# Patient Record
Sex: MALE | Race: WHITE | Employment: OTHER | URBAN - METROPOLITAN AREA
[De-identification: names, ages, dates, MRNs, and addresses within clinical notes are randomized per-mention and may not be internally consistent; named-entity substitution may affect disease eponyms.]

---

## 2017-11-17 PROBLEM — L03.116 CELLULITIS OF LEFT FOOT: Status: ACTIVE | Noted: 2017-11-17

## 2018-03-16 ENCOUNTER — HOSPITAL ENCOUNTER (OUTPATIENT)
Dept: WOUND CARE | Age: 73
Discharge: HOME | End: 2018-03-16
Payer: MEDICARE

## 2018-03-16 VITALS
RESPIRATION RATE: 18 BRPM | HEART RATE: 56 BPM | TEMPERATURE: 97.6 F | WEIGHT: 315 LBS | HEIGHT: 75 IN | BODY MASS INDEX: 39.17 KG/M2 | SYSTOLIC BLOOD PRESSURE: 150 MMHG | DIASTOLIC BLOOD PRESSURE: 84 MMHG

## 2018-03-16 DIAGNOSIS — M86.672 CHRONIC OSTEOMYELITIS OF LEFT FOOT (HCC): ICD-10-CM

## 2018-03-16 PROCEDURE — 11042 DBRDMT SUBQ TIS 1ST 20SQCM/<: CPT

## 2018-03-16 PROCEDURE — 11045 DBRDMT SUBQ TISS EACH ADDL: CPT

## 2018-03-16 NOTE — PLAN OF CARE
Problem: Pain:  Goal: Pain level will decrease  Pain level will decrease   Outcome: Ongoing    Goal: Control of acute pain  Control of acute pain   Outcome: Ongoing    Goal: Control of chronic pain  Control of chronic pain   Outcome: Ongoing      Problem: Falls - Risk of  Goal: Absence of falls  Outcome: Ongoing      Problem: Wound:  Goal: Will show signs of wound healing; wound closure and no evidence of infection  Will show signs of wound healing; wound closure and no evidence of infection   Outcome: Ongoing      Problem: Blood Glucose:  Goal: Ability to maintain appropriate glucose levels will improve  Ability to maintain appropriate glucose levels will improve   Outcome: Ongoing      Problem: Venous:  Goal: Signs of wound healing will improve  Signs of wound healing will improve   Outcome: Ongoing      Problem: Compression therapy:  Goal: Will be free from complications associated with compression therapy  Will be free from complications associated with compression therapy   Outcome: Ongoing

## 2018-03-16 NOTE — PROGRESS NOTES
64       Diabetic Ulcer 03/23/16 Foot Plantar;Left #1 lt plantar foot  onset 3/23/15   diabetic  wag 2 (Active)   Belén-wound Assessment Pink 3/16/2018 10:19 AM   Belén-Wound Moisture Macerated 2/16/2018  9:24 AM   Belén-Wound Color Red 11/17/2017 10:18 AM   Diabetic Wound - Ladell Owen 2 7/21/2017 10:33 AM   Wound Assessment Red;Pink 3/16/2018 10:19 AM   Wound Length (cm) 2.5 cm 3/16/2018 11:13 AM   Wound Width (cm) 2.1 cm 3/16/2018 11:13 AM   Wound Depth (cm)  0 3/16/2018 11:13 AM   Calculated Wound Size (cm^2) (l*w) 5.25 cm^2 3/16/2018 11:13 AM   Change in Wound Size % (l*w) -1400 3/16/2018 11:13 AM   Culture Taken Yes 11/10/2017 10:51 AM   Dressing Status Clean;Dry; Intact 3/9/2018 11:00 AM   Dressing Changed Changed/New 3/9/2018 11:00 AM   Dressing/Treatment Dry dressing 3/9/2018 11:00 AM   Wound Cleansed Rinsed/Irrigated with saline 3/9/2018 11:00 AM   Dressing Change Due 02/05/18 2/2/2018 11:48 AM   Granulation Quality Red 2/23/2018  9:33 AM   Drainage Amount Moderate 3/16/2018 10:19 AM   Drainage Description Yellow 3/16/2018 10:19 AM   Odor None 3/16/2018 10:19 AM   Undermining Starts ___ O'Clock 11 1/19/2018 11:09 AM   Undermining Ends___ O'Clock 2 1/19/2018 11:09 AM   Undermining Maxium Distance (cm) .5 1/19/2018 11:09 AM   [place]%Wound Bed 70 2/3/2017 11:21 AM   Red%Wound Bed 80 2/23/2018  9:33 AM   Yellow%Wound Bed 20 2/23/2018  9:33 AM   Exposed structure Muscle 7/14/2017 10:38 AM   Debridement per physician Subcutaneous 2/23/2018 10:09 AM   Time out Yes 3/16/2018 11:13 AM   Procedural Pain 0 3/16/2018 11:13 AM   Post procedural Pain 1 9/29/2017 11:12 AM   Op First Treatment Date 10/07/16 10/7/2016 11:11 AM   Number of days: 723       Diabetic Ulcer 07/14/17 Toe (Comment which one) Left;Plantar #11 left second toe see I DFU acq: 7-7-17 (Active)   Belén-wound Assessment Pink 3/16/2018 10:19 AM   Belén-Wound Moisture Intact 2/23/2018  9:33 AM   Diabetic Wound - Ladell Owen 1 10/13/2017 10:41 AM   Wound

## 2018-03-23 ENCOUNTER — HOSPITAL ENCOUNTER (OUTPATIENT)
Dept: WOUND CARE | Age: 73
Discharge: HOME | End: 2018-03-23
Payer: MEDICARE

## 2018-03-23 VITALS
DIASTOLIC BLOOD PRESSURE: 80 MMHG | TEMPERATURE: 97.7 F | RESPIRATION RATE: 18 BRPM | SYSTOLIC BLOOD PRESSURE: 130 MMHG | HEART RATE: 56 BPM

## 2018-03-23 DIAGNOSIS — M86.672 CHRONIC OSTEOMYELITIS OF LEFT FOOT (HCC): ICD-10-CM

## 2018-03-23 PROCEDURE — 11042 DBRDMT SUBQ TIS 1ST 20SQCM/<: CPT

## 2018-03-23 ASSESSMENT — PAIN DESCRIPTION - FREQUENCY: FREQUENCY: INTERMITTENT

## 2018-03-23 NOTE — PROGRESS NOTES
(ADVIL;MOTRIN) 600 MG tablet Take 1 tablet by mouth every 8 hours as needed 120 tablet 3    diltiazem (CARDIZEM) 120 MG tablet Take 300 mg by mouth daily       carbidopa-levodopa (SINEMET)  MG per tablet Take 1 tablet by mouth 2 times daily Patient takes both in evening      pioglitazone (ACTOS) 45 MG tablet Take 1 tablet by mouth daily. 30 tablet 3    doxazosin (CARDURA) 8 MG tablet Take 1 tablet by mouth daily. 30 tablet 3    hydrALAZINE (APRESOLINE) 25 MG tablet Take 1 tablet by mouth 3 times daily. 90 tablet 3    atorvastatin (LIPITOR) 10 MG tablet Take 20 mg by mouth daily       potassium chloride SA (K-DUR;KLOR-CON M) 20 MEQ tablet Take 1 tablet by mouth daily (with breakfast) (Patient taking differently: Take 20 mEq by mouth daily (with breakfast) Patient \"takes it with lasix when I feel bloated. \") 60 tablet 3    furosemide (LASIX) 40 MG tablet Take 1 tablet by mouth daily. (Patient taking differently: Take 40 mg by mouth daily as needed ) 60 tablet 3     No current facility-administered medications on file prior to encounter.         REVIEW OF SYSTEMS See HPI    Objective:    /80   Pulse 56   Temp 97.7 °F (36.5 °C) (Oral)   Resp 18   Wt Readings from Last 3 Encounters:   03/16/18 (!) 350 lb (158.8 kg)   03/09/18 (!) 350 lb (158.8 kg)   02/16/18 (!) 350 lb (158.8 kg)     PHYSICAL EXAM  CONSTITUTIONAL:   Awake, alert, cooperative   EYES:  lids and lashes normal   ENT: external ears and nose without lesions   NECK:  supple, symmetrical, trachea midline   SKIN:  Open wound Present    Assessment:     Patient Active Problem List   Diagnosis Code    DM (diabetes mellitus) ([address] E11.9    HTN (hypertension) I10    Hyperlipidemia E78.5    Non-pressure chronic ulcer of left lower leg with fat layer exposed ([address] L97.922    Non-pressure chronic ulcer of left lower leg with fat layer exposed ([address] L97.922    Non-pressure chronic ulcer of other part of left foot with unspecified severity ([address] Wound Size (cm^2) (l*w) 2 cm^2 3/23/2018 11:14 AM   Change in Wound Size % (l*w) 44.44 3/23/2018 11:14 AM   Dressing Status Changed 3/16/2018 12:15 PM   Dressing Changed Changed/New 3/16/2018 12:15 PM   Dressing/Treatment Dry dressing 3/16/2018 12:15 PM   Wound Cleansed Rinsed/Irrigated with saline 3/16/2018 12:15 PM   Dressing Change Due 02/05/18 2/2/2018 11:48 AM   Granulation Quality Pink 9/15/2017 10:57 AM   Drainage Amount Scant 3/23/2018 10:40 AM   Drainage Description Yellow 3/23/2018 10:40 AM   Odor None 3/23/2018 10:40 AM   Undermining Starts ___ O'Clock 6 12/29/2017 10:31 AM   Undermining Ends___ O'Clock 7 12/29/2017 10:31 AM   Undermining Maxium Distance (cm) 0.6 12/29/2017 10:31 AM   [place]%Wound Bed 70 11/17/2017 10:18 AM   Red%Wound Bed 50 8/25/2017 10:36 AM   Yellow%Wound Bed 30 11/17/2017 10:18 AM   Debridement per physician Subcutaneous 2/23/2018 10:09 AM   Time out Yes 3/23/2018 11:14 AM   Procedural Pain 0 3/23/2018 11:14 AM   Post procedural Pain 0 11/22/2017 12:12 PM   Number of days: 252     Percent of Wound/Ulcer Debrided: 100%    Total Surface Area Debrided:  27 sq cm     Estimated Blood Loss:  Minimal  Hemostasis Achieved:  by pressure    Procedural Pain:  2  / 10   Post Procedural Pain:  2 / 10     Response to treatment:  Well tolerated by patient. Plan:   Treatment Note please see attached Discharge Instructions    Written patient dismissal instructions given to patient and signed by patient or POA. Discharge Instructions       Visit Discharge/Physician Orders      Discharge condition: Stable      Assessment of pain at discharge; no      Anesthetic used: 2%LIDOCAINE GEL      Discharge to:home      Left via:Private automobile      Accompanied by: Danica Mantilla  ECF/[facility]: [address] .          Dressing Orders:  CLEANSE LEFT LEG AND FOOT INCLUDING ULCERS WITH CLORPACTIN SOLUTION,   apply vianey, TO ALL ULCERS,LEFT PLANTAR FOOT, DORSAL FOOT, LEFT LATERAL LEG AND LEFT TOES, MAY WEAVE BETWEEN TOES TO PREVENT MACERATION DRY DRESSING AND SECURE, KERLEX , COBAN WRAP . APPLY BETADINE TO DRY AREAS ON TOES ON LEFT FOOT, THEN NYSTATIN POWDER TO LEFT FOOT, AND LEG INCLUDING ULCERS. SECURE ALL DRESSINGS AND CHANGE daily  MAY APPLY DESITIN CREAM TO AREA AROUND ULCER ON LEFT LEG      Treatment Orders:FOLLOW NUTRITIOUS DIET. CHOOSE FOODS HIGH IN PROTEIN -CHICKEN- FISH-AND EGGS, CHOOSE FOODS HIGH IN VITAMIN C. MULTIVITAMIN DAILY.        NON WEIGHT BEARING ON LEFT FOOT--OK TO BEAR WEIGHT ON HEEL        PLEASE CALL [facility] WITH ANY PROBLEMS [phone]/  [facility]  OR [phone]  [address] followup visit ____1 WEEK_____DR RUSSO____FRIDAY______________  Jacek Scriver  (Please note your next appointment above and if you are unable to keep, kindly give a 24 hour notice.  Thank you.)      Physician signature:__________________________  Zeinab Mares  If you experience any of the following, please call the [address] during business hours:      * Increase in Pain  * Temperature over 101  * Increase in drainage from your wound  * Drainage with a foul odor  * Bleeding  * Increase in swelling  * Need for compression bandage changes due to slippage, breakthrough drainage.      If you need medical attention outside of the business hours of the [address] please contact your PCP or go to the nearest emergency room        Electronically signed by Edgardo Ortega DPM on 3/23/2018 at 11:20 AM

## 2018-04-06 ENCOUNTER — HOSPITAL ENCOUNTER (OUTPATIENT)
Dept: WOUND CARE | Age: 73
Discharge: HOME | End: 2018-04-06
Payer: MEDICARE

## 2018-04-06 VITALS
BODY MASS INDEX: 39.17 KG/M2 | HEIGHT: 75 IN | DIASTOLIC BLOOD PRESSURE: 72 MMHG | HEART RATE: 64 BPM | WEIGHT: 315 LBS | TEMPERATURE: 97.7 F | SYSTOLIC BLOOD PRESSURE: 136 MMHG | RESPIRATION RATE: 20 BRPM

## 2018-04-06 DIAGNOSIS — M86.672 CHRONIC OSTEOMYELITIS OF LEFT FOOT (HCC): ICD-10-CM

## 2018-04-06 PROCEDURE — 11042 DBRDMT SUBQ TIS 1ST 20SQCM/<: CPT

## 2018-04-13 ENCOUNTER — HOSPITAL ENCOUNTER (OUTPATIENT)
Dept: WOUND CARE | Age: 73
Discharge: HOME | End: 2018-04-13
Payer: MEDICARE

## 2018-04-13 VITALS
DIASTOLIC BLOOD PRESSURE: 70 MMHG | TEMPERATURE: 97.6 F | RESPIRATION RATE: 18 BRPM | HEART RATE: 60 BPM | BODY MASS INDEX: 39.17 KG/M2 | WEIGHT: 315 LBS | SYSTOLIC BLOOD PRESSURE: 148 MMHG | HEIGHT: 75 IN

## 2018-04-13 DIAGNOSIS — M86.672 CHRONIC OSTEOMYELITIS OF LEFT FOOT (HCC): ICD-10-CM

## 2018-04-13 PROCEDURE — 11042 DBRDMT SUBQ TIS 1ST 20SQCM/<: CPT

## 2018-04-20 ENCOUNTER — HOSPITAL ENCOUNTER (OUTPATIENT)
Dept: WOUND CARE | Age: 73
Discharge: HOME | End: 2018-04-20
Payer: MEDICARE

## 2018-04-20 VITALS
RESPIRATION RATE: 18 BRPM | SYSTOLIC BLOOD PRESSURE: 142 MMHG | DIASTOLIC BLOOD PRESSURE: 70 MMHG | HEART RATE: 72 BPM | TEMPERATURE: 97.6 F

## 2018-04-20 DIAGNOSIS — M86.672 CHRONIC OSTEOMYELITIS OF LEFT FOOT (HCC): ICD-10-CM

## 2018-04-20 PROCEDURE — 11042 DBRDMT SUBQ TIS 1ST 20SQCM/<: CPT

## 2018-04-27 ENCOUNTER — HOSPITAL ENCOUNTER (OUTPATIENT)
Dept: WOUND CARE | Age: 73
Discharge: HOME | End: 2018-04-27
Payer: MEDICARE

## 2018-04-27 VITALS
DIASTOLIC BLOOD PRESSURE: 80 MMHG | RESPIRATION RATE: 18 BRPM | SYSTOLIC BLOOD PRESSURE: 150 MMHG | HEART RATE: 68 BPM | TEMPERATURE: 97.9 F

## 2018-04-27 DIAGNOSIS — M86.672 CHRONIC OSTEOMYELITIS OF LEFT FOOT (HCC): ICD-10-CM

## 2018-04-27 PROCEDURE — 11042 DBRDMT SUBQ TIS 1ST 20SQCM/<: CPT

## 2018-05-04 ENCOUNTER — HOSPITAL ENCOUNTER (OUTPATIENT)
Dept: WOUND CARE | Age: 73
Discharge: HOME | End: 2018-05-04
Payer: MEDICARE

## 2018-05-04 VITALS
BODY MASS INDEX: 39.17 KG/M2 | SYSTOLIC BLOOD PRESSURE: 145 MMHG | RESPIRATION RATE: 18 BRPM | HEIGHT: 75 IN | HEART RATE: 86 BPM | TEMPERATURE: 97.8 F | WEIGHT: 315 LBS | DIASTOLIC BLOOD PRESSURE: 70 MMHG

## 2018-05-04 DIAGNOSIS — M86.672 CHRONIC OSTEOMYELITIS OF LEFT FOOT (HCC): ICD-10-CM

## 2018-05-04 PROCEDURE — 11042 DBRDMT SUBQ TIS 1ST 20SQCM/<: CPT

## 2018-05-04 ASSESSMENT — PAIN DESCRIPTION - PAIN TYPE: TYPE: CHRONIC PAIN

## 2018-05-04 ASSESSMENT — PAIN DESCRIPTION - PROGRESSION: CLINICAL_PROGRESSION: NOT CHANGED

## 2018-05-04 ASSESSMENT — PAIN SCALES - GENERAL: PAINLEVEL_OUTOF10: 0

## 2018-05-11 ENCOUNTER — HOSPITAL ENCOUNTER (OUTPATIENT)
Dept: WOUND CARE | Age: 73
Discharge: HOME | End: 2018-05-11
Payer: MEDICARE

## 2018-05-11 VITALS
RESPIRATION RATE: 16 BRPM | HEIGHT: 75 IN | DIASTOLIC BLOOD PRESSURE: 70 MMHG | TEMPERATURE: 97.9 F | HEART RATE: 68 BPM | WEIGHT: 315 LBS | SYSTOLIC BLOOD PRESSURE: 118 MMHG | BODY MASS INDEX: 39.17 KG/M2

## 2018-05-11 DIAGNOSIS — M86.672 CHRONIC OSTEOMYELITIS OF LEFT FOOT (HCC): ICD-10-CM

## 2018-05-11 PROCEDURE — 11042 DBRDMT SUBQ TIS 1ST 20SQCM/<: CPT

## 2018-05-18 ENCOUNTER — HOSPITAL ENCOUNTER (OUTPATIENT)
Dept: WOUND CARE | Age: 73
Discharge: HOME | End: 2018-05-18
Payer: MEDICARE

## 2018-05-18 VITALS
RESPIRATION RATE: 18 BRPM | HEART RATE: 72 BPM | DIASTOLIC BLOOD PRESSURE: 80 MMHG | SYSTOLIC BLOOD PRESSURE: 142 MMHG | TEMPERATURE: 98 F

## 2018-05-18 DIAGNOSIS — E11.621 DIABETIC ULCER OF LEFT MIDFOOT ASSOCIATED WITH TYPE 2 DIABETES MELLITUS, WITH FAT LAYER EXPOSED (HCC): ICD-10-CM

## 2018-05-18 DIAGNOSIS — M86.672 CHRONIC OSTEOMYELITIS OF LEFT FOOT (HCC): ICD-10-CM

## 2018-05-18 DIAGNOSIS — L97.422 DIABETIC ULCER OF LEFT MIDFOOT ASSOCIATED WITH TYPE 2 DIABETES MELLITUS, WITH FAT LAYER EXPOSED (HCC): ICD-10-CM

## 2018-05-18 PROCEDURE — 11042 DBRDMT SUBQ TIS 1ST 20SQCM/<: CPT

## 2018-06-08 ENCOUNTER — HOSPITAL ENCOUNTER (OUTPATIENT)
Dept: WOUND CARE | Age: 73
Discharge: HOME | End: 2018-06-08

## 2018-06-22 ENCOUNTER — HOSPITAL ENCOUNTER (OUTPATIENT)
Dept: WOUND CARE | Age: 73
Discharge: HOME | End: 2018-06-22
Payer: MEDICARE

## 2018-06-22 VITALS
BODY MASS INDEX: 39.17 KG/M2 | TEMPERATURE: 97.4 F | DIASTOLIC BLOOD PRESSURE: 66 MMHG | WEIGHT: 315 LBS | HEIGHT: 75 IN | RESPIRATION RATE: 18 BRPM | HEART RATE: 55 BPM | SYSTOLIC BLOOD PRESSURE: 130 MMHG

## 2018-06-22 DIAGNOSIS — L97.422 DIABETIC ULCER OF LEFT MIDFOOT ASSOCIATED WITH TYPE 2 DIABETES MELLITUS, WITH FAT LAYER EXPOSED (HCC): ICD-10-CM

## 2018-06-22 DIAGNOSIS — M86.672 CHRONIC OSTEOMYELITIS OF LEFT FOOT (HCC): ICD-10-CM

## 2018-06-22 DIAGNOSIS — E11.621 DIABETIC ULCER OF LEFT MIDFOOT ASSOCIATED WITH TYPE 2 DIABETES MELLITUS, WITH FAT LAYER EXPOSED (HCC): ICD-10-CM

## 2018-06-22 PROCEDURE — 11042 DBRDMT SUBQ TIS 1ST 20SQCM/<: CPT

## 2018-06-22 ASSESSMENT — PAIN DESCRIPTION - PROGRESSION: CLINICAL_PROGRESSION: NOT CHANGED

## 2018-06-22 ASSESSMENT — PAIN SCALES - GENERAL: PAINLEVEL_OUTOF10: 0

## 2018-06-29 ENCOUNTER — HOSPITAL ENCOUNTER (OUTPATIENT)
Dept: WOUND CARE | Age: 73
Discharge: HOME | End: 2018-06-29
Payer: MEDICARE

## 2018-06-29 VITALS
WEIGHT: 315 LBS | RESPIRATION RATE: 18 BRPM | DIASTOLIC BLOOD PRESSURE: 82 MMHG | SYSTOLIC BLOOD PRESSURE: 160 MMHG | BODY MASS INDEX: 39.17 KG/M2 | HEART RATE: 72 BPM | HEIGHT: 75 IN | TEMPERATURE: 98 F

## 2018-06-29 DIAGNOSIS — E11.621 DIABETIC ULCER OF LEFT MIDFOOT ASSOCIATED WITH TYPE 2 DIABETES MELLITUS, WITH FAT LAYER EXPOSED (HCC): ICD-10-CM

## 2018-06-29 DIAGNOSIS — M86.672 CHRONIC OSTEOMYELITIS OF LEFT FOOT (HCC): ICD-10-CM

## 2018-06-29 DIAGNOSIS — L97.422 DIABETIC ULCER OF LEFT MIDFOOT ASSOCIATED WITH TYPE 2 DIABETES MELLITUS, WITH FAT LAYER EXPOSED (HCC): ICD-10-CM

## 2018-06-29 PROCEDURE — 11042 DBRDMT SUBQ TIS 1ST 20SQCM/<: CPT

## 2018-07-06 ENCOUNTER — HOSPITAL ENCOUNTER (OUTPATIENT)
Dept: WOUND CARE | Age: 73
Discharge: HOME | End: 2018-07-06
Payer: MEDICARE

## 2018-07-06 VITALS
BODY MASS INDEX: 39.17 KG/M2 | RESPIRATION RATE: 18 BRPM | TEMPERATURE: 97.9 F | HEIGHT: 75 IN | SYSTOLIC BLOOD PRESSURE: 110 MMHG | DIASTOLIC BLOOD PRESSURE: 58 MMHG | WEIGHT: 315 LBS | HEART RATE: 68 BPM

## 2018-07-06 DIAGNOSIS — L97.422 DIABETIC ULCER OF LEFT MIDFOOT ASSOCIATED WITH TYPE 2 DIABETES MELLITUS, WITH FAT LAYER EXPOSED (HCC): ICD-10-CM

## 2018-07-06 DIAGNOSIS — M86.672 CHRONIC OSTEOMYELITIS OF LEFT FOOT (HCC): ICD-10-CM

## 2018-07-06 DIAGNOSIS — E11.621 DIABETIC ULCER OF LEFT MIDFOOT ASSOCIATED WITH TYPE 2 DIABETES MELLITUS, WITH FAT LAYER EXPOSED (HCC): ICD-10-CM

## 2018-07-06 PROCEDURE — 11042 DBRDMT SUBQ TIS 1ST 20SQCM/<: CPT

## 2018-07-06 PROCEDURE — 11045 DBRDMT SUBQ TISS EACH ADDL: CPT

## 2018-07-06 NOTE — PROGRESS NOTES
(ADVIL;MOTRIN) 600 MG tablet Take 1 tablet by mouth every 8 hours as needed 120 tablet 3    potassium chloride SA (K-DUR;KLOR-CON M) 20 MEQ tablet Take 1 tablet by mouth daily (with breakfast) (Patient taking differently: Take 20 mEq by mouth daily (with breakfast) Patient \"takes it with lasix when I feel bloated. \") 60 tablet 3    diltiazem (CARDIZEM) 120 MG tablet Take 300 mg by mouth daily       carbidopa-levodopa (SINEMET)  MG per tablet Take 1 tablet by mouth 2 times daily Patient takes both in evening      pioglitazone (ACTOS) 45 MG tablet Take 1 tablet by mouth daily. 30 tablet 3    doxazosin (CARDURA) 8 MG tablet Take 1 tablet by mouth daily. 30 tablet 3    hydrALAZINE (APRESOLINE) 25 MG tablet Take 1 tablet by mouth 3 times daily. 90 tablet 3    furosemide (LASIX) 40 MG tablet Take 1 tablet by mouth daily. (Patient taking differently: Take 40 mg by mouth daily as needed ) 60 tablet 3    atorvastatin (LIPITOR) 10 MG tablet Take 20 mg by mouth daily        No current facility-administered medications on file prior to encounter.         REVIEW OF SYSTEMS See HPI    Objective:    BP (!) 110/58   Pulse 68   Temp 97.9 °F (36.6 °C) (Oral)   Resp 18   Ht 6' 3\" (1.905 m)   Wt (!) 350 lb (158.8 kg)   BMI 43.75 kg/m²   Wt Readings from Last 3 Encounters:   07/06/18 (!) 350 lb (158.8 kg)   06/29/18 (!) 350 lb (158.8 kg)   06/22/18 (!) 350 lb (158.8 kg)     PHYSICAL EXAM  CONSTITUTIONAL:   Awake, alert, cooperative   EYES:  lids and lashes normal   ENT: external ears and nose without lesions   NECK:  supple, symmetrical, trachea midline   SKIN:  Open wound Present    Assessment:     Problem List Items Addressed This Visit     Diabetic foot ulcer associated with type 2 diabetes mellitus, with fat layer exposed ([address]    Chronic osteomyelitis of left foot ([address]        Procedure Note  Indications:  Based on my examination of this patient's wound(s)/ulcer(s) today, debridement is required to promote healing and evaluate the wound base. Performed by: Merline Byers DPM    Consent obtained:  Yes    Time out taken:  Yes    Pain Control: Anesthetic  Anesthetic: 2% Lidocaine Gel Topical     Debridement:Excisional Debridement    Using curette the wound(s)/ulcer(s) was/were sharply debrided down through and including the removal of subcutaneous tissue. Devitalized Tissue Debrided:  fibrin, biofilm, slough and necrotic/eschar to stimulate bleeding to promote healing, post debridement good bleeding base and wound edges noted    Pre Debridement Measurements:  Are located in the Wound/Ulcer Documentation Flow Sheet    Wound/Ulcer #: 1, 2 and 3    Post Debridement Measurements:  Wound/Ulcer Descriptions are Pre Debridement except measurements:    Wound 03/23/16 Venous ulcer Leg Left;Posterior #3 lt lateral calf  onset 2/1/16  venous;  (Active)   Wound Image   6/29/2018  8:54 AM   Wound Venous 10/13/2017 10:41 AM   Dressing Status Clean;Dry; Intact 6/29/2018  9:28 AM   Dressing Changed Changed/New 6/29/2018  9:28 AM   Dressing/Treatment Dry dressing 6/29/2018  9:28 AM   Wound Cleansed Wound cleanser 6/29/2018  9:28 AM   Dressing Change Due 02/05/18 2/2/2018 11:48 AM   Wound Length (cm) 3.5 cm 7/6/2018  8:23 AM   Wound Width (cm) 6 cm 7/6/2018  8:23 AM   Wound Depth (cm)  0.2 7/6/2018  8:23 AM   Calculated Wound Size (cm^2) (l*w) 21 cm^2 7/6/2018  8:23 AM   Change in Wound Size % (l*w) 90 7/6/2018  8:23 AM   Wound Assessment Pink;Yellow; White;Gray 7/6/2018  8:23 AM   Drainage Amount Large 7/6/2018  8:23 AM   Drainage Description [place];Yellow 7/6/2018  8:23 AM   Odor Mild 7/6/2018  8:23 AM   Belén-wound Assessment Maceration;Gray 7/6/2018  8:23 AM   [place]%Wound Bed 60 1/19/2018 11:09 AM   Red%Wound Bed 70 2/23/2018  9:33 AM   Yellow%Wound Bed 30 2/23/2018  9:33 AM   Culture Taken Yes 3/9/2018 10:36 AM   Debridement per physician Subcutaneous 2/23/2018 10:09 AM   Time out Yes 6/29/2018  9:18 AM   Procedural Pain 0 5/18/2018  9:22 AM   Post procedural Pain 1 12/22/2017 11:19 AM   Number of days: 509       Wound 06/29/18 Other (Comment) Toe (Comment  which one) Left;Plantar;Other (Comment) #19 Tilley II second toe acq: 6-25-18 (Active)   Wound Image   6/29/2018  9:05 AM   Dressing Status Clean;Dry; Intact 6/29/2018  9:28 AM   Dressing Changed Changed/New 6/29/2018  9:28 AM   Dressing/Treatment Dry dressing 6/29/2018  9:28 AM   Wound Cleansed Wound cleanser 6/29/2018  9:28 AM   Wound Length (cm) 1.8 cm 7/6/2018  8:23 AM   Wound Width (cm) 2 cm 7/6/2018  8:23 AM   Wound Depth (cm)  0.1 7/6/2018  8:23 AM   Calculated Wound Size (cm^2) (l*w) 3.6 cm^2 7/6/2018  8:23 AM   Change in Wound Size % (l*w) -20 7/6/2018  8:23 AM   Wound Assessment Yola Favorite 7/6/2018  8:23 AM   Drainage Amount Large 7/6/2018  8:23 AM   Drainage Description Yellow;Green 7/6/2018  8:23 AM   Odor Mild 7/6/2018  8:23 AM   Belén-wound Assessment Maceration;Gray 7/6/2018  8:23 AM   Time out Yes 6/29/2018  9:18 AM   Number of days: 6       Diabetic Ulcer 03/23/16 Foot Plantar;Left #1 lt plantar foot  onset 3/23/15   diabetic  wag 2 (Active)   Belén-wound Assessment Maceration 7/6/2018  8:23 AM   Belén-Wound Moisture Denuded 6/22/2018  8:20 AM   Belén-Wound Color White 6/22/2018  8:20 AM   Diabetic Wound - Starlene Desanctis 2 7/21/2017 10:33 AM   Wound Assessment White;Yellow;Gray 7/6/2018  8:23 AM   Wound Length (cm) 1.8 cm 7/6/2018  8:23 AM   Wound Width (cm) 2.2 cm 7/6/2018  8:23 AM   Wound Depth (cm)  0.3 7/6/2018  8:23 AM   Calculated Wound Size (cm^2) (l*w) 3.96 cm^2 7/6/2018  8:23 AM   Change in Wound Size % (l*w) -1031.43 7/6/2018  8:23 AM   Culture Taken Yes 11/10/2017 10:51 AM   Dressing Status Clean;Dry; Intact 6/29/2018  9:28 AM   Dressing Changed Changed/New 6/29/2018  9:28 AM   Dressing/Treatment Dry dressing 6/29/2018  9:28 AM   Wound Cleansed Wound cleanser 6/29/2018  9:28 AM   Dressing Change Due 02/05/18 2/2/2018 11:48 AM   Granulation Quality Red 6/22/2018  8:20

## 2018-07-13 ENCOUNTER — HOSPITAL ENCOUNTER (OUTPATIENT)
Dept: WOUND CARE | Age: 73
Discharge: HOME | End: 2018-07-13
Payer: MEDICARE

## 2018-07-13 VITALS
TEMPERATURE: 97.6 F | HEIGHT: 75 IN | DIASTOLIC BLOOD PRESSURE: 68 MMHG | HEART RATE: 64 BPM | SYSTOLIC BLOOD PRESSURE: 138 MMHG | BODY MASS INDEX: 39.17 KG/M2 | RESPIRATION RATE: 18 BRPM | WEIGHT: 315 LBS

## 2018-07-13 DIAGNOSIS — M86.672 CHRONIC OSTEOMYELITIS OF LEFT FOOT (HCC): ICD-10-CM

## 2018-07-13 PROCEDURE — 11042 DBRDMT SUBQ TIS 1ST 20SQCM/<: CPT

## 2018-07-13 PROCEDURE — 11045 DBRDMT SUBQ TISS EACH ADDL: CPT

## 2018-07-13 NOTE — FLOWSHEET NOTE
Pt had maggots on left lateral leg when dressing was removed, maggots cleansed off of wound and vashe applied to area.

## 2018-07-13 NOTE — PROGRESS NOTES
[facility] Followup Visit Note    Referring Physician : Lucille Castleman, MD  Kenn Chahal  MEDICAL RECORD NUMBER:  [id number]  AGE: 72 y.o. GENDER: male  : 1945  EPISODE DATE:  2018    Subjective:     Chief Complaint   Patient presents with    Wound Check     left plantar, left lateral leg      HISTORY of PRESENT ILLNESS WAYNE Chahal is a 72 y.o. male who presents today for wound/ulcer evaluation.    History of Wound Context:  3 years     Wound/Ulcer Pain Timing/Severity: intermittent  Quality of pain: aching  Severity:  2 / 10   Modifying Factors: Pain worsens with walking  Associated Signs/Symptoms: edema, erythema, drainage and magots    Ulcer Identification:  Ulcer Type: venous and diabetic  Contributing Factors: edema, venous stasis, lymphedema, diabetes and poor glucose control    Diabetic/Pressure/Non Pressure Ulcers only:  Ulcer: Diabetic ulcer, fat layer exposed    Wound: N/A        PAST MEDICAL HISTORY      Diagnosis Date    Diabetes mellitus ([address]     Diabetic foot ulcer associated with type 2 diabetes mellitus, with fat layer exposed ([address] 2016    Hyperlipidemia     Hypertension      Past Surgical History:   Procedure Laterality Date    FOOT AMPUTATION      FOOT SURGERY Left 16    I&D    OTHER SURGICAL HISTORY  2014    left foot debridement, I&D exostectomy, bone biopsy; I&D right lower leg(BK stump site)    [facility]      has been shut off for 5 years     Family History   Problem Relation Age of Onset    Diabetes Mother      Social History   Substance Use Topics    Smoking status: Former Smoker    Smokeless tobacco: Current User     Types: Snuff    Alcohol use No     Allergies   Allergen Reactions    Zosyn [Piperacillin Sod-Tazobactam So] Itching    Morphine Rash    Vancomycin Rash     Current Outpatient Prescriptions on File Prior to Encounter   Medication Sig Dispense Refill    oxychlorosene (CLORPACTIN) powder Apply Jose Estevez DPM    Consent obtained:  Yes    Time out taken:  Yes    Pain Control: Anesthetic  Anesthetic: 2% Lidocaine Gel Topical     Debridement:Excisional Debridement    Using #15 blade scalpel the wound(s)/ulcer(s) was/were sharply debrided down through and including the removal of subcutaneous tissue. Devitalized Tissue Debrided:  fibrin, biofilm, slough and necrotic/eschar to stimulate bleeding to promote healing, post debridement good bleeding base and wound edges noted    Pre Debridement Measurements:  Are located in the Wound/Ulcer Documentation Flow Sheet    Wound/Ulcer #: 1, 2 and 3    Post Debridement Measurements:  Wound/Ulcer Descriptions are Pre Debridement except measurements:    Wound 03/23/16 Venous ulcer Leg Left;Posterior #3 lt lateral calf  onset 2/1/16  venous;  (Active)   Wound Image   6/29/2018  8:54 AM   Wound Venous 10/13/2017 10:41 AM   Dressing Status Clean;Dry; Intact 7/6/2018  9:03 AM   Dressing Changed Changed/New 7/6/2018  9:03 AM   Dressing/Treatment Dry dressing 7/6/2018  9:03 AM   Wound Cleansed Wound cleanser 7/6/2018  9:03 AM   Dressing Change Due 02/05/18 2/2/2018 11:48 AM   Wound Length (cm) 9.5 cm 7/13/2018  9:28 AM   Wound Width (cm) 6.9 cm 7/13/2018  9:28 AM   Wound Depth (cm)  0.2 7/13/2018  9:28 AM   Calculated Wound Size (cm^2) (l*w) 65.55 cm^2 7/13/2018  9:28 AM   Change in Wound Size % (l*w) 68.79 7/13/2018  9:28 AM   Wound Assessment Pink;Brown;Gray 7/13/2018  8:53 AM   Drainage Amount Large 7/13/2018  8:53 AM   Drainage Description Yellow;Green 7/13/2018  8:53 AM   Odor None 7/13/2018  8:53 AM   Belén-wound Assessment Maceration 7/13/2018  8:53 AM   [place]%Wound Bed 60 1/19/2018 11:09 AM   Red%Wound Bed 70 2/23/2018  9:33 AM   Yellow%Wound Bed 30 2/23/2018  9:33 AM   Culture Taken Yes 3/9/2018 10:36 AM   Debridement per physician Subcutaneous 2/23/2018 10:09 AM   Time out Yes 7/13/2018  9:28 AM   Procedural Pain 0 5/18/2018  9:22 AM   Post procedural Pain 1 12/22/2017 11:19 AM   Number of days: 841       Wound 06/29/18 Other (Comment) Toe (Comment  which one) Left;Plantar;Other (Comment) #19 Tilley II second toe acq: 6-25-18 (Active)   Wound Image   6/29/2018  9:05 AM   Dressing Status Clean;Dry; Intact 7/6/2018  9:03 AM   Dressing Changed Changed/New 7/6/2018  9:03 AM   Dressing/Treatment Dry dressing 7/6/2018  9:03 AM   Wound Cleansed Wound cleanser 7/6/2018  9:03 AM   Wound Length (cm) 2 cm 7/13/2018  9:28 AM   Wound Width (cm) 2.5 cm 7/13/2018  9:28 AM   Wound Depth (cm)  0.3 7/13/2018  9:28 AM   Calculated Wound Size (cm^2) (l*w) 5 cm^2 7/13/2018  9:28 AM   Change in Wound Size % (l*w) -66.67 7/13/2018  9:28 AM   Wound Assessment Pink;Yellow 7/13/2018  8:53 AM   Drainage Amount Large 7/13/2018  8:53 AM   Drainage Description Green 7/13/2018  8:53 AM   Odor None 7/13/2018  8:53 AM   Belén-wound Assessment Maceration;Pink 7/13/2018  8:53 AM   Time out Yes 7/13/2018  9:28 AM   Number of days: 14       Diabetic Ulcer 03/23/16 Foot Plantar;Left #1 lt plantar foot  onset 3/23/15   diabetic  wag 2 (Active)   Bleén-wound Assessment [place] 7/13/2018  8:53 AM   Belén-Wound Moisture Denuded 6/22/2018  8:20 AM   Bel[place]-Wound Color White 6/22/2018  8:20 AM   Diabetic Wound - Shelvia Lips 2 7/21/2017 10:33 AM   Wound Assessment Pink;Red;Yellow 7/13/2018  8:53 AM   Wound Length (cm) 1.2 cm 7/13/2018  9:28 AM   Wound Width (cm) 2 cm 7/13/2018  9:28 AM   Wound Depth (cm)  0.3 7/13/2018  9:28 AM   Calculated Wound Size (cm^2) (l*w) 2.4 cm^2 7/13/2018  9:28 AM   Change in Wound Size % (l*w) -585.71 7/13/2018  9:28 AM   Culture Taken Yes 11/10/2017 10:51 AM   Dressing Status Clean;Dry; Intact 7/6/2018  9:03 AM   Dressing Changed Changed/New 7/6/2018  9:03 AM   Dressing/Treatment Dry dressing 7/6/2018  9:03 AM   Wound Cleansed Wound cleanser 7/6/2018  9:03 AM   Dressing Change Due 02/05/18 2/2/2018 11:48 AM   Granulation Quality Red 6/22/2018  8:20 AM   Drainage Amount Moderate 7/13/2018  8:53 AM EGGS, CHOOSE FOODS HIGH IN VITAMIN C. MULTIVITAMIN DAILY.        NON WEIGHT BEARING ON LEFT FOOT--OK TO BEAR WEIGHT ON HEEL        PLEASE CALL [facility] WITH ANY PROBLEMS [phone]/  [facility]  OR [phone]  [facility]      WILL ORDER SUPPLIES FROM [address] followup visit ____one week____FRIDAY_  Andrea Dubose  (Please note your next appointment above and if you are unable to keep, kindly give a 24 hour notice.  Thank you.)      Physician signature:__________________________  Dior Turner  If you experience any of the following, please call the [address] during business hours:      * Increase in Pain  * Temperature over 101  * Increase in drainage from your wound  * Drainage with a foul odor  * Bleeding  * Increase in swelling  * Need for compression bandage changes due to slippage, breakthrough drainage.      If you need medical attention outside of the business hours of the [address] please contact your PCP or go to the nearest emergency room        Electronically signed by Gaby Hodges DPM on 7/13/2018 at 9:33 AM

## 2018-07-20 ENCOUNTER — HOSPITAL ENCOUNTER (OUTPATIENT)
Dept: WOUND CARE | Age: 73
Discharge: HOME | End: 2018-07-20
Payer: MEDICARE

## 2018-07-20 ENCOUNTER — HOSPITAL ENCOUNTER (OUTPATIENT)
Age: 73
Discharge: HOME | End: 2018-07-22
Payer: MEDICARE

## 2018-07-20 VITALS
RESPIRATION RATE: 18 BRPM | DIASTOLIC BLOOD PRESSURE: 58 MMHG | WEIGHT: 315 LBS | HEIGHT: 75 IN | SYSTOLIC BLOOD PRESSURE: 152 MMHG | TEMPERATURE: 98.2 F | HEART RATE: 72 BPM | BODY MASS INDEX: 39.17 KG/M2

## 2018-07-20 DIAGNOSIS — M86.672 CHRONIC OSTEOMYELITIS OF LEFT FOOT (HCC): ICD-10-CM

## 2018-07-20 PROCEDURE — 87070 CULTURE OTHR SPECIMN AEROBIC: CPT

## 2018-07-20 PROCEDURE — 11045 DBRDMT SUBQ TISS EACH ADDL: CPT

## 2018-07-20 PROCEDURE — 11042 DBRDMT SUBQ TIS 1ST 20SQCM/<: CPT

## 2018-07-20 PROCEDURE — 87205 SMEAR GRAM STAIN: CPT

## 2018-07-20 PROCEDURE — 87075 CULTR BACTERIA EXCEPT BLOOD: CPT

## 2018-07-20 NOTE — PLAN OF CARE
Problem: Falls - Risk of  Goal: Absence of falls  Outcome: Ongoing      Problem: Wound:  Goal: Will show signs of wound healing; wound closure and no evidence of infection  Will show signs of wound healing; wound closure and no evidence of infection   Outcome: Ongoing      Problem: Blood Glucose:  Goal: Ability to maintain appropriate glucose levels will improve  Ability to maintain appropriate glucose levels will improve   Outcome: Ongoing      Problem: Venous:  Goal: Signs of wound healing will improve  Signs of wound healing will improve   Outcome: Ongoing      Problem: Compression therapy:  Goal: Will be free from complications associated with compression therapy  Will be free from complications associated with compression therapy   Outcome: Ongoing

## 2018-07-20 NOTE — PROGRESS NOTES
tablet Take 1 tablet by mouth every 8 hours as needed 120 tablet 3    potassium chloride SA (K-DUR;KLOR-CON M) 20 MEQ tablet Take 1 tablet by mouth daily (with breakfast) (Patient taking differently: Take 20 mEq by mouth daily (with breakfast) Patient \"takes it with lasix when I feel bloated. \") 60 tablet 3    diltiazem (CARDIZEM) 120 MG tablet Take 300 mg by mouth daily       carbidopa-levodopa (SINEMET)  MG per tablet Take 1 tablet by mouth 2 times daily Patient takes both in evening      pioglitazone (ACTOS) 45 MG tablet Take 1 tablet by mouth daily. 30 tablet 3    doxazosin (CARDURA) 8 MG tablet Take 1 tablet by mouth daily. 30 tablet 3    hydrALAZINE (APRESOLINE) 25 MG tablet Take 1 tablet by mouth 3 times daily. 90 tablet 3    furosemide (LASIX) 40 MG tablet Take 1 tablet by mouth daily. (Patient taking differently: Take 40 mg by mouth daily as needed ) 60 tablet 3    atorvastatin (LIPITOR) 10 MG tablet Take 20 mg by mouth daily        No current facility-administered medications on file prior to encounter. REVIEW OF SYSTEMS See HPI    Objective:    BP (!) 152/58   Pulse 72   Temp 98.2 °F (36.8 °C) (Oral)   Resp 18   Ht 6' 3\" (1.905 m)   Wt (!) 350 lb (158.8 kg)   BMI 43.75 kg/m²   Wt Readings from Last 3 Encounters:   07/20/18 (!) 350 lb (158.8 kg)   07/13/18 (!) 350 lb (158.8 kg)   07/06/18 (!) 350 lb (158.8 kg)     PHYSICAL EXAM  CONSTITUTIONAL:   Awake, alert, cooperative   EYES:  lids and lashes normal   ENT: external ears and nose without lesions   NECK:  supple, symmetrical, trachea midline   SKIN:  Open wound Present    Assessment:     Problem List Items Addressed This Visit     Chronic osteomyelitis of left foot ([address]        Procedure Note  Indications:  Based on my examination of this patient's wound(s)/ulcer(s) today, debridement is required to promote healing and evaluate the wound base.     Performed by: Hank Saul DPM    Consent obtained:  Yes    Time out taken: Yes    Pain Control: Anesthetic  Anesthetic: 2% Lidocaine Gel Topical     Debridement:Excisional Debridement    Using #15 blade scalpel the wound(s)/ulcer(s) was/were sharply debrided down through and including the removal of subcutaneous tissue. Devitalized Tissue Debrided:  fibrin, biofilm, slough and necrotic/eschar to stimulate bleeding to promote healing, post debridement good bleeding base and wound edges noted    Pre Debridement Measurements:  Are located in the Wound/Ulcer Documentation Flow Sheet    Wound/Ulcer #: 1, 2 and 3    Post Debridement Measurements:  Wound/Ulcer Descriptions are Pre Debridement except measurements:    Wound 03/23/16 Venous ulcer Leg Left;Posterior #3 lt lateral calf  onset 2/1/16  venous;  (Active)   Wound Image   6/29/2018  8:54 AM   Wound Venous 10/13/2017 10:41 AM   Dressing Status Clean;Dry; Intact 7/13/2018  9:35 AM   Dressing Changed Changed/New 7/13/2018  9:35 AM   Dressing/Treatment Dry dressing 7/13/2018  9:35 AM   Wound Cleansed Wound cleanser 7/13/2018  9:35 AM   Dressing Change Due 02/05/18 2/2/2018 11:48 AM   Wound Length (cm) 11.8 cm 7/20/2018 10:00 AM   Wound Width (cm) 7.9 cm 7/20/2018 10:00 AM   Wound Depth (cm)  0.2 7/20/2018 10:00 AM   Calculated Wound Size (cm^2) (l*w) 93.22 cm^2 7/20/2018 10:00 AM   Change in Wound Size % (l*w) 55.61 7/20/2018 10:00 AM   Wound Assessment Pale;Pink;Red 7/20/2018  8:52 AM   Drainage Amount Copious 7/20/2018  8:52 AM   Drainage Description Green;Yellow 7/20/2018  8:52 AM   Odor None 7/20/2018  8:52 AM   Belén-wound Assessment Maceration 7/20/2018  8:52 AM   [place]%Wound Bed 60 1/19/2018 11:09 AM   Red%Wound Bed 70 2/23/2018  9:33 AM   Yellow%Wound Bed 30 2/23/2018  9:33 AM   Culture Taken Yes 3/9/2018 10:36 AM   Debridement per physician Subcutaneous 2/23/2018 10:09 AM   Time out Yes 7/20/2018 10:00 AM   Procedural Pain 0 5/18/2018  9:22 AM   Post procedural Pain 1 12/22/2017 11:19 AM   Number of days: 291       Wound

## 2018-07-24 LAB
ANAEROBIC CULTURE: ABNORMAL
ANAEROBIC CULTURE: ABNORMAL
ORGANISM: ABNORMAL

## 2018-07-27 ENCOUNTER — APPOINTMENT (OUTPATIENT)
Dept: ULTRASOUND IMAGING | Age: 73
DRG: 623 | End: 2018-07-27
Payer: MEDICARE

## 2018-07-27 ENCOUNTER — APPOINTMENT (OUTPATIENT)
Dept: GENERAL RADIOLOGY | Age: 73
DRG: 623 | End: 2018-07-27
Payer: MEDICARE

## 2018-07-27 ENCOUNTER — APPOINTMENT (OUTPATIENT)
Dept: CT IMAGING | Age: 73
DRG: 623 | End: 2018-07-27
Payer: MEDICARE

## 2018-07-27 ENCOUNTER — HOSPITAL ENCOUNTER (INPATIENT)
Age: 73
LOS: 5 days | Discharge: NURSING, REHAB OR LONG-TERM CARE | DRG: 623 | End: 2018-08-01
Attending: EMERGENCY MEDICINE | Admitting: HOSPITALIST
Payer: MEDICARE

## 2018-07-27 ENCOUNTER — HOSPITAL ENCOUNTER (OUTPATIENT)
Dept: WOUND CARE | Age: 73
Discharge: HOME | DRG: 623 | End: 2018-07-27
Payer: MEDICARE

## 2018-07-27 VITALS
HEART RATE: 72 BPM | DIASTOLIC BLOOD PRESSURE: 80 MMHG | RESPIRATION RATE: 16 BRPM | WEIGHT: 315 LBS | HEIGHT: 75 IN | TEMPERATURE: 98.2 F | SYSTOLIC BLOOD PRESSURE: 146 MMHG | BODY MASS INDEX: 39.17 KG/M2

## 2018-07-27 DIAGNOSIS — L02.416 CELLULITIS AND ABSCESS OF LEFT LEG: Primary | ICD-10-CM

## 2018-07-27 DIAGNOSIS — L03.116 CELLULITIS AND ABSCESS OF LEFT LEG: Primary | ICD-10-CM

## 2018-07-27 DIAGNOSIS — Z78.9 FAILURE OF OUTPATIENT TREATMENT: ICD-10-CM

## 2018-07-27 DIAGNOSIS — M86.672 CHRONIC OSTEOMYELITIS OF LEFT FOOT (HCC): ICD-10-CM

## 2018-07-27 LAB
ALBUMIN SERPL-MCNC: 3.8 G/DL (ref 3.5–5.2)
ALP BLD-CCNC: 145 U/L (ref 40–129)
ALT SERPL-CCNC: 11 U/L (ref 0–40)
ANION GAP SERPL CALCULATED.3IONS-SCNC: 14 MMOL/L (ref 7–16)
ANTISTREPTOLYSIN-O: 540 IU/ML (ref 0–200)
AST SERPL-CCNC: 17 U/L (ref 0–39)
BACTERIA: NORMAL /HPF
BASOPHILS ABSOLUTE: 0 E9/L (ref 0–0.2)
BASOPHILS RELATIVE PERCENT: 0 % (ref 0–2)
BILIRUB SERPL-MCNC: 1.1 MG/DL (ref 0–1.2)
BILIRUBIN URINE: NEGATIVE
BLOOD, URINE: ABNORMAL
BUN BLDV-MCNC: 20 MG/DL (ref 8–23)
C-REACTIVE PROTEIN: 18.3 MG/DL (ref 0–0.4)
CALCIUM SERPL-MCNC: 8.8 MG/DL (ref 8.6–10.2)
CASTS: NORMAL /LPF
CHLORIDE BLD-SCNC: 98 MMOL/L (ref 98–107)
CLARITY: CLEAR
CO2: 25 MMOL/L (ref 22–29)
COLOR: YELLOW
CREAT SERPL-MCNC: 1.1 MG/DL (ref 0.7–1.2)
EOSINOPHILS ABSOLUTE: 0.09 E9/L (ref 0.05–0.5)
EOSINOPHILS RELATIVE PERCENT: 1 % (ref 0–6)
EPITHELIAL CELLS, UA: NORMAL /HPF
GFR AFRICAN AMERICAN: >60
GFR NON-AFRICAN AMERICAN: >60 ML/MIN/1.73
GLUCOSE BLD-MCNC: 124 MG/DL (ref 74–109)
GLUCOSE URINE: NEGATIVE MG/DL
HCT VFR BLD CALC: 33.6 % (ref 37–54)
HEMOGLOBIN: 11 G/DL (ref 12.5–16.5)
KETONES, URINE: NEGATIVE MG/DL
LACTIC ACID, SEPSIS: 1.1 MMOL/L (ref 0.5–1.9)
LACTIC ACID, SEPSIS: 1.1 MMOL/L (ref 0.5–1.9)
LEUKOCYTE ESTERASE, URINE: NEGATIVE
LIPASE: 37 U/L (ref 13–60)
LYMPHOCYTES ABSOLUTE: 0.18 E9/L (ref 1.5–4)
LYMPHOCYTES RELATIVE PERCENT: 2 % (ref 20–42)
MAGNESIUM: 2 MG/DL (ref 1.6–2.6)
MCH RBC QN AUTO: 28.6 PG (ref 26–35)
MCHC RBC AUTO-ENTMCNC: 32.7 % (ref 32–34.5)
MCV RBC AUTO: 87.5 FL (ref 80–99.9)
METER GLUCOSE: 215 MG/DL (ref 70–110)
MONOCYTES ABSOLUTE: 0.62 E9/L (ref 0.1–0.95)
MONOCYTES RELATIVE PERCENT: 7 % (ref 2–12)
NEUTROPHILS ABSOLUTE: 8.01 E9/L (ref 1.8–7.3)
NEUTROPHILS RELATIVE PERCENT: 90 % (ref 43–80)
NITRITE, URINE: NEGATIVE
PDW BLD-RTO: 15.5 FL (ref 11.5–15)
PH UA: 5.5 (ref 5–9)
PLATELET # BLD: 193 E9/L (ref 130–450)
PMV BLD AUTO: 9.9 FL (ref 7–12)
POTASSIUM REFLEX MAGNESIUM: 3.5 MMOL/L (ref 3.5–5)
PROTEIN UA: 100 MG/DL
RBC # BLD: 3.84 E12/L (ref 3.8–5.8)
RBC # BLD: NORMAL 10*6/UL
RBC UA: NORMAL /HPF (ref 0–2)
SEDIMENTATION RATE, ERYTHROCYTE: 83 MM/HR (ref 0–15)
SODIUM BLD-SCNC: 137 MMOL/L (ref 132–146)
SPECIFIC GRAVITY UA: >=1.03 (ref 1–1.03)
TOTAL PROTEIN: 8.8 G/DL (ref 6.4–8.3)
UROBILINOGEN, URINE: 1 E.U./DL
WBC # BLD: 8.9 E9/L (ref 4.5–11.5)
WBC UA: NORMAL /HPF (ref 0–5)

## 2018-07-27 PROCEDURE — 85651 RBC SED RATE NONAUTOMATED: CPT

## 2018-07-27 PROCEDURE — 0JBP0ZZ EXCISION OF LEFT LOWER LEG SUBCUTANEOUS TISSUE AND FASCIA, OPEN APPROACH: ICD-10-PCS | Performed by: PODIATRIST

## 2018-07-27 PROCEDURE — 83605 ASSAY OF LACTIC ACID: CPT

## 2018-07-27 PROCEDURE — 80053 COMPREHEN METABOLIC PANEL: CPT

## 2018-07-27 PROCEDURE — 0JBR0ZZ EXCISION OF LEFT FOOT SUBCUTANEOUS TISSUE AND FASCIA, OPEN APPROACH: ICD-10-PCS | Performed by: PODIATRIST

## 2018-07-27 PROCEDURE — 1200000000 HC SEMI PRIVATE

## 2018-07-27 PROCEDURE — 83735 ASSAY OF MAGNESIUM: CPT

## 2018-07-27 PROCEDURE — 87070 CULTURE OTHR SPECIMN AEROBIC: CPT

## 2018-07-27 PROCEDURE — 99285 EMERGENCY DEPT VISIT HI MDM: CPT

## 2018-07-27 PROCEDURE — 87088 URINE BACTERIA CULTURE: CPT

## 2018-07-27 PROCEDURE — 86060 ANTISTREPTOLYSIN O TITER: CPT

## 2018-07-27 PROCEDURE — 11045 DBRDMT SUBQ TISS EACH ADDL: CPT

## 2018-07-27 PROCEDURE — 86140 C-REACTIVE PROTEIN: CPT

## 2018-07-27 PROCEDURE — 6370000000 HC RX 637 (ALT 250 FOR IP): Performed by: INTERNAL MEDICINE

## 2018-07-27 PROCEDURE — 2580000003 HC RX 258: Performed by: EMERGENCY MEDICINE

## 2018-07-27 PROCEDURE — 81001 URINALYSIS AUTO W/SCOPE: CPT

## 2018-07-27 PROCEDURE — 85025 COMPLETE CBC W/AUTO DIFF WBC: CPT

## 2018-07-27 PROCEDURE — 11042 DBRDMT SUBQ TIS 1ST 20SQCM/<: CPT

## 2018-07-27 PROCEDURE — 6360000002 HC RX W HCPCS: Performed by: SPECIALIST

## 2018-07-27 PROCEDURE — 93971 EXTREMITY STUDY: CPT

## 2018-07-27 PROCEDURE — 83690 ASSAY OF LIPASE: CPT

## 2018-07-27 PROCEDURE — 73700 CT LOWER EXTREMITY W/O DYE: CPT

## 2018-07-27 PROCEDURE — 36415 COLL VENOUS BLD VENIPUNCTURE: CPT

## 2018-07-27 PROCEDURE — 2580000003 HC RX 258: Performed by: SPECIALIST

## 2018-07-27 PROCEDURE — 87040 BLOOD CULTURE FOR BACTERIA: CPT

## 2018-07-27 PROCEDURE — 71045 X-RAY EXAM CHEST 1 VIEW: CPT

## 2018-07-27 PROCEDURE — 82962 GLUCOSE BLOOD TEST: CPT

## 2018-07-27 PROCEDURE — 99223 1ST HOSP IP/OBS HIGH 75: CPT | Performed by: INTERNAL MEDICINE

## 2018-07-27 PROCEDURE — 2580000003 HC RX 258: Performed by: INTERNAL MEDICINE

## 2018-07-27 RX ORDER — SODIUM CHLORIDE 0.9 % (FLUSH) 0.9 %
10 SYRINGE (ML) INJECTION PRN
Status: DISCONTINUED | OUTPATIENT
Start: 2018-07-27 | End: 2018-08-01 | Stop reason: HOSPADM

## 2018-07-27 RX ORDER — ONDANSETRON 2 MG/ML
4 INJECTION INTRAMUSCULAR; INTRAVENOUS EVERY 6 HOURS PRN
Status: DISCONTINUED | OUTPATIENT
Start: 2018-07-27 | End: 2018-08-01 | Stop reason: HOSPADM

## 2018-07-27 RX ORDER — IBUPROFEN 600 MG/1
600 TABLET ORAL EVERY 8 HOURS PRN
Status: DISCONTINUED | OUTPATIENT
Start: 2018-07-27 | End: 2018-08-01 | Stop reason: HOSPADM

## 2018-07-27 RX ORDER — DEXTROSE MONOHYDRATE 50 MG/ML
100 INJECTION, SOLUTION INTRAVENOUS PRN
Status: DISCONTINUED | OUTPATIENT
Start: 2018-07-27 | End: 2018-08-01 | Stop reason: HOSPADM

## 2018-07-27 RX ORDER — 0.9 % SODIUM CHLORIDE 0.9 %
1000 INTRAVENOUS SOLUTION INTRAVENOUS ONCE
Status: COMPLETED | OUTPATIENT
Start: 2018-07-27 | End: 2018-07-27

## 2018-07-27 RX ORDER — FUROSEMIDE 40 MG/1
40 TABLET ORAL DAILY
Status: DISCONTINUED | OUTPATIENT
Start: 2018-07-27 | End: 2018-08-01 | Stop reason: HOSPADM

## 2018-07-27 RX ORDER — DOXAZOSIN MESYLATE 4 MG/1
8 TABLET ORAL NIGHTLY
Status: DISCONTINUED | OUTPATIENT
Start: 2018-07-27 | End: 2018-08-01 | Stop reason: HOSPADM

## 2018-07-27 RX ORDER — SODIUM CHLORIDE 0.9 % (FLUSH) 0.9 %
10 SYRINGE (ML) INJECTION EVERY 12 HOURS SCHEDULED
Status: DISCONTINUED | OUTPATIENT
Start: 2018-07-27 | End: 2018-08-01 | Stop reason: HOSPADM

## 2018-07-27 RX ORDER — DEXTROSE MONOHYDRATE 25 G/50ML
12.5 INJECTION, SOLUTION INTRAVENOUS PRN
Status: DISCONTINUED | OUTPATIENT
Start: 2018-07-27 | End: 2018-08-01 | Stop reason: HOSPADM

## 2018-07-27 RX ORDER — ATORVASTATIN CALCIUM 20 MG/1
20 TABLET, FILM COATED ORAL NIGHTLY
Status: DISCONTINUED | OUTPATIENT
Start: 2018-07-27 | End: 2018-08-01 | Stop reason: HOSPADM

## 2018-07-27 RX ORDER — HYDRALAZINE HYDROCHLORIDE 25 MG/1
25 TABLET, FILM COATED ORAL 3 TIMES DAILY
Status: DISCONTINUED | OUTPATIENT
Start: 2018-07-27 | End: 2018-08-01 | Stop reason: HOSPADM

## 2018-07-27 RX ORDER — DILTIAZEM HYDROCHLORIDE 240 MG/1
240 CAPSULE, COATED, EXTENDED RELEASE ORAL NIGHTLY
Status: DISCONTINUED | OUTPATIENT
Start: 2018-07-27 | End: 2018-08-01 | Stop reason: HOSPADM

## 2018-07-27 RX ORDER — NICOTINE POLACRILEX 4 MG
15 LOZENGE BUCCAL PRN
Status: DISCONTINUED | OUTPATIENT
Start: 2018-07-27 | End: 2018-08-01 | Stop reason: HOSPADM

## 2018-07-27 RX ADMIN — Medication 10 ML: at 22:46

## 2018-07-27 RX ADMIN — INSULIN LISPRO 1 UNITS: 100 INJECTION, SOLUTION INTRAVENOUS; SUBCUTANEOUS at 22:48

## 2018-07-27 RX ADMIN — SODIUM CHLORIDE 1000 ML: 9 INJECTION, SOLUTION INTRAVENOUS at 16:51

## 2018-07-27 RX ADMIN — SODIUM CHLORIDE 1000 MG: 900 INJECTION INTRAVENOUS at 18:33

## 2018-07-27 RX ADMIN — DOXAZOSIN MESYLATE 8 MG: 4 TABLET ORAL at 22:46

## 2018-07-27 RX ADMIN — IBUPROFEN 600 MG: 600 TABLET ORAL at 22:47

## 2018-07-27 RX ADMIN — CARBIDOPA AND LEVODOPA 2 TABLET: 25; 100 TABLET ORAL at 22:47

## 2018-07-27 RX ADMIN — HYDRALAZINE HYDROCHLORIDE 25 MG: 25 TABLET ORAL at 22:46

## 2018-07-27 RX ADMIN — ATORVASTATIN CALCIUM 20 MG: 20 TABLET, FILM COATED ORAL at 22:47

## 2018-07-27 RX ADMIN — DILTIAZEM HYDROCHLORIDE 240 MG: 240 CAPSULE, EXTENDED RELEASE ORAL at 22:47

## 2018-07-27 ASSESSMENT — PAIN DESCRIPTION - LOCATION
LOCATION: LEG
LOCATION: LEG

## 2018-07-27 ASSESSMENT — PAIN DESCRIPTION - ORIENTATION
ORIENTATION: LEFT
ORIENTATION: LEFT

## 2018-07-27 ASSESSMENT — PAIN SCALES - GENERAL
PAINLEVEL_OUTOF10: 5
PAINLEVEL_OUTOF10: 6

## 2018-07-27 ASSESSMENT — PAIN DESCRIPTION - PAIN TYPE
TYPE: ACUTE PAIN
TYPE: ACUTE PAIN

## 2018-07-27 NOTE — PROGRESS NOTES
Database complete. Medications reconciled. Care plans and education initiated. Pacemaker to chest that is inactive. Right lower leg amputation and NWB to LLE.

## 2018-07-27 NOTE — ED NOTES
Report faxed to [phone], notified Naomy Montano RN at ext [phone], patient chimed for transfer to room [facility]6 [phone] via brina Jacobo RN  07/27/18 4427

## 2018-07-27 NOTE — ED PROVIDER NOTES
HPI:  7/27/18, Time: 4:15 PM        Alcira Gonzalez is a 72 y.o. male presenting to the ED for worsening wound conditions, beginning 2 weeks ago ago. The complaint has been persistent, severe in severity, and worsened by nothing. Patient's does have history of osteomyelitis chronically dating back to 2 years ago;  he is being followed by wound care but was actually sent here from wound care because of worsening condition of his left leg. Patient is also a history of severe peripheral vascular disease status post right below the knee amputation. Patient denies any fevers chills sweating nausea vomiting diarrhea or any change in bowel or bladder habit patterns and he denies any chest heaviness/pressure tightness or pain. Patient has had worsening drainage from the affected left leg and increased redness extending to the mid thigh area. No relieving factors are reported symptoms worsen by any palpation or pressure and ambulation. Review of Systems:   Pertinent positives and negatives are stated within HPI, all other systems reviewed and are negative.      --------------------------------------------- PAST HISTORY ---------------------------------------------  Past Medical History:  has a past medical history of Diabetes mellitus ([facility] Diabetic foot ulcer associated with type 2 diabetes mellitus, with fat layer exposed [facility] Hyperlipidemia; and Hypertension. Past Surgical History:  has a past surgical history that includes Foot Amputation; other surgical history (9/20/2014); Foot surgery (Left, 5/29/16); and pacemaker placement. Social History:  reports that he quit smoking about 34 years ago. His smoking use included Cigarettes. He started smoking about 55 years ago. He smoked 3.00 packs per day. He has quit using smokeless tobacco. His smokeless tobacco use included Snuff. He reports that he does not drink alcohol or use drugs.     Family History: family history includes Cancer in his brother; Diabetes in ED encounter and vital signs as below have been reviewed. BP (!) 175/67   Pulse 86   Temp 99.1 °F (37.3 °C) (Oral)   Resp 20   Ht 6' 4\" (1.93 m)   Wt (!) 350 lb (158.8 kg)   SpO2 98%   BMI 42.60 kg/m²   Oxygen Saturation Interpretation: Normal      ---------------------------------------------------PHYSICAL EXAM--------------------------------------      Constitutional/General: Alert and oriented x3, well appearing, non toxic in NAD  Head: Normocephalic and atraumatic  Eyes: PERRL, EOMI  Mouth: Oropharynx clear, handling secretions, no trismus  Neck: Supple, full ROM, Trachea midline no JVD no bruits  Pulmonary: Lungs clear to auscultation bilaterally, no wheezes, rales, or rhonchi. Not in respiratory distress  Cardiovascular:  Regular rate and rhythm, no murmurs, gallops, or rubs. 2+ distal pulses  Abdomen: Soft, non tender, obesely distended, no apparent tenderness no rebound tenderness no guarding no rigidity normal bowel sounds  Extremities: Right below-the-knee\" with prosthesis in place. Patient has macerated tissue of the left foot with wound drainage which appears to be foul-smelling and is markedly erythematous. Patient had red streaking to the mid thigh which frisbee consistent with cellulitis  Skin: See above; no petechia and no purpura no target lesions no bullae   Neurologic: GCS 15, CN's 2-12 grossly intact, no focal deficits. Psych: Normal Affect; no signs of depression nor psychosis.    ------------------------------ ED COURSE/MEDICAL DECISION MAKING----------------------  Medications   ertapenem (INVANZ) 1 g IVPB minibag (1,000 mg Intravenous New Bag 7/27/18 1833)   0.9 % sodium chloride bolus (0 mLs Intravenous Stopped 7/27/18 1825)     ED COURSE:     Medical Decision Making:   Differential Diagnoses:  Cellulitis, Wound Abscess, Osteomyelitis, Bacteremia, Metabolic/Electrolyte disorder, Lymphedema, to name a few. Counseling:   The emergency provider has spoken with the patient and spouse/SO and discussed todays results, in addition to providing specific details for the plan of care and counseling regarding the diagnosis and prognosis. Questions are answered at this time and they are agreeable with the plan. SIRS CRITERIA:    Temp >38 C (100.4 F) or <36 C (96.8 F)   NO     Heart Rate >90   NO     Resp. Rate >20 or PaCO2 < 32 mmHg   NO     Altered Mental Status   NO     WBC <4K or >12K  or >10% Bands   NO     Total:   0   * Two or more above criteria met? No*  * Infection Source determined? Yes*    SEPTIC SHOCK CRITERIA:  SBP <90 or 40 reduction from baseline      despite adequate fluid resuscitation. No    MODS CRITERIA:  Altered organ function in an acutely ill  person that requires intervention. No  Systems affected:        LACTIC ACID    Initial                ordered  Follow - up if initial abnormal             not indicated    FLUIDS  Saline 30 ml/kg given              No  (Septic SHOCK or lactic > 4)                         No    CENTRAL LINE INSERTED? No       3-Hour Sepsis Re-examination  7/27/18   5:23 PM          Vital Signs:   Vitals:    07/27/18 1355 07/27/18 1414 07/27/18 1649 07/27/18 1842   BP: (!) 222/98 (!) 180/66 (!) 163/68 (!) 175/67   Pulse: 82 86 84 86   Resp: 20 20 18 20   Temp: 98.1 °F (36.7 °C) 98.2 °F (36.8 °C) 98.8 °F (37.1 °C) 99.1 °F (37.3 °C)   TempSrc: Oral Oral Oral Oral   SpO2: 95% 96% 97% 98%   Weight: (!) 350 lb (158.8 kg)      Height: 6' 4\" (1.93 m)        Card/Pulm:  Rhythm: normal rate. Heart Sounds: no murmurs, gallops, or rubs. clear to auscultation, no wheezes or rales and unlabored breathing. Capillary Refill: normal.  Radial Pulse:  present 2+.   Skin:  Upper extremities dry, warm; still w/ wound maceration and drainage LLE, unchanged from initial exam..    Consults:  Spoke w/ Dr. Chencho Maynard (Infectious Disease Specialist) @ 16:16 hours and he stated that he would advise administering Cefepime 2g IV iONLY f the patient had an elevated WBC and to perform routine cultures as were previously ordered. Dr. Jose Alejandro Walters came down to the ED and saw the patient independently @ 17:15 and at this time, based on his opinion no anabiotic orders were given. Later find out that Dr. Jose Alejandro Walters put in anabiotic orders himself however and I did relay this to the admitting physician and the patient will be admitted to the medicine floor under the hospitalist service.     --------------------------------- IMPRESSION AND DISPOSITION ---------------------------------    IMPRESSION  1. Cellulitis and abscess of left leg    2. Failure of outpatient treatment        DISPOSITION  Disposition: Admit to med/surg floor  Patient condition is stable      NOTE: This report was transcribed using voice recognition software.  Every effort was made to ensure accuracy; however, inadvertent computerized transcription errors may be present        Keyshawn Bucio MD  07/27/18 9704

## 2018-07-27 NOTE — CONSULTS
[address] [place] [address] [facility]  Consultation Note     Admit Date: 7/27/2018  1:52 PM    Reason for Consult:   Cellulitis    Attending Physician:  Boni Yeh MD    HISTORY OF PRESENT ILLNESS:             The history is obtained from extensive review of available past medical records. The patient is a 72 y.o. male who is known to the ID service. The patient has a history of diabetes, right below-the-knee amputation and a chronic lymphedema over the left foot and distal leg. He was last treated in December 2017 for cellulitis and wound infection. Patient was doing relatively well. He continues to follow with Podiatry at the wound clinic. He says that he took a road trip down to the [facility] around May 2018. He has noticed that he is left leg has become somewhat reddened slightly tender. He has not had any fevers. A culture was done a week ago showing anaerobic gram-negative rods and mixed syed. He was not treated with any antibiotics. He went to see his podiatrist today. He decided to send him to the ER for further treatment. Past Medical History:        Diagnosis Date    Diabetes mellitus ([address]     Diabetic foot ulcer associated with type 2 diabetes mellitus, with fat layer exposed ([facility] 9/16/2016    Hyperlipidemia     Hypertension      November 2017. Admitted briefly to [facility]. Had a few wound infection with cellulitis. Cultures grew multiple organisms, including Proteus mirabilis, Pseudomonas aeruginosa, Klebsiella oxytoca, Enterococcus faecalis and group B streptococcus. Patient was treated with Meropenem and Daptomycin. Meropenem was too expensive so this was switched over to Cefepime. Patient finished IV antibiotics at home. Followed up in the office and did well. May 2016. Admitted to the hospital and seen by Dr. Ray Alexander. Cultures of the foot grew pseudomonas aeruginosa and coagulase-negative Staphylococcus.  He was treated with Vancomycin and Cefepime.     April 2016. Admitted to [facility]. Seen by ID for cellulitis of the left lower extremity. The port of entry. To be a chronic ulcer on the plantar aspect of the left lateral foot. Cultures grew mixed syed. He was treated with Cefazolin and Clindamycin, followed by oral Cephalexin and clindamycin.     September 2014. Admitted for a limb threatening diabetic foot infection secondary to group B streptococcus. He had positive blood cultures with group B streptococcus. He was treated with Vancomycin and Ertapenem and narrowed to Aztreonam because of a suspected allergy to Zosyn. Past Surgical History:        Procedure Laterality Date    FOOT AMPUTATION      FOOT SURGERY Left 5/29/16    I&D    OTHER SURGICAL HISTORY  9/20/2014    left foot debridement, I&D exostectomy, bone biopsy; I&D right lower leg(BK stump site)    PACEMAKER PLACEMENT      has been shut off for 5 years     Current Medications:   Scheduled Meds:    Continuous Infusions:  PRN Meds:    Allergies:  Zosyn [piperacillin sod-tazobactam so]; Morphine; and Vancomycin    Social History:   Social History     Social History    Marital status:      Spouse name: N/A    Number of children: N/A    Years of education: N/A     Occupational History    RETIRED      Social History Main Topics    Smoking status: Former Smoker    Smokeless tobacco: Current User     Types: Snuff    Alcohol use No    Drug use: No    Sexual activity: No     Other Topics Concern    None     Social History Narrative    None      Pets: Dogs and cats  Travel: Patient took a road trip with his wife to the [facility] in May 2018    Family History:   Family History   Problem Relation Age of Onset    Diabetes Mother    . Otherwise non-pertinent to the chief complaint.     REVIEW OF SYSTEMS:    Constitutional: Negative for fevers, chills, diaphoresis  Neurologic: Negative   Psychiatric: Negative  Rheumatologic: Negative   Endocrine: Diabetes, under Date    ALT 11 07/27/2018    AST 17 07/27/2018    ALKPHOS 145 (H) 07/27/2018    BILITOT 1.1 07/27/2018     Lab Results   Component Value Date     07/27/2018    K 3.5 07/27/2018    CL 98 07/27/2018    CO2 25 07/27/2018    BUN 20 07/27/2018    CREATININE 1.1 07/27/2018    GFRAA >60 07/27/2018    LABGLOM >60 07/27/2018    GLUCOSE 124 07/27/2018    PROT 8.8 07/27/2018    LABALBU 3.8 07/27/2018    CALCIUM 8.8 07/27/2018    BILITOT 1.1 07/27/2018    ALKPHOS 145 07/27/2018    AST 17 07/27/2018    ALT 11 07/27/2018       Lab Results   Component Value Date    PROTIME 13.1 05/27/2016    INR 1.2 05/27/2016       No results found for: TSH    Lab Results   Component Value Date    COLORU Yellow 07/27/2018    PHUR 5.5 07/27/2018    LABCAST RARE 07/27/2018    WBCUA 0-1 07/27/2018    RBCUA 2-5 07/27/2018    BACTERIA NONE 07/27/2018    CLARITYU Clear 07/27/2018    SPECGRAV >=1.030 07/27/2018    LEUKOCYTESUR Negative 07/27/2018    UROBILINOGEN 1.0 07/27/2018    BILIRUBINUR Negative 07/27/2018    BLOODU TRACE-INTACT 07/27/2018    GLUCOSEU Negative 07/27/2018       Radiology:  Noted    Microbiology:  Pending  No results for input(s): BC in the last 72 hours. No results for input(s): ORG in the last 72 hours. No results for input(s): Emily Zheng in the last 72 hours. No results for input(s): STREPNEUMAGU in the last 72 hours. No results for input(s): LP1UAG in the last 72 hours. No results for input(s): ASO in the last 72 hours. No results for input(s): CULTRESP in the last 72 hours. Assessment:  · Chronic left diabetic foot ulcer plantar aspect. It is clean  · Chronic wounds associated to venous stasis and lymphedema left leg  · Wound infection left leg with cellulitis  · History of intolerance to Zosyn and Vancomycin    Plan:    · Start Ertapenem  · Check cultures, baseline ESR, CRP, ASO titer  · Wound care  · Will follow with you    Thank you for having us see this patient in consultation.  The case was discussed with the ED provider.     Jed Gillette  5:32 PM  7/27/2018

## 2018-07-28 LAB
ALBUMIN SERPL-MCNC: 3.3 G/DL (ref 3.5–5.2)
ALP BLD-CCNC: 137 U/L (ref 40–129)
ALT SERPL-CCNC: <5 U/L (ref 0–40)
ANION GAP SERPL CALCULATED.3IONS-SCNC: 13 MMOL/L (ref 7–16)
AST SERPL-CCNC: 18 U/L (ref 0–39)
BASOPHILS ABSOLUTE: 0.02 E9/L (ref 0–0.2)
BASOPHILS RELATIVE PERCENT: 0.3 % (ref 0–2)
BILIRUB SERPL-MCNC: 0.8 MG/DL (ref 0–1.2)
BUN BLDV-MCNC: 18 MG/DL (ref 8–23)
CALCIUM SERPL-MCNC: 8.4 MG/DL (ref 8.6–10.2)
CHLORIDE BLD-SCNC: 100 MMOL/L (ref 98–107)
CO2: 23 MMOL/L (ref 22–29)
CREAT SERPL-MCNC: 1 MG/DL (ref 0.7–1.2)
EOSINOPHILS ABSOLUTE: 0.05 E9/L (ref 0.05–0.5)
EOSINOPHILS RELATIVE PERCENT: 0.7 % (ref 0–6)
GFR AFRICAN AMERICAN: >60
GFR NON-AFRICAN AMERICAN: >60 ML/MIN/1.73
GLUCOSE BLD-MCNC: 115 MG/DL (ref 74–109)
HCT VFR BLD CALC: 30.9 % (ref 37–54)
HEMOGLOBIN: 10 G/DL (ref 12.5–16.5)
IMMATURE GRANULOCYTES #: 0.07 E9/L
IMMATURE GRANULOCYTES %: 0.9 % (ref 0–5)
LACTIC ACID: 1 MMOL/L (ref 0.5–2.2)
LYMPHOCYTES ABSOLUTE: 0.49 E9/L (ref 1.5–4)
LYMPHOCYTES RELATIVE PERCENT: 6.6 % (ref 20–42)
MAGNESIUM: 2 MG/DL (ref 1.6–2.6)
MCH RBC QN AUTO: 28.3 PG (ref 26–35)
MCHC RBC AUTO-ENTMCNC: 32.4 % (ref 32–34.5)
MCV RBC AUTO: 87.5 FL (ref 80–99.9)
METER GLUCOSE: 120 MG/DL (ref 70–110)
METER GLUCOSE: 146 MG/DL (ref 70–110)
METER GLUCOSE: 147 MG/DL (ref 70–110)
METER GLUCOSE: 158 MG/DL (ref 70–110)
MONOCYTES ABSOLUTE: 0.88 E9/L (ref 0.1–0.95)
MONOCYTES RELATIVE PERCENT: 11.8 % (ref 2–12)
NEUTROPHILS ABSOLUTE: 5.95 E9/L (ref 1.8–7.3)
NEUTROPHILS RELATIVE PERCENT: 79.7 % (ref 43–80)
PDW BLD-RTO: 15.3 FL (ref 11.5–15)
PLATELET # BLD: 182 E9/L (ref 130–450)
PMV BLD AUTO: 9.7 FL (ref 7–12)
POTASSIUM REFLEX MAGNESIUM: 3.3 MMOL/L (ref 3.5–5)
RBC # BLD: 3.53 E12/L (ref 3.8–5.8)
RBC # BLD: NORMAL 10*6/UL
SODIUM BLD-SCNC: 136 MMOL/L (ref 132–146)
TOTAL PROTEIN: 7.9 G/DL (ref 6.4–8.3)
WBC # BLD: 7.5 E9/L (ref 4.5–11.5)

## 2018-07-28 PROCEDURE — 6370000000 HC RX 637 (ALT 250 FOR IP): Performed by: INTERNAL MEDICINE

## 2018-07-28 PROCEDURE — 99232 SBSQ HOSP IP/OBS MODERATE 35: CPT | Performed by: INTERNAL MEDICINE

## 2018-07-28 PROCEDURE — 80053 COMPREHEN METABOLIC PANEL: CPT

## 2018-07-28 PROCEDURE — 2580000003 HC RX 258: Performed by: INTERNAL MEDICINE

## 2018-07-28 PROCEDURE — 82962 GLUCOSE BLOOD TEST: CPT

## 2018-07-28 PROCEDURE — 85025 COMPLETE CBC W/AUTO DIFF WBC: CPT

## 2018-07-28 PROCEDURE — 6360000002 HC RX W HCPCS: Performed by: SPECIALIST

## 2018-07-28 PROCEDURE — 83605 ASSAY OF LACTIC ACID: CPT

## 2018-07-28 PROCEDURE — 2580000003 HC RX 258: Performed by: SPECIALIST

## 2018-07-28 PROCEDURE — 6360000002 HC RX W HCPCS: Performed by: INTERNAL MEDICINE

## 2018-07-28 PROCEDURE — 83735 ASSAY OF MAGNESIUM: CPT

## 2018-07-28 PROCEDURE — 36415 COLL VENOUS BLD VENIPUNCTURE: CPT

## 2018-07-28 PROCEDURE — 1200000000 HC SEMI PRIVATE

## 2018-07-28 RX ORDER — POTASSIUM CHLORIDE 20 MEQ/1
40 TABLET, EXTENDED RELEASE ORAL ONCE
Status: COMPLETED | OUTPATIENT
Start: 2018-07-28 | End: 2018-07-28

## 2018-07-28 RX ADMIN — IBUPROFEN 600 MG: 600 TABLET ORAL at 07:28

## 2018-07-28 RX ADMIN — Medication 10 ML: at 08:30

## 2018-07-28 RX ADMIN — HYDRALAZINE HYDROCHLORIDE 25 MG: 25 TABLET ORAL at 13:33

## 2018-07-28 RX ADMIN — DILTIAZEM HYDROCHLORIDE 240 MG: 240 CAPSULE, EXTENDED RELEASE ORAL at 20:31

## 2018-07-28 RX ADMIN — HYDRALAZINE HYDROCHLORIDE 25 MG: 25 TABLET ORAL at 08:30

## 2018-07-28 RX ADMIN — POTASSIUM CHLORIDE 40 MEQ: 20 TABLET, EXTENDED RELEASE ORAL at 11:48

## 2018-07-28 RX ADMIN — HYDRALAZINE HYDROCHLORIDE 25 MG: 25 TABLET ORAL at 20:31

## 2018-07-28 RX ADMIN — INSULIN LISPRO 1 UNITS: 100 INJECTION, SOLUTION INTRAVENOUS; SUBCUTANEOUS at 12:32

## 2018-07-28 RX ADMIN — ENOXAPARIN SODIUM 40 MG: 40 INJECTION, SOLUTION INTRAVENOUS; SUBCUTANEOUS at 08:30

## 2018-07-28 RX ADMIN — ATORVASTATIN CALCIUM 20 MG: 20 TABLET, FILM COATED ORAL at 20:30

## 2018-07-28 RX ADMIN — SODIUM CHLORIDE, PRESERVATIVE FREE 10 ML: 5 INJECTION INTRAVENOUS at 11:45

## 2018-07-28 RX ADMIN — INSULIN LISPRO 1 UNITS: 100 INJECTION, SOLUTION INTRAVENOUS; SUBCUTANEOUS at 17:04

## 2018-07-28 RX ADMIN — SODIUM CHLORIDE 1000 MG: 900 INJECTION INTRAVENOUS at 17:04

## 2018-07-28 RX ADMIN — IBUPROFEN 600 MG: 600 TABLET ORAL at 20:31

## 2018-07-28 RX ADMIN — FUROSEMIDE 40 MG: 40 TABLET ORAL at 08:30

## 2018-07-28 RX ADMIN — CARBIDOPA AND LEVODOPA 2 TABLET: 25; 100 TABLET ORAL at 22:12

## 2018-07-28 RX ADMIN — Medication 10 ML: at 20:32

## 2018-07-28 RX ADMIN — DOXAZOSIN MESYLATE 8 MG: 4 TABLET ORAL at 20:31

## 2018-07-28 RX ADMIN — INSULIN LISPRO 1 UNITS: 100 INJECTION, SOLUTION INTRAVENOUS; SUBCUTANEOUS at 20:36

## 2018-07-28 ASSESSMENT — PAIN SCALES - GENERAL
PAINLEVEL_OUTOF10: 0
PAINLEVEL_OUTOF10: 6
PAINLEVEL_OUTOF10: 6

## 2018-07-28 ASSESSMENT — PAIN DESCRIPTION - ORIENTATION
ORIENTATION: LEFT
ORIENTATION: LEFT

## 2018-07-28 ASSESSMENT — PAIN DESCRIPTION - LOCATION
LOCATION: LEG
LOCATION: LEG

## 2018-07-28 ASSESSMENT — PAIN DESCRIPTION - FREQUENCY: FREQUENCY: INTERMITTENT

## 2018-07-28 ASSESSMENT — PAIN DESCRIPTION - DESCRIPTORS
DESCRIPTORS: ACHING;DISCOMFORT
DESCRIPTORS: ACHING;DISCOMFORT

## 2018-07-28 ASSESSMENT — PAIN DESCRIPTION - PAIN TYPE
TYPE: ACUTE PAIN
TYPE: ACUTE PAIN

## 2018-07-28 ASSESSMENT — PAIN DESCRIPTION - ONSET: ONSET: ON-GOING

## 2018-07-28 NOTE — PLAN OF CARE
Problem: Nutrition  Goal: Optimal nutrition therapy  Outcome: Ongoing  Nutrition Problem: Increased nutrient needs  Intervention: Food and/or Nutrient Delivery: Continue current diet, Start ONS (Ivan BID, Ensure HP BID)  Nutritional Goals: Consume >75% meals/ONS

## 2018-07-28 NOTE — PROGRESS NOTES
[address] [place] [address] [facility]  Progress Note    SUBJECTIVE:  Chief Complaint   Patient presents with    Wound Check     left leg, was told he had to come to er to be evaluated by ID     Patient is tolerating medications. No reported adverse drug reactions. No nausea, vomiting, diarrhea. No new c/o, states that he feels a little better. Review of systems as above, otherwise negative. Medications:  Scheduled Meds:   potassium chloride  40 mEq Oral Once    ertapenem (INVANZ) IVPB  1 g Intravenous Q24H    atorvastatin  20 mg Oral Nightly    doxazosin  8 mg Oral Nightly    hydrALAZINE  25 mg Oral TID    furosemide  40 mg Oral Daily    carbidopa-levodopa  2 tablet Oral Nightly    clorpactin 2 G irrigation  2 g Irrigation Daily    sodium chloride flush  10 mL Intravenous 2 times per day    enoxaparin  40 mg Subcutaneous Daily    insulin lispro  0-6 Units Subcutaneous TID WC    insulin lispro  0-3 Units Subcutaneous Nightly    diltiazem  240 mg Oral Nightly     Continuous Infusions:   dextrose       PRN Meds:ibuprofen, sodium chloride flush, magnesium hydroxide, ondansetron, glucose, dextrose, glucagon (rDNA), dextrose    OBJECTIVE:  BP (!) 146/68   Pulse 69   Temp 97.8 °F (36.6 °C) (Oral)   Resp 18   Ht 6' 4\" (1.93 m)   Wt (!) 377 lb 14.4 oz (171.4 kg)   SpO2 97%   BMI 46.00 kg/m²   Temp  Av.6 °F (37 °C)  Min: 97.8 °F (36.6 °C)  Max: 99.7 °F (37.6 °C)  Constitutional: The patient is awake, alert, and oriented. Skin: Warm and dry. No rashes were noted. HEENT: Eyes show round, and reactive pupils. No jaundice. Moist mucous membranes, no ulcerations, no thrush. Neck: Supple to movements. No lymphadenopathy. Chest: No use of accessory muscles to breathe. Symmetrical expansion. Auscultation reveals no wheezing, crackles, or rhonchi. Cardiovascular: S1 and S2 are rhythmic and regular. No murmurs appreciated. Abdomen: Positive bowel sounds to auscultation. thrombosis in  the left lower extremity    7/27/18 CT left foot:   1. 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 2. Ulceration and at plantar aspect of the foot with dense attenuating  tissue extending to the plantar aspect of the tarsal bones with  irregular periosteal thickening which could suggest chronic  osteomyelitis at this site. No definite evidence to suggest acute  osteomyelitis. Three-phase bone scan or MRI with and without contrast  may be helpful for further evaluation. 3. Generalized soft tissue edema with severe generalized skin  Thickening.    7/27/18 CXR: Stable pulmonary vascular congestion. No obvious pneumonia or pleural effusion. Microbiology:   No new cultures  7/27/18 BC pending  7/27/18 Urine pending  7/27/18 wound pending    Recent Labs      07/27/18   1829   ASO  540*       ASSESSMENT:  · Chronic left diabetic foot ulcer plantar aspect. It is clean  · Chronic wounds associated to venous stasis and lymphedema left leg  · Wound infection left leg with cellulitis  · History of intolerance to Zosyn and Vancomycin    PLAN:  · Continue Ertapenem  · Continue wound care  · Follow up cultures  · Monitor labs    PAPA Marie  11:01 AM  7/28/2018     Patient seen and examined. I had a face to face encounter with the patient. Agree with exam, assessment and plan as outlined above. Addition and corrections were done as deemed appropriate. My exam and plan include: Patient is tolerating current antibiotic regimen. There is some evidence of clinical improvement. Awaiting for results of cultures. Elevated ASO titer is suggestive of a beta hemolytic Streptococcal infection.     Jose Whipple  7/28/2018

## 2018-07-28 NOTE — PROGRESS NOTES
Unable to perform dressing change to LLE at this time d/t not enough [place] [employer] available. Awaiting supply from [facility] this afternoon.

## 2018-07-28 NOTE — H&P
questions appropriately and cooperative with exam  HEENT:  Mucous membranes moist. No erythema, rhinorrhea, or post-nasal drip noted. Neck:  No carotid bruits. Heart:  Rhythm regular at rate of 92  Lungs:  CTA. No wheeze, rales, or rhonchi  Abdomen:  Positive bowel sounds positive. Soft. Non-tender. No guarding, rebound or rigidity. Breast/Rectal/Genitourinary: not pertinent. Extremities:  positive for 1-2+ left lower extremity edema  Skin:  Warm and dry, erythema extending from foot to above knee. lle very warm to touch  Vascular: 2/4 Dorsalis Pedis pulses bilaterally. Neuro:  Cranial nerves 2-12 grossly intact, no focal weakness or change in sensation noted. Extraocular muscles intact. Pupils equal, round, reactive to light. LABS:  Recent Labs      07/27/18   1601   NA  137   K  3.5   CL  98   CO2  25   BUN  20   CREATININE  1.1   GLUCOSE  124*   CALCIUM  8.8       Recent Labs      07/27/18   1601   WBC  8.9   RBC  3.84   HGB  11.0*   HCT  33.6*   MCV  87.5   MCH  28.6   MCHC  32.7   RDW  15.5*   PLT  193   MPV  9.9       No results for input(s): POCGLU in the last 72 hours.     CBC with Differential:    Lab Results   Component Value Date    WBC 8.9 07/27/2018    RBC 3.84 07/27/2018    HGB 11.0 07/27/2018    HCT 33.6 07/27/2018     07/27/2018    MCV 87.5 07/27/2018    MCH 28.6 07/27/2018    MCHC 32.7 07/27/2018    RDW 15.5 07/27/2018    NRBC 0.0 11/23/2016    BANDSPCT 2 09/19/2014    LYMPHOPCT 2.0 07/27/2018    MONOPCT 7.0 07/27/2018    BASOPCT 0.0 07/27/2018    MONOSABS 0.62 07/27/2018    LYMPHSABS 0.18 07/27/2018    EOSABS 0.09 07/27/2018    BASOSABS 0.00 07/27/2018     CMP:    Lab Results   Component Value Date     07/27/2018    K 3.5 07/27/2018    CL 98 07/27/2018    CO2 25 07/27/2018    BUN 20 07/27/2018    CREATININE 1.1 07/27/2018    GFRAA >60 07/27/2018    LABGLOM >60 07/27/2018    GLUCOSE 124 07/27/2018    PROT 8.8 07/27/2018    LABALBU 3.8 07/27/2018    CALCIUM 8.8 2018    BILITOT 1.1 2018    ALKPHOS 145 2018    AST 17 2018    ALT 11 2018     Magnesium:    Lab Results   Component Value Date    MG 2.0 2018     U/A:    Lab Results   Component Value Date    COLORU Yellow 2018    PROTEINU 100 2018    PHUR 5.5 2018    LABCAST RARE 2018    WBCUA 0-1 2018    RBCUA 2-5 2018    BACTERIA NONE 2018    CLARITYU Clear 2018    SPECGRAV >=1.030 2018    LEUKOCYTESUR Negative 2018    UROBILINOGEN 1.0 2018    BILIRUBINUR Negative 2018    BLOODU TRACE-INTACT 2018    GLUCOSEU Negative 2018     LIPASE:    Lab Results   Component Value Date    LIPASE 37 2018     Lactic Acid, Sepsis 1.1          Radiology: Xr Chest 1 Vw    Result Date: 2018  Patient MRN:  [id number] : 1945 Age: 72 years Gender: Male Order Date:  2018 3:30 PM EXAM: XR CHEST 1 VIEW COMPARISON: 2017 INDICATION:  sepsis workup sepsis workup FINDINGS: Left sided pacemaker present. There is prominence of pulmonary vasculature appearing similar when compared to prior. Stable mild cardiomegaly. No pneumothorax. No focal airspace opacity or pleural effusion. Stable pulmonary vascular congestion. No obvious pneumonia or pleural effusion. Ct Foot Left Wo Contrast    Result Date: 2018  Patient MRN:  [id number] : 1945 Age: 72 years Gender: Male Order Date:  2018 4:45 PM EXAM: CT FOOT LEFT WO CONTRAST COMPARISON: None INDICATION:  chronic nonhealing wound w/ history of osteomyelitis  TECHNIQUE:  Low-dose CT acquisition technique included one of the following options; 1. Automated exposure control, 2. Adjustment of mA and/or kV according to the patient's size or 3. Use of iterative reconstruction. Unenhanced CT performed, this data then utilized to create 3-dimensional images. FINDINGS: There is deformity of the foot and notable loss of plantar calcaneal arch.  There is also subluxation of bases of the metatarsal bones and there is abnormal cortical thickening at level of first metatarsal bone suggestive of remote/old healed fracture. There is severe generalized soft tissue edema and severe skin thickening. Note made of an ulceration at the plantar aspect of the foot with dense attenuating tissue extending deep to the level of the tarsal bones. There is significant cortical thickening and thickened, irregular periosteum at this site deep to the ulceration suggestive of chronic osteomyelitis. No obvious erosive changes to suggest acute osteomyelitis. No evidence of acute fracture. Calcaneal tendon is intact. Plantar fascia appears lax and may be related to disruption. 1. 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 2. Ulceration and at plantar aspect of the foot with dense attenuating tissue extending to the plantar aspect of the tarsal bones with irregular periosteal thickening which could suggest chronic osteomyelitis at this site. No definite evidence to suggest acute osteomyelitis. Three-phase bone scan or MRI with and without contrast may be helpful for further evaluation. 3. Generalized soft tissue edema with severe generalized skin thickening. Us Dup Lower Extremity Left Marco    Result Date: 2018  Patient MRN:  [id number] : 1945 Age: 72 years Gender: Male Order Date:  2018 5:30 PM EXAM: US DUP LOWER EXTREMITY LEFT MARCO NUMBER OF IMAGES:  23 INDICATION: Leg swelling COMPARISON: No prior study is available for comparison. TECHNIQUE:  Gray-scale, color Doppler, and spectral Doppler ultrasonography of the deep veins was performed by the sonographic technologist. Selected images were submitted for radiologic interpretation. FINDINGS: The image quality is significantly limited by the patient's body habitus and edematous soft tissues.  The submitted images demonstrate at least partial compressibility in the common femoral vein, femoral vein, and popliteal vein, with flow and augmentation demonstrated on Doppler imaging. Limited study without evidence of occlusive deep venous thrombosis in the left lower extremity. ASSESSMENT:      Active Problems:    Cellulitis and abscess of left leg  Resolved Problems:    * No resolved hospital problems. *      PLAN:    1. Cellulitis of LLE with assocication of wound infection abx per ID  2. Chronic left diabetic foot plantar aspect wound will consult wound care and podiatry  3. Venous stasis/lymphedema of left leg. U/s noted. Will continue with his diuretic and will check orthostatics. 4. Dm type 2 controlled monitor bs and tx with ssi  5. htn continue meds  6.  Hyperlipidemia continue med          Electronically signed by Manju Sal DO on 7/27/2018 at 9:20 PM

## 2018-07-28 NOTE — PROGRESS NOTES
[facility]   Progress Note    Admitting Date and Time: 7/27/2018  1:52 PM  Admit Dx: Cellulitis and abscess of left leg [L03.116, L02.416]  Cellulitis and abscess of left leg [L03.116, L02.416]    Subjective:    Pt feels left lower extremity is doing a little better. He is tolerating antibiotics without adverse event. Reports he feels better than he did on presentation. ROS: denies fever, chills, cp, sob, n/v, HA unless stated above.      ertapenem (INVANZ) IVPB  1 g Intravenous Q24H    atorvastatin  20 mg Oral Nightly    doxazosin  8 mg Oral Nightly    hydrALAZINE  25 mg Oral TID    furosemide  40 mg Oral Daily    carbidopa-levodopa  2 tablet Oral Nightly    clorpactin 2 G irrigation  2 g Irrigation Daily    sodium chloride flush  10 mL Intravenous 2 times per day    enoxaparin  40 mg Subcutaneous Daily    insulin lispro  0-6 Units Subcutaneous TID WC    insulin lispro  0-3 Units Subcutaneous Nightly    diltiazem  240 mg Oral Nightly       ibuprofen 600 mg Q8H PRN   sodium chloride flush 10 mL PRN   magnesium hydroxide 30 mL Daily PRN   ondansetron 4 mg Q6H PRN   glucose 15 g PRN   dextrose 12.5 g PRN   glucagon (rDNA) 1 mg PRN   dextrose 100 mL/hr PRN        Objective:    /64   Pulse 69   Temp 97.8 °F (36.6 °C) (Oral)   Resp 18   Ht 6' 4\" (1.93 m)   Wt (!) 377 lb 14.4 oz (171.4 kg)   SpO2 97%   BMI 46.00 kg/m²   General Appearance: alert and oriented to person, place and time, well developed and well- nourished, in no acute distress  Skin: warm and dry, no rash or erythema  Head: normocephalic and atraumatic  Eyes: pupils equal, round, and reactive to light, extraocular eye movements intact, conjunctivae normal  Neck: supple and non-tender without mass, no thyromegaly or thyroid nodules, no cervical lymphadenopathy  Pulmonary/Chest: clear to auscultation bilaterally- no wheezes, rales or rhonchi, normal air movement, no respiratory distress  Cardiovascular: normal rate, regular rhythm, normal S1 and S2, no murmurs, rubs, clicks, or gallops, distal pulses intact, no carotid bruits  Abdomen: Obese soft, non-tender, non-distended, normal bowel sounds, no masses or organomegaly  Extremities: Left lower extremity with extensive chronic lipedema wound changes and foul-smelling with erythema up to the mid thigh; right BKA; left foot ulcer  Neurologic: Awake and alert, moving all extremities      Recent Labs      07/27/18   1601  07/28/18   0519   NA  137  136   K  3.5  3.3*   CL  98  100   CO2  25  23   BUN  20  18   CREATININE  1.1  1.0   GLUCOSE  124*  115*   CALCIUM  8.8  8.4*       Recent Labs      07/27/18   1601  07/28/18   0519   WBC  8.9  7.5   RBC  3.84  3.53*   HGB  11.0*  10.0*   HCT  33.6*  30.9*   MCV  87.5  87.5   MCH  28.6  28.3   MCHC  32.7  32.4   RDW  15.5*  15.3*   PLT  193  182   MPV  9.9  9.7       Radiology:   US DUP LOWER EXTREMITY LEFT MARCO   Final Result   Limited study without evidence of occlusive deep venous thrombosis in   the left lower extremity. CT FOOT LEFT WO CONTRAST   Final Result      1. 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 2. Ulceration and at plantar aspect of the foot with dense attenuating   tissue extending to the plantar aspect of the tarsal bones with   irregular periosteal thickening which could suggest chronic   osteomyelitis at this site. No definite evidence to suggest acute   osteomyelitis. Three-phase bone scan or MRI with and without contrast   may be helpful for further evaluation. 3. Generalized soft tissue edema with severe generalized skin   thickening. XR CHEST 1 VW   Final Result      Stable pulmonary vascular congestion. No obvious pneumonia or pleural   effusion.         Assessment:    Active Problems:    DM (diabetes mellitus) ([address]    HTN (hypertension)    Hyperlipidemia    Diabetic ulcer of left foot (HCC)    Cellulitis and abscess of left leg  Resolved Problems:    * No resolved hospital problems. *    Plan:  1. Cellulitis of left lower extremity and extensive venous stasis and lymphedema: Antibiotics started per infectious disease, ertapenem; blood cultures SO titer pending. 2. Chronic left neuropathic ulcer: Wound care podiatry following  3. Diabetes type II, last A1c on record 2016: We'll repeat A1c and continue sliding scale insulin as his blood sugars are less than 180 in the hospital  4. Essential hypertension: Controlled with resumption of home medicines  5.  Hyperlipidemia: Continue home statin    Electronically signed by Atiya Martinez DO on 7/28/2018 at 2:33 PM

## 2018-07-28 NOTE — PLAN OF CARE
Problem: Falls - Risk of:  Goal: Will remain free from falls  Will remain free from falls   Outcome: Met This Shift      Problem: Pain  Goal: Pain level will decrease  Pain level will decrease      Outcome: Met This Shift      Problem: Pain:  Goal: Pain level will decrease  Pain level will decrease      Outcome: Met This Shift      Problem: Skin Integrity:  Goal: Will show no infection signs and symptoms  Will show no infection signs and symptoms   Outcome: Met This Shift

## 2018-07-28 NOTE — PROGRESS NOTES
Consult placed through [employer] text to Dr. Mo Johnson  8:22 AM  7/28/2018   Ihor Krabbe.  [facility]         Message read @ 8:22 AM

## 2018-07-28 NOTE — PROGRESS NOTES
Nutrition Assessment    Type and Reason for Visit: Initial, Positive Nutrition Screen    Nutrition Recommendations: Continue current diet, Start Ivan BID and Ensure High Protein BID    Malnutrition Assessment:  · Malnutrition Status: No malnutrition  · Context: Chronic illness  · Findings of the 6 clinical characteristics of malnutrition (Minimum of 2 out of 6 clinical characteristics is required to make the diagnosis of moderate or severe Protein Calorie Malnutrition based on AND/ASPEN Guidelines):  1. Energy Intake-51% to 75%  2. Weight Loss-No significant weight loss  3. Fat Loss-No significant subcutaneous fat loss  4. Muscle Loss-No significant muscle mass loss  5. Fluid Accumulation-Severe fluid accumulation, Extremities  6.   Strength-Not measured    Nutrition Diagnosis:   · Problem: Increased nutrient needs  · Etiology: related to Increased demand for energy/nutrients due to (wound healing)     Signs and symptoms:  as evidenced by Presence of wounds, Localized or generalized fluid accumulation    Nutrition Assessment:  · Nutrition-Focused Physical Findings: abd distention, active BS, R BKA, +4 pitting/weeping edema LLE  · Wound Type: Diabetic Ulcer, Multiple, Venous Stasis, Wound Consult Pending (Chronic diabetic/venous ulcers noted)  · Current Nutrition Therapies:  · Oral Diet Orders: Carb Control 5 Carbs/Meal   · Oral Diet intake: 51-75% (observed breakfast tray )  · Oral Nutrition Supplement (ONS) Orders: None  · Anthropometric Measures:  · Ht: 6' 4\" (193 cm)   · Current Body Wt: 377 lb (171 kg) (7/28 bed scale)  · Admission Body Wt: 377 lb (171 kg) (7/28 first measured)  · Usual Body Wt: 365 lb (165.6 kg) (05/2016 bed per EMR hx)  · % Weight Change: none significant  · Ideal Body Wt: 190 lb (86.2 kg) (adjusted for BKA), % Ideal Body 198%  · Adjusted Body Wt: 237 lb (107.5 kg), body weight adjusted for Unilateral, BKA  · BMI Classification: BMI > or equal to 40.0 Obese Class III  · Comparative Standards (Estimated Nutrition Needs):  · Estimated Daily Total Kcal: 7103-1150  · Estimated Daily Protein (g): 170-215    Estimated Intake vs Estimated Needs: Intake Less Than Needs    Nutrition Risk Level: Moderate    Nutrition Interventions:   Continue current diet, Start ONS (Ivan BID, Ensure HP BID)  Continued Inpatient Monitoring, Education not appropriate at this time, Coordination of Care    Nutrition Evaluation:   · Evaluation: Goals set   · Goals: Consume >75% meals/ONS    · Monitoring: Meal Intake, Supplement Intake, Diet Tolerance, Skin Integrity, Wound Healing, Fluid Balance, Ascites/Edema, Weight, Comparative Standards, Pertinent Labs    See Adult Nutrition Doc Flowsheet for more detail.      Electronically signed by Sumeet Rodriguez RD, PHILIP on 7/28/18 at 9:56 AM    Contact Number: [phone]

## 2018-07-29 LAB
METER GLUCOSE: 102 MG/DL (ref 70–110)
METER GLUCOSE: 109 MG/DL (ref 70–110)
METER GLUCOSE: 157 MG/DL (ref 70–110)

## 2018-07-29 PROCEDURE — 6360000002 HC RX W HCPCS: Performed by: SPECIALIST

## 2018-07-29 PROCEDURE — 1200000000 HC SEMI PRIVATE

## 2018-07-29 PROCEDURE — 82962 GLUCOSE BLOOD TEST: CPT

## 2018-07-29 PROCEDURE — 2580000003 HC RX 258: Performed by: SPECIALIST

## 2018-07-29 PROCEDURE — 6360000002 HC RX W HCPCS: Performed by: INTERNAL MEDICINE

## 2018-07-29 PROCEDURE — 6370000000 HC RX 637 (ALT 250 FOR IP): Performed by: INTERNAL MEDICINE

## 2018-07-29 PROCEDURE — 99232 SBSQ HOSP IP/OBS MODERATE 35: CPT | Performed by: INTERNAL MEDICINE

## 2018-07-29 PROCEDURE — 2580000003 HC RX 258: Performed by: INTERNAL MEDICINE

## 2018-07-29 RX ADMIN — CARBIDOPA AND LEVODOPA 2 TABLET: 25; 100 TABLET ORAL at 22:28

## 2018-07-29 RX ADMIN — DILTIAZEM HYDROCHLORIDE 240 MG: 240 CAPSULE, EXTENDED RELEASE ORAL at 22:29

## 2018-07-29 RX ADMIN — INSULIN LISPRO 1 UNITS: 100 INJECTION, SOLUTION INTRAVENOUS; SUBCUTANEOUS at 18:03

## 2018-07-29 RX ADMIN — ATORVASTATIN CALCIUM 20 MG: 20 TABLET, FILM COATED ORAL at 22:28

## 2018-07-29 RX ADMIN — DOXAZOSIN MESYLATE 8 MG: 4 TABLET ORAL at 22:29

## 2018-07-29 RX ADMIN — HYDRALAZINE HYDROCHLORIDE 25 MG: 25 TABLET ORAL at 15:00

## 2018-07-29 RX ADMIN — IBUPROFEN 600 MG: 600 TABLET ORAL at 22:28

## 2018-07-29 RX ADMIN — FUROSEMIDE 40 MG: 40 TABLET ORAL at 09:45

## 2018-07-29 RX ADMIN — SODIUM CHLORIDE 1000 MG: 900 INJECTION INTRAVENOUS at 18:03

## 2018-07-29 RX ADMIN — Medication 10 ML: at 22:29

## 2018-07-29 RX ADMIN — ENOXAPARIN SODIUM 40 MG: 40 INJECTION, SOLUTION INTRAVENOUS; SUBCUTANEOUS at 09:45

## 2018-07-29 RX ADMIN — HYDRALAZINE HYDROCHLORIDE 25 MG: 25 TABLET ORAL at 09:45

## 2018-07-29 RX ADMIN — Medication 10 ML: at 09:46

## 2018-07-29 RX ADMIN — HYDRALAZINE HYDROCHLORIDE 25 MG: 25 TABLET ORAL at 22:29

## 2018-07-29 ASSESSMENT — PAIN DESCRIPTION - DESCRIPTORS: DESCRIPTORS: ACHING;DISCOMFORT

## 2018-07-29 ASSESSMENT — PAIN DESCRIPTION - LOCATION: LOCATION: LEG

## 2018-07-29 ASSESSMENT — PAIN DESCRIPTION - FREQUENCY: FREQUENCY: INTERMITTENT

## 2018-07-29 ASSESSMENT — PAIN DESCRIPTION - PROGRESSION: CLINICAL_PROGRESSION: NOT CHANGED

## 2018-07-29 ASSESSMENT — PAIN DESCRIPTION - PAIN TYPE: TYPE: ACUTE PAIN

## 2018-07-29 ASSESSMENT — PAIN DESCRIPTION - ONSET: ONSET: ON-GOING

## 2018-07-29 ASSESSMENT — PAIN SCALES - GENERAL
PAINLEVEL_OUTOF10: 0
PAINLEVEL_OUTOF10: 8

## 2018-07-29 ASSESSMENT — PAIN DESCRIPTION - ORIENTATION: ORIENTATION: LEFT

## 2018-07-29 NOTE — PROGRESS NOTES
to palpation. No masses felt. No hepatosplenomegaly. Extremities: Right BKA, stump is unremarkable. The left leg shows diffuse lymphedema extending from below the knee to the toes. There are multiple coalescing wounds from the dorsum of the foot up to the leg. There is a clean, shallow ulcer on the bottom of the left foot measuring approximately 1.5 cm in diameter. There is cellulitis below the knee extending towards the medial aspect of the left thigh. It is warm to the touch and darker. Less edema. No crepitus. Less odor. Left leg/foot with dressing, coban.   Lines: peripheral    Laboratory and Tests Review:  Lab Results   Component Value Date    WBC 7.5 07/28/2018    WBC 8.9 07/27/2018    WBC 5.5 11/17/2017    HGB 10.0 (L) 07/28/2018    HCT 30.9 (L) 07/28/2018    MCV 87.5 07/28/2018     07/28/2018     Lab Results   Component Value Date    NEUTROABS 5.95 07/28/2018    NEUTROABS 8.01 (H) 07/27/2018    NEUTROABS 4.12 11/17/2017     Lab Results   Component Value Date    CRP 18.3 (H) 07/27/2018    CRP 9.5 (H) 11/17/2017    CRP 2.1 (H) 11/28/2016     Lab Results   Component Value Date    CRPHS 13.0 (H) 04/04/2016     Lab Results   Component Value Date    SEDRATE 83 (H) 07/27/2018    SEDRATE 77 (H) 11/17/2017    SEDRATE 73 (H) 11/28/2016     Lab Results   Component Value Date    ALT <5 07/28/2018    AST 18 07/28/2018    ALKPHOS 137 (H) 07/28/2018    BILITOT 0.8 07/28/2018     Lab Results   Component Value Date     07/28/2018    K 3.3 07/28/2018     07/28/2018    CO2 23 07/28/2018    BUN 18 07/28/2018    CREATININE 1.0 07/28/2018    CREATININE 1.1 07/27/2018    CREATININE 0.9 11/17/2017    GFRAA >60 07/28/2018    LABGLOM >60 07/28/2018    GLUCOSE 115 07/28/2018    PROT 7.9 07/28/2018    LABALBU 3.3 07/28/2018    CALCIUM 8.4 07/28/2018    BILITOT 0.8 07/28/2018    ALKPHOS 137 07/28/2018    AST 18 07/28/2018    ALT <5 07/28/2018     Radiology:  7/27/18 US LLE: Limited study without evidence of occlusive

## 2018-07-29 NOTE — PROGRESS NOTES
AM   Wound Length (cm) 2.5 cm 7/27/2018  9:30 AM   Wound Width (cm) 5 cm 7/27/2018  9:30 AM   Wound Depth (cm)  0.1 7/27/2018  9:30 AM   Calculated Wound Size (cm^2) (l*w) 12.5 cm^2 7/27/2018  9:30 AM   Change in Wound Size % (l*w) -316.67 7/27/2018  9:30 AM   Wound Assessment Pink;Pale;Tan;Yellow 7/27/2018  9:30 AM   Drainage Amount Copious 7/27/2018  9:30 AM   Drainage Description Yellow;Green 7/27/2018  9:30 AM   Odor None 7/27/2018  9:30 AM   Bel[place]-wound Assessment Denuded; Maceration;Pink;Purple; Swelling; White 7/27/2018  9:30 AM   Time out Yes 7/20/2018 10:00 AM   Number of days: 28       Diabetic Ulcer 03/23/16 Foot Plantar;Left #1 lt plantar foot  onset 3/23/15   diabetic  wag 2 (Active)   Belén-wound Assessment White; Maceration;Fragile 7/27/2018  9:30 AM   Bel[place]-Wound Moisture Denuded 6/22/2018  8:20 AM   [place]-Wound Color White 6/22/2018  8:20 AM   Diabetic Wound - Sheliah Keto 2 7/21/2017 10:33 AM   Wound Assessment Pale;Pink;Yellow 7/27/2018  9:30 AM   Wound Length (cm) 1.2 cm 7/27/2018  9:30 AM   Wound Width (cm) 1.8 cm 7/27/2018  9:30 AM   Wound Depth (cm)  0.3 7/27/2018  9:30 AM   Calculated Wound Size (cm^2) (l*w) 2.16 cm^2 7/27/2018  9:30 AM   Change in Wound Size % (l*w) -517.14 7/27/2018  9:30 AM   Culture Taken Yes 11/10/2017 10:51 AM   Dressing Status Clean;Dry; Intact 7/20/2018 10:27 AM   Dressing Changed Changed/New 7/20/2018 10:27 AM   Dressing/Treatment Dry dressing 7/13/2018  9:35 AM   Wound Cleansed Rinsed/Irrigated with saline 7/20/2018 10:27 AM   Dressing Change Due 02/05/18 2/2/2018 11:48 AM   Granulation Quality Red 6/22/2018  8:20 AM   Drainage Amount Copious 7/27/2018  9:30 AM   Drainage Description Yellow;Green 7/27/2018  9:30 AM   Odor Mild 7/27/2018  9:30 AM   Undermining Starts ___ O'Clock 0 4/27/2018  8:47 AM   Undermining Ends___ O'Clock 0 4/27/2018  8:47 AM   Undermining Maxium Distance (cm) 0 4/27/2018  8:47 AM   [place]%Wound Bed 70 2/3/2017 11:21 AM   Red%Wound Bed 80 2/23/2018

## 2018-07-30 LAB
METER GLUCOSE: 112 MG/DL (ref 70–110)
METER GLUCOSE: 148 MG/DL (ref 70–110)
METER GLUCOSE: 151 MG/DL (ref 70–110)
METER GLUCOSE: 164 MG/DL (ref 70–110)
URINE CULTURE, ROUTINE: NORMAL

## 2018-07-30 PROCEDURE — 82962 GLUCOSE BLOOD TEST: CPT

## 2018-07-30 PROCEDURE — 6360000002 HC RX W HCPCS: Performed by: SPECIALIST

## 2018-07-30 PROCEDURE — 2580000003 HC RX 258: Performed by: INTERNAL MEDICINE

## 2018-07-30 PROCEDURE — 2580000003 HC RX 258: Performed by: SPECIALIST

## 2018-07-30 PROCEDURE — 1200000000 HC SEMI PRIVATE

## 2018-07-30 PROCEDURE — 99233 SBSQ HOSP IP/OBS HIGH 50: CPT | Performed by: INTERNAL MEDICINE

## 2018-07-30 PROCEDURE — 6370000000 HC RX 637 (ALT 250 FOR IP): Performed by: INTERNAL MEDICINE

## 2018-07-30 PROCEDURE — 6360000002 HC RX W HCPCS: Performed by: INTERNAL MEDICINE

## 2018-07-30 RX ADMIN — IBUPROFEN 600 MG: 600 TABLET ORAL at 17:19

## 2018-07-30 RX ADMIN — DILTIAZEM HYDROCHLORIDE 240 MG: 240 CAPSULE, EXTENDED RELEASE ORAL at 22:14

## 2018-07-30 RX ADMIN — Medication 10 ML: at 22:16

## 2018-07-30 RX ADMIN — INSULIN LISPRO 1 UNITS: 100 INJECTION, SOLUTION INTRAVENOUS; SUBCUTANEOUS at 12:13

## 2018-07-30 RX ADMIN — ATORVASTATIN CALCIUM 20 MG: 20 TABLET, FILM COATED ORAL at 22:14

## 2018-07-30 RX ADMIN — Medication 10 ML: at 10:49

## 2018-07-30 RX ADMIN — FUROSEMIDE 40 MG: 40 TABLET ORAL at 10:48

## 2018-07-30 RX ADMIN — DOXAZOSIN MESYLATE 8 MG: 4 TABLET ORAL at 22:14

## 2018-07-30 RX ADMIN — HYDRALAZINE HYDROCHLORIDE 25 MG: 25 TABLET ORAL at 14:16

## 2018-07-30 RX ADMIN — CARBIDOPA AND LEVODOPA 2 TABLET: 25; 100 TABLET ORAL at 22:14

## 2018-07-30 RX ADMIN — ENOXAPARIN SODIUM 40 MG: 40 INJECTION, SOLUTION INTRAVENOUS; SUBCUTANEOUS at 10:47

## 2018-07-30 RX ADMIN — HYDRALAZINE HYDROCHLORIDE 25 MG: 25 TABLET ORAL at 10:48

## 2018-07-30 RX ADMIN — SODIUM CHLORIDE 1000 MG: 900 INJECTION INTRAVENOUS at 17:20

## 2018-07-30 RX ADMIN — HYDRALAZINE HYDROCHLORIDE 25 MG: 25 TABLET ORAL at 22:14

## 2018-07-30 RX ADMIN — INSULIN LISPRO 1 UNITS: 100 INJECTION, SOLUTION INTRAVENOUS; SUBCUTANEOUS at 22:18

## 2018-07-30 RX ADMIN — INSULIN LISPRO 1 UNITS: 100 INJECTION, SOLUTION INTRAVENOUS; SUBCUTANEOUS at 18:28

## 2018-07-30 ASSESSMENT — PAIN SCALES - GENERAL
PAINLEVEL_OUTOF10: 0
PAINLEVEL_OUTOF10: 8

## 2018-07-30 ASSESSMENT — PAIN DESCRIPTION - ONSET: ONSET: ON-GOING

## 2018-07-30 ASSESSMENT — PAIN DESCRIPTION - DESCRIPTORS: DESCRIPTORS: ACHING;DISCOMFORT

## 2018-07-30 ASSESSMENT — PAIN DESCRIPTION - LOCATION: LOCATION: LEG

## 2018-07-30 ASSESSMENT — PAIN DESCRIPTION - PAIN TYPE: TYPE: ACUTE PAIN

## 2018-07-30 ASSESSMENT — PAIN DESCRIPTION - FREQUENCY: FREQUENCY: INTERMITTENT

## 2018-07-30 ASSESSMENT — PAIN DESCRIPTION - ORIENTATION: ORIENTATION: LEFT

## 2018-07-30 ASSESSMENT — PAIN DESCRIPTION - PROGRESSION: CLINICAL_PROGRESSION: NOT CHANGED

## 2018-07-30 NOTE — PROGRESS NOTES
Podiatry Followup Visit Note    Referring Physician : Pio Wharton MD  Geremias Campos  MEDICAL RECORD NUMBER:  [id number]  AGE: 72 y.o. GENDER: male  : 1945  EPISODE DATE:  2018    Subjective:     Chief Complaint   Patient presents with    Wound Check     left leg, was told he had to come to er to be evaluated by ID      HISTORY of PRESENT ILLNESS HPI   Geremias Campos is a 72 y.o. male who presents today for wound/ulcer evaluation.    History of Wound Context:  2 years     Wound/Ulcer Pain Timing/Severity: constant   Quality of pain: dull, aching  Severity:  2 / 10   Modifying Factors: pain with walking  Associated Signs/Symptoms: edema, erythema and drainage    Ulcer Identification:  Ulcer Type: venous and diabetes  Contributing Factors: edema, venous stasis, lymphedema, diabetes, poor glucose control and chronic pressure    Diabetic/Pressure/Non Pressure Ulcers only:  Ulcer: Diabetic ulcer, fat layer exposed    Wound: N/A        PAST MEDICAL HISTORY      Diagnosis Date    [facility] mellitus ([facility] [address] [facility]     Diabetic foot ulcer associated with type 2 diabetes mellitus, with fat layer exposed ([facility] [address] 2016    Hyperlipidemia     Hypertension      Past Surgical History:   Procedure Laterality Date    FOOT AMPUTATION      FOOT SURGERY Left 16    I&D    OTHER SURGICAL HISTORY  2014    left foot debridement, I&D exostectomy, bone biopsy; I&D right lower leg(BK stump site)    PACEMAKER PLACEMENT      has been shut off for 5 years     Family History   Problem Relation Age of Onset    Diabetes Mother     Heart Disease Mother     Other Father         cerebral aneurysm    Parkinsonism Sister     Heart Attack Brother     Cancer Brother      Social History   Substance Use Topics    Smoking status: Former Smoker     Packs/day: 3.00     Types: Cigarettes     Start date: 1963     Quit date: 1984    Smokeless tobacco: Former User     Types: Snuff    Alcohol use No Allergies   Allergen Reactions    Zosyn [Piperacillin Sod-Tazobactam So] Itching    Morphine Rash    Vancomycin Rash     No current facility-administered medications on file prior to encounter. Current Outpatient Prescriptions on File Prior to Encounter   Medication Sig Dispense Refill    ibuprofen (ADVIL;MOTRIN) 600 MG tablet Take 1 tablet by mouth every 8 hours as needed 120 tablet 3    diltiazem (CARDIZEM) 120 MG tablet Take 240 mg by mouth nightly       carbidopa-levodopa (SINEMET)  MG per tablet Take 2 tablets by mouth nightly       pioglitazone (ACTOS) 45 MG tablet Take 1 tablet by mouth daily. (Patient taking differently: Take 45 mg by mouth nightly ) 30 tablet 3    doxazosin (CARDURA) 8 MG tablet Take 1 tablet by mouth daily. (Patient taking differently: Take 8 mg by mouth nightly ) 30 tablet 3    hydrALAZINE (APRESOLINE) 25 MG tablet Take 1 tablet by mouth 3 times daily. 90 tablet 3    atorvastatin (LIPITOR) 10 MG tablet Take 20 mg by mouth nightly       potassium chloride SA (K-DUR;KLOR-CON M) 20 MEQ tablet Take 1 tablet by mouth daily (with breakfast) (Patient taking differently: Take 20 mEq by mouth daily as needed (taken with Lasix) ) 60 tablet 3    furosemide (LASIX) 40 MG tablet Take 1 tablet by mouth daily.  (Patient taking differently: Take 40 mg by mouth daily as needed ) 60 tablet 3       REVIEW OF SYSTEMS See HPI    Objective:    /65   Pulse 75   Temp 98 °F (36.7 °C) (Oral)   Resp 16   Ht 6' 4\" (1.93 m)   Wt (!) 377 lb 14.4 oz (171.4 kg)   SpO2 96%   BMI 46.00 kg/m²   Wt Readings from Last 3 Encounters:   07/28/18 (!) 377 lb 14.4 oz (171.4 kg)   07/27/18 (!) 350 lb (158.8 kg)   07/20/18 (!) 350 lb (158.8 kg)     PHYSICAL EXAM  CONSTITUTIONAL:   Awake, alert, cooperative   EYES:  lids and lashes normal   ENT: external ears and nose without lesions   NECK:  supple, symmetrical, trachea midline   SKIN:  Open wound Present    Assessment:     Problem List Items AM   Wound Length (cm) 2.5 cm 7/27/2018  9:30 AM   Wound Width (cm) 5 cm 7/27/2018  9:30 AM   Wound Depth (cm)  0.1 7/27/2018  9:30 AM   Calculated Wound Size (cm^2) (l*w) 12.5 cm^2 7/27/2018  9:30 AM   Change in Wound Size % (l*w) -316.67 7/27/2018  9:30 AM   Wound Assessment Pink;Pale;Tan;Yellow 7/27/2018  9:30 AM   Drainage Amount Copious 7/27/2018  9:30 AM   Drainage Description Yellow;Green 7/27/2018  9:30 AM   Odor None 7/27/2018  9:30 AM   Belén-wound Assessment Denuded; Maceration;Pink;Purple; Swelling; White 7/27/2018  9:30 AM   Time out Yes 7/20/2018 10:00 AM   Number of days: 28       Diabetic Ulcer 03/23/16 Foot Plantar;Left #1 lt plantar foot  onset 3/23/15   diabetic  wag 2 (Active)   Belén-wound Assessment White; Maceration;Fragile 7/27/2018  9:30 AM   Belén-Wound Moisture Denuded 6/22/2018  8:20 AM   Belén-Wound Color White 6/22/2018  8:20 AM   Diabetic Wound - [facility] 2 7/21/2017 10:33 AM   Wound Assessment Pale;Pink;Yellow 7/27/2018  9:30 AM   Wound Length (cm) 1.2 cm 7/27/2018  9:30 AM   Wound Width (cm) 1.8 cm 7/27/2018  9:30 AM   Wound Depth (cm)  0.3 7/27/2018  9:30 AM   Calculated Wound Size (cm^2) (l*w) 2.16 cm^2 7/27/2018  9:30 AM   Change in Wound Size % (l*w) -517.14 7/27/2018  9:30 AM   Culture Taken Yes 11/10/2017 10:51 AM   Dressing Status Clean;Dry; Intact 7/20/2018 10:27 AM   Dressing Changed Changed/New 7/20/2018 10:27 AM   Dressing/Treatment Dry dressing 7/13/2018  9:35 AM   Wound Cleansed Rinsed/Irrigated with saline 7/20/2018 10:27 AM   Dressing Change Due 02/05/18 2/2/2018 11:48 AM   Granulation Quality Red 6/22/2018  8:20 AM   Drainage Amount Copious 7/27/2018  9:30 AM   Drainage Description Yellow;Green 7/27/2018  9:30 AM   Odor Mild 7/27/2018  9:30 AM   Undermining Starts ___ O'Clock 0 4/27/2018  8:47 AM   Undermining Ends___ O'Clock 0 4/27/2018  8:47 AM   Undermining Maxium Distance (cm) 0 4/27/2018  8:47 AM   [place]%Wound Bed 70 2/3/2017 11:21 AM   Red%Wound Bed 80 2/23/2018

## 2018-07-30 NOTE — PROGRESS NOTES
to palpation. No masses felt. No hepatosplenomegaly. Extremities: Right BKA, stump is unremarkable. The left leg shows diffuse lymphedema extending from below the knee to the toes. No odor. Left leg/foot with dressing, coban. Left medial thigh shows darker erythema. Less edema  Lines: peripheral    Laboratory and Tests Review:  Lab Results   Component Value Date    WBC 7.5 07/28/2018    WBC 8.9 07/27/2018    WBC 5.5 11/17/2017    HGB 10.0 (L) 07/28/2018    HCT 30.9 (L) 07/28/2018    MCV 87.5 07/28/2018     07/28/2018     Lab Results   Component Value Date    NEUTROABS 5.95 07/28/2018    NEUTROABS 8.01 (H) 07/27/2018    NEUTROABS 4.12 11/17/2017     Lab Results   Component Value Date    CRP 18.3 (H) 07/27/2018    CRP 9.5 (H) 11/17/2017    CRP 2.1 (H) 11/28/2016     Lab Results   Component Value Date    CRPHS 13.0 (H) 04/04/2016     Lab Results   Component Value Date    SEDRATE 83 (H) 07/27/2018    SEDRATE 77 (H) 11/17/2017    SEDRATE 73 (H) 11/28/2016     Lab Results   Component Value Date    ALT <5 07/28/2018    AST 18 07/28/2018    ALKPHOS 137 (H) 07/28/2018    BILITOT 0.8 07/28/2018     Lab Results   Component Value Date     07/28/2018    K 3.3 07/28/2018     07/28/2018    CO2 23 07/28/2018    BUN 18 07/28/2018    CREATININE 1.0 07/28/2018    CREATININE 1.1 07/27/2018    CREATININE 0.9 11/17/2017    GFRAA >60 07/28/2018    LABGLOM >60 07/28/2018    GLUCOSE 115 07/28/2018    PROT 7.9 07/28/2018    LABALBU 3.3 07/28/2018    CALCIUM 8.4 07/28/2018    BILITOT 0.8 07/28/2018    ALKPHOS 137 07/28/2018    AST 18 07/28/2018    ALT <5 07/28/2018     Radiology:  7/27/18 US LLE: Limited study without evidence of occlusive deep venous thrombosis in  the left lower extremity    7/27/18 CT left foot:   1. 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   2. Ulceration and at plantar aspect of the foot with dense attenuating  tissue extending to the plantar aspect of the tarsal bones with  irregular

## 2018-07-30 NOTE — PLAN OF CARE
Problem: Falls - Risk of:  Goal: Will remain free from falls  Will remain free from falls   Outcome: Met This Shift      Problem: Pain:  Goal: Control of acute pain  Control of acute pain   Outcome: Met This Shift      Problem: Skin Integrity:  Goal: Absence of new skin breakdown  Absence of new skin breakdown   Outcome: Met This Shift

## 2018-07-31 LAB
HBA1C MFR BLD: 6.4 % (ref 4–5.6)
METER GLUCOSE: 119 MG/DL (ref 70–110)
METER GLUCOSE: 172 MG/DL (ref 70–110)
METER GLUCOSE: 182 MG/DL (ref 70–110)
METER GLUCOSE: 191 MG/DL (ref 70–110)

## 2018-07-31 PROCEDURE — 83036 HEMOGLOBIN GLYCOSYLATED A1C: CPT

## 2018-07-31 PROCEDURE — 97161 PT EVAL LOW COMPLEX 20 MIN: CPT

## 2018-07-31 PROCEDURE — 2580000003 HC RX 258: Performed by: INTERNAL MEDICINE

## 2018-07-31 PROCEDURE — 2580000003 HC RX 258: Performed by: SPECIALIST

## 2018-07-31 PROCEDURE — G8978 MOBILITY CURRENT STATUS: HCPCS

## 2018-07-31 PROCEDURE — 6360000002 HC RX W HCPCS: Performed by: INTERNAL MEDICINE

## 2018-07-31 PROCEDURE — 6360000002 HC RX W HCPCS: Performed by: SPECIALIST

## 2018-07-31 PROCEDURE — 6370000000 HC RX 637 (ALT 250 FOR IP): Performed by: INTERNAL MEDICINE

## 2018-07-31 PROCEDURE — 97530 THERAPEUTIC ACTIVITIES: CPT

## 2018-07-31 PROCEDURE — 97165 OT EVAL LOW COMPLEX 30 MIN: CPT

## 2018-07-31 PROCEDURE — 36415 COLL VENOUS BLD VENIPUNCTURE: CPT

## 2018-07-31 PROCEDURE — 1200000000 HC SEMI PRIVATE

## 2018-07-31 PROCEDURE — G8988 SELF CARE GOAL STATUS: HCPCS

## 2018-07-31 PROCEDURE — APPSS30 APP SPLIT SHARED TIME 16-30 MINUTES: Performed by: NURSE PRACTITIONER

## 2018-07-31 PROCEDURE — 82962 GLUCOSE BLOOD TEST: CPT

## 2018-07-31 PROCEDURE — 99232 SBSQ HOSP IP/OBS MODERATE 35: CPT | Performed by: INTERNAL MEDICINE

## 2018-07-31 PROCEDURE — G8979 MOBILITY GOAL STATUS: HCPCS

## 2018-07-31 PROCEDURE — G8987 SELF CARE CURRENT STATUS: HCPCS

## 2018-07-31 RX ADMIN — HYDRALAZINE HYDROCHLORIDE 25 MG: 25 TABLET ORAL at 08:13

## 2018-07-31 RX ADMIN — FUROSEMIDE 40 MG: 40 TABLET ORAL at 08:13

## 2018-07-31 RX ADMIN — INSULIN LISPRO 1 UNITS: 100 INJECTION, SOLUTION INTRAVENOUS; SUBCUTANEOUS at 21:33

## 2018-07-31 RX ADMIN — INSULIN LISPRO 1 UNITS: 100 INJECTION, SOLUTION INTRAVENOUS; SUBCUTANEOUS at 12:20

## 2018-07-31 RX ADMIN — CARBIDOPA AND LEVODOPA 2 TABLET: 25; 100 TABLET ORAL at 21:30

## 2018-07-31 RX ADMIN — DOXAZOSIN MESYLATE 8 MG: 4 TABLET ORAL at 21:30

## 2018-07-31 RX ADMIN — DILTIAZEM HYDROCHLORIDE 240 MG: 240 CAPSULE, EXTENDED RELEASE ORAL at 21:30

## 2018-07-31 RX ADMIN — HYDRALAZINE HYDROCHLORIDE 25 MG: 25 TABLET ORAL at 15:38

## 2018-07-31 RX ADMIN — ENOXAPARIN SODIUM 40 MG: 40 INJECTION, SOLUTION INTRAVENOUS; SUBCUTANEOUS at 08:13

## 2018-07-31 RX ADMIN — INSULIN LISPRO 1 UNITS: 100 INJECTION, SOLUTION INTRAVENOUS; SUBCUTANEOUS at 18:18

## 2018-07-31 RX ADMIN — IBUPROFEN 600 MG: 600 TABLET ORAL at 18:18

## 2018-07-31 RX ADMIN — Medication 10 ML: at 08:14

## 2018-07-31 RX ADMIN — SODIUM CHLORIDE 1000 MG: 900 INJECTION INTRAVENOUS at 18:17

## 2018-07-31 RX ADMIN — Medication 10 ML: at 21:30

## 2018-07-31 RX ADMIN — HYDRALAZINE HYDROCHLORIDE 25 MG: 25 TABLET ORAL at 21:30

## 2018-07-31 RX ADMIN — ATORVASTATIN CALCIUM 20 MG: 20 TABLET, FILM COATED ORAL at 21:30

## 2018-07-31 ASSESSMENT — PAIN SCALES - GENERAL
PAINLEVEL_OUTOF10: 0
PAINLEVEL_OUTOF10: 6
PAINLEVEL_OUTOF10: 0

## 2018-07-31 NOTE — DISCHARGE SUMMARY
EXAM: XR CHEST 1 VIEW COMPARISON: 2017 INDICATION:  sepsis workup sepsis workup FINDINGS: Left sided pacemaker present. There is prominence of pulmonary vasculature appearing similar when compared to prior. Stable mild cardiomegaly. No pneumothorax. No focal airspace opacity or pleural effusion. Stable pulmonary vascular congestion. No obvious pneumonia or pleural effusion. Ct Foot Left Wo Contrast    Result Date: 2018  Patient MRN:  [id number] : 1945 Age: 72 years Gender: Male Order Date:  2018 4:45 PM EXAM: CT FOOT LEFT WO CONTRAST COMPARISON: None INDICATION:  chronic nonhealing wound w/ history of osteomyelitis  TECHNIQUE:  Low-dose CT acquisition technique included one of the following options; 1. Automated exposure control, 2. Adjustment of mA and/or kV according to the patient's size or 3. Use of iterative reconstruction. Unenhanced CT performed, this data then utilized to create 3-dimensional images. FINDINGS: There is deformity of the foot and notable loss of plantar calcaneal arch. There is also subluxation of bases of the metatarsal bones and there is abnormal cortical thickening at level of first metatarsal bone suggestive of remote/old healed fracture. There is severe generalized soft tissue edema and severe skin thickening. Note made of an ulceration at the plantar aspect of the foot with dense attenuating tissue extending deep to the level of the tarsal bones. There is significant cortical thickening and thickened, irregular periosteum at this site deep to the ulceration suggestive of chronic osteomyelitis. No obvious erosive changes to suggest acute osteomyelitis. No evidence of acute fracture. Calcaneal tendon is intact. Plantar fascia appears lax and may be related to disruption. 1. 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  2. Ulceration and at plantar aspect of the foot with dense attenuating tissue extending to the plantar aspect of the tarsal

## 2018-07-31 NOTE — PROGRESS NOTES
Occupational Therapy      OCCUPATIONAL THERAPY INITIAL EVALUATION      Date:2018  Patient Name: Laurance Skiff  MRN: [id number]  : 1945  Room: [facility]     Evaluating OT: Geoff Schuster OTR/L       Recommended Adaptive Equipment: TBA     AM-PAC Daily Activity Raw Score: 17/24  G Code:  CK    Diagnosis: Cellulitis, abscess L leg and foot   Past Medical History: B BKA  Precautions: Falls, Isolation, limit ambulation to about 30 ft d/t L foot wound      Home Living/PLOF:   Patient lives with wife, 1 story, ramp to enter   PTA: Independent with ADLs, has w/walker, w/c and electric mobility scooter. R prosthesis, L charcot shoe    Pain Level: pt c/o no pain  this session     Hearing: WFLS  Vision: [facility]      Cognition: oriented x 3    UE Assessment:  Hand Dominance: Right [x]  Left []    UE AROM and strength WFLs      Functional Assessment:   Initial Eval Status 18 Comments   Feeding  Independent     Grooming  Set-up     Upper Body Dressing Set-up      Lower Body Dressing Mod A  Assist donning L shoe     Per patient - feels tighter d/t wrappings on foot   Using shoe horn  Wife able to assist with ADLS    Bathing     Toileting      Bed Mobility  Supine to Sit: Independent     Functional Transfers Independent   Sit-stand from bed      Functional Mobility Independent,w/walker   Ambulating in room       Sit balance: Independent   Stand balance: Independent   Endurance/Activity tolerance: Tolerant of func eval   Sensation: WFLS                                Comments: Upon arrival pt lying in bed . At end of session pt sitting EOB  with all devices within reach, all lines and tubes intact. Call light within reach.          Assessment of current deficits    Functional mobility []  ROM [] Strength []  Cognition []  ADLs []   IADLs [] Safety Awareness [] Endurance []  Fine Motor Coordination [] Balance [] Vision/perception [] Sensation []   Gross Motor Coordination []     Treatment frequency:    Plan of Care:

## 2018-07-31 NOTE — PROGRESS NOTES
[address] [place] [address] [facility]  Progress Note    SUBJECTIVE:  Chief Complaint   Patient presents with    Wound Check     left leg, was told he had to come to er to be evaluated by ID     Patient is tolerating medications. No reported adverse drug reactions. No nausea, vomiting, diarrhea. Sitting up on edge of bed. No new c/o. Review of systems as above, otherwise negative. Medications:  Scheduled Meds:   ertapenem (INVANZ) IVPB  1 g Intravenous Q24H    atorvastatin  20 mg Oral Nightly    doxazosin  8 mg Oral Nightly    hydrALAZINE  25 mg Oral TID    furosemide  40 mg Oral Daily    carbidopa-levodopa  2 tablet Oral Nightly    clorpactin 2 G irrigation  2 g Irrigation Daily    sodium chloride flush  10 mL Intravenous 2 times per day    enoxaparin  40 mg Subcutaneous Daily    insulin lispro  0-6 Units Subcutaneous TID WC    insulin lispro  0-3 Units Subcutaneous Nightly    diltiazem  240 mg Oral Nightly     Continuous Infusions:   dextrose       PRN Meds:ibuprofen, sodium chloride flush, magnesium hydroxide, ondansetron, glucose, dextrose, glucagon (rDNA), dextrose    OBJECTIVE:  /66   Pulse 70   Temp 98.2 °F (36.8 °C) (Oral)   Resp 16   Ht 6' 4\" (1.93 m)   Wt (!) 377 lb 14.4 oz (171.4 kg)   SpO2 94%   BMI 46.00 kg/m²   Temp  Av.1 °F (36.7 °C)  Min: 98 °F (36.7 °C)  Max: 98.2 °F (36.8 °C)  Constitutional: The patient is awake, alert, and oriented. Skin: Warm and dry. No rashes were noted. HEENT: Eyes show round, and reactive pupils. No jaundice. Moist mucous membranes, no ulcerations, no thrush. Neck: Supple to movements. Chest: No use of accessory muscles to breathe. Symmetrical expansion. Auscultation reveals no wheezing, crackles, or rhonchi. Cardiovascular: S1 and S2 are rhythmic and regular. No murmurs appreciated. Abdomen: Positive bowel sounds to auscultation. Benign to palpation. No masses felt. No hepatosplenomegaly.   Extremities: Right BKA, wearing prosthesis. The left leg shows diffuse lymphedema extending from below the knee to the toes. No odor. Left leg/foot with dressing, coban. Left medial thigh shows darker erythema. Less edema. Lines: peripheral    Laboratory and Tests Review:  Lab Results   Component Value Date    WBC 7.5 07/28/2018    WBC 8.9 07/27/2018    WBC 5.5 11/17/2017    HGB 10.0 (L) 07/28/2018    HCT 30.9 (L) 07/28/2018    MCV 87.5 07/28/2018     07/28/2018     Lab Results   Component Value Date    NEUTROABS 5.95 07/28/2018    NEUTROABS 8.01 (H) 07/27/2018    NEUTROABS 4.12 11/17/2017     Lab Results   Component Value Date    CRP 18.3 (H) 07/27/2018    CRP 9.5 (H) 11/17/2017    CRP 2.1 (H) 11/28/2016     Lab Results   Component Value Date    CRPHS 13.0 (H) 04/04/2016     Lab Results   Component Value Date    SEDRATE 83 (H) 07/27/2018    SEDRATE 77 (H) 11/17/2017    SEDRATE 73 (H) 11/28/2016     Lab Results   Component Value Date    ALT <5 07/28/2018    AST 18 07/28/2018    ALKPHOS 137 (H) 07/28/2018    BILITOT 0.8 07/28/2018     Lab Results   Component Value Date     07/28/2018    K 3.3 07/28/2018     07/28/2018    CO2 23 07/28/2018    BUN 18 07/28/2018    CREATININE 1.0 07/28/2018    CREATININE 1.1 07/27/2018    CREATININE 0.9 11/17/2017    GFRAA >60 07/28/2018    LABGLOM >60 07/28/2018    GLUCOSE 115 07/28/2018    PROT 7.9 07/28/2018    LABALBU 3.3 07/28/2018    CALCIUM 8.4 07/28/2018    BILITOT 0.8 07/28/2018    ALKPHOS 137 07/28/2018    AST 18 07/28/2018    ALT <5 07/28/2018     Radiology:  7/27/18 US LLE: Limited study without evidence of occlusive deep venous thrombosis in  the left lower extremity    7/27/18 CT left foot:   1. 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 2. Ulceration and at plantar aspect of the foot with dense attenuating  tissue extending to the plantar aspect of the tarsal bones with  irregular periosteal thickening which could suggest chronic  osteomyelitis at this site.  No definite evidence to suggest acute  osteomyelitis. Three-phase bone scan or MRI with and without contrast  may be helpful for further evaluation. 3. Generalized soft tissue edema with severe generalized skin  Thickening.    7/27/18 CXR: Stable pulmonary vascular congestion. No obvious pneumonia or pleural effusion. Microbiology:   No new cultures  7/27/18 BC negative so far  7/27/18 Urine Growth not present  7/27/18 wound Ox+ GNRs, Proteus species, GPO    No results for input(s): ASO in the last 72 hours. ASSESSMENT:  · Chronic left diabetic foot ulcer plantar aspect. It is clean  · Chronic wounds associated to venous stasis and lymphedema left leg  · Wound infection left leg with cellulitis. Wound cultures growing Ox+ GNR, possible Pseudomonas. Ertapenem does not treat Pseudomonas, however this organism may have not been involved in the infection  · Maggot infestation, no further maggots seen with  dressing change 7/30/18  · History of intolerance to Zosyn and Vancomycin    PLAN:  · Continue Ertapenem. · Continue wound care  · Follow up final wound cultures    Kulwant Obrien  12:29 PM  7/31/2018     Patient seen and examined. I had a face to face encounter with the patient. Agree with exam, assessment and plan as outlined above. Addition and corrections were done as deemed appropriate. My exam and plan include: Awaiting for final identification of organisms to decide on oral versus IV antibiotics upon discharge.     Radha William  7/31/2018

## 2018-07-31 NOTE — PLAN OF CARE
Problem: Falls - Risk of:  Goal: Will remain free from falls  Will remain free from falls   Outcome: Met This Shift    Goal: Absence of physical injury  Absence of physical injury   Outcome: Met This Shift      Problem: Pain:  Goal: Control of acute pain  Control of acute pain   Outcome: Met This Shift

## 2018-07-31 NOTE — PROGRESS NOTES
Physical Therapy    Facility/Department: [address] MED SURG/TELE  Initial Assessment    NAME: Juan C Juan  : 1945  MRN: [id number]    Date of Service: 2018    Evaluating Therapist: Cady Scott. Loly Maciel., P.T. Room #: 1071/7470-B  DIAGNOSIS: Cellulitis and wound LLE and foot  PMH: R BKA  PRECAUTIONS: falls, contact isolation, limit amb to about 30 feet due to wound on L foot. Social:  Pt lives with wife in a 1 floor with a ramp to enter. Prior to admission pt walked with R prosthesis and L charcot shoe and ww or uses WC or electric mobility scooter     Initial Evaluation  Date: 18 Treatment      Short Term/ Long Term   Goals   Was pt agreeable to Eval/treatment? yes     Does pt have pain? No c/o pain     Bed Mobility  Rolling: Independent  Supine to sit: Independent  Sit to supine: NA  Scooting: Independent  Independent   Transfers Sit to stand: Independent  Stand to sit: Independent  Stand pivot: Independent with R prosthesis and ww  Independent   Ambulation    30 feet with ww Independent   30 feet with ww Independent   Stair negotiation: ascended and descended NA  NA   AM-PAC Raw Score  23/24       Pt is alert and Oriented x3  BLE ROM is WFL. BLE strength is grossly WFL. Balance: sitting EOB Independent and standing with ww and R prosthesis and L charcot shoe Independent   Sensation: L foot numb  Edema: LLE  Endurance: fair+     ASSESSMENT  Pt displays functional ability as noted in the objective portion of this evaluation. Comments/Treatment:  Pt sat EOB x 10 min and donned R prosthesis and L charcot shoe with assist.  He then stood and walked in his room with ww Independently. Amb distance was limited due to wound on L foot. Pt will remain on PT caseload to ensure he remains Independent with functional mobility. Pt was left sitting EOB with call light in reach. Rehab potential is Good for reaching above PT goals. Pts/ family goals   1. To heel LLE and go home. Patient and or family understand(s) diagnosis, prognosis, and plan of care. PLAN  PT care will be provided in accordance with the objectives noted above. Whenever appropriate, clear delegation orders will be provided for nursing staff. Exercises and functional mobility practice will be used as well as appropriate assistive devices or modalities to obtain goals. Patient and family education will also be administered as needed. Frequency of treatments will be 2-5x/week x 3-5 days. Time in: 10:40  Time out: 11:20    Benjamin Malik., P.T.    License number:  [id number]

## 2018-07-31 NOTE — CARE COORDINATION
Social Work / discharge planning   7/31/2018 9:51 AM   Referral made to "[facility] [employer]    Select not an option as patient has Medicare and has not had a 3 day stay on ICU or intermediate unit   Electronically signed by SABRINA Simmons on 7/31/2018 at 9:52 AM

## 2018-08-01 VITALS
WEIGHT: 315 LBS | DIASTOLIC BLOOD PRESSURE: 70 MMHG | TEMPERATURE: 98 F | SYSTOLIC BLOOD PRESSURE: 132 MMHG | BODY MASS INDEX: 38.36 KG/M2 | HEIGHT: 76 IN | HEART RATE: 72 BPM | OXYGEN SATURATION: 94 % | RESPIRATION RATE: 18 BRPM

## 2018-08-01 LAB
METER GLUCOSE: 125 MG/DL (ref 70–110)
METER GLUCOSE: 174 MG/DL (ref 70–110)
WOUND/ABSCESS: NORMAL

## 2018-08-01 PROCEDURE — 6360000002 HC RX W HCPCS: Performed by: INTERNAL MEDICINE

## 2018-08-01 PROCEDURE — 2580000003 HC RX 258: Performed by: INTERNAL MEDICINE

## 2018-08-01 PROCEDURE — 6370000000 HC RX 637 (ALT 250 FOR IP): Performed by: INTERNAL MEDICINE

## 2018-08-01 PROCEDURE — 2580000003 HC RX 258: Performed by: SPECIALIST

## 2018-08-01 PROCEDURE — 82962 GLUCOSE BLOOD TEST: CPT

## 2018-08-01 PROCEDURE — 6360000002 HC RX W HCPCS: Performed by: SPECIALIST

## 2018-08-01 PROCEDURE — 99239 HOSP IP/OBS DSCHRG MGMT >30: CPT | Performed by: INTERNAL MEDICINE

## 2018-08-01 PROCEDURE — APPSS45 APP SPLIT SHARED TIME 31-45 MINUTES: Performed by: NURSE PRACTITIONER

## 2018-08-01 RX ORDER — SODIUM CHLORIDE 0.9 % (FLUSH) 0.9 %
10 SYRINGE (ML) INJECTION PRN
Status: DISCONTINUED | OUTPATIENT
Start: 2018-08-01 | End: 2018-08-01 | Stop reason: HOSPADM

## 2018-08-01 RX ORDER — HEPARIN SODIUM (PORCINE) LOCK FLUSH IV SOLN 100 UNIT/ML 100 UNIT/ML
3 SOLUTION INTRAVENOUS EVERY 12 HOURS SCHEDULED
Status: DISCONTINUED | OUTPATIENT
Start: 2018-08-01 | End: 2018-08-01 | Stop reason: HOSPADM

## 2018-08-01 RX ORDER — LIDOCAINE HYDROCHLORIDE 10 MG/ML
5 INJECTION, SOLUTION EPIDURAL; INFILTRATION; INTRACAUDAL; PERINEURAL ONCE
Status: DISCONTINUED | OUTPATIENT
Start: 2018-08-01 | End: 2018-08-01 | Stop reason: HOSPADM

## 2018-08-01 RX ORDER — MEROPENEM 500 MG/1
1 INJECTION, POWDER, FOR SOLUTION INTRAVENOUS EVERY 8 HOURS
Qty: 60 G | Refills: 1 | DISCHARGE
Start: 2018-08-01 | End: 2018-08-15

## 2018-08-01 RX ORDER — HEPARIN SODIUM (PORCINE) LOCK FLUSH IV SOLN 100 UNIT/ML 100 UNIT/ML
3 SOLUTION INTRAVENOUS PRN
Status: DISCONTINUED | OUTPATIENT
Start: 2018-08-01 | End: 2018-08-01 | Stop reason: HOSPADM

## 2018-08-01 RX ORDER — DILTIAZEM HYDROCHLORIDE 240 MG/1
240 CAPSULE, COATED, EXTENDED RELEASE ORAL NIGHTLY
Qty: 30 CAPSULE | Refills: 0 | DISCHARGE
Start: 2018-08-01 | End: 2020-04-27

## 2018-08-01 RX ADMIN — FUROSEMIDE 40 MG: 40 TABLET ORAL at 09:11

## 2018-08-01 RX ADMIN — HYDRALAZINE HYDROCHLORIDE 25 MG: 25 TABLET ORAL at 09:11

## 2018-08-01 RX ADMIN — INSULIN LISPRO 1 UNITS: 100 INJECTION, SOLUTION INTRAVENOUS; SUBCUTANEOUS at 12:12

## 2018-08-01 RX ADMIN — ENOXAPARIN SODIUM 40 MG: 40 INJECTION, SOLUTION INTRAVENOUS; SUBCUTANEOUS at 09:11

## 2018-08-01 RX ADMIN — HYDRALAZINE HYDROCHLORIDE 25 MG: 25 TABLET ORAL at 14:56

## 2018-08-01 RX ADMIN — MEROPENEM 1 G: 1 INJECTION, POWDER, FOR SOLUTION INTRAVENOUS at 13:54

## 2018-08-01 RX ADMIN — Medication 10 ML: at 09:11

## 2018-08-01 ASSESSMENT — PAIN SCALES - GENERAL
PAINLEVEL_OUTOF10: 0
PAINLEVEL_OUTOF10: 0

## 2018-08-01 NOTE — PROGRESS NOTES
[facility] Quality Flow/Interdisciplinary Rounds Progress Note        Quality Flow Rounds held on August 1, 2018    Disciplines Attending:  Bedside Nurse, ,  and Nursing Unit Leadership    Janell Hollingsworth was admitted on 7/27/2018  1:52 PM    Anticipated Discharge Date:  Expected Discharge Date: 07/31/18    Disposition:    Bruce Score:  Bruce Scale Score: 20    Readmission Risk              Risk of Unplanned Readmission:        7             Discussed patient goal for the day, patient clinical progression, and barriers to discharge.   The following Goal(s) of the Day/Commitment(s) have been identified:  discharge planning; awaiting ID plan      Brent Merritt  August 1, 2018

## 2018-08-01 NOTE — CARE COORDINATION
Social Work:    Received a call back from Estella Shrestha at [employer] advising they arranged [id number] for $40.00 and will pay the transport, as well as bring a bariatric wheelchair to the hospital for [address] to use. Social work canceled [employer] and notified Mrs. Kraft/Mr. Infante Anatoliy of transfer time of 5:00 p.m.     Electronically signed by SABRINA Sevilla on 8/1/2018 at 3:17 PM

## 2018-08-01 NOTE — PROGRESS NOTES
Attempted to call nurse to nurse to [facility]. Nurse not available at this time. Will attempt to call at a later time.

## 2018-08-02 LAB
BLOOD CULTURE, ROUTINE: NORMAL
CULTURE, BLOOD 2: NORMAL

## 2018-08-03 ENCOUNTER — HOSPITAL ENCOUNTER (OUTPATIENT)
Dept: WOUND CARE | Age: 73
Discharge: HOME | End: 2018-08-03
Payer: MEDICARE

## 2018-08-04 LAB
GRAM STAIN RESULT: ABNORMAL
ORGANISM: ABNORMAL
WOUND/ABSCESS: ABNORMAL

## 2018-08-27 RX ORDER — CEPHALEXIN 500 MG/1
500 CAPSULE ORAL 4 TIMES DAILY
Status: ON HOLD | COMMUNITY
End: 2018-09-06 | Stop reason: HOSPADM

## 2018-08-31 ENCOUNTER — HOSPITAL ENCOUNTER (OUTPATIENT)
Dept: WOUND CARE | Age: 73
Discharge: HOME | DRG: 638 | End: 2018-08-31
Payer: MEDICARE

## 2018-08-31 VITALS
HEART RATE: 72 BPM | RESPIRATION RATE: 18 BRPM | SYSTOLIC BLOOD PRESSURE: 142 MMHG | DIASTOLIC BLOOD PRESSURE: 80 MMHG | TEMPERATURE: 97.7 F

## 2018-08-31 DIAGNOSIS — M86.672 CHRONIC OSTEOMYELITIS OF LEFT FOOT (HCC): ICD-10-CM

## 2018-08-31 PROCEDURE — 11042 DBRDMT SUBQ TIS 1ST 20SQCM/<: CPT

## 2018-08-31 PROCEDURE — 11045 DBRDMT SUBQ TISS EACH ADDL: CPT

## 2018-08-31 PROCEDURE — 87070 CULTURE OTHR SPECIMN AEROBIC: CPT

## 2018-08-31 PROCEDURE — 87186 SC STD MICRODIL/AGAR DIL: CPT

## 2018-08-31 PROCEDURE — 87075 CULTR BACTERIA EXCEPT BLOOD: CPT

## 2018-08-31 PROCEDURE — 87077 CULTURE AEROBIC IDENTIFY: CPT

## 2018-08-31 NOTE — PLAN OF CARE
Problem: Wound:  Goal: Will show signs of wound healing; wound closure and no evidence of infection  Will show signs of wound healing; wound closure and no evidence of infection   Outcome: Ongoing      Problem: Blood Glucose:  Goal: Ability to maintain appropriate glucose levels will improve  Ability to maintain appropriate glucose levels will improve   Outcome: Ongoing      Problem: Venous:  Goal: Signs of wound healing will improve  Signs of wound healing will improve   Outcome: Ongoing      Problem: Compression therapy:  Goal: Will be free from complications associated with compression therapy  Will be free from complications associated with compression therapy   Outcome: Ongoing

## 2018-08-31 NOTE — PROGRESS NOTES
[facility] Followup Visit Note    Referring Physician : Latisha Daly MD  Marizol Luke  MEDICAL RECORD NUMBER:  [id number]  AGE: 72 y.o. GENDER: male  : 1945  EPISODE DATE:  2018    Subjective:     Chief Complaint   Patient presents with    Wound Check     Left Foot /LLE wound Care Follow up Appt post 49 [facility] stay      HISTORY of PRESENT ILLNESS HPI   Marizol Luke is a 72 y.o. male who presents today for wound/ulcer evaluation.    History of Wound Context:  2 years     Wound/Ulcer Pain Timing/Severity: intermittent  Quality of pain: aching  Severity:  3 / 10   Modifying Factors: Pain worsens with walking  Associated Signs/Symptoms: edema, erythema and drainage    Ulcer Identification:  Ulcer Type: venous and diabetic  Contributing Factors: edema, venous stasis and diabetes    Diabetic/Pressure/Non Pressure Ulcers only:  Ulcer: Diabetic ulcer, fat layer exposed    Wound: N/A        PAST MEDICAL HISTORY      Diagnosis Date    [facility] [address]     Diabetic foot ulcer associated with type 2 diabetes mellitus, with fat layer exposed ([facility] [address] 2016    Hyperlipidemia     Hypertension      Past Surgical History:   Procedure Laterality Date    FOOT AMPUTATION      FOOT SURGERY Left 16    I&D    OTHER SURGICAL HISTORY  2014    left foot debridement, I&D exostectomy, bone biopsy; I&D right lower leg(BK stump site)    Abron Call      has been shut off for 5 years     Family History   Problem Relation Age of Onset    Diabetes Mother     Heart Disease Mother     Other Father         cerebral aneurysm    Parkinsonism Sister     Heart Attack Brother     Cancer Brother      Social History   Substance Use Topics    Smoking status: Former Smoker     Packs/day: 3.00     Types: Cigarettes     Start date: 1963     Quit date: 1984    Smokeless tobacco: Former User     Types: Snuff    Alcohol use No     Allergies   Allergen Reactions    Zosyn [Piperacillin Sod-Tazobactam So] Itching    Morphine Rash    Vancomycin Rash     Current Outpatient Prescriptions on File Prior to Encounter   Medication Sig Dispense Refill    diltiazem (CARDIZEM CD) 240 MG extended release capsule Take 1 capsule by mouth nightly 30 capsule 0    ibuprofen (ADVIL;MOTRIN) 600 MG tablet Take 1 tablet by mouth every 8 hours as needed 120 tablet 3    potassium chloride SA (K-DUR;KLOR-CON M) 20 MEQ tablet Take 1 tablet by mouth daily (with breakfast) (Patient taking differently: Take 20 mEq by mouth daily as needed (taken with Lasix) ) 60 tablet 3    carbidopa-levodopa (SINEMET)  MG per tablet Take 2 tablets by mouth nightly       pioglitazone (ACTOS) 45 MG tablet Take 1 tablet by mouth daily. (Patient taking differently: Take 45 mg by mouth nightly ) 30 tablet 3    doxazosin (CARDURA) 8 MG tablet Take 1 tablet by mouth daily. (Patient taking differently: Take 8 mg by mouth nightly ) 30 tablet 3    hydrALAZINE (APRESOLINE) 25 MG tablet Take 1 tablet by mouth 3 times daily. 90 tablet 3    furosemide (LASIX) 40 MG tablet Take 1 tablet by mouth daily. (Patient taking differently: Take 40 mg by mouth daily as needed ) 60 tablet 3    atorvastatin (LIPITOR) 10 MG tablet Take 20 mg by mouth nightly        No current facility-administered medications on file prior to encounter.         REVIEW OF SYSTEMS See HPI    Objective:    BP (!) 142/80   Pulse 72   Temp 97.7 °F (36.5 °C) (Oral)   Resp 18   Wt Readings from Last 3 Encounters:   07/28/18 (!) 377 lb 14.4 oz (171.4 kg)   07/27/18 (!) 350 lb (158.8 kg)   07/20/18 (!) 350 lb (158.8 kg)     PHYSICAL EXAM  CONSTITUTIONAL:   Awake, alert, cooperative   EYES:  lids and lashes normal   ENT: external ears and nose without lesions   NECK:  supple, symmetrical, trachea midline   SKIN:  Open wound Present    Assessment:     Problem List Items Addressed This Visit     Chronic osteomyelitis of left foot ([address]        Procedure days: 0       Diabetic Ulcer 03/23/16 Foot Plantar;Left #1 lt plantar foot  onset 3/23/15   diabetic  wag 2 (Active)   Belén-wound Assessment White; Maceration;Fragile 7/27/2018  9:30 AM   Belén-Wound Moisture Denuded 6/22/2018  8:20 AM   [place]-Wound Color White 6/22/2018  8:20 AM   Diabetic Wound - Indira Lick 2 7/21/2017 10:33 AM   Wound Assessment Pale;Pink;Yellow 7/27/2018  9:30 AM   Wound Length (cm) 1.2 cm 7/27/2018 10:05 AM   Wound Width (cm) 1.8 cm 7/27/2018 10:05 AM   Wound Depth (cm)  0.3 7/27/2018 10:05 AM   Calculated Wound Size (cm^2) (l*w) 2.16 cm^2 7/27/2018 10:05 AM   Change in Wound Size % (l*w) -517.14 7/27/2018 10:05 AM   Culture Taken Yes 11/10/2017 10:51 AM   Dressing Status Clean;Dry; Intact 7/27/2018 10:30 AM   Dressing Changed Changed/New 7/27/2018 10:30 AM   Dressing/Treatment Silver dressing;Alginate;Dry dressing 7/27/2018 10:30 AM   Wound Cleansed Rinsed/Irrigated with saline 7/27/2018 10:30 AM   Dressing Change Due 02/05/18 2/2/2018 11:48 AM   Granulation Quality Red 6/22/2018  8:20 AM   Drainage Amount Copious 7/27/2018  9:30 AM   Drainage Description Yellow;Green 7/27/2018  9:30 AM   Odor Mild 7/27/2018  9:30 AM   Undermining Starts ___ O'Clock 0 4/27/2018  8:47 AM   Undermining Ends___ O'Clock 0 4/27/2018  8:47 AM   Undermining Maxium Distance (cm) 0 4/27/2018  8:47 AM   [place]%Wound Bed 70 2/3/2017 11:21 AM   Red%Wound Bed 80 2/23/2018  9:33 AM   Yellow%Wound Bed 20 2/23/2018  9:33 AM   Exposed structure Muscle 7/14/2017 10:38 AM   Debridement per physician Subcutaneous 2/23/2018 10:09 AM   Time out Yes 7/27/2018 10:05 AM   Procedural Pain 0 6/22/2018  8:42 AM   Post procedural Pain 0 6/22/2018  8:42 AM   Op First Treatment Date 10/07/16 10/7/2016 11:11 AM   Number of days: 890     Percent of Wound/Ulcer Debrided: 100%    Total Surface Area Debrided:  54 sq cm     Estimated Blood Loss:  Minimal  Hemostasis Achieved:  by pressure    Procedural Pain:  2  / 10   Post Procedural Pain:  3 / 10

## 2018-09-02 ENCOUNTER — HOSPITAL ENCOUNTER (INPATIENT)
Age: 73
LOS: 4 days | Discharge: NURSING, REHAB OR LONG-TERM CARE | DRG: 638 | End: 2018-09-06
Attending: EMERGENCY MEDICINE | Admitting: INTERNAL MEDICINE
Payer: MEDICARE

## 2018-09-02 DIAGNOSIS — L97.422 DIABETIC ULCER OF LEFT MIDFOOT ASSOCIATED WITH DIABETES MELLITUS DUE TO UNDERLYING CONDITION, WITH FAT LAYER EXPOSED (HCC): Primary | ICD-10-CM

## 2018-09-02 DIAGNOSIS — E08.621 DIABETIC ULCER OF LEFT MIDFOOT ASSOCIATED WITH DIABETES MELLITUS DUE TO UNDERLYING CONDITION, WITH FAT LAYER EXPOSED (HCC): Primary | ICD-10-CM

## 2018-09-02 PROBLEM — I63.9 ACUTE CEREBROVASCULAR ACCIDENT (CVA) (HCC): Status: ACTIVE | Noted: 2018-09-02

## 2018-09-02 PROBLEM — L97.522 FOOT ULCER, LEFT, WITH FAT LAYER EXPOSED (HCC): Status: ACTIVE | Noted: 2018-09-02

## 2018-09-02 LAB
ALBUMIN SERPL-MCNC: 3.9 G/DL (ref 3.5–5.2)
ALP BLD-CCNC: 106 U/L (ref 40–129)
ALT SERPL-CCNC: 9 U/L (ref 0–40)
ANION GAP SERPL CALCULATED.3IONS-SCNC: 11 MMOL/L (ref 7–16)
AST SERPL-CCNC: 15 U/L (ref 0–39)
BACTERIA: ABNORMAL /HPF
BILIRUB SERPL-MCNC: 0.6 MG/DL (ref 0–1.2)
BILIRUBIN URINE: NEGATIVE
BLOOD, URINE: ABNORMAL
BUN BLDV-MCNC: 22 MG/DL (ref 8–23)
CALCIUM SERPL-MCNC: 9.1 MG/DL (ref 8.6–10.2)
CHLORIDE BLD-SCNC: 99 MMOL/L (ref 98–107)
CLARITY: CLEAR
CO2: 26 MMOL/L (ref 22–29)
COLOR: YELLOW
CREAT SERPL-MCNC: 0.9 MG/DL (ref 0.7–1.2)
EPITHELIAL CELLS, UA: ABNORMAL /HPF
GFR AFRICAN AMERICAN: >60
GFR NON-AFRICAN AMERICAN: >60 ML/MIN/1.73
GLUCOSE BLD-MCNC: 115 MG/DL (ref 74–109)
GLUCOSE URINE: NEGATIVE MG/DL
HCT VFR BLD CALC: 34.8 % (ref 37–54)
HEMOGLOBIN: 11.3 G/DL (ref 12.5–16.5)
KETONES, URINE: NEGATIVE MG/DL
LEUKOCYTE ESTERASE, URINE: NEGATIVE
MCH RBC QN AUTO: 29.4 PG (ref 26–35)
MCHC RBC AUTO-ENTMCNC: 32.5 % (ref 32–34.5)
MCV RBC AUTO: 90.4 FL (ref 80–99.9)
NITRITE, URINE: NEGATIVE
PDW BLD-RTO: 16.1 FL (ref 11.5–15)
PH UA: 5.5 (ref 5–9)
PLATELET # BLD: 141 E9/L (ref 130–450)
PMV BLD AUTO: 10.4 FL (ref 7–12)
POTASSIUM SERPL-SCNC: 3.8 MMOL/L (ref 3.5–5)
PROTEIN UA: 100 MG/DL
RBC # BLD: 3.85 E12/L (ref 3.8–5.8)
RBC UA: ABNORMAL /HPF (ref 0–2)
SODIUM BLD-SCNC: 136 MMOL/L (ref 132–146)
SPECIFIC GRAVITY UA: 1.02 (ref 1–1.03)
TOTAL PROTEIN: 8.7 G/DL (ref 6.4–8.3)
UROBILINOGEN, URINE: 0.2 E.U./DL
WBC # BLD: 4.4 E9/L (ref 4.5–11.5)
WBC UA: ABNORMAL /HPF (ref 0–5)

## 2018-09-02 PROCEDURE — 81001 URINALYSIS AUTO W/SCOPE: CPT

## 2018-09-02 PROCEDURE — 80053 COMPREHEN METABOLIC PANEL: CPT

## 2018-09-02 PROCEDURE — 6370000000 HC RX 637 (ALT 250 FOR IP): Performed by: INTERNAL MEDICINE

## 2018-09-02 PROCEDURE — 2580000003 HC RX 258: Performed by: EMERGENCY MEDICINE

## 2018-09-02 PROCEDURE — 87040 BLOOD CULTURE FOR BACTERIA: CPT

## 2018-09-02 PROCEDURE — 1200000000 HC SEMI PRIVATE

## 2018-09-02 PROCEDURE — 87075 CULTR BACTERIA EXCEPT BLOOD: CPT

## 2018-09-02 PROCEDURE — 99284 EMERGENCY DEPT VISIT MOD MDM: CPT

## 2018-09-02 PROCEDURE — 36415 COLL VENOUS BLD VENIPUNCTURE: CPT

## 2018-09-02 PROCEDURE — 86140 C-REACTIVE PROTEIN: CPT

## 2018-09-02 PROCEDURE — 85027 COMPLETE CBC AUTOMATED: CPT

## 2018-09-02 PROCEDURE — 6360000002 HC RX W HCPCS: Performed by: EMERGENCY MEDICINE

## 2018-09-02 PROCEDURE — 87186 SC STD MICRODIL/AGAR DIL: CPT

## 2018-09-02 PROCEDURE — 87070 CULTURE OTHR SPECIMN AEROBIC: CPT

## 2018-09-02 PROCEDURE — 87077 CULTURE AEROBIC IDENTIFY: CPT

## 2018-09-02 RX ORDER — MEROPENEM 1 G/1
INJECTION, POWDER, FOR SOLUTION INTRAVENOUS
Status: DISPENSED
Start: 2018-09-02 | End: 2018-09-03

## 2018-09-02 RX ORDER — HYDRALAZINE HYDROCHLORIDE 25 MG/1
25 TABLET, FILM COATED ORAL 3 TIMES DAILY
Status: DISCONTINUED | OUTPATIENT
Start: 2018-09-02 | End: 2018-09-05

## 2018-09-02 RX ADMIN — CARBIDOPA AND LEVODOPA 2 TABLET: 25; 100 TABLET ORAL at 23:26

## 2018-09-02 RX ADMIN — MEROPENEM 1 G: 1 INJECTION, POWDER, FOR SOLUTION INTRAVENOUS at 20:04

## 2018-09-02 RX ADMIN — HYDRALAZINE HYDROCHLORIDE 25 MG: 25 TABLET, FILM COATED ORAL at 23:26

## 2018-09-02 ASSESSMENT — PAIN SCALES - GENERAL: PAINLEVEL_OUTOF10: 0

## 2018-09-02 NOTE — ED PROVIDER NOTES
no asymmetry of the posterior oropharynx or uvular edema  Neck: Supple, full ROM, non tender to palpation in the midline, no stridor, no crepitus, no meningeal signs  Respiratory: Lungs clear to auscultation bilaterally, no wheezes, rales, or rhonchi. Not in respiratory distress  Cardiovascular:  Regular rate. Regular rhythm. No murmurs, gallops, or rubs. 2+ distal pulses  Chest: No chest wall tenderness  GI:  Abdomen Soft, Non tender, Non distended. +BS. No organomegaly, no palpable masses,  No rebound, guarding, or rigidity. Musculoskeletal: Moves all extremities x 4. Warm and well perfused, no clubbing, cyanosis, or edema. Capillary refill <3 seconds  Integument: skin warm and dry. No rashes. Lymphatic: no lymphadenopathy noted  Neurologic: GCS 15, no focal deficits, symmetric strength 5/5 in the upper and lower extremities bilaterally  Psychiatric: Normal Affect            -------------------------------------------------- RESULTS -------------------------------------------------  I have personally reviewed all laboratory and imaging results for this patient. Results are listed below.      LABS:  Results for orders placed or performed during the hospital encounter of 09/02/18   CBC   Result Value Ref Range    WBC 4.4 (L) 4.5 - 11.5 E9/L    RBC 3.85 3.80 - 5.80 E12/L    Hemoglobin 11.3 (L) 12.5 - 16.5 g/dL    Hematocrit 34.8 (L) 37.0 - 54.0 %    MCV 90.4 80.0 - 99.9 fL    MCH 29.4 26.0 - 35.0 pg    MCHC 32.5 32.0 - 34.5 %    RDW 16.1 (H) 11.5 - 15.0 fL    Platelets 556 160 - 722 E9/L    MPV 10.4 7.0 - 12.0 fL   Comprehensive Metabolic Panel   Result Value Ref Range    Sodium 136 132 - 146 mmol/L    Potassium 3.8 3.5 - 5.0 mmol/L    Chloride 99 98 - 107 mmol/L    CO2 26 22 - 29 mmol/L    Anion Gap 11 7 - 16 mmol/L    Glucose 115 (H) 74 - 109 mg/dL    BUN 22 8 - 23 mg/dL    CREATININE 0.9 0.7 - 1.2 mg/dL    GFR Non-African American >60 >=60 mL/min/1.73    GFR African American >60     Calcium 9.1 8.6 - 10.2 mg/dL    Total Protein 8.7 (H) 6.4 - 8.3 g/dL    Alb 3.9 3.5 - 5.2 g/dL    Total Bilirubin 0.6 0.0 - 1.2 mg/dL    Alkaline Phosphatase 106 40 - 129 U/L    ALT 9 0 - 40 U/L    AST 15 0 - 39 U/L       RADIOLOGY:  Interpreted by Radiologist.  No orders to display         ------------------------- NURSING NOTES AND VITALS REVIEWED ---------------------------   The nursing notes within the ED encounter and vital signs as below have been reviewed by myself. BP (!) 167/82   Pulse 63   Temp 97.8 °F (36.6 °C) (Oral)   Resp 16   Ht 6' 3\" (1.905 m)   Wt (!) 370 lb (167.8 kg)   SpO2 99%   BMI 46.25 kg/m²   Oxygen Saturation Interpretation: Normal    The patients available past medical records and past encounters were reviewed. ------------------------------ ED COURSE/MEDICAL DECISION MAKING----------------------  Medications   meropenem (MERREM) 1 g in sodium chloride 0.9 % 100 mL IVPB (mini-bag) (1 g Intravenous New Bag 9/2/18 2004)   meropenem (MERREM) 1 g injection (not administered)   daptomycin (CUBICIN) 500 mg in sodium chloride 0.9 % 50 mL IVPB (not administered)         ED COURSE:       Medical Decision Making:    Patient was given IV meropenem and Cubicin. He was discussed with Dr. Kisha Guzman and infectious disease      This patient's ED course included: a personal history and physicial examination, re-evaluation prior to disposition, IV medications and complex medical decision making and emergency management    This patient has remained hemodynamically stable and improved during their ED course. Re-Evaluations:             Re-evaluation.   Patients symptoms are improving    Re-examination  9/2/18   6:18 PM          Vital Signs:   Vitals:    09/02/18 1725 09/02/18 2002   BP: (!) 175/77 (!) 167/82   Pulse: 65 63   Resp: 18 16   Temp: 97.8 °F (36.6 °C) 97.8 °F (36.6 °C)   TempSrc: Oral Oral   SpO2: 97% 99%   Weight: (!) 370 lb (167.8 kg)    Height: 6' 3\" (1.905 m)            Consultations: Dr. Juan Saha and Dr. Hetal West: 0        Counseling: The emergency provider has spoken with the patient and spouse/SO and discussed todays results, in addition to providing specific details for the plan of care and counseling regarding the diagnosis and prognosis. Questions are answered at this time and they are agreeable with the plan.       --------------------------------- IMPRESSION AND DISPOSITION ---------------------------------    IMPRESSION  1. Diabetic ulcer of left midfoot associated with diabetes mellitus due to underlying condition, with fat layer exposed ([address]        DISPOSITION  Disposition: Admit to med/surg floor  Patient condition is serious    NOTE: This report was transcribed using voice recognition software.  Every effort was made to ensure accuracy; however, inadvertent computerized transcription errors may be present        Jame Cruz MD  09/02/18 2029

## 2018-09-02 NOTE — ED NOTES
Patient has a diabetic ulcer to his left leg. The wound is red and warm. Wound is 6alo5rk.       TXU Alexis, RN  09/02/18 [id number]

## 2018-09-02 NOTE — ED NOTES
Pt presenting with worsening drainage and redness to a wound on his left lower outer leg. He was  Sent in by podiatry and is currently on Keflex PO daily. He denies fever, chills, or systemic symptoms. Pt has no pain at the moment.       Silvino Connolly RN  09/02/18 125 [facility] Basil Jones RN  09/02/18 2257

## 2018-09-03 ENCOUNTER — APPOINTMENT (OUTPATIENT)
Dept: GENERAL RADIOLOGY | Age: 73
DRG: 638 | End: 2018-09-03
Payer: MEDICARE

## 2018-09-03 PROBLEM — E66.01 MORBID OBESITY (HCC): Chronic | Status: ACTIVE | Noted: 2018-09-03

## 2018-09-03 PROBLEM — G25.81 RLS (RESTLESS LEGS SYNDROME): Chronic | Status: ACTIVE | Noted: 2018-09-03

## 2018-09-03 PROBLEM — L03.119 CELLULITIS AND ABSCESS OF FOOT: Status: ACTIVE | Noted: 2018-09-03

## 2018-09-03 PROBLEM — L02.619 CELLULITIS AND ABSCESS OF FOOT: Status: ACTIVE | Noted: 2018-09-03

## 2018-09-03 LAB
ANION GAP SERPL CALCULATED.3IONS-SCNC: 11 MMOL/L (ref 7–16)
BASOPHILS ABSOLUTE: 0.02 E9/L (ref 0–0.2)
BASOPHILS RELATIVE PERCENT: 0.4 % (ref 0–2)
BUN BLDV-MCNC: 20 MG/DL (ref 8–23)
C-REACTIVE PROTEIN: 2.9 MG/DL (ref 0–0.4)
C-REACTIVE PROTEIN: 3 MG/DL (ref 0–0.4)
CALCIUM SERPL-MCNC: 8.8 MG/DL (ref 8.6–10.2)
CHLORIDE BLD-SCNC: 102 MMOL/L (ref 98–107)
CO2: 25 MMOL/L (ref 22–29)
CREAT SERPL-MCNC: 0.9 MG/DL (ref 0.7–1.2)
EOSINOPHILS ABSOLUTE: 0.13 E9/L (ref 0.05–0.5)
EOSINOPHILS RELATIVE PERCENT: 2.4 % (ref 0–6)
GFR AFRICAN AMERICAN: >60
GFR NON-AFRICAN AMERICAN: >60 ML/MIN/1.73
GLUCOSE BLD-MCNC: 125 MG/DL (ref 74–109)
HCT VFR BLD CALC: 32.4 % (ref 37–54)
HEMOGLOBIN: 10.6 G/DL (ref 12.5–16.5)
IMMATURE GRANULOCYTES #: 0.01 E9/L
IMMATURE GRANULOCYTES %: 0.2 % (ref 0–5)
LYMPHOCYTES ABSOLUTE: 0.42 E9/L (ref 1.5–4)
LYMPHOCYTES RELATIVE PERCENT: 7.8 % (ref 20–42)
MCH RBC QN AUTO: 29 PG (ref 26–35)
MCHC RBC AUTO-ENTMCNC: 32.7 % (ref 32–34.5)
MCV RBC AUTO: 88.8 FL (ref 80–99.9)
METER GLUCOSE: 130 MG/DL (ref 70–110)
METER GLUCOSE: 158 MG/DL (ref 70–110)
MONOCYTES ABSOLUTE: 0.6 E9/L (ref 0.1–0.95)
MONOCYTES RELATIVE PERCENT: 11.1 % (ref 2–12)
NEUTROPHILS ABSOLUTE: 4.22 E9/L (ref 1.8–7.3)
NEUTROPHILS RELATIVE PERCENT: 78.1 % (ref 43–80)
PDW BLD-RTO: 16.2 FL (ref 11.5–15)
PLATELET # BLD: 144 E9/L (ref 130–450)
PMV BLD AUTO: 9.9 FL (ref 7–12)
POTASSIUM REFLEX MAGNESIUM: 4 MMOL/L (ref 3.5–5)
RBC # BLD: 3.65 E12/L (ref 3.8–5.8)
RBC # BLD: NORMAL 10*6/UL
SODIUM BLD-SCNC: 138 MMOL/L (ref 132–146)
WBC # BLD: 5.4 E9/L (ref 4.5–11.5)

## 2018-09-03 PROCEDURE — 86140 C-REACTIVE PROTEIN: CPT

## 2018-09-03 PROCEDURE — 6360000002 HC RX W HCPCS: Performed by: EMERGENCY MEDICINE

## 2018-09-03 PROCEDURE — 99999 PR OFFICE/OUTPT VISIT,PROCEDURE ONLY: CPT | Performed by: INTERNAL MEDICINE

## 2018-09-03 PROCEDURE — 71045 X-RAY EXAM CHEST 1 VIEW: CPT

## 2018-09-03 PROCEDURE — 85025 COMPLETE CBC W/AUTO DIFF WBC: CPT

## 2018-09-03 PROCEDURE — 2580000003 HC RX 258: Performed by: INTERNAL MEDICINE

## 2018-09-03 PROCEDURE — 6360000002 HC RX W HCPCS: Performed by: SPECIALIST

## 2018-09-03 PROCEDURE — 2580000003 HC RX 258: Performed by: SPECIALIST

## 2018-09-03 PROCEDURE — 2580000003 HC RX 258: Performed by: EMERGENCY MEDICINE

## 2018-09-03 PROCEDURE — 6370000000 HC RX 637 (ALT 250 FOR IP): Performed by: INTERNAL MEDICINE

## 2018-09-03 PROCEDURE — C1751 CATH, INF, PER/CENT/MIDLINE: HCPCS

## 2018-09-03 PROCEDURE — 82962 GLUCOSE BLOOD TEST: CPT

## 2018-09-03 PROCEDURE — 36415 COLL VENOUS BLD VENIPUNCTURE: CPT

## 2018-09-03 PROCEDURE — B548ZZA ULTRASONOGRAPHY OF SUPERIOR VENA CAVA, GUIDANCE: ICD-10-PCS | Performed by: SPECIALIST

## 2018-09-03 PROCEDURE — 99222 1ST HOSP IP/OBS MODERATE 55: CPT | Performed by: INTERNAL MEDICINE

## 2018-09-03 PROCEDURE — 80048 BASIC METABOLIC PNL TOTAL CA: CPT

## 2018-09-03 PROCEDURE — 36569 INSJ PICC 5 YR+ W/O IMAGING: CPT

## 2018-09-03 PROCEDURE — 76937 US GUIDE VASCULAR ACCESS: CPT

## 2018-09-03 PROCEDURE — 1200000000 HC SEMI PRIVATE

## 2018-09-03 PROCEDURE — 6360000002 HC RX W HCPCS: Performed by: INTERNAL MEDICINE

## 2018-09-03 PROCEDURE — 02HV33Z INSERTION OF INFUSION DEVICE INTO SUPERIOR VENA CAVA, PERCUTANEOUS APPROACH: ICD-10-PCS | Performed by: SPECIALIST

## 2018-09-03 PROCEDURE — 2500000003 HC RX 250 WO HCPCS: Performed by: SPECIALIST

## 2018-09-03 RX ORDER — SODIUM CHLORIDE 0.9 % (FLUSH) 0.9 %
10 SYRINGE (ML) INJECTION PRN
Status: DISCONTINUED | OUTPATIENT
Start: 2018-09-03 | End: 2018-09-06 | Stop reason: HOSPADM

## 2018-09-03 RX ORDER — SODIUM CHLORIDE 0.9 % (FLUSH) 0.9 %
10 SYRINGE (ML) INJECTION EVERY 12 HOURS SCHEDULED
Status: DISCONTINUED | OUTPATIENT
Start: 2018-09-03 | End: 2018-09-06 | Stop reason: HOSPADM

## 2018-09-03 RX ORDER — DILTIAZEM HYDROCHLORIDE 240 MG/1
240 CAPSULE, COATED, EXTENDED RELEASE ORAL NIGHTLY
Status: DISCONTINUED | OUTPATIENT
Start: 2018-09-03 | End: 2018-09-06 | Stop reason: HOSPADM

## 2018-09-03 RX ORDER — FAMOTIDINE 20 MG/1
20 TABLET, FILM COATED ORAL 2 TIMES DAILY
Status: DISCONTINUED | OUTPATIENT
Start: 2018-09-03 | End: 2018-09-06 | Stop reason: HOSPADM

## 2018-09-03 RX ORDER — DILTIAZEM HYDROCHLORIDE 240 MG/1
240 CAPSULE, COATED, EXTENDED RELEASE ORAL ONCE
Status: COMPLETED | OUTPATIENT
Start: 2018-09-03 | End: 2018-09-03

## 2018-09-03 RX ORDER — ATORVASTATIN CALCIUM 20 MG/1
20 TABLET, FILM COATED ORAL NIGHTLY
Status: DISCONTINUED | OUTPATIENT
Start: 2018-09-03 | End: 2018-09-06 | Stop reason: HOSPADM

## 2018-09-03 RX ORDER — DEXTROSE MONOHYDRATE 50 MG/ML
100 INJECTION, SOLUTION INTRAVENOUS PRN
Status: DISCONTINUED | OUTPATIENT
Start: 2018-09-03 | End: 2018-09-06 | Stop reason: HOSPADM

## 2018-09-03 RX ORDER — ONDANSETRON 2 MG/ML
4 INJECTION INTRAMUSCULAR; INTRAVENOUS EVERY 6 HOURS PRN
Status: DISCONTINUED | OUTPATIENT
Start: 2018-09-03 | End: 2018-09-06 | Stop reason: HOSPADM

## 2018-09-03 RX ORDER — ACETAMINOPHEN 325 MG/1
650 TABLET ORAL EVERY 4 HOURS PRN
Status: DISCONTINUED | OUTPATIENT
Start: 2018-09-03 | End: 2018-09-06 | Stop reason: HOSPADM

## 2018-09-03 RX ORDER — DOXAZOSIN MESYLATE 4 MG/1
8 TABLET ORAL NIGHTLY
Status: DISCONTINUED | OUTPATIENT
Start: 2018-09-03 | End: 2018-09-06 | Stop reason: HOSPADM

## 2018-09-03 RX ORDER — DEXTROSE MONOHYDRATE 25 G/50ML
12.5 INJECTION, SOLUTION INTRAVENOUS PRN
Status: DISCONTINUED | OUTPATIENT
Start: 2018-09-03 | End: 2018-09-06 | Stop reason: HOSPADM

## 2018-09-03 RX ORDER — LIDOCAINE HYDROCHLORIDE 10 MG/ML
5 INJECTION, SOLUTION EPIDURAL; INFILTRATION; INTRACAUDAL; PERINEURAL ONCE
Status: COMPLETED | OUTPATIENT
Start: 2018-09-03 | End: 2018-09-03

## 2018-09-03 RX ORDER — NICOTINE POLACRILEX 4 MG
15 LOZENGE BUCCAL PRN
Status: DISCONTINUED | OUTPATIENT
Start: 2018-09-03 | End: 2018-09-06 | Stop reason: HOSPADM

## 2018-09-03 RX ADMIN — DILTIAZEM HYDROCHLORIDE 240 MG: 240 CAPSULE, COATED, EXTENDED RELEASE ORAL at 05:06

## 2018-09-03 RX ADMIN — HYDRALAZINE HYDROCHLORIDE 25 MG: 25 TABLET, FILM COATED ORAL at 09:37

## 2018-09-03 RX ADMIN — ENOXAPARIN SODIUM 50 MG: 60 INJECTION SUBCUTANEOUS at 09:47

## 2018-09-03 RX ADMIN — LIDOCAINE HYDROCHLORIDE 2 ML: 10 INJECTION, SOLUTION EPIDURAL; INFILTRATION; INTRACAUDAL; PERINEURAL at 13:50

## 2018-09-03 RX ADMIN — DOXAZOSIN MESYLATE 8 MG: 4 TABLET ORAL at 20:55

## 2018-09-03 RX ADMIN — MEROPENEM 1 G: 1 INJECTION, POWDER, FOR SOLUTION INTRAVENOUS at 20:54

## 2018-09-03 RX ADMIN — Medication 10 ML: at 20:55

## 2018-09-03 RX ADMIN — CARBIDOPA AND LEVODOPA 2 TABLET: 25; 100 TABLET ORAL at 20:54

## 2018-09-03 RX ADMIN — INSULIN LISPRO 2 UNITS: 100 INJECTION, SOLUTION INTRAVENOUS; SUBCUTANEOUS at 12:15

## 2018-09-03 RX ADMIN — ATORVASTATIN CALCIUM 20 MG: 20 TABLET, FILM COATED ORAL at 20:54

## 2018-09-03 RX ADMIN — MEROPENEM 1 G: 1 INJECTION, POWDER, FOR SOLUTION INTRAVENOUS at 03:48

## 2018-09-03 RX ADMIN — HYDRALAZINE HYDROCHLORIDE 25 MG: 25 TABLET, FILM COATED ORAL at 15:14

## 2018-09-03 RX ADMIN — FAMOTIDINE 20 MG: 20 TABLET ORAL at 09:37

## 2018-09-03 RX ADMIN — FAMOTIDINE 20 MG: 20 TABLET ORAL at 20:54

## 2018-09-03 RX ADMIN — Medication 10 ML: at 09:37

## 2018-09-03 RX ADMIN — DILTIAZEM HYDROCHLORIDE 240 MG: 240 CAPSULE, COATED, EXTENDED RELEASE ORAL at 20:55

## 2018-09-03 RX ADMIN — MEROPENEM 1 G: 1 INJECTION, POWDER, FOR SOLUTION INTRAVENOUS at 12:15

## 2018-09-03 RX ADMIN — HYDRALAZINE HYDROCHLORIDE 25 MG: 25 TABLET, FILM COATED ORAL at 20:55

## 2018-09-03 RX ADMIN — DAPTOMYCIN 500 MG: 500 INJECTION, POWDER, LYOPHILIZED, FOR SOLUTION INTRAVENOUS at 01:43

## 2018-09-03 ASSESSMENT — PAIN SCALES - GENERAL: PAINLEVEL_OUTOF10: 0

## 2018-09-03 NOTE — H&P
[place] [address] [facility]     History and Physical      CHIEF COMPLAINT: Worsening erythema and swelling over the left foot and purulent discharge involving the left leg debridement site. History of Present Illness: The pt is a 72 y.o. male with a history of DM, Charcot foot of the LLE, past RLE BKA, HTN and hyperlipidemia, who underwent left leg / foot wound debridement at his podiatrist's office Fri 8/31/18. Yesterday (Sun 9/2) pt's visiting nurse noted worsening of the leg and foot wounds when she went to pt's home to change his dressings. In d/w pt's PCP and Podiatrist, pt was sent to the ER for evaluation. Pt has been admitted for further mgmt. Informant(s) for H&P: Patient.     REVIEW OF SYSTEMS:  Constitutional: negative for chills, fevers and sweats  Eyes: negative  Ears, nose, mouth, throat, and face: negative  Respiratory: negative for cough, dyspnea on exertion, hemoptysis, pleurisy/chest pain, shortness of breath, sputum, stridor and wheezing  Cardiovascular: negative for chest pain, exertional chest pressure/discomfort, irregular heart beat, near-syncope, orthopnea, palpitations, paroxysmal nocturnal dyspnea and syncope  Gastrointestinal: negative for abdominal pain, constipation, diarrhea, dysphagia, melena, nausea, reflux symptoms, vomiting and blood in stool  Genitourinary:negative for dysuria, hematuria and pyuria  Musculoskeletal:positive for increased redness and purulence involving left leg debridement site and dorsum of left foot  Neurological: positive for neuropathy with loss of sensation over left foot / leg  Behavioral/Psych: negative  Endocrine: negative    Past Medical History:   Diagnosis Date    Diabetes mellitus ([address]     Diabetic foot ulcer associated with type 2 diabetes mellitus, with fat layer exposed ([address] 9/16/2016    Diabetic peripheral neuropathy (HCC)     Hyperlipidemia     Hypertension     RLS (restless legs syndrome)      Past Surgical History: Procedure Laterality Date    FOOT AMPUTATION      FOOT SURGERY Left 5/29/16    I&D    OTHER SURGICAL HISTORY  9/20/2014    left foot debridement, I&D exostectomy, bone biopsy; I&D right lower leg(BK stump site)    PACEMAKER PLACEMENT      has been shut off for 5 years     Medications Prior to Admission:    Prescriptions Prior to Admission: cephALEXin (KEFLEX) 500 MG capsule, Take 500 mg by mouth 4 times daily Per Dr. Aleyda Castro  diltiazem (CARDIZEM CD) 240 MG extended release capsule, Take 1 capsule by mouth nightly  ibuprofen (ADVIL;MOTRIN) 600 MG tablet, Take 1 tablet by mouth every 8 hours as needed  carbidopa-levodopa (SINEMET)  MG per tablet, Take 2 tablets by mouth nightly   pioglitazone (ACTOS) 45 MG tablet, Take 1 tablet by mouth daily. (Patient taking differently: Take 45 mg by mouth nightly )  doxazosin (CARDURA) 8 MG tablet, Take 1 tablet by mouth daily. (Patient taking differently: Take 8 mg by mouth nightly )  hydrALAZINE (APRESOLINE) 25 MG tablet, Take 1 tablet by mouth 3 times daily. atorvastatin (LIPITOR) 10 MG tablet, Take 20 mg by mouth nightly   potassium chloride SA (K-DUR;KLOR-CON M) 20 MEQ tablet, Take 1 tablet by mouth daily (with breakfast) (Patient taking differently: Take 20 mEq by mouth daily as needed (taken with Lasix) )  furosemide (LASIX) 40 MG tablet, Take 1 tablet by mouth daily. (Patient taking differently: Take 40 mg by mouth daily as needed )    Allergies:    Zosyn [piperacillin sod-tazobactam so]; Morphine; and Vancomycin    Social History:    reports that he quit smoking about 34 years ago. His smoking use included Cigarettes. He started smoking about 55 years ago. He smoked 3.00 packs per day. He has quit using smokeless tobacco. His smokeless tobacco use included Snuff. He reports that he does not drink alcohol or use drugs. Cig: up to 3 PPD x19 yrs, quit age 30 yrs. EtOH: heavy consumption of beer, up to 12-pk daily x47 yrs, quit 3 yrs ago. .   Retired over the left leg and foot. No clubbing, cyanosis or left calf tenderness. Feet dry and warm. Skin: Warm and dry, no rash. Neuro: Pt is awake, alert and oriented to time, place and person. Speech and cognition normal. Cranial nerves intact with no focal deficits. Motor strength 5/5 in UEs and LEs b/l. BJs non-elicitable. Light touch sense: Absent over the left foot. Allred catheter (inserted in the ER at patient request): Draining clear yellow urine. Breast: deferred  Rectal: deferred  Genitalia:  Deferred      LABS:  Recent Labs      09/02/18   1847   NA  136   K  3.8   CL  99   CO2  26   BUN  22   CREATININE  0.9   GLUCOSE  115*   CALCIUM  9.1   eGFR non-aa > 60. AG 11. Recent Labs      09/02/18 1847   WBC  4.4*   RBC  3.85   HGB  11.3*   HCT  34.8*   MCV  90.4   MCH  29.4   MCHC  32.5   RDW  16.1*   PLT  141   MPV  10.4     Recent Labs      09/02/18   1847   GLUCOSE  115*      Ref. Range 9/2/2018 18:47   CRP Latest Ref Range: 0.0 - 0.4 mg/dL 2.9 (H)      Ref. Range 9/2/2018 18:47   Albumin Latest Ref Range: 3.5 - 5.2 g/dL 3.9   Alk Phos Latest Ref Range: 40 - 129 U/L 106   ALT Latest Ref Range: 0 - 40 U/L 9   AST Latest Ref Range: 0 - 39 U/L 15   Bilirubin Latest Ref Range: 0.0 - 1.2 mg/dL 0.6   Total Protein Latest Ref Range: 6.4 - 8.3 g/dL 8.7 (H)     UA:   Ref.  Range 9/2/2018 20:29   Color, UA Latest Ref Range: Straw/Yellow  Yellow   Clarity, UA Latest Ref Range: Clear  Clear   Glucose, UA Latest Ref Range: Negative mg/dL Negative   Bilirubin, Urine Latest Ref Range: Negative  Negative   Ketones, Urine Latest Ref Range: Negative mg/dL Negative   Specific Gravity, UA Latest Ref Range: 1.005 - 1.030  1.025   Blood, Urine Latest Ref Range: Negative  TRACE-LYSED   pH, UA Latest Ref Range: 5.0 - 9.0  5.5   Protein, UA Latest Ref Range: Negative mg/dL 100 (A)   Urobilinogen, Urine Latest Ref Range: <2.0 E.U./dL 0.2   Nitrite, Urine Latest Ref Range: Negative  Negative   Leukocyte Esterase, Urine Latest Ref

## 2018-09-03 NOTE — PROGRESS NOTES
I have personally evaluated the patient    -Cellulitis, meropenem and daptomycin as the patient has allergies to vanco  -HTN, continue on cardizem, cardura and hydralazine.

## 2018-09-03 NOTE — CONSULTS
[address] [place] [address] [facility]    Consultation Note     Admit Date: 9/2/2018  5:29 PM    Reason for Consult:   LEFT FOOT CELLULITS    Attending Physician:  Aileen Gordon MD     Chief Complaint: REDNESS AND SWELLING LEFT FOOT    HISTORY OF PRESENT ILLNESS:   The patient is a 72 y.o.  male known to the Infectious Diseases service. The patient was transferred to [facility] after the July admission on invanz for 2 weeks. He had been followed up at the wound care and has had progressive deterioration c redness and swelling and tissue loss. His 8-31-18 wound cultures mimic the July cultures and include from 8-31-18;            Proteus            Pseudomonas \            corynebacterium  He is admitted and started on Merrem and vanco      The July admission had a similar scenario: \"The patient has a history of diabetes, right below-the-knee amputation and a chronic lymphedema over the left foot and distal leg. He was last treated in December 2017 for cellulitis and wound infection. Patient was doing relatively well. He continues to follow with Podiatry at the wound clinic. He says that he took a road trip down to the [facility] around May 2018. He has noticed that he is left leg has become somewhat reddened slightly tender. He has not had any fevers. A culture was done a week ago showing anaerobic gram-negative rods and mixed syed. He was not treated with any antibiotics. He went to see his podiatrist today. He decided to send him to the ER for further treatment.   2018 10:09 AM [email or web address]    Pseudomonas aeruginosa     WOUND/ABSCESS 07/20/2018 10:09 AM [email or web address]   Heavy growth    Organism  (Abnormal) 07/20/2018 10:09 AM [email or web address]   Pseudomonas aeruginosa     WOUND/ABSCESS 07/20/2018 10:09 AM [email or web address]   Heavy growth   Second morphology     Organism  (Abnormal) 07/20/2018 10:09 AM [email or web address]   Morganella morganii ssp sibonii Constitutional: The patient is awake, alert, and oriented. Skin: Warm and dry. No rashes were noted. HEENT: Eyes show round, and reactive pupils. No jaundice. Moist mucous membranes, no ulcerations, no thrush. Neck: Supple to movements. No lymphadenopathy. Chest: No use of accessory muscles to breathe. Symmetrical expansion. Auscultation reveals no wheezing, crackles, or rhonchi. Cardiovascular: S1 and S2 are rhythmic and regular. No murmurs appreciated. Abdomen: Positive bowel sounds to auscultation. Benign to palpation. No masses felt. No hepatosplenomegaly. Extremities: rt bka; left foot has charcot changes c edema, cellulitis and loss of skin anteriorly and callous plantar aspect of foot;   Pt has venous insuff and stasis dermatitis     Lines: peripheral      CBC+dif:  Recent Labs      09/02/18   1847  09/03/18   0535   WBC  4.4*  5.4   HGB  11.3*  10.6*   HCT  34.8*  32.4*   MCV  90.4  88.8   PLT  141  144   NEUTROABS   --   4.22     Lab Results   Component Value Date    CRP 3.0 (H) 09/03/2018    CRP 2.9 (H) 09/02/2018    CRP 18.3 (H) 07/27/2018     Lab Results   Component Value Date    CRPHS 13.0 (H) 04/04/2016     Lab Results   Component Value Date    SEDRATE 83 (H) 07/27/2018    SEDRATE 77 (H) 11/17/2017    SEDRATE 73 (H) 11/28/2016     Lab Results   Component Value Date    ALT 9 09/02/2018    AST 15 09/02/2018    ALKPHOS 106 09/02/2018    BILITOT 0.6 09/02/2018     Lab Results   Component Value Date     09/03/2018    K 4.0 09/03/2018     09/03/2018    CO2 25 09/03/2018    BUN 20 09/03/2018    CREATININE 0.9 09/03/2018    GFRAA >60 09/03/2018    LABGLOM >60 09/03/2018    GLUCOSE 125 09/03/2018    PROT 8.7 09/02/2018    LABALBU 3.9 09/02/2018    CALCIUM 8.8 09/03/2018    BILITOT 0.6 09/02/2018    ALKPHOS 106 09/02/2018    AST 15 09/02/2018    ALT 9 09/02/2018       Lab Results   Component Value Date    PROTIME 13.1 05/27/2016    INR 1.2 05/27/2016       No results found for:

## 2018-09-03 NOTE — PROGRESS NOTES
Attention Dr Phylicia Mcleod: The patient's current BMI is 46.248 kg/m2 and is currently ordered Lovenox 40 mg daily. According to official package labeling, if the patient's BMI is greater than or equal to 40 kg/m2, the recommended dose for Lovenox for DVT prophylaxis should be increased by 30% to Lovenox 50 mg daily.   Domenica Oh [facility]  9/3/2018  4:36 AM

## 2018-09-03 NOTE — PROCEDURES
PICC   Catheter insertion date 9/3/2018     Product Number:  [id number]   Lot No: [id number]   Gauge: 4 fr   Lumen: single   R Basilic    Vein Diameter : 0.5 cm   Upper arm circumference (10CM ABOVE AC): 39 cm   Catheter Length : 50 cm   Internal Length: 49 cm   Exposed Catheter Length: 1 cm   Ultrasound Used:yes    : Zuleyma Dahl RN    PICC line tip seen in the SVC at the rt atrial junction per CXR report. Ramon Cavazos RN notified that the PICC line is ok to use.

## 2018-09-03 NOTE — PROGRESS NOTES
Call placed to wound care regarding new consult for foot wounds.     Electronically signed by Sanjay Ramirez RN on 9/3/2018 at 5:21 AM

## 2018-09-03 NOTE — CONSULTS
noted.    ASSESSMENT:  1.  Venous stasis ulceration of the left leg. 2.  Diabetic ulceration of the left foot. 3.  Pain in the left foot and left leg. 4.  NIDDM with neuropathy. 5.  Lymphedema, left leg. PLAN:  1. Evaluation and management. 2.  Educate. 3.  Continue dressing changes as directed. There was a debridement done on  this previous Friday at the [address] [place] [address]. Wounds are stable. There  is mild redness noted on the left foot. No signs of abscess noted. Therefore, continue treatment as previously directed. Possible further  antibiotic treatment either p.o. or IV needed. We will follow him at the  [address] [place] [address]. Left leg dressing as noted in the orders accordingly.         Odessa Heaton DPM    D: 09/03/2018 7:33:52       T: 09/03/2018 9:34:43     [email or web address]  Job#: [id number]     Doc#: [id number]    CC:

## 2018-09-04 LAB
ANAEROBIC CULTURE: NORMAL
ANAEROBIC CULTURE: NORMAL
ANION GAP SERPL CALCULATED.3IONS-SCNC: 11 MMOL/L (ref 7–16)
BASOPHILS ABSOLUTE: 0.03 E9/L (ref 0–0.2)
BASOPHILS RELATIVE PERCENT: 0.6 % (ref 0–2)
BUN BLDV-MCNC: 22 MG/DL (ref 8–23)
C-REACTIVE PROTEIN: 4 MG/DL (ref 0–0.4)
CALCIUM SERPL-MCNC: 9 MG/DL (ref 8.6–10.2)
CHLORIDE BLD-SCNC: 103 MMOL/L (ref 98–107)
CO2: 25 MMOL/L (ref 22–29)
CREAT SERPL-MCNC: 1 MG/DL (ref 0.7–1.2)
EOSINOPHILS ABSOLUTE: 0.13 E9/L (ref 0.05–0.5)
EOSINOPHILS RELATIVE PERCENT: 2.7 % (ref 0–6)
GFR AFRICAN AMERICAN: >60
GFR NON-AFRICAN AMERICAN: >60 ML/MIN/1.73
GLUCOSE BLD-MCNC: 115 MG/DL (ref 74–109)
HCT VFR BLD CALC: 32.2 % (ref 37–54)
HEMOGLOBIN: 10.5 G/DL (ref 12.5–16.5)
IMMATURE GRANULOCYTES #: 0.02 E9/L
IMMATURE GRANULOCYTES %: 0.4 % (ref 0–5)
LYMPHOCYTES ABSOLUTE: 0.68 E9/L (ref 1.5–4)
LYMPHOCYTES RELATIVE PERCENT: 14.3 % (ref 20–42)
MCH RBC QN AUTO: 29.2 PG (ref 26–35)
MCHC RBC AUTO-ENTMCNC: 32.6 % (ref 32–34.5)
MCV RBC AUTO: 89.7 FL (ref 80–99.9)
METER GLUCOSE: 117 MG/DL (ref 70–110)
METER GLUCOSE: 124 MG/DL (ref 70–110)
METER GLUCOSE: 126 MG/DL (ref 70–110)
METER GLUCOSE: 129 MG/DL (ref 70–110)
MONOCYTES ABSOLUTE: 0.63 E9/L (ref 0.1–0.95)
MONOCYTES RELATIVE PERCENT: 13.2 % (ref 2–12)
NEUTROPHILS ABSOLUTE: 3.27 E9/L (ref 1.8–7.3)
NEUTROPHILS RELATIVE PERCENT: 68.8 % (ref 43–80)
PDW BLD-RTO: 16.2 FL (ref 11.5–15)
PLATELET # BLD: 155 E9/L (ref 130–450)
PMV BLD AUTO: 10.3 FL (ref 7–12)
POTASSIUM REFLEX MAGNESIUM: 4.1 MMOL/L (ref 3.5–5)
RBC # BLD: 3.59 E12/L (ref 3.8–5.8)
SODIUM BLD-SCNC: 139 MMOL/L (ref 132–146)
WBC # BLD: 4.8 E9/L (ref 4.5–11.5)

## 2018-09-04 PROCEDURE — 86140 C-REACTIVE PROTEIN: CPT

## 2018-09-04 PROCEDURE — 1200000000 HC SEMI PRIVATE

## 2018-09-04 PROCEDURE — 2580000003 HC RX 258: Performed by: SPECIALIST

## 2018-09-04 PROCEDURE — APPSS30 APP SPLIT SHARED TIME 16-30 MINUTES: Performed by: NURSE PRACTITIONER

## 2018-09-04 PROCEDURE — 6360000002 HC RX W HCPCS: Performed by: SPECIALIST

## 2018-09-04 PROCEDURE — 6360000002 HC RX W HCPCS: Performed by: EMERGENCY MEDICINE

## 2018-09-04 PROCEDURE — 99232 SBSQ HOSP IP/OBS MODERATE 35: CPT | Performed by: INTERNAL MEDICINE

## 2018-09-04 PROCEDURE — 36415 COLL VENOUS BLD VENIPUNCTURE: CPT

## 2018-09-04 PROCEDURE — 80048 BASIC METABOLIC PNL TOTAL CA: CPT

## 2018-09-04 PROCEDURE — 85025 COMPLETE CBC W/AUTO DIFF WBC: CPT

## 2018-09-04 PROCEDURE — 2580000003 HC RX 258: Performed by: INTERNAL MEDICINE

## 2018-09-04 PROCEDURE — 6370000000 HC RX 637 (ALT 250 FOR IP): Performed by: INTERNAL MEDICINE

## 2018-09-04 PROCEDURE — 82962 GLUCOSE BLOOD TEST: CPT

## 2018-09-04 PROCEDURE — 6360000002 HC RX W HCPCS: Performed by: INTERNAL MEDICINE

## 2018-09-04 PROCEDURE — 2580000003 HC RX 258: Performed by: EMERGENCY MEDICINE

## 2018-09-04 RX ORDER — HEPARIN SODIUM (PORCINE) LOCK FLUSH IV SOLN 100 UNIT/ML 100 UNIT/ML
300 SOLUTION INTRAVENOUS EVERY 12 HOURS
Status: DISCONTINUED | OUTPATIENT
Start: 2018-09-04 | End: 2018-09-06 | Stop reason: HOSPADM

## 2018-09-04 RX ADMIN — MEROPENEM 1 G: 1 INJECTION, POWDER, FOR SOLUTION INTRAVENOUS at 04:00

## 2018-09-04 RX ADMIN — DAPTOMYCIN 500 MG: 500 INJECTION, POWDER, LYOPHILIZED, FOR SOLUTION INTRAVENOUS at 02:50

## 2018-09-04 RX ADMIN — ATORVASTATIN CALCIUM 20 MG: 20 TABLET, FILM COATED ORAL at 20:22

## 2018-09-04 RX ADMIN — MEROPENEM 1 G: 1 INJECTION, POWDER, FOR SOLUTION INTRAVENOUS at 20:21

## 2018-09-04 RX ADMIN — Medication 10 ML: at 12:51

## 2018-09-04 RX ADMIN — MEROPENEM 1 G: 1 INJECTION, POWDER, FOR SOLUTION INTRAVENOUS at 12:48

## 2018-09-04 RX ADMIN — FAMOTIDINE 20 MG: 20 TABLET ORAL at 20:21

## 2018-09-04 RX ADMIN — FAMOTIDINE 20 MG: 20 TABLET ORAL at 08:59

## 2018-09-04 RX ADMIN — HYDRALAZINE HYDROCHLORIDE 25 MG: 25 TABLET, FILM COATED ORAL at 08:59

## 2018-09-04 RX ADMIN — HYDRALAZINE HYDROCHLORIDE 25 MG: 25 TABLET, FILM COATED ORAL at 14:43

## 2018-09-04 RX ADMIN — ENOXAPARIN SODIUM 50 MG: 60 INJECTION SUBCUTANEOUS at 09:00

## 2018-09-04 RX ADMIN — Medication 10 ML: at 20:22

## 2018-09-04 RX ADMIN — Medication 10 ML: at 08:59

## 2018-09-04 RX ADMIN — DOXAZOSIN MESYLATE 8 MG: 4 TABLET ORAL at 20:21

## 2018-09-04 RX ADMIN — CARBIDOPA AND LEVODOPA 2 TABLET: 25; 100 TABLET ORAL at 20:21

## 2018-09-04 RX ADMIN — HYDRALAZINE HYDROCHLORIDE 25 MG: 25 TABLET, FILM COATED ORAL at 20:21

## 2018-09-04 RX ADMIN — Medication 10 ML: at 21:16

## 2018-09-04 RX ADMIN — DILTIAZEM HYDROCHLORIDE 240 MG: 240 CAPSULE, COATED, EXTENDED RELEASE ORAL at 20:21

## 2018-09-04 ASSESSMENT — PAIN SCALES - GENERAL: PAINLEVEL_OUTOF10: 0

## 2018-09-04 NOTE — PROGRESS NOTES
[address] [place] [address] [facility]  Progress Note    SUBJECTIVE:  Chief Complaint   Patient presents with    Wound Check     pt has left foot infection he was recently admitted to the hospital with. podiatrist sent patient in today for eval.     Patient is tolerating medications. No reported adverse drug reactions. No nausea, vomiting, diarrhea. Review of systems:  As stated above in the chief complaint, otherwise negative. Medications:  Scheduled Meds:   atorvastatin  20 mg Oral Nightly    diltiazem  240 mg Oral Nightly    doxazosin  8 mg Oral Nightly    insulin lispro  0-10 Units Subcutaneous Q6H    sodium chloride flush  10 mL Intravenous 2 times per day    famotidine  20 mg Oral BID    enoxaparin  50 mg Subcutaneous Daily    sodium chloride flush  10 mL Intravenous 2 times per day    meropenem  1 g Intravenous Q8H    daptomycin (CUBICIN) IVPB  500 mg Intravenous Q24H    carbidopa-levodopa  2 tablet Oral Nightly    hydrALAZINE  25 mg Oral TID     Continuous Infusions:   dextrose       PRN Meds:sodium chloride flush, magnesium hydroxide, ondansetron, acetaminophen, glucose, dextrose, glucagon (rDNA), dextrose, sodium chloride flush    OBJECTIVE:  /72   Pulse 60   Temp 96.4 °F (35.8 °C) (Oral)   Resp 16   Ht 6' 3\" (1.905 m)   Wt (!) 372 lb (168.7 kg)   SpO2 95%   BMI 46.50 kg/m²   Temp  Av.1 °F (36.2 °C)  Min: 96.4 °F (35.8 °C)  Max: 98.1 °F (36.7 °C)  Constitutional: The patient is awake, alert, and oriented. Skin: Warm and dry. No rashes were noted. HEENT: Eyes show round, and reactive pupils. No jaundice. Moist mucous membranes, no ulcerations, no thrush. Neck: Supple to movements. No lymphadenopathy. Chest: No use of accessory muscles to breathe. Symmetrical expansion. Auscultation reveals no wheezing, crackles, or rhonchi. Cardiovascular: S1 and S2 are rhythmic and regular. No murmurs appreciated.    Abdomen: Positive bowel sounds to

## 2018-09-04 NOTE — CARE COORDINATION
Social Work / Discharge Planning :SW noted consult for IV anti-biotics at discharge. SW met with patient and explained role as discharge planner/ transition of care. Patient resides at home with his spouse. Patient is current with [facility] services through [facility]. SW did reach out to [address] from [address] [place] [address] . Patient plan is to return home with IV anti-biotics if they are not too expensive. Salo Hwang from [facility] and they will run benefits. SW to await response from [facility]. If the cost for IV anti-biotics are too expensive, patient will go to [facility] [place] if appropriate per patient choice. Patient also has  wound care. Patient has had a hx with Lana Augustin for IV anti-biotics/ wound care and also at home with IV antibiotics. SW to await plan and patient receptive to [facility] vs [facility]. SW to follow.  Electronically signed by SABRINA Garcia on 9/4/2018 at 10:11 AM.

## 2018-09-04 NOTE — PROGRESS NOTES
Wound / ostomy dept consulted for this pt. With lower extrmity wounds. Podiatry is following. Will defer to them.

## 2018-09-04 NOTE — PROGRESS NOTES
Nutrition Assessment    Type and Reason for Visit: Initial, Positive Nutrition Screen (wounds)    Nutrition Recommendations: Continue Diet. Start Ensure Low Calorie, High Pro BID. Malnutrition Assessment:  · Malnutrition Status: No malnutrition  · Context: Chronic illness  · Findings of the 6 clinical characteristics of malnutrition (Minimum of 2 out of 6 clinical characteristics is required to make the diagnosis of moderate or severe Protein Calorie Malnutrition based on AND/ASPEN Guidelines):  1. Energy Intake-Greater than 75%, greater than or equal to 1 month    2. Weight Loss-No significant weight loss    3. Fat Loss-No significant subcutaneous fat loss    4. Muscle Loss-No significant muscle mass loss    5. Fluid Accumulation-No significant fluid accumulation    6.  Strength-Not measured    Nutrition Diagnosis:   · Problem: Increased nutrient needs (protein)  · Etiology: related to Increased demand for energy/nutrients due to (wound healing)     Signs and symptoms:  as evidenced by Presence of wounds    Nutrition Assessment:  · Subjective Assessment: No family present. Pt currently eating ~% of meals, no noted wt loss, +DM ulcer. No noted plans for OR at this time.     · Nutrition-Focused Physical Findings: A&O, +teeth, abd WDL, +BS, trace LLE edema, -I/O's  · Wound Type: Diabetic Ulcer  · Current Nutrition Therapies:  · Oral Diet Orders: Carb Control 5 Carbs/Meal   · Oral Diet intake: %, Select  · Oral Nutrition Supplement (ONS) Orders: None  · Anthropometric Measures:  · Ht: 6' 3\" (190.5 cm)   · Current Body Wt: 372 lb (168.7 kg) (act 9/4)  · Admission Body Wt: 370 lb (167.8 kg) (bed 9/3)  · Usual Body Wt: 377 lb (171 kg) (bed 7/27/18 per EMR)  · % Weight Change: no significant unplanned wt loss per EMR and CBW     · Ideal Body Wt: 196 lb (88.9 kg), % Ideal Body 190%  · BMI Classification: BMI > or equal to 40.0 Obese Class III  · Comparative Standards (Estimated Nutrition

## 2018-09-04 NOTE — PROGRESS NOTES
Gram negative stacey     WOUND/ABSCESS 09/02/2018  6:24 PM [facility] - [address] Lab   Heavy growth   Identification and sensitivity to follow     Organism  (Abnormal) 09/02/2018  6:24 PM [facility] - [address] Lab   Gram-positive cocci     WOUND/ABSCESS 09/02/2018  6:24 PM [facility] - [address] Lab   Moderate growth   Identification and sensitivity to follow     Organism  (Abnormal) 09/02/2018  6:24 PM [facility] - [address] Lab   Gram negative stacey     WOUND/ABSCESS 09/02/2018  6:24 PM [facility] - [address] Lab   Light growth   Identification and sensitivity to follow     Testing Performed By     Lab - Abbreviation Name Director Address Valid Date Range   49[facility] - [address] Arpan Wright, [address] [place] [address]. [address] [place] [address] 08/30/17 1221-Present   Narrative     Source: FOOT       Site: Left                 Radiology:   XR CHEST 1 VW   Final Result      Right-sided PICC line tip in the SVC/right atrial junction. No pneumothorax on the right or on the left. No acute cardiopulmonary process. Assessment:    Principal Problem: Foot ulcer, left, with fat layer exposed ([address]  Active Problems:    Cellulitis and abscess of foot, left    DM (diabetes mellitus) (HCC)    HTN (hypertension)    Hyperlipidemia    Cellulitis of leg, left    Chronic osteomyelitis (HCC)    Morbid obesity (HCC)    RLS (restless legs syndrome)  Resolved Problems:    * No resolved hospital problems. *      Plan:  1. Left foot ulcer/abscess with left lower extremity cellulitis: Charcot changes left foot per ID. ID and Podiatry following. Antibiotics per ID - currently prescribed IV Daptomycin and Merrem. Monitor labs and vitals. Follow wound culture. Blood cultures negative thus far - continue to follow. Continue wound care. PICC line placed 9/3/18 - will order Heparin flushes. 2. Diabetes Mellitus: Monitor glucose AC&HS. Continue SSI. Continue carb controlled diet. Hypoglycemia protocol.

## 2018-09-04 NOTE — PLAN OF CARE
Problem: Nutrition  Goal: Optimal nutrition therapy  Outcome: Ongoing  Nutrition Problem: Increased nutrient needs (protein)  Intervention: Food and/or Nutrient Delivery: Continue current diet, Start ONS (Continue Diet.   Start Ensure Low Calorie, High Pro BID.  )  Nutritional Goals: PO intake >75% of meals/ONS

## 2018-09-05 LAB
ANION GAP SERPL CALCULATED.3IONS-SCNC: 11 MMOL/L (ref 7–16)
BASOPHILS ABSOLUTE: 0.03 E9/L (ref 0–0.2)
BASOPHILS RELATIVE PERCENT: 0.7 % (ref 0–2)
BUN BLDV-MCNC: 18 MG/DL (ref 8–23)
CALCIUM SERPL-MCNC: 9.2 MG/DL (ref 8.6–10.2)
CHLORIDE BLD-SCNC: 101 MMOL/L (ref 98–107)
CO2: 27 MMOL/L (ref 22–29)
CREAT SERPL-MCNC: 1 MG/DL (ref 0.7–1.2)
EOSINOPHILS ABSOLUTE: 0.23 E9/L (ref 0.05–0.5)
EOSINOPHILS RELATIVE PERCENT: 5.2 % (ref 0–6)
GFR AFRICAN AMERICAN: >60
GFR NON-AFRICAN AMERICAN: >60 ML/MIN/1.73
GLUCOSE BLD-MCNC: 123 MG/DL (ref 74–109)
HCT VFR BLD CALC: 32.9 % (ref 37–54)
HEMOGLOBIN: 10.7 G/DL (ref 12.5–16.5)
IMMATURE GRANULOCYTES #: 0.01 E9/L
IMMATURE GRANULOCYTES %: 0.2 % (ref 0–5)
LYMPHOCYTES ABSOLUTE: 0.73 E9/L (ref 1.5–4)
LYMPHOCYTES RELATIVE PERCENT: 16.6 % (ref 20–42)
MCH RBC QN AUTO: 28.9 PG (ref 26–35)
MCHC RBC AUTO-ENTMCNC: 32.5 % (ref 32–34.5)
MCV RBC AUTO: 88.9 FL (ref 80–99.9)
METER GLUCOSE: 104 MG/DL (ref 70–110)
METER GLUCOSE: 120 MG/DL (ref 70–110)
METER GLUCOSE: 125 MG/DL (ref 70–110)
METER GLUCOSE: 139 MG/DL (ref 70–110)
METER GLUCOSE: 160 MG/DL (ref 70–110)
MONOCYTES ABSOLUTE: 0.56 E9/L (ref 0.1–0.95)
MONOCYTES RELATIVE PERCENT: 12.8 % (ref 2–12)
NEUTROPHILS ABSOLUTE: 2.83 E9/L (ref 1.8–7.3)
NEUTROPHILS RELATIVE PERCENT: 64.5 % (ref 43–80)
ORGANISM: ABNORMAL
PDW BLD-RTO: 15.9 FL (ref 11.5–15)
PLATELET # BLD: 167 E9/L (ref 130–450)
PMV BLD AUTO: 10.3 FL (ref 7–12)
POTASSIUM REFLEX MAGNESIUM: 4.3 MMOL/L (ref 3.5–5)
RBC # BLD: 3.7 E12/L (ref 3.8–5.8)
SODIUM BLD-SCNC: 139 MMOL/L (ref 132–146)
WBC # BLD: 4.4 E9/L (ref 4.5–11.5)
WOUND/ABSCESS: ABNORMAL

## 2018-09-05 PROCEDURE — 6360000002 HC RX W HCPCS: Performed by: EMERGENCY MEDICINE

## 2018-09-05 PROCEDURE — 2580000003 HC RX 258: Performed by: SPECIALIST

## 2018-09-05 PROCEDURE — 2580000003 HC RX 258: Performed by: INTERNAL MEDICINE

## 2018-09-05 PROCEDURE — 2580000003 HC RX 258: Performed by: EMERGENCY MEDICINE

## 2018-09-05 PROCEDURE — 6360000002 HC RX W HCPCS: Performed by: NURSE PRACTITIONER

## 2018-09-05 PROCEDURE — 6370000000 HC RX 637 (ALT 250 FOR IP): Performed by: INTERNAL MEDICINE

## 2018-09-05 PROCEDURE — 99232 SBSQ HOSP IP/OBS MODERATE 35: CPT | Performed by: INTERNAL MEDICINE

## 2018-09-05 PROCEDURE — APPSS15 APP SPLIT SHARED TIME 0-15 MINUTES: Performed by: NURSE PRACTITIONER

## 2018-09-05 PROCEDURE — 1200000000 HC SEMI PRIVATE

## 2018-09-05 PROCEDURE — 36592 COLLECT BLOOD FROM PICC: CPT

## 2018-09-05 PROCEDURE — 82962 GLUCOSE BLOOD TEST: CPT

## 2018-09-05 PROCEDURE — 80048 BASIC METABOLIC PNL TOTAL CA: CPT

## 2018-09-05 PROCEDURE — 6360000002 HC RX W HCPCS: Performed by: INTERNAL MEDICINE

## 2018-09-05 PROCEDURE — 85025 COMPLETE CBC W/AUTO DIFF WBC: CPT

## 2018-09-05 PROCEDURE — 6360000002 HC RX W HCPCS: Performed by: SPECIALIST

## 2018-09-05 PROCEDURE — 36415 COLL VENOUS BLD VENIPUNCTURE: CPT

## 2018-09-05 RX ORDER — HYDRALAZINE HYDROCHLORIDE 50 MG/1
50 TABLET, FILM COATED ORAL 3 TIMES DAILY
Status: DISCONTINUED | OUTPATIENT
Start: 2018-09-05 | End: 2018-09-06 | Stop reason: HOSPADM

## 2018-09-05 RX ADMIN — FAMOTIDINE 20 MG: 20 TABLET ORAL at 11:02

## 2018-09-05 RX ADMIN — DAPTOMYCIN 500 MG: 500 INJECTION, POWDER, LYOPHILIZED, FOR SOLUTION INTRAVENOUS at 03:15

## 2018-09-05 RX ADMIN — Medication 300 UNITS: at 20:06

## 2018-09-05 RX ADMIN — FAMOTIDINE 20 MG: 20 TABLET ORAL at 20:06

## 2018-09-05 RX ADMIN — ATORVASTATIN CALCIUM 20 MG: 20 TABLET, FILM COATED ORAL at 20:06

## 2018-09-05 RX ADMIN — MEROPENEM 1 G: 1 INJECTION, POWDER, FOR SOLUTION INTRAVENOUS at 03:32

## 2018-09-05 RX ADMIN — DILTIAZEM HYDROCHLORIDE 240 MG: 240 CAPSULE, COATED, EXTENDED RELEASE ORAL at 20:06

## 2018-09-05 RX ADMIN — ENOXAPARIN SODIUM 50 MG: 60 INJECTION SUBCUTANEOUS at 11:03

## 2018-09-05 RX ADMIN — Medication 10 ML: at 11:54

## 2018-09-05 RX ADMIN — INSULIN LISPRO 2 UNITS: 100 INJECTION, SOLUTION INTRAVENOUS; SUBCUTANEOUS at 21:13

## 2018-09-05 RX ADMIN — HYDRALAZINE HYDROCHLORIDE 25 MG: 25 TABLET, FILM COATED ORAL at 14:00

## 2018-09-05 RX ADMIN — Medication 10 ML: at 10:51

## 2018-09-05 RX ADMIN — HYDRALAZINE HYDROCHLORIDE 25 MG: 25 TABLET, FILM COATED ORAL at 11:03

## 2018-09-05 RX ADMIN — MEROPENEM 1 G: 1 INJECTION, POWDER, FOR SOLUTION INTRAVENOUS at 20:06

## 2018-09-05 RX ADMIN — HYDRALAZINE HYDROCHLORIDE 50 MG: 50 TABLET ORAL at 20:58

## 2018-09-05 RX ADMIN — Medication 10 ML: at 20:58

## 2018-09-05 RX ADMIN — Medication 10 ML: at 20:06

## 2018-09-05 RX ADMIN — Medication 300 UNITS: at 10:50

## 2018-09-05 RX ADMIN — DOXAZOSIN MESYLATE 8 MG: 4 TABLET ORAL at 20:06

## 2018-09-05 RX ADMIN — CARBIDOPA AND LEVODOPA 2 TABLET: 25; 100 TABLET ORAL at 20:58

## 2018-09-05 RX ADMIN — MEROPENEM 1 G: 1 INJECTION, POWDER, FOR SOLUTION INTRAVENOUS at 11:54

## 2018-09-05 ASSESSMENT — PAIN SCALES - GENERAL
PAINLEVEL_OUTOF10: 0
PAINLEVEL_OUTOF10: 0

## 2018-09-05 NOTE — CARE COORDINATION
Social Work discharge planning   Per Georgina Wilcox with [facility], pt's out of pocket cost for current RX of IV Daptomycin, IV Merrem, and home iv atb supplies would cost pt $2,040 a week. Sw discussed with pt and his RN Latesha Page. Pt agreeable to referral to [facility] as he was there in past.  Sw called referral to [facility] with Kriss Hough.  Await their eval.  Electronically signed by Vik Plata on 9/5/2018 at 11:57 AM

## 2018-09-05 NOTE — CARE COORDINATION
Social Work discharge planning   Per Montnaa Mayers with [facility], pt clinically accepted for them today. Wilson notified , who advised Wilson to call NP April [phone]. Wilson called April but n//a. WILSON notified charge ARTHUR Plata.   Electronically signed by Trip Werner on 9/5/2018 at 2:18 PM

## 2018-09-05 NOTE — PATIENT CARE CONFERENCE
P Quality Flow/Interdisciplinary Rounds Progress Note        Quality Flow Rounds held on September 5, 2018    Disciplines Attending:  Bedside Nurse, ,  and Nursing Unit Leadership    Alexa Larson was admitted on 9/2/2018  5:29 PM    Anticipated Discharge Date:  Expected Discharge Date: 09/05/18    Disposition:    Bruce Score:  Bruce Scale Score: 19    Readmission Risk              Risk of Unplanned Readmission:        9             Discussed patient goal for the day, patient clinical progression, and barriers to discharge.   The following Goal(s) of the Day/Commitment(s) have been identified:discharge planning          Starr Malave  September 5, 2018

## 2018-09-05 NOTE — PROGRESS NOTES
[facility] CARE AT [facility] Hospitalist   Progress Note    Admitting Date and Time: 9/2/2018  5:29 PM  Admit Dx: Acute cerebrovascular accident (CVA) ([address] [I63.9]  Foot ulcer, left, with fat layer exposed ([address] [L97.522]  Foot ulcer, left, with fat layer exposed ([address] [L97.522]    Subjective:  Patient observed resting in bed with eyes closed in NAD. Alert to voice. Reports feeling tired today. Denies pain at this time. Patient reports eating and drinking well. Last BM yesterday. Denies questions. ROS: denies fever, chills, cp, sob, n/v, HA unless stated above.      insulin lispro  0-10 Units Subcutaneous 4x Daily AC & HS    heparin flush  300 Units Intercatheter Q12H    atorvastatin  20 mg Oral Nightly    diltiazem  240 mg Oral Nightly    doxazosin  8 mg Oral Nightly    sodium chloride flush  10 mL Intravenous 2 times per day    famotidine  20 mg Oral BID    enoxaparin  50 mg Subcutaneous Daily    sodium chloride flush  10 mL Intravenous 2 times per day    meropenem  1 g Intravenous Q8H    daptomycin (CUBICIN) IVPB  500 mg Intravenous Q24H    carbidopa-levodopa  2 tablet Oral Nightly    hydrALAZINE  25 mg Oral TID       sodium chloride flush 10 mL PRN   magnesium hydroxide 30 mL Daily PRN   ondansetron 4 mg Q6H PRN   acetaminophen 650 mg Q4H PRN   glucose 15 g PRN   dextrose 12.5 g PRN   glucagon (rDNA) 1 mg PRN   dextrose 100 mL/hr PRN   sodium chloride flush 10 mL PRN        Objective:    BP (!) 150/80 Comment: RN notified   Pulse 70   Temp 98.6 °F (37 °C) (Oral)   Resp 16   Ht 6' 3\" (1.905 m)   Wt (!) 370 lb (167.8 kg)   SpO2 99%   BMI 46.25 kg/m²   General Appearance: alert and oriented to person, place and time, well-developed and well nourished and morbidly obese, in NAD at rest    Skin: warm and dry, dressing/wrap noted to LLE that is clean, dry, and intact   Head: normocephalic and atraumatic  Eyes: pupils equal, round, and reactive to light, extraocular eye movements intact [facility] [address] 08/30/17 1221-Present   Narrative     Source: LEG       Site: Left                  9/4/2018  2:27 PM - Arnie, Mhy Incoming Results From [facility]     Component Results     Component Collected Lab   WOUND/ABSCESS  (Abnormal) 09/02/2018  6:24 PM [address]      Mixed syed isolated. Further workup and sensitivity testing   is not routinely indicated and will not be performed. Mixed syed isolated includes:   Mixed Gram negative rods   Nonhemolytic Strep species   Physician requested workup     Organism  (Abnormal) 09/02/2018  6:24 PM [address] Lab   Gram negative stacey     WOUND/ABSCESS 09/02/2018  6:24 PM [address] Lab   Heavy growth   Identification and sensitivity to follow     Organism  (Abnormal) 09/02/2018  6:24 PM [address] Lab   Gram-positive cocci     WOUND/ABSCESS 09/02/2018  6:24 PM [address] Lab   Moderate growth   Identification and sensitivity to follow     Organism  (Abnormal) 09/02/2018  6:24 PM [address] Lab   Gram negative stacey     WOUND/ABSCESS 09/02/2018  6:24 PM [address] Lab   Light growth   Identification and sensitivity to follow     Testing Performed By     Lab - Abbreviation Name Director Address Valid Date Range   39-OE - 27 [facility] 9 [facility] 193 Alisa Judd MD [address] [place] [address]. [facility] [address] 08/30/17 1221-Present   Narrative     Source: FOOT       Site: Left               9/5/2018  7:05 AM - Arnie, Mhy Incoming Results From [facility]     Component Results     Component Collected Lab   WOUND/ABSCESS  (Abnormal) 09/02/2018  6:24 PM [address] Lab      Mixed syed isolated. Further workup and sensitivity testing   is not routinely indicated and will not be performed.    Mixed syed isolated includes:   Mixed Gram negative rods   Nonhemolytic Strep species   Physician requested workup     Organism  (Abnormal) 09/02/2018  6:24 PM [address] Lab Morganella morganii     WOUND/ABSCESS 09/02/2018  6:24 PM [place] [id number] [facility] Lab   Heavy growth    Organism  (Abnormal) 09/02/2018  6:24 PM [place] [address] [facility]   Enterococcus faecalis     WOUND/ABSCESS 09/02/2018  6:24 PM [facility]   Moderate growth    Organism  (Abnormal) 09/02/2018  6:24 PM [facility] [place] [address] [facility]   Proteus mirabilis     WOUND/ABSCESS 09/02/2018  6:24 PM [facility] [address] Lab   Light growth    Testing Performed By     Lab - Abbreviation Name Director Address Valid Date Range   49 - [address] Ayesha Lance MD [address] [place] [address]. [address] [place] [address] 08/30/17 1221-Present   Narrative     Source: FOOT       Site: Left                Radiology:   XR CHEST 1 VW   Final Result      Right-sided PICC line tip in the SVC/right atrial junction. No pneumothorax on the right or on the left. No acute cardiopulmonary process. Assessment:    Principal Problem: Foot ulcer, left, with fat layer exposed ([address]  Active Problems:    Cellulitis and abscess of foot, left    DM (diabetes mellitus) (HCC)    HTN (hypertension)    Hyperlipidemia    Cellulitis of leg, left    Chronic osteomyelitis (HCC)    Morbid obesity (HCC)    RLS (restless legs syndrome)  Resolved Problems:    * No resolved hospital problems. *      Plan:  1. Left foot ulcer/abscess with left lower extremity cellulitis: Charcot changes left foot per ID. ID and Podiatry following. Antibiotics per ID - currently prescribed IV Daptomycin and Merrem. Monitor labs and vitals. Wound culture noted. Blood cultures negative thus far - continue to follow. Continue wound care. PICC line placed 9/3/18. Contact isolation. 2. Diabetes Mellitus: Monitor glucose AC&HS. Continue SSI. Continue carb controlled diet. Hypoglycemia protocol. 3. Hypertension: Monitor vitals. Continue medication - diltiazem, doxazosin, and hydralazine   4.  Hyperlipidemia: Continue statin -

## 2018-09-05 NOTE — PLAN OF CARE
Problem: PROTECTIVE PRECAUTIONS  Goal: Knowledge - infection control  Patient will demonstrate appropriate care of infection-prone site.     Outcome: Met This Shift    Goal: Knowledge of contact precautions  Knowledge of contact precautions    Outcome: Met This Shift

## 2018-09-05 NOTE — PROGRESS NOTES
[facility] [facility] Hospitalist   Progress Note    Spoke to social work who advised me that ID states to hold discharge to [facility] today as ID awaiting final cultures.

## 2018-09-05 NOTE — PROGRESS NOTES
[address] [place] [address] [facility]  Progress Note    SUBJECTIVE:  Chief Complaint   Patient presents with    Wound Check     pt has left foot infection he was recently admitted to the hospital with. podiatrist sent patient in today for eval.     Patient is tolerating medications. No reported adverse drug reactions. No nausea, vomiting, diarrhea. No fever. No new complaints. Review of systems:  As stated above in the chief complaint, otherwise negative. Medications:  Scheduled Meds:   insulin lispro  0-10 Units Subcutaneous 4x Daily AC & HS    heparin flush  300 Units Intercatheter Q12H    atorvastatin  20 mg Oral Nightly    diltiazem  240 mg Oral Nightly    doxazosin  8 mg Oral Nightly    sodium chloride flush  10 mL Intravenous 2 times per day    famotidine  20 mg Oral BID    enoxaparin  50 mg Subcutaneous Daily    sodium chloride flush  10 mL Intravenous 2 times per day    meropenem  1 g Intravenous Q8H    daptomycin (CUBICIN) IVPB  500 mg Intravenous Q24H    carbidopa-levodopa  2 tablet Oral Nightly    hydrALAZINE  25 mg Oral TID     Continuous Infusions:   dextrose       PRN Meds:sodium chloride flush, magnesium hydroxide, ondansetron, acetaminophen, glucose, dextrose, glucagon (rDNA), dextrose, sodium chloride flush    OBJECTIVE:  BP (!) 150/80 Comment: RN notified   Pulse 70   Temp 98.6 °F (37 °C) (Oral)   Resp 16   Ht 6' 3\" (1.905 m)   Wt (!) 370 lb (167.8 kg)   SpO2 99%   BMI 46.25 kg/m²   Temp  Av.2 °F (36.8 °C)  Min: 97.7 °F (36.5 °C)  Max: 98.6 °F (37 °C)  Constitutional: The patient is awake, alert, and oriented. Skin: Warm and dry. No rashes were noted. HEENT: Eyes show round, and reactive pupils. No jaundice. Moist mucous membranes, no ulcerations, no thrush. Neck: Supple to movements. Chest: No wheezing, crackles, or rhonchi. Cardiovascular: S1 and S2 are rhythmic and regular. No murmurs appreciated.    Abdomen: Positive bowel sounds to auscultation. Benign to palpation. Extremities: No clubbing, no cyanosis, no edema. Right leg prothesis  Left leg wrapped in Coban. No odor. Lines: PICC    Laboratory and Tests Review:  Lab Results   Component Value Date    WBC 4.4 (L) 09/05/2018    WBC 4.8 09/04/2018    WBC 5.4 09/03/2018    HGB 10.7 (L) 09/05/2018    HCT 32.9 (L) 09/05/2018    MCV 88.9 09/05/2018     09/05/2018     Lab Results   Component Value Date    NEUTROABS 2.83 09/05/2018    NEUTROABS 3.27 09/04/2018    NEUTROABS 4.22 09/03/2018     Lab Results   Component Value Date    CRP 4.0 (H) 09/04/2018    CRP 3.0 (H) 09/03/2018    CRP 2.9 (H) 09/02/2018     Lab Results   Component Value Date    CRPHS 13.0 (H) 04/04/2016     Lab Results   Component Value Date    SEDRATE 83 (H) 07/27/2018    SEDRATE 77 (H) 11/17/2017    SEDRATE 73 (H) 11/28/2016     Lab Results   Component Value Date    ALT 9 09/02/2018    AST 15 09/02/2018    ALKPHOS 106 09/02/2018    BILITOT 0.6 09/02/2018     Lab Results   Component Value Date     09/05/2018    K 4.3 09/05/2018     09/05/2018    CO2 27 09/05/2018    BUN 18 09/05/2018    CREATININE 1.0 09/05/2018    CREATININE 1.0 09/04/2018    CREATININE 0.9 09/03/2018    GFRAA >60 09/05/2018    LABGLOM >60 09/05/2018    GLUCOSE 123 09/05/2018    PROT 8.7 09/02/2018    LABALBU 3.9 09/02/2018    CALCIUM 9.2 09/05/2018    BILITOT 0.6 09/02/2018    ALKPHOS 106 09/02/2018    AST 15 09/02/2018    ALT 9 09/02/2018     Radiology:      Microbiology:   Foot culture 9/2/18 nonhemolytic Streptococcus, Morganella morganii, Enterococcus faecalis, Proteus mirabilis  Blood culture 9/2/CT negative so far  Urine culture pending    Had Meropenem for a minimum of 2 weeks from 8/1/18--done at [facility], then placed on  Keflex now from [facility].  We will have to check IV card and rcoreds out pt see     wound care SOV,  [facility] cx rev from hospital, [facility]       Assessment:  · Recurrent cellulitis Left  lower extremity assoc c venous insuf and

## 2018-09-05 NOTE — PLAN OF CARE
Problem: Falls - Risk of:  Goal: Will remain free from falls  Will remain free from falls   Outcome: Met This Shift      Problem: Skin Integrity:  Goal: Absence of new skin breakdown  Absence of new skin breakdown   Outcome: Met This Shift

## 2018-09-06 VITALS
TEMPERATURE: 96.4 F | OXYGEN SATURATION: 99 % | BODY MASS INDEX: 39.17 KG/M2 | HEART RATE: 75 BPM | DIASTOLIC BLOOD PRESSURE: 70 MMHG | WEIGHT: 315 LBS | RESPIRATION RATE: 20 BRPM | SYSTOLIC BLOOD PRESSURE: 150 MMHG | HEIGHT: 75 IN

## 2018-09-06 LAB
METER GLUCOSE: 114 MG/DL (ref 70–110)
METER GLUCOSE: 120 MG/DL (ref 70–110)
METER GLUCOSE: 127 MG/DL (ref 70–110)

## 2018-09-06 PROCEDURE — 6370000000 HC RX 637 (ALT 250 FOR IP): Performed by: INTERNAL MEDICINE

## 2018-09-06 PROCEDURE — 99238 HOSP IP/OBS DSCHRG MGMT 30/<: CPT | Performed by: HOSPITALIST

## 2018-09-06 PROCEDURE — 82962 GLUCOSE BLOOD TEST: CPT

## 2018-09-06 PROCEDURE — 6360000002 HC RX W HCPCS: Performed by: EMERGENCY MEDICINE

## 2018-09-06 PROCEDURE — 6360000002 HC RX W HCPCS: Performed by: INTERNAL MEDICINE

## 2018-09-06 PROCEDURE — 2580000003 HC RX 258: Performed by: EMERGENCY MEDICINE

## 2018-09-06 PROCEDURE — 2580000003 HC RX 258: Performed by: SPECIALIST

## 2018-09-06 PROCEDURE — 6360000002 HC RX W HCPCS: Performed by: NURSE PRACTITIONER

## 2018-09-06 PROCEDURE — 6360000002 HC RX W HCPCS: Performed by: SPECIALIST

## 2018-09-06 RX ORDER — FAMOTIDINE 20 MG/1
20 TABLET, FILM COATED ORAL 2 TIMES DAILY
Qty: 60 TABLET | Refills: 3 | DISCHARGE
Start: 2018-09-06 | End: 2018-09-28 | Stop reason: ALTCHOICE

## 2018-09-06 RX ORDER — HYDRALAZINE HYDROCHLORIDE 50 MG/1
50 TABLET, FILM COATED ORAL 3 TIMES DAILY
Qty: 90 TABLET | Refills: 3 | Status: ON HOLD | DISCHARGE
Start: 2018-09-06 | End: 2019-12-27 | Stop reason: HOSPADM

## 2018-09-06 RX ORDER — DAPTOMYCIN 50 MG/ML
500 INJECTION, POWDER, LYOPHILIZED, FOR SOLUTION INTRAVENOUS DAILY
Qty: 15000 MG | Refills: 1 | DISCHARGE
Start: 2018-09-06 | End: 2018-09-21

## 2018-09-06 RX ORDER — MEROPENEM 500 MG/1
1 INJECTION, POWDER, FOR SOLUTION INTRAVENOUS EVERY 8 HOURS
Qty: 60 G | Refills: 1 | DISCHARGE
Start: 2018-09-06 | End: 2018-09-21

## 2018-09-06 RX ADMIN — DAPTOMYCIN 500 MG: 500 INJECTION, POWDER, LYOPHILIZED, FOR SOLUTION INTRAVENOUS at 05:05

## 2018-09-06 RX ADMIN — MEROPENEM 1 G: 1 INJECTION, POWDER, FOR SOLUTION INTRAVENOUS at 04:26

## 2018-09-06 RX ADMIN — FAMOTIDINE 20 MG: 20 TABLET ORAL at 08:41

## 2018-09-06 RX ADMIN — ENOXAPARIN SODIUM 50 MG: 60 INJECTION SUBCUTANEOUS at 08:40

## 2018-09-06 RX ADMIN — Medication 10 ML: at 04:26

## 2018-09-06 RX ADMIN — HYDRALAZINE HYDROCHLORIDE 50 MG: 50 TABLET ORAL at 08:41

## 2018-09-06 RX ADMIN — Medication 300 UNITS: at 08:44

## 2018-09-06 RX ADMIN — MEROPENEM 1 G: 1 INJECTION, POWDER, FOR SOLUTION INTRAVENOUS at 11:37

## 2018-09-06 RX ADMIN — Medication 10 ML: at 08:44

## 2018-09-06 RX ADMIN — HYDRALAZINE HYDROCHLORIDE 50 MG: 50 TABLET ORAL at 14:27

## 2018-09-06 ASSESSMENT — PAIN SCALES - GENERAL
PAINLEVEL_OUTOF10: 0

## 2018-09-06 NOTE — PLAN OF CARE
Problem: Falls - Risk of:  Goal: Will remain free from falls  Will remain free from falls   Outcome: Ongoing      Problem: Skin Integrity:  Goal: Absence of new skin breakdown  Absence of new skin breakdown   Outcome: Ongoing

## 2018-09-07 ENCOUNTER — HOSPITAL ENCOUNTER (OUTPATIENT)
Dept: WOUND CARE | Age: 73
Discharge: HOME | End: 2018-09-07
Payer: MEDICARE

## 2018-09-07 LAB
BLOOD CULTURE, ROUTINE: NORMAL
CULTURE, BLOOD 2: NORMAL

## 2018-09-28 ENCOUNTER — HOSPITAL ENCOUNTER (OUTPATIENT)
Dept: WOUND CARE | Age: 73
Discharge: HOME | End: 2018-09-28
Payer: MEDICARE

## 2018-09-28 VITALS
WEIGHT: 315 LBS | TEMPERATURE: 97.2 F | SYSTOLIC BLOOD PRESSURE: 148 MMHG | RESPIRATION RATE: 18 BRPM | DIASTOLIC BLOOD PRESSURE: 72 MMHG | HEART RATE: 76 BPM | BODY MASS INDEX: 45.62 KG/M2

## 2018-09-28 PROCEDURE — 11042 DBRDMT SUBQ TIS 1ST 20SQCM/<: CPT

## 2018-09-28 PROCEDURE — 11045 DBRDMT SUBQ TISS EACH ADDL: CPT

## 2018-09-28 RX ORDER — AMOXICILLIN AND CLAVULANATE POTASSIUM 500; 125 MG/1; MG/1
1 TABLET, FILM COATED ORAL 2 TIMES DAILY
COMMUNITY
End: 2019-07-17

## 2018-09-28 RX ORDER — CIPROFLOXACIN 500 MG/1
500 TABLET, FILM COATED ORAL 2 TIMES DAILY
COMMUNITY
End: 2019-07-17

## 2018-09-28 NOTE — PROGRESS NOTES
[facility] Followup Visit Note    Referring Physician : Devonte Mora MD  Valerie Fierro  MEDICAL RECORD NUMBER:  [id number]  AGE: 72 y.o. GENDER: male  : 1945  EPISODE DATE:  2018    Subjective:     Chief Complaint   Patient presents with    Wound Check     LEFT FOOT,LEFT LEG      HISTORY of PRESENT ILLNESS HPI   Valerie Fierro is a 72 y.o. male who presents today for wound/ulcer evaluation.    History of Wound Context:  2 years     Wound/Ulcer Pain Timing/Severity: intermittent  Quality of pain: aching  Severity:  2 / 10   Modifying Factors: Pain worsens with walking  Associated Signs/Symptoms: edema, erythema and drainage    Ulcer Identification:  Ulcer Type: venous and diabetic  Contributing Factors: venous stasis, diabetes, poor glucose control and chronic pressure    Diabetic/Pressure/Non Pressure Ulcers only:  Ulcer: Diabetic ulcer, fat layer exposed    Wound: N/A        PAST MEDICAL HISTORY      Diagnosis Date    [facility] mellitus ([facility] [facility]     Diabetic foot ulcer associated with type 2 diabetes mellitus, with fat layer exposed ([facility] [address] 2016    Diabetic peripheral neuropathy (HCC)     Hyperlipidemia     Hypertension     RLS (restless legs syndrome)      Past Surgical History:   Procedure Laterality Date    FOOT AMPUTATION      FOOT SURGERY Left 16    I&D    OTHER SURGICAL HISTORY  2014    left foot debridement, I&D exostectomy, bone biopsy; I&D right lower leg(BK stump site)    Amanda Rios      has been shut off for 5 years     Family History   Problem Relation Age of Onset    Diabetes Mother     Heart Disease Mother     Other Father         cerebral aneurysm    Parkinsonism Sister     Heart Attack Brother     Cancer Brother      Social History   Substance Use Topics    Smoking status: Former Smoker     Packs/day: 3.00     Types: Cigarettes     Start date: 1963     Quit date: 1984    Smokeless tobacco: Former User     Types: Snuff    Alcohol use No     Allergies   Allergen Reactions    Zosyn [Piperacillin Sod-Tazobactam So] Itching    Morphine Rash    Vancomycin Rash     Current Outpatient Prescriptions on File Prior to Encounter   Medication Sig Dispense Refill    hydrALAZINE (APRESOLINE) 50 MG tablet Take 1 tablet by mouth 3 times daily 90 tablet 3    diltiazem (CARDIZEM CD) 240 MG extended release capsule Take 1 capsule by mouth nightly 30 capsule 0    ibuprofen (ADVIL;MOTRIN) 600 MG tablet Take 1 tablet by mouth every 8 hours as needed 120 tablet 3    carbidopa-levodopa (SINEMET)  MG per tablet Take 2 tablets by mouth nightly       pioglitazone (ACTOS) 45 MG tablet Take 1 tablet by mouth daily. (Patient taking differently: Take 45 mg by mouth nightly ) 30 tablet 3    doxazosin (CARDURA) 8 MG tablet Take 1 tablet by mouth daily. (Patient taking differently: Take 8 mg by mouth nightly ) 30 tablet 3    furosemide (LASIX) 40 MG tablet Take 1 tablet by mouth daily. (Patient taking differently: Take 40 mg by mouth daily as needed ) 60 tablet 3    atorvastatin (LIPITOR) 10 MG tablet Take 20 mg by mouth nightly        No current facility-administered medications on file prior to encounter.         REVIEW OF SYSTEMS See HPI    Objective:    BP (!) 148/72   Pulse 76   Temp 97.2 °F (36.2 °C) (Oral)   Resp 18   Wt (!) 365 lb (165.6 kg)   BMI 45.62 kg/m²   Wt Readings from Last 3 Encounters:   09/28/18 (!) 365 lb (165.6 kg)   09/06/18 (!) 365 lb 12.8 oz (165.9 kg)   07/28/18 (!) 377 lb 14.4 oz (171.4 kg)     PHYSICAL EXAM  CONSTITUTIONAL:   Awake, alert, cooperative   EYES:  lids and lashes normal   ENT: external ears and nose without lesions   NECK:  supple, symmetrical, trachea midline   SKIN:  Open wound Present    Assessment:     Problem List Items Addressed This Visit     None        Procedure Note  Indications:  Based on my examination of this patient's wound(s)/ulcer(s) today, debridement is required to promote healing and evaluate the wound base. Performed by: Rex Bailey DPM    Consent obtained:  Yes    Time out taken:  Yes    Pain Control: Anesthetic  Anesthetic: None     Debridement:Excisional Debridement    Using #15 blade scalpel the wound(s)/ulcer(s) was/were sharply debrided down through and including the removal of subcutaneous tissue. Devitalized Tissue Debrided:  fibrin, biofilm, slough and necrotic/eschar to stimulate bleeding to promote healing, post debridement good bleeding base and wound edges noted    Pre Debridement Measurements:  Are located in the Wound/Ulcer Documentation Flow Sheet    Wound/Ulcer #: 3, 20 and 21, 22    Post Debridement Measurements:  Wound/Ulcer Descriptions are Pre Debridement except measurements:    Wound 03/23/16 Venous ulcer Leg Left;Posterior #3 lt lateral calf  onset 2/1/16  venous;  (Active)   Wound Image   9/28/2018  9:26 AM   Wound Diabetic Tilley 2 9/28/2018  9:26 AM   Dressing Status Clean;Dry; Intact 9/28/2018 10:19 AM   Dressing Changed Changed/New 9/28/2018 10:19 AM   Dressing/Treatment Alginate;Silver dressing 9/28/2018 10:19 AM   Wound Cleansed Rinsed/Irrigated with saline 9/28/2018 10:19 AM   Dressing Change Due 09/06/18 9/5/2018 11:33 AM   Wound Length (cm) 4.5 cm 9/28/2018 10:01 AM   Wound Width (cm) 9.8 cm 9/28/2018 10:01 AM   Wound Depth (cm)  0.2 9/28/2018 10:01 AM   Calculated Wound Size (cm^2) (l*w) 44.1 cm^2 9/28/2018 10:01 AM   Change in Wound Size % (l*w) 79 9/28/2018 10:01 AM   Wound Assessment Yellow;Pink;Red 9/28/2018  9:26 AM   Drainage Amount Moderate 9/28/2018  9:26 AM   Drainage Description Serosanguinous; Yellow 9/28/2018  9:26 AM   Odor None 9/28/2018  9:26 AM   Belén-wound Assessment Pink 9/28/2018  9:26 AM   [place]%Wound Bed 60 1/19/2018 11:09 AM   Red%Wound Bed 70 2/23/2018  9:33 AM   Yellow%Wound Bed 30 2/23/2018  9:33 AM   Culture Taken Yes 3/9/2018 10:36 AM   Debridement per physician Subcutaneous 2/23/2018 10:09 AM   Time out Yes 9/28/2018 10:01 AM   Procedural Pain 0 9/28/2018 10:01 AM   Post procedural Pain 0 9/28/2018 10:01 AM   Number of days: 626       Wound 08/31/18 Foot Dorsal #20 Left Dorsal Foot DFU II(Acquired August 2018) (Active)   Wound Image   9/28/2018  9:26 AM   Wound Diabetic Tilley 2 9/28/2018  9:26 AM   Dressing Status Clean;Dry; Intact 9/28/2018 10:19 AM   Dressing Changed Changed/New 9/28/2018 10:19 AM   Dressing/Treatment ABD 9/28/2018 10:19 AM   Wound Cleansed Rinsed/Irrigated with saline 9/28/2018 10:19 AM   Dressing Change Due 09/06/18 9/5/2018 11:33 AM   Wound Length (cm) 0.7 cm 9/28/2018 10:01 AM   Wound Width (cm) 0.8 cm 9/28/2018 10:01 AM   Wound Depth (cm)  0.2 9/28/2018 10:01 AM   Calculated Wound Size (cm^2) (l*w) 0.56 cm^2 9/28/2018 10:01 AM   Change in Wound Size % (l*w) 38.46 9/28/2018 10:01 AM   Wound Assessment Red;Pink 9/28/2018  9:26 AM   Drainage Amount Small 9/28/2018  9:26 AM   Drainage Description Serosanguinous 9/28/2018  9:26 AM   Odor None 9/28/2018  9:26 AM   Belén-wound Assessment Pink 9/28/2018  9:26 AM   Time out Yes 9/28/2018 10:01 AM   Procedural Pain 0 9/28/2018 10:01 AM   Post procedural Pain 0 9/28/2018 10:01 AM   Number of days: 28       Wound Other (Comment) Leg Lateral [id number] (Active)   Wound Type Wound 9/3/2018 12:32 PM   Wound Diabetic 9/2/2018  7:11 PM   Dressing Status Dry;Clean; Intact 9/6/2018 10:29 AM   Dressing Changed Changed/New 9/4/2018  1:30 PM   Dressing/Treatment Other (Comment) 9/4/2018  1:30 PM   Wound Cleansed Rinsed/Irrigated with saline 9/4/2018  1:30 PM   Wound Length (cm) 10 cm 9/5/2018 11:33 AM   Wound Width (cm) 7 cm 9/5/2018 11:33 AM   Wound Depth (cm)  0 9/5/2018 11:33 AM   Calculated Wound Size (cm^2) (l*w) 70 cm^2 9/5/2018 11:33 AM   Wound Assessment Slough;Pink;Red;Yellow 9/4/2018  1:30 PM   Drainage Amount Small 9/4/2018  1:30 PM   Drainage Description Yellow 9/4/2018  1:30 PM   Odor Mild 9/4/2018  1:30 PM   Belén-wound Assessment Pink;Red 9/4/2018  1:30 PM   Number of days:        Diabetic Ulcer 03/23/16 Foot Plantar;Left #1 lt plantar foot  onset 3/23/15   diabetic  wag 2 (Active)   Belén-wound Assessment Pink 9/28/2018  9:26 AM   Belén-Wound Moisture Denuded 6/22/2018  8:20 AM   Bel[place]-Wound Color White 6/22/2018  8:20 AM   Diabetic Wound - Anabel Rich 2 7/21/2017 10:33 AM   Wound Assessment Pink;Red 9/28/2018  9:26 AM   Wound Length (cm) 1.2 cm 9/28/2018 10:01 AM   Wound Width (cm) 1.8 cm 9/28/2018 10:01 AM   Wound Depth (cm)  0.2 9/28/2018 10:01 AM   Calculated Wound Size (cm^2) (l*w) 2.16 cm^2 9/28/2018 10:01 AM   Change in Wound Size % (l*w) -517.14 9/28/2018 10:01 AM   Culture Taken Yes 11/10/2017 10:51 AM   Dressing Status Clean;Dry; Intact 9/28/2018 10:19 AM   Dressing Changed Changed/New 9/28/2018 10:19 AM   Dressing/Treatment Dry dressing 9/28/2018 10:19 AM   Wound Cleansed Rinsed/Irrigated with saline 9/28/2018 10:19 AM   Dressing Change Due 02/05/18 2/2/2018 11:48 AM   Granulation Quality Red 6/22/2018  8:20 AM   Drainage Amount Small 9/28/2018  9:26 AM   Drainage Description [place];Yellow 9/28/2018  9:26 AM   Odor None 9/28/2018  9:26 AM   Undermining Starts ___ O'Clock 0 4/27/2018  8:47 AM   Undermining Ends___ O'Clock 0 4/27/2018  8:47 AM   Undermining Maxium Distance (cm) 0 4/27/2018  8:47 AM   [place]%Wound Bed 70 2/3/2017 11:21 AM   Red%Wound Bed 80 2/23/2018  9:33 AM   Yellow%Wound Bed 20 2/23/2018  9:33 AM   Exposed structure Muscle 7/14/2017 10:38 AM   Debridement per physician Subcutaneous 2/23/2018 10:09 AM   Time out Yes 9/28/2018 10:01 AM   Procedural Pain 0 9/28/2018 10:01 AM   Post procedural Pain 0 9/28/2018 10:01 AM   Op First Treatment Date 10/07/16 10/7/2016 11:11 AM   Number of days: 395     Percent of Wound/Ulcer Debrided: 100%    Total Surface Area Debrided:  70 sq cm     Estimated Blood Loss:  Minimal  Hemostasis Achieved:  by pressure    Procedural Pain:  2  / 10   Post Procedural Pain:  3 / 10     Response to treatment:  Well tolerated

## 2018-10-05 ENCOUNTER — HOSPITAL ENCOUNTER (OUTPATIENT)
Dept: WOUND CARE | Age: 73
Discharge: HOME | End: 2018-10-05
Payer: MEDICARE

## 2018-10-05 VITALS
TEMPERATURE: 98.3 F | HEIGHT: 75 IN | DIASTOLIC BLOOD PRESSURE: 74 MMHG | BODY MASS INDEX: 39.17 KG/M2 | RESPIRATION RATE: 18 BRPM | WEIGHT: 315 LBS | HEART RATE: 72 BPM | SYSTOLIC BLOOD PRESSURE: 148 MMHG

## 2018-10-05 DIAGNOSIS — M86.672 CHRONIC OSTEOMYELITIS OF LEFT FOOT (HCC): ICD-10-CM

## 2018-10-05 PROCEDURE — 11045 DBRDMT SUBQ TISS EACH ADDL: CPT

## 2018-10-05 PROCEDURE — 11042 DBRDMT SUBQ TIS 1ST 20SQCM/<: CPT

## 2018-10-05 NOTE — PROGRESS NOTES
Wound Healing Center Followup Visit Note    Referring Physician : Carissa Collazo MD  Jadon Farias  MEDICAL RECORD NUMBER:  [id number]  AGE: 72 y.o. GENDER: male  : 1945  EPISODE DATE:  10/5/2018    Subjective:     Chief Complaint   Patient presents with    Wound Check     left lower leg      HISTORY of PRESENT ILLNESS HPI   Jadon Farias is a 72 y.o. male who presents today for wound/ulcer evaluation.    History of Wound Context:  2 years      Wound/Ulcer Pain Timing/Severity: constant  Quality of pain: aching  Severity:  2 / 10   Modifying Factors: Pain worsens with walking  Associated Signs/Symptoms: edema, erythema and drainage    Ulcer Identification:  Ulcer Type: venous and diabetic  Contributing Factors: edema, venous stasis and lymphedema    Diabetic/Pressure/Non Pressure Ulcers only:  Ulcer: Non-Pressure ulcer, fat layer exposed    Wound: N/A        PAST MEDICAL HISTORY      Diagnosis Date    [facility] mellitus ([facility] [address]     Diabetic foot ulcer associated with type 2 diabetes mellitus, with fat layer exposed ([facility] [address] 2016    Diabetic peripheral neuropathy (HCC)     Hyperlipidemia     Hypertension     RLS (restless legs syndrome)      Past Surgical History:   Procedure Laterality Date    FOOT AMPUTATION      FOOT SURGERY Left 16    I&D    OTHER SURGICAL HISTORY  2014    left foot debridement, I&D exostectomy, bone biopsy; I&D right lower leg(BK stump site)    PACEMAKER PLACEMENT      has been shut off for 5 years     Family History   Problem Relation Age of Onset    Diabetes Mother     Heart Disease Mother     Other Father         cerebral aneurysm    Parkinsonism Sister     Heart Attack Brother     Cancer Brother      Social History   Substance Use Topics    Smoking status: Former Smoker     Packs/day: 3.00     Types: Cigarettes     Start date: 1963     Quit date: 1984    Smokeless tobacco: Former User     Types: Snuff    Alcohol use No

## 2018-10-12 ENCOUNTER — HOSPITAL ENCOUNTER (OUTPATIENT)
Dept: WOUND CARE | Age: 73
Discharge: HOME | End: 2018-10-12
Payer: MEDICARE

## 2019-06-19 NOTE — PROGRESS NOTES
base.    Performed by: Magy Clancy DPM    Consent obtained:  Yes    Time out taken:  Yes    Pain Control: Anesthetic  Anesthetic: 2% Lidocaine Gel Topical     Debridement:Excisional Debridement    Using #15 blade scalpel the wound(s)/ulcer(s) was/were sharply debrided down through and including the removal of subcutaneous tissue. Devitalized Tissue Debrided:  fibrin, biofilm, slough and necrotic/eschar to stimulate bleeding to promote healing, post debridement good bleeding base and wound edges noted    Pre Debridement Measurements:  Are located in the Wound/Ulcer Documentation Flow Sheet    Wound/Ulcer #: 1, 3 and 19    Post Debridement Measurements:  Wound/Ulcer Descriptions are Pre Debridement except measurements:    Wound 03/23/16 Venous ulcer Leg Left;Posterior #3 lt lateral calf  onset 2/1/16  venous;  (Active)   Wound Image   7/27/2018  9:30 AM   Wound Venous 10/13/2017 10:41 AM   Dressing Status Clean;Dry; Intact 7/20/2018 10:27 AM   Dressing Changed Changed/New 7/20/2018 10:27 AM   Dressing/Treatment Dry dressing 7/13/2018  9:35 AM   Wound Cleansed Wound cleanser 7/20/2018 10:27 AM   Dressing Change Due 02/05/18 2/2/2018 11:48 AM   Wound Length (cm) 14.5 cm 7/27/2018  9:30 AM   Wound Width (cm) 21.5 cm 7/27/2018  9:30 AM   Wound Depth (cm)  0.2 7/27/2018  9:30 AM   Calculated Wound Size (cm^2) (l*w) 311.75 cm^2 7/27/2018  9:30 AM   Change in Wound Size % (l*w) -48.45 7/27/2018  9:30 AM   Wound Assessment Pale;Pink;Red;Gray 7/27/2018  9:30 AM   Drainage Amount Copious 7/27/2018  9:30 AM   Drainage Description Yellow;Green 7/27/2018  9:30 AM   Odor Mild 7/27/2018  9:30 AM   Belén-wound Assessment Denuded;Edema; Maceration; Purple;Pink;Swelling; White 7/27/2018  9:30 AM   [place]%Wound Bed 60 1/19/2018 11:09 AM   Red%Wound Bed 70 2/23/2018  9:33 AM   Yellow%Wound Bed 30 2/23/2018  9:33 AM   Culture Taken Yes 3/9/2018 10:36 AM   Debridement per physician Subcutaneous 2/23/2018 10:09 AM   Time out Yes
normal

## 2019-07-17 ENCOUNTER — APPOINTMENT (OUTPATIENT)
Dept: ULTRASOUND IMAGING | Age: 74
DRG: 872 | End: 2019-07-17
Payer: MEDICARE

## 2019-07-17 ENCOUNTER — HOSPITAL ENCOUNTER (INPATIENT)
Age: 74
LOS: 8 days | Discharge: NURSING, REHAB OR LONG-TERM CARE | DRG: 872 | End: 2019-07-25
Attending: EMERGENCY MEDICINE | Admitting: INTERNAL MEDICINE
Payer: MEDICARE

## 2019-07-17 ENCOUNTER — APPOINTMENT (OUTPATIENT)
Dept: GENERAL RADIOLOGY | Age: 74
DRG: 872 | End: 2019-07-17
Payer: MEDICARE

## 2019-07-17 DIAGNOSIS — L03.116 LEFT LEG CELLULITIS: Primary | ICD-10-CM

## 2019-07-17 PROBLEM — E87.6 HYPOKALEMIA: Status: ACTIVE | Noted: 2019-07-17

## 2019-07-17 PROBLEM — E11.22 CKD STAGE 2 DUE TO TYPE 2 DIABETES MELLITUS (HCC): Status: ACTIVE | Noted: 2019-07-17

## 2019-07-17 PROBLEM — N18.2 CKD STAGE 2 DUE TO TYPE 2 DIABETES MELLITUS (HCC): Status: ACTIVE | Noted: 2019-07-17

## 2019-07-17 LAB
ALBUMIN SERPL-MCNC: 3.6 G/DL (ref 3.5–5.2)
ALP BLD-CCNC: 99 U/L (ref 40–129)
ALT SERPL-CCNC: 15 U/L (ref 0–40)
ANION GAP SERPL CALCULATED.3IONS-SCNC: 12 MMOL/L (ref 7–16)
ANISOCYTOSIS: ABNORMAL
AST SERPL-CCNC: 32 U/L (ref 0–39)
BACTERIA: ABNORMAL /HPF
BASOPHILS ABSOLUTE: 0.01 E9/L (ref 0–0.2)
BASOPHILS RELATIVE PERCENT: 0.1 % (ref 0–2)
BILIRUB SERPL-MCNC: 1 MG/DL (ref 0–1.2)
BILIRUBIN URINE: NEGATIVE
BLOOD, URINE: ABNORMAL
BUN BLDV-MCNC: 17 MG/DL (ref 8–23)
C-REACTIVE PROTEIN: 15.5 MG/DL (ref 0–0.4)
CALCIUM SERPL-MCNC: 8.5 MG/DL (ref 8.6–10.2)
CHLORIDE BLD-SCNC: 95 MMOL/L (ref 98–107)
CHP ED QC CHECK: NORMAL
CLARITY: CLEAR
CO2: 24 MMOL/L (ref 22–29)
COLOR: ABNORMAL
CREAT SERPL-MCNC: 1.2 MG/DL (ref 0.7–1.2)
EKG ATRIAL RATE: 84 BPM
EKG Q-T INTERVAL: 392 MS
EKG QRS DURATION: 110 MS
EKG QTC CALCULATION (BAZETT): 469 MS
EKG R AXIS: 81 DEGREES
EKG T AXIS: 54 DEGREES
EKG VENTRICULAR RATE: 86 BPM
EOSINOPHILS ABSOLUTE: 0 E9/L (ref 0.05–0.5)
EOSINOPHILS RELATIVE PERCENT: 0 % (ref 0–6)
GFR AFRICAN AMERICAN: >60
GFR NON-AFRICAN AMERICAN: 59 ML/MIN/1.73
GLUCOSE BLD-MCNC: 104 MG/DL
GLUCOSE BLD-MCNC: 95 MG/DL (ref 74–99)
GLUCOSE URINE: NEGATIVE MG/DL
HCT VFR BLD CALC: 31.8 % (ref 37–54)
HEMOGLOBIN: 10.3 G/DL (ref 12.5–16.5)
HYPOCHROMIA: ABNORMAL
IMMATURE GRANULOCYTES #: 0.13 E9/L
IMMATURE GRANULOCYTES %: 1.3 % (ref 0–5)
INR BLD: 1.7
KETONES, URINE: NEGATIVE MG/DL
LACTIC ACID, SEPSIS: 1.5 MMOL/L (ref 0.5–1.9)
LEUKOCYTE ESTERASE, URINE: NEGATIVE
LYMPHOCYTES ABSOLUTE: 0.31 E9/L (ref 1.5–4)
LYMPHOCYTES RELATIVE PERCENT: 3 % (ref 20–42)
MAGNESIUM: 1.8 MG/DL (ref 1.6–2.6)
MCH RBC QN AUTO: 29 PG (ref 26–35)
MCHC RBC AUTO-ENTMCNC: 32.4 % (ref 32–34.5)
MCV RBC AUTO: 89.6 FL (ref 80–99.9)
METER GLUCOSE: 104 MG/DL (ref 74–99)
METER GLUCOSE: 88 MG/DL (ref 74–99)
MONOCYTES ABSOLUTE: 0.23 E9/L (ref 0.1–0.95)
MONOCYTES RELATIVE PERCENT: 2.2 % (ref 2–12)
NEUTROPHILS ABSOLUTE: 9.6 E9/L (ref 1.8–7.3)
NEUTROPHILS RELATIVE PERCENT: 93.4 % (ref 43–80)
NITRITE, URINE: NEGATIVE
PDW BLD-RTO: 15.9 FL (ref 11.5–15)
PH UA: 6.5 (ref 5–9)
PLATELET # BLD: 106 E9/L (ref 130–450)
PMV BLD AUTO: 10.7 FL (ref 7–12)
POTASSIUM REFLEX MAGNESIUM: 3.2 MMOL/L (ref 3.5–5)
PRO-BNP: 9606 PG/ML (ref 0–125)
PROTEIN UA: >=300 MG/DL
PROTHROMBIN TIME: 19.7 SEC (ref 9.3–12.4)
RBC # BLD: 3.55 E12/L (ref 3.8–5.8)
RBC UA: ABNORMAL /HPF (ref 0–2)
SEDIMENTATION RATE, ERYTHROCYTE: 58 MM/HR (ref 0–15)
SODIUM BLD-SCNC: 131 MMOL/L (ref 132–146)
SPECIFIC GRAVITY UA: 1.02 (ref 1–1.03)
TOTAL PROTEIN: 8.2 G/DL (ref 6.4–8.3)
UROBILINOGEN, URINE: 1 E.U./DL
WBC # BLD: 10.3 E9/L (ref 4.5–11.5)
WBC UA: ABNORMAL /HPF (ref 0–5)

## 2019-07-17 PROCEDURE — 83735 ASSAY OF MAGNESIUM: CPT

## 2019-07-17 PROCEDURE — 93005 ELECTROCARDIOGRAM TRACING: CPT | Performed by: EMERGENCY MEDICINE

## 2019-07-17 PROCEDURE — 96375 TX/PRO/DX INJ NEW DRUG ADDON: CPT

## 2019-07-17 PROCEDURE — 87070 CULTURE OTHR SPECIMN AEROBIC: CPT

## 2019-07-17 PROCEDURE — 87075 CULTR BACTERIA EXCEPT BLOOD: CPT

## 2019-07-17 PROCEDURE — 83605 ASSAY OF LACTIC ACID: CPT

## 2019-07-17 PROCEDURE — 93010 ELECTROCARDIOGRAM REPORT: CPT | Performed by: INTERNAL MEDICINE

## 2019-07-17 PROCEDURE — 85651 RBC SED RATE NONAUTOMATED: CPT

## 2019-07-17 PROCEDURE — 87040 BLOOD CULTURE FOR BACTERIA: CPT

## 2019-07-17 PROCEDURE — 99223 1ST HOSP IP/OBS HIGH 75: CPT | Performed by: INTERNAL MEDICINE

## 2019-07-17 PROCEDURE — 87186 SC STD MICRODIL/AGAR DIL: CPT

## 2019-07-17 PROCEDURE — 2580000003 HC RX 258: Performed by: EMERGENCY MEDICINE

## 2019-07-17 PROCEDURE — 6360000002 HC RX W HCPCS: Performed by: INTERNAL MEDICINE

## 2019-07-17 PROCEDURE — 87088 URINE BACTERIA CULTURE: CPT

## 2019-07-17 PROCEDURE — 99285 EMERGENCY DEPT VISIT HI MDM: CPT

## 2019-07-17 PROCEDURE — 6360000002 HC RX W HCPCS: Performed by: EMERGENCY MEDICINE

## 2019-07-17 PROCEDURE — 87077 CULTURE AEROBIC IDENTIFY: CPT

## 2019-07-17 PROCEDURE — 1200000000 HC SEMI PRIVATE

## 2019-07-17 PROCEDURE — 85025 COMPLETE CBC W/AUTO DIFF WBC: CPT

## 2019-07-17 PROCEDURE — 94664 DEMO&/EVAL PT USE INHALER: CPT

## 2019-07-17 PROCEDURE — 86140 C-REACTIVE PROTEIN: CPT

## 2019-07-17 PROCEDURE — 82962 GLUCOSE BLOOD TEST: CPT

## 2019-07-17 PROCEDURE — 6370000000 HC RX 637 (ALT 250 FOR IP): Performed by: INTERNAL MEDICINE

## 2019-07-17 PROCEDURE — 83880 ASSAY OF NATRIURETIC PEPTIDE: CPT

## 2019-07-17 PROCEDURE — 87147 CULTURE TYPE IMMUNOLOGIC: CPT

## 2019-07-17 PROCEDURE — 93971 EXTREMITY STUDY: CPT

## 2019-07-17 PROCEDURE — 81001 URINALYSIS AUTO W/SCOPE: CPT

## 2019-07-17 PROCEDURE — 80053 COMPREHEN METABOLIC PANEL: CPT

## 2019-07-17 PROCEDURE — 96365 THER/PROPH/DIAG IV INF INIT: CPT

## 2019-07-17 PROCEDURE — 93005 ELECTROCARDIOGRAM TRACING: CPT | Performed by: INTERNAL MEDICINE

## 2019-07-17 PROCEDURE — 71045 X-RAY EXAM CHEST 1 VIEW: CPT

## 2019-07-17 PROCEDURE — 85610 PROTHROMBIN TIME: CPT

## 2019-07-17 RX ORDER — POTASSIUM CHLORIDE 7.45 MG/ML
10 INJECTION INTRAVENOUS ONCE
Status: COMPLETED | OUTPATIENT
Start: 2019-07-17 | End: 2019-07-17

## 2019-07-17 RX ORDER — SODIUM CHLORIDE 9 MG/ML
INJECTION, SOLUTION INTRAVENOUS CONTINUOUS
Status: DISCONTINUED | OUTPATIENT
Start: 2019-07-17 | End: 2019-07-18

## 2019-07-17 RX ORDER — ACETAMINOPHEN 325 MG/1
650 TABLET ORAL EVERY 4 HOURS PRN
Status: DISCONTINUED | OUTPATIENT
Start: 2019-07-17 | End: 2019-07-25 | Stop reason: HOSPADM

## 2019-07-17 RX ORDER — 0.9 % SODIUM CHLORIDE 0.9 %
1000 INTRAVENOUS SOLUTION INTRAVENOUS ONCE
Status: COMPLETED | OUTPATIENT
Start: 2019-07-17 | End: 2019-07-17

## 2019-07-17 RX ORDER — DEXTROSE MONOHYDRATE 25 G/50ML
12.5 INJECTION, SOLUTION INTRAVENOUS PRN
Status: DISCONTINUED | OUTPATIENT
Start: 2019-07-17 | End: 2019-07-25 | Stop reason: HOSPADM

## 2019-07-17 RX ORDER — DILTIAZEM HYDROCHLORIDE 240 MG/1
240 CAPSULE, COATED, EXTENDED RELEASE ORAL NIGHTLY
Status: DISCONTINUED | OUTPATIENT
Start: 2019-07-17 | End: 2019-07-25 | Stop reason: HOSPADM

## 2019-07-17 RX ORDER — SODIUM CHLORIDE 0.9 % (FLUSH) 0.9 %
10 SYRINGE (ML) INJECTION PRN
Status: DISCONTINUED | OUTPATIENT
Start: 2019-07-17 | End: 2019-07-25 | Stop reason: HOSPADM

## 2019-07-17 RX ORDER — ALBUTEROL SULFATE 2.5 MG/3ML
2.5 SOLUTION RESPIRATORY (INHALATION) ONCE
Status: COMPLETED | OUTPATIENT
Start: 2019-07-17 | End: 2019-07-17

## 2019-07-17 RX ORDER — HYDRALAZINE HYDROCHLORIDE 50 MG/1
50 TABLET, FILM COATED ORAL 3 TIMES DAILY
Status: DISCONTINUED | OUTPATIENT
Start: 2019-07-17 | End: 2019-07-20

## 2019-07-17 RX ORDER — LINEZOLID 2 MG/ML
600 INJECTION, SOLUTION INTRAVENOUS EVERY 12 HOURS
Status: DISCONTINUED | OUTPATIENT
Start: 2019-07-17 | End: 2019-07-19

## 2019-07-17 RX ORDER — DOXAZOSIN MESYLATE 4 MG/1
8 TABLET ORAL NIGHTLY
Status: DISCONTINUED | OUTPATIENT
Start: 2019-07-17 | End: 2019-07-25 | Stop reason: HOSPADM

## 2019-07-17 RX ORDER — ATORVASTATIN CALCIUM 20 MG/1
20 TABLET, FILM COATED ORAL NIGHTLY
Status: DISCONTINUED | OUTPATIENT
Start: 2019-07-17 | End: 2019-07-25 | Stop reason: HOSPADM

## 2019-07-17 RX ORDER — DEXTROSE MONOHYDRATE 50 MG/ML
100 INJECTION, SOLUTION INTRAVENOUS PRN
Status: DISCONTINUED | OUTPATIENT
Start: 2019-07-17 | End: 2019-07-25 | Stop reason: HOSPADM

## 2019-07-17 RX ORDER — NICOTINE POLACRILEX 4 MG
15 LOZENGE BUCCAL PRN
Status: DISCONTINUED | OUTPATIENT
Start: 2019-07-17 | End: 2019-07-25 | Stop reason: HOSPADM

## 2019-07-17 RX ORDER — ONDANSETRON 2 MG/ML
4 INJECTION INTRAMUSCULAR; INTRAVENOUS EVERY 6 HOURS PRN
Status: DISCONTINUED | OUTPATIENT
Start: 2019-07-17 | End: 2019-07-25 | Stop reason: HOSPADM

## 2019-07-17 RX ORDER — SODIUM CHLORIDE 0.9 % (FLUSH) 0.9 %
10 SYRINGE (ML) INJECTION EVERY 12 HOURS SCHEDULED
Status: DISCONTINUED | OUTPATIENT
Start: 2019-07-17 | End: 2019-07-25 | Stop reason: HOSPADM

## 2019-07-17 RX ADMIN — SODIUM CHLORIDE: 9 INJECTION, SOLUTION INTRAVENOUS at 21:18

## 2019-07-17 RX ADMIN — POTASSIUM CHLORIDE 10 MEQ: 10 INJECTION, SOLUTION INTRAVENOUS at 17:42

## 2019-07-17 RX ADMIN — MEROPENEM 1 G: 1 INJECTION, POWDER, FOR SOLUTION INTRAVENOUS at 16:20

## 2019-07-17 RX ADMIN — HYDRALAZINE HYDROCHLORIDE 50 MG: 50 TABLET, FILM COATED ORAL at 21:18

## 2019-07-17 RX ADMIN — DILTIAZEM HYDROCHLORIDE 240 MG: 240 CAPSULE, COATED, EXTENDED RELEASE ORAL at 21:26

## 2019-07-17 RX ADMIN — SODIUM CHLORIDE 1000 ML: 9 INJECTION, SOLUTION INTRAVENOUS at 15:39

## 2019-07-17 RX ADMIN — ALBUTEROL SULFATE 2.5 MG: 2.5 SOLUTION RESPIRATORY (INHALATION) at 16:03

## 2019-07-17 RX ADMIN — CARBIDOPA AND LEVODOPA 2 TABLET: 25; 100 TABLET ORAL at 21:18

## 2019-07-17 RX ADMIN — DOXAZOSIN MESYLATE 8 MG: 4 TABLET ORAL at 21:18

## 2019-07-17 RX ADMIN — LINEZOLID 600 MG: 600 INJECTION, SOLUTION INTRAVENOUS at 21:19

## 2019-07-17 RX ADMIN — ACETAMINOPHEN 650 MG: 325 TABLET ORAL at 21:17

## 2019-07-17 RX ADMIN — ATORVASTATIN CALCIUM 20 MG: 20 TABLET, FILM COATED ORAL at 21:17

## 2019-07-17 ASSESSMENT — ENCOUNTER SYMPTOMS
VOMITING: 0
DIARRHEA: 0
SORE THROAT: 0
SINUS PRESSURE: 0
EYE PAIN: 0
SHORTNESS OF BREATH: 0
WHEEZING: 0
NAUSEA: 0
COUGH: 0
COLOR CHANGE: 1
BACK PAIN: 0
EYE REDNESS: 0
ABDOMINAL PAIN: 0
EYE DISCHARGE: 0

## 2019-07-17 ASSESSMENT — PAIN SCALES - GENERAL: PAINLEVEL_OUTOF10: 0

## 2019-07-17 NOTE — ED PROVIDER NOTES
57-year-old male presenting with fever and cellulitis left leg. Patient states that this started about 2 days ago on the left side of his leg and began to get gradually worse. States it is somewhat tender but denies any draining. He denies any pain in his groin, nausea or vomiting. States he did have a fever of 101 °F at home. Or tingling of the legs. He is a known diabetic. Chart review reveals evidence of multiple episodes of cellulitis with several multidrug-resistant organisms being found on pathological studies. The history is provided by the patient. Review of Systems   Constitutional: Negative for chills and fever. HENT: Negative for ear pain, sinus pressure and sore throat. Eyes: Negative for pain, discharge and redness. Respiratory: Negative for cough, shortness of breath and wheezing. Cardiovascular: Negative for chest pain. Gastrointestinal: Negative for abdominal pain, diarrhea, nausea and vomiting. Genitourinary: Negative for dysuria and frequency. Musculoskeletal: Negative for arthralgias and back pain. Skin: Positive for color change. Negative for rash and wound. Neurological: Negative for weakness and headaches. Hematological: Negative for adenopathy. All other systems reviewed and are negative. Physical Exam   Constitutional: He is oriented to person, place, and time. He appears well-developed and well-nourished. HENT:   Head: Normocephalic and atraumatic. Eyes: Conjunctivae are normal.   Neck: Normal range of motion. Neck supple. Cardiovascular: Normal rate, regular rhythm and normal heart sounds. No murmur heard. Pulmonary/Chest: Effort normal and breath sounds normal. No respiratory distress. He has no wheezes. He has no rales. Abdominal: Soft. Bowel sounds are normal. There is no tenderness. There is no rebound and no guarding. Musculoskeletal: He exhibits no edema, tenderness or deformity.    Neurological: He is alert and oriented to

## 2019-07-17 NOTE — ED NOTES
Faxed SBAR to floor, spoke with Cassandra who will check for fax, will call if not received. Pt ready to go.      Bryanna Lopez RN  07/17/19 [id number]

## 2019-07-18 LAB
ALBUMIN SERPL-MCNC: 3.4 G/DL (ref 3.5–5.2)
ANION GAP SERPL CALCULATED.3IONS-SCNC: 10 MMOL/L (ref 7–16)
BASOPHILS ABSOLUTE: 0.01 E9/L (ref 0–0.2)
BASOPHILS RELATIVE PERCENT: 0.1 % (ref 0–2)
BUN BLDV-MCNC: 19 MG/DL (ref 8–23)
CALCIUM SERPL-MCNC: 8.2 MG/DL (ref 8.6–10.2)
CHLORIDE BLD-SCNC: 97 MMOL/L (ref 98–107)
CO2: 24 MMOL/L (ref 22–29)
CREAT SERPL-MCNC: 1.1 MG/DL (ref 0.7–1.2)
DOHLE BODIES: ABNORMAL
EOSINOPHILS ABSOLUTE: 0 E9/L (ref 0.05–0.5)
EOSINOPHILS RELATIVE PERCENT: 0 % (ref 0–6)
GFR AFRICAN AMERICAN: >60
GFR NON-AFRICAN AMERICAN: >60 ML/MIN/1.73
GLUCOSE BLD-MCNC: 140 MG/DL (ref 74–99)
HBA1C MFR BLD: 6.4 % (ref 4–5.6)
HCT VFR BLD CALC: 30.5 % (ref 37–54)
HEMOGLOBIN: 10 G/DL (ref 12.5–16.5)
IMMATURE GRANULOCYTES #: 0.04 E9/L
IMMATURE GRANULOCYTES %: 0.5 % (ref 0–5)
LV EF: 60 %
LVEF MODALITY: NORMAL
LYMPHOCYTES ABSOLUTE: 0.22 E9/L (ref 1.5–4)
LYMPHOCYTES RELATIVE PERCENT: 2.9 % (ref 20–42)
MCH RBC QN AUTO: 29.5 PG (ref 26–35)
MCHC RBC AUTO-ENTMCNC: 32.8 % (ref 32–34.5)
MCV RBC AUTO: 90 FL (ref 80–99.9)
METER GLUCOSE: 103 MG/DL (ref 74–99)
METER GLUCOSE: 111 MG/DL (ref 74–99)
METER GLUCOSE: 127 MG/DL (ref 74–99)
METER GLUCOSE: 152 MG/DL (ref 74–99)
MONOCYTES ABSOLUTE: 0.3 E9/L (ref 0.1–0.95)
MONOCYTES RELATIVE PERCENT: 4 % (ref 2–12)
NEUTROPHILS ABSOLUTE: 6.9 E9/L (ref 1.8–7.3)
NEUTROPHILS RELATIVE PERCENT: 92.5 % (ref 43–80)
PDW BLD-RTO: 16 FL (ref 11.5–15)
PHOSPHORUS: 2.3 MG/DL (ref 2.5–4.5)
PLATELET # BLD: 90 E9/L (ref 130–450)
PLATELET CONFIRMATION: NORMAL
PMV BLD AUTO: 10.6 FL (ref 7–12)
POTASSIUM SERPL-SCNC: 3.1 MMOL/L (ref 3.5–5)
RBC # BLD: 3.39 E12/L (ref 3.8–5.8)
SODIUM BLD-SCNC: 131 MMOL/L (ref 132–146)
WBC # BLD: 7.5 E9/L (ref 4.5–11.5)

## 2019-07-18 PROCEDURE — 87186 SC STD MICRODIL/AGAR DIL: CPT

## 2019-07-18 PROCEDURE — 36415 COLL VENOUS BLD VENIPUNCTURE: CPT

## 2019-07-18 PROCEDURE — 87077 CULTURE AEROBIC IDENTIFY: CPT

## 2019-07-18 PROCEDURE — 82962 GLUCOSE BLOOD TEST: CPT

## 2019-07-18 PROCEDURE — 6370000000 HC RX 637 (ALT 250 FOR IP): Performed by: INTERNAL MEDICINE

## 2019-07-18 PROCEDURE — 2580000003 HC RX 258: Performed by: INTERNAL MEDICINE

## 2019-07-18 PROCEDURE — 6360000004 HC RX CONTRAST MEDICATION: Performed by: INTERNAL MEDICINE

## 2019-07-18 PROCEDURE — 6360000002 HC RX W HCPCS: Performed by: HOSPITALIST

## 2019-07-18 PROCEDURE — 99233 SBSQ HOSP IP/OBS HIGH 50: CPT | Performed by: HOSPITALIST

## 2019-07-18 PROCEDURE — 85025 COMPLETE CBC W/AUTO DIFF WBC: CPT

## 2019-07-18 PROCEDURE — 93306 TTE W/DOPPLER COMPLETE: CPT

## 2019-07-18 PROCEDURE — 6360000002 HC RX W HCPCS: Performed by: INTERNAL MEDICINE

## 2019-07-18 PROCEDURE — 1200000000 HC SEMI PRIVATE

## 2019-07-18 PROCEDURE — 2500000003 HC RX 250 WO HCPCS: Performed by: INTERNAL MEDICINE

## 2019-07-18 PROCEDURE — 83036 HEMOGLOBIN GLYCOSYLATED A1C: CPT

## 2019-07-18 PROCEDURE — 2700000000 HC OXYGEN THERAPY PER DAY

## 2019-07-18 PROCEDURE — 80069 RENAL FUNCTION PANEL: CPT

## 2019-07-18 RX ORDER — POTASSIUM CHLORIDE 20 MEQ/1
40 TABLET, EXTENDED RELEASE ORAL ONCE
Status: COMPLETED | OUTPATIENT
Start: 2019-07-18 | End: 2019-07-18

## 2019-07-18 RX ORDER — FUROSEMIDE 10 MG/ML
40 INJECTION INTRAMUSCULAR; INTRAVENOUS 2 TIMES DAILY
Status: COMPLETED | OUTPATIENT
Start: 2019-07-18 | End: 2019-07-20

## 2019-07-18 RX ORDER — PETROLATUM 420 MG/G
OINTMENT TOPICAL DAILY
Status: DISCONTINUED | OUTPATIENT
Start: 2019-07-18 | End: 2019-07-25 | Stop reason: HOSPADM

## 2019-07-18 RX ADMIN — PETROLATUM: 420 OINTMENT TOPICAL at 18:50

## 2019-07-18 RX ADMIN — DILTIAZEM HYDROCHLORIDE 240 MG: 240 CAPSULE, COATED, EXTENDED RELEASE ORAL at 20:31

## 2019-07-18 RX ADMIN — ENOXAPARIN SODIUM 40 MG: 40 INJECTION SUBCUTANEOUS at 09:23

## 2019-07-18 RX ADMIN — INSULIN LISPRO 1 UNITS: 100 INJECTION, SOLUTION INTRAVENOUS; SUBCUTANEOUS at 12:20

## 2019-07-18 RX ADMIN — HYDRALAZINE HYDROCHLORIDE 50 MG: 50 TABLET, FILM COATED ORAL at 14:06

## 2019-07-18 RX ADMIN — DOXAZOSIN MESYLATE 8 MG: 4 TABLET ORAL at 20:31

## 2019-07-18 RX ADMIN — HYDRALAZINE HYDROCHLORIDE 50 MG: 50 TABLET, FILM COATED ORAL at 09:23

## 2019-07-18 RX ADMIN — POTASSIUM PHOSPHATE, MONOBASIC AND POTASSIUM PHOSPHATE, DIBASIC 10 MMOL: 224; 236 INJECTION, SOLUTION, CONCENTRATE INTRAVENOUS at 21:25

## 2019-07-18 RX ADMIN — FUROSEMIDE 40 MG: 10 INJECTION, SOLUTION INTRAMUSCULAR; INTRAVENOUS at 12:22

## 2019-07-18 RX ADMIN — ATORVASTATIN CALCIUM 20 MG: 20 TABLET, FILM COATED ORAL at 20:31

## 2019-07-18 RX ADMIN — LINEZOLID 600 MG: 600 INJECTION, SOLUTION INTRAVENOUS at 09:23

## 2019-07-18 RX ADMIN — HYDRALAZINE HYDROCHLORIDE 50 MG: 50 TABLET, FILM COATED ORAL at 20:31

## 2019-07-18 RX ADMIN — FUROSEMIDE 40 MG: 10 INJECTION, SOLUTION INTRAMUSCULAR; INTRAVENOUS at 20:32

## 2019-07-18 RX ADMIN — CARBIDOPA AND LEVODOPA 2 TABLET: 25; 100 TABLET ORAL at 20:31

## 2019-07-18 RX ADMIN — Medication 10 ML: at 09:24

## 2019-07-18 RX ADMIN — Medication 10 ML: at 20:35

## 2019-07-18 RX ADMIN — PERFLUTREN 1.65 MG: 6.52 INJECTION, SUSPENSION INTRAVENOUS at 10:43

## 2019-07-18 RX ADMIN — POTASSIUM CHLORIDE 40 MEQ: 20 TABLET, EXTENDED RELEASE ORAL at 19:06

## 2019-07-18 ASSESSMENT — PAIN SCALES - GENERAL: PAINLEVEL_OUTOF10: 0

## 2019-07-18 NOTE — H&P
ibuprofen (ADVIL;MOTRIN) 600 MG tablet Take 1 tablet by mouth every 8 hours as needed 5/31/16  Yes Sree Kwok, DO   carbidopa-levodopa (SINEMET)  MG per tablet Take 2 tablets by mouth nightly    Yes Historical Provider, MD   pioglitazone (ACTOS) 45 MG tablet Take 1 tablet by mouth daily. Patient taking differently: Take 45 mg by mouth nightly  9/23/14  Yes Sree Kwok, DO   doxazosin (CARDURA) 8 MG tablet Take 1 tablet by mouth daily. Patient taking differently: Take 8 mg by mouth nightly  9/23/14  Yes Sha Sanabria, DO   furosemide (LASIX) 40 MG tablet Take 1 tablet by mouth daily. Patient taking differently: Take 40 mg by mouth daily as needed  9/23/14  Yes Sree Kwok, DO   atorvastatin (LIPITOR) 10 MG tablet Take 20 mg by mouth nightly    Yes Historical Provider, MD       Allergies:    Zosyn [piperacillin sod-tazobactam so]; Morphine; and Vancomycin    Social History:    reports that he quit smoking about 34 years ago. His smoking use included cigarettes. He started smoking about 56 years ago. He smoked 3.00 packs per day. He has quit using smokeless tobacco.  His smokeless tobacco use included snuff. He reports that he does not drink alcohol or use drugs. Family History:   family history includes Cancer in his brother; Diabetes in his mother; Heart Attack in his brother; Heart Disease in his mother; Other in his father; Parkinsonism in his sister.        PHYSICAL EXAM:  Vitals:  BP (!) 180/84   Pulse 85   Temp 101.9 °F (38.8 °C) (Oral)   Resp 18   Ht 6' 3\" (1.905 m)   Wt (!) 365 lb (165.6 kg)   SpO2 96%   BMI 45.62 kg/m²   General Appearance: alert and oriented to person, place and time and in no acute distress  Skin: warm and dry, turgor not diminished  Head: normocephalic and atraumatic  Eyes: pupils equal, round, and reactive to light, extraocular eye movements intact, conjunctivae normal  Neck: neck supple and non tender without mass   Pulmonary/Chest: clear to

## 2019-07-18 NOTE — PROGRESS NOTES
Notified hospitalist that patient is tripping the sepsis alert. Will continue to monitor. Perfect serve message sent to wound care for new consult.

## 2019-07-18 NOTE — PROGRESS NOTES
[facility] CARE AT [facility] Hospitalist   Progress Note    Admitting Date and Time: 7/17/2019  2:44 PM  Admit Dx: Left leg cellulitis [T41.882]  Left leg cellulitis [Q07.491]    Subjective:    Patient was admitted with Left leg cellulitis [F63.062]  Left leg cellulitis [O44.696]. Patient feels short of breath and has since he arrived. Patient denies fevers or chills. Still having severe pain in the left leg as well. Pain medication is reasonable at this time. Patient states he cannot lie flat and that the breathing is worse with any kind of activity. Chronically on oxygen, worse at this time. Per RN: Echo pending. Chest x-ray showed CHF.    ROS: denies fever, chills, cp, sob, n/v, HA unless stated above.  atorvastatin  20 mg Oral Nightly    carbidopa-levodopa  2 tablet Oral Nightly    diltiazem  240 mg Oral Nightly    doxazosin  8 mg Oral Nightly    hydrALAZINE  50 mg Oral TID    linezolid  600 mg Intravenous Q12H    insulin lispro  0-6 Units Subcutaneous TID WC    insulin lispro  0-3 Units Subcutaneous Nightly    sodium chloride flush  10 mL Intravenous 2 times per day    enoxaparin  40 mg Subcutaneous Daily       glucose 15 g PRN   dextrose 12.5 g PRN   glucagon (rDNA) 1 mg PRN   dextrose 100 mL/hr PRN   sodium chloride flush 10 mL PRN   magnesium hydroxide 30 mL Daily PRN   ondansetron 4 mg Q6H PRN   acetaminophen 650 mg Q4H PRN        Objective:    BP (!) 149/65   Pulse 74   Temp 98.4 °F (36.9 °C) (Oral)   Resp 20   Ht 6' 4\" (1.93 m)   Wt (!) 417 lb 5 oz (189.3 kg)   SpO2 95%   BMI 50.80 kg/m²   General Appearance: alert and oriented to person, place and time, frail-appearing, obese and in moderate distress  Skin: Severe erythema of the left upper thigh with demarcated area. Erythema staying within the marker lines.   Head: normocephalic and atraumatic  ENT: hearing grossly normal bilaterally and nose without deformity, nasal mucosa and turbinates normal without polyps  Neck: neck

## 2019-07-19 LAB
ANTISTREPTOLYSIN-O: 569 IU/ML (ref 0–200)
EKG ATRIAL RATE: 92 BPM
EKG P AXIS: 9 DEGREES
EKG P-R INTERVAL: 248 MS
EKG Q-T INTERVAL: 398 MS
EKG QRS DURATION: 108 MS
EKG QTC CALCULATION (BAZETT): 492 MS
EKG R AXIS: 46 DEGREES
EKG T AXIS: 56 DEGREES
EKG VENTRICULAR RATE: 92 BPM
MAGNESIUM: 1.9 MG/DL (ref 1.6–2.6)
METER GLUCOSE: 121 MG/DL (ref 74–99)
METER GLUCOSE: 125 MG/DL (ref 74–99)
METER GLUCOSE: 128 MG/DL (ref 74–99)
METER GLUCOSE: 146 MG/DL (ref 74–99)

## 2019-07-19 PROCEDURE — 93010 ELECTROCARDIOGRAM REPORT: CPT | Performed by: INTERNAL MEDICINE

## 2019-07-19 PROCEDURE — 6360000002 HC RX W HCPCS: Performed by: SPECIALIST

## 2019-07-19 PROCEDURE — 2580000003 HC RX 258: Performed by: INTERNAL MEDICINE

## 2019-07-19 PROCEDURE — 83735 ASSAY OF MAGNESIUM: CPT

## 2019-07-19 PROCEDURE — 36415 COLL VENOUS BLD VENIPUNCTURE: CPT

## 2019-07-19 PROCEDURE — 86060 ANTISTREPTOLYSIN O TITER: CPT

## 2019-07-19 PROCEDURE — 2700000000 HC OXYGEN THERAPY PER DAY

## 2019-07-19 PROCEDURE — 82962 GLUCOSE BLOOD TEST: CPT

## 2019-07-19 PROCEDURE — 6370000000 HC RX 637 (ALT 250 FOR IP): Performed by: INTERNAL MEDICINE

## 2019-07-19 PROCEDURE — 99233 SBSQ HOSP IP/OBS HIGH 50: CPT | Performed by: INTERNAL MEDICINE

## 2019-07-19 PROCEDURE — 1200000000 HC SEMI PRIVATE

## 2019-07-19 PROCEDURE — 6360000002 HC RX W HCPCS: Performed by: INTERNAL MEDICINE

## 2019-07-19 PROCEDURE — 2580000003 HC RX 258: Performed by: SPECIALIST

## 2019-07-19 PROCEDURE — 6360000002 HC RX W HCPCS: Performed by: HOSPITALIST

## 2019-07-19 RX ADMIN — FUROSEMIDE 40 MG: 10 INJECTION, SOLUTION INTRAMUSCULAR; INTRAVENOUS at 08:41

## 2019-07-19 RX ADMIN — INSULIN LISPRO 1 UNITS: 100 INJECTION, SOLUTION INTRAVENOUS; SUBCUTANEOUS at 17:01

## 2019-07-19 RX ADMIN — Medication 10 ML: at 21:17

## 2019-07-19 RX ADMIN — ENOXAPARIN SODIUM 40 MG: 40 INJECTION SUBCUTANEOUS at 08:41

## 2019-07-19 RX ADMIN — MEROPENEM 1 G: 1 INJECTION, POWDER, FOR SOLUTION INTRAVENOUS at 10:31

## 2019-07-19 RX ADMIN — HYDRALAZINE HYDROCHLORIDE 50 MG: 50 TABLET, FILM COATED ORAL at 14:15

## 2019-07-19 RX ADMIN — DOXAZOSIN MESYLATE 8 MG: 4 TABLET ORAL at 21:17

## 2019-07-19 RX ADMIN — ATORVASTATIN CALCIUM 20 MG: 20 TABLET, FILM COATED ORAL at 21:17

## 2019-07-19 RX ADMIN — FUROSEMIDE 40 MG: 10 INJECTION, SOLUTION INTRAMUSCULAR; INTRAVENOUS at 17:01

## 2019-07-19 RX ADMIN — PETROLATUM: 420 OINTMENT TOPICAL at 08:46

## 2019-07-19 RX ADMIN — MEROPENEM 1 G: 1 INJECTION, POWDER, FOR SOLUTION INTRAVENOUS at 17:00

## 2019-07-19 RX ADMIN — LINEZOLID 600 MG: 600 INJECTION, SOLUTION INTRAVENOUS at 01:31

## 2019-07-19 RX ADMIN — ACETAMINOPHEN 650 MG: 325 TABLET ORAL at 19:43

## 2019-07-19 RX ADMIN — Medication 10 ML: at 08:41

## 2019-07-19 RX ADMIN — CARBIDOPA AND LEVODOPA 2 TABLET: 25; 100 TABLET ORAL at 21:17

## 2019-07-19 RX ADMIN — HYDRALAZINE HYDROCHLORIDE 50 MG: 50 TABLET, FILM COATED ORAL at 21:17

## 2019-07-19 RX ADMIN — DILTIAZEM HYDROCHLORIDE 240 MG: 240 CAPSULE, COATED, EXTENDED RELEASE ORAL at 21:17

## 2019-07-19 RX ADMIN — HYDRALAZINE HYDROCHLORIDE 50 MG: 50 TABLET, FILM COATED ORAL at 08:41

## 2019-07-19 ASSESSMENT — PAIN DESCRIPTION - LOCATION: LOCATION: LEG

## 2019-07-19 ASSESSMENT — PAIN DESCRIPTION - DESCRIPTORS: DESCRIPTORS: STABBING

## 2019-07-19 ASSESSMENT — PAIN DESCRIPTION - ORIENTATION: ORIENTATION: LEFT

## 2019-07-19 ASSESSMENT — PAIN SCALES - GENERAL
PAINLEVEL_OUTOF10: 1
PAINLEVEL_OUTOF10: 6

## 2019-07-19 ASSESSMENT — PAIN DESCRIPTION - PROGRESSION: CLINICAL_PROGRESSION: NOT CHANGED

## 2019-07-19 ASSESSMENT — PAIN DESCRIPTION - FREQUENCY: FREQUENCY: INTERMITTENT

## 2019-07-19 ASSESSMENT — PAIN DESCRIPTION - ONSET: ONSET: ON-GOING

## 2019-07-19 ASSESSMENT — PAIN - FUNCTIONAL ASSESSMENT: PAIN_FUNCTIONAL_ASSESSMENT: PREVENTS OR INTERFERES SOME ACTIVE ACTIVITIES AND ADLS

## 2019-07-19 ASSESSMENT — PAIN DESCRIPTION - PAIN TYPE: TYPE: CHRONIC PAIN

## 2019-07-19 NOTE — PROGRESS NOTES
7/19)  · Admission Body Wt: 417 lb (189.1 kg)(bed 7/17)  · Usual Body Wt: 377 lb (171 kg)(bed 7/28/18 per EMR, Pt confirms)  · % Weight Change:  no wt loss per pt  · Ideal Body Wt: 202 lb (91.6 kg), % Ideal Body 207%  · BMI Classification: BMI > or equal to 40.0 Obese Class III    Nutrition Interventions:   Continue current diet, Start ONS(Continue Diet. Start Ensure High Pro ONS BID.   Declines Ivan ONS at this time. )  Continued Inpatient Monitoring, Education Initiated, Coordination of Care(ONS options/benefits)    Nutrition Evaluation:   · Evaluation: Goals set   · Goals: PO intake >75% of meals/ONS    · Monitoring: Meal Intake, Supplement Intake, Diet Tolerance, Skin Integrity, Wound Healing, I&O, Weight, Pertinent Labs, Nausea or Vomiting, Diarrhea, Constipation, Monitor Hemodynamic Status, Monitor Bowel Function      Electronically signed by Clearence Severe, RD, LD on 7/19/19 at 4:51 PM    Contact Number: ext [phone]

## 2019-07-19 NOTE — PLAN OF CARE
Problem: Increased nutrient needs (NI-5.1)  Goal: Food and/or Nutrient Delivery  Continue Diet. Start Ensure High Pro ONS BID. Declines Ivan ONS at this time. Description  Individualized approach for food/nutrient provision.   Outcome: Not Met This Shift

## 2019-07-19 NOTE — FLOWSHEET NOTE
Inpatient Wound Care    Admit Date: 7/17/2019  2:44 PM    Reason for consult: legs       Significant history: 72-year-old male history of morbid obesity diabetes type 2 essential hypertension right BKA for Charcot joint. For the past 2 days has been having generalized weakness and poor appetite. Had some chills last night. Noted to have erythema on his left thigh by his home nurse today therefore sent to the ED for further evaluation. Reports since today his erythema has spread throughout his thigh. When seen it remains within the demarcation done by the ED. He does have a diabetic foot ulcer that is dressed in his shin but is not contiguous with this area of erythema. Denies any nausea or vomiting. No diarrhea or constipation. After presentation his temperature was up to 102. 1. Follows with [facility]-uses Medline multi layer wrap mwf with opticell ag  Wound history:  POA  Findings: alert and oriented. Left heel and foot intact other than below wound    07/19/19 0957   Wound 07/17/19 Foot Left;Dorsal   Date First Assessed/Time First Assessed: 07/17/19 2000   Location: Foot  Wound Location Orientation: Left;Dorsal   Wound Image    Dressing Changed Changed/New   Dressing/Treatment   (aquacel ag, fanfolded kerlex, wrapped with kerlex, coban, )   Dressing Change Due 07/19/19   Wound Length (cm) 5 cm   Wound Width (cm) 2.5 cm   Wound Depth (cm) 0.4 cm   Wound Surface Area (cm^2) 12.5 cm^2   Change in Wound Size % (l*w) -38.89   Wound Volume (cm^3) 5 cm^3   Wound Healing % -178   Wound Assessment   (dark red, fibrin)   Drainage Amount Moderate   Odor Mild   Belén-wound Assessment Hyperpigmented  (dermatitic)   Wound 07/17/19 Leg Lower; Outer   Date First Assessed/Time First Assessed: 07/17/19 2000   Present on Hospital Admission: Yes  Location: Leg  Wound Location Orientation: Lower; Outer   Wound Image    Dressing Status Changed   Dressing/Treatment   (opticell ag, fanfolded kerlex, abd, wrap with

## 2019-07-19 NOTE — PROGRESS NOTES
07/17/19  1533 07/17/19  1537 07/18/19  0846   NA  --  131* 131*   K  --  3.2* 3.1*   CL  --  95* 97*   CO2  --  24 24   BUN  --  17 19   CREATININE  --  1.2 1.1   GLUCOSE 104 95 140*   CALCIUM  --  8.5* 8.2*       Recent Labs     07/17/19  1537 07/18/19  0846   WBC 10.3 7.5   RBC 3.55* 3.39*   HGB 10.3* 10.0*   HCT 31.8* 30.5*   MCV 89.6 90.0   MCH 29.0 29.5   MCHC 32.4 32.8   RDW 15.9* 16.0*   * 90*   MPV 10.7 10.6         Assessment:    Principal Problem:    Left leg cellulitis  Active Problems:    DM (diabetes mellitus) (HCC)    HTN (hypertension)    Non-pressure chronic ulcer of left lower leg with fat layer exposed (HCC)    Morbid obesity (HCC)    RLS (restless legs syndrome)    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 *      Plan:  1. 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 Linezolid was stopped. Patient is intolerant to Zosyn and vancomycin although he cannot tolerate Augmentin. 2.  Type 2 diabetes, controlled continue on sliding scale insulin. 3.  History of hypertension not controlled, continue on hydralazine and Cardizem. 4.  History of hyperlipidemia continue atorvastatin. 5.  History of Parkinson's continue on Sinemet      NOTE: This report was transcribed using voice recognition software. Every effort was made to ensure accuracy; however, inadvertent computerized transcription errors may be present.   Electronically signed by Everette Cardenas MD on 7/19/2019 at 11:17 AM

## 2019-07-20 LAB
ANAEROBIC CULTURE: NORMAL
ANAEROBIC CULTURE: NORMAL
METER GLUCOSE: 128 MG/DL (ref 74–99)
METER GLUCOSE: 146 MG/DL (ref 74–99)
METER GLUCOSE: 151 MG/DL (ref 74–99)
METER GLUCOSE: 164 MG/DL (ref 74–99)
URINE CULTURE, ROUTINE: NORMAL

## 2019-07-20 PROCEDURE — 2580000003 HC RX 258: Performed by: INTERNAL MEDICINE

## 2019-07-20 PROCEDURE — 6370000000 HC RX 637 (ALT 250 FOR IP): Performed by: INTERNAL MEDICINE

## 2019-07-20 PROCEDURE — 6360000002 HC RX W HCPCS: Performed by: INTERNAL MEDICINE

## 2019-07-20 PROCEDURE — 6360000002 HC RX W HCPCS: Performed by: SPECIALIST

## 2019-07-20 PROCEDURE — 82962 GLUCOSE BLOOD TEST: CPT

## 2019-07-20 PROCEDURE — 99233 SBSQ HOSP IP/OBS HIGH 50: CPT | Performed by: INTERNAL MEDICINE

## 2019-07-20 PROCEDURE — 6360000002 HC RX W HCPCS: Performed by: HOSPITALIST

## 2019-07-20 PROCEDURE — 1200000000 HC SEMI PRIVATE

## 2019-07-20 PROCEDURE — 2580000003 HC RX 258: Performed by: SPECIALIST

## 2019-07-20 PROCEDURE — 51702 INSERT TEMP BLADDER CATH: CPT

## 2019-07-20 RX ORDER — MINOCYCLINE HYDROCHLORIDE 50 MG/1
200 CAPSULE ORAL ONCE
Status: COMPLETED | OUTPATIENT
Start: 2019-07-20 | End: 2019-07-20

## 2019-07-20 RX ORDER — MINOCYCLINE HYDROCHLORIDE 50 MG/1
100 CAPSULE ORAL EVERY 12 HOURS SCHEDULED
Status: DISCONTINUED | OUTPATIENT
Start: 2019-07-21 | End: 2019-07-22

## 2019-07-20 RX ADMIN — DOXAZOSIN MESYLATE 8 MG: 4 TABLET ORAL at 20:18

## 2019-07-20 RX ADMIN — MINOCYCLINE HYDROCHLORIDE 200 MG: 50 CAPSULE ORAL at 16:12

## 2019-07-20 RX ADMIN — INSULIN LISPRO 1 UNITS: 100 INJECTION, SOLUTION INTRAVENOUS; SUBCUTANEOUS at 20:20

## 2019-07-20 RX ADMIN — INSULIN LISPRO 1 UNITS: 100 INJECTION, SOLUTION INTRAVENOUS; SUBCUTANEOUS at 16:15

## 2019-07-20 RX ADMIN — INSULIN LISPRO 1 UNITS: 100 INJECTION, SOLUTION INTRAVENOUS; SUBCUTANEOUS at 12:46

## 2019-07-20 RX ADMIN — ENOXAPARIN SODIUM 40 MG: 40 INJECTION SUBCUTANEOUS at 08:33

## 2019-07-20 RX ADMIN — PETROLATUM: 420 OINTMENT TOPICAL at 08:36

## 2019-07-20 RX ADMIN — MEROPENEM 1 G: 1 INJECTION, POWDER, FOR SOLUTION INTRAVENOUS at 01:20

## 2019-07-20 RX ADMIN — MEROPENEM 1 G: 1 INJECTION, POWDER, FOR SOLUTION INTRAVENOUS at 10:59

## 2019-07-20 RX ADMIN — Medication 10 ML: at 17:21

## 2019-07-20 RX ADMIN — Medication 10 ML: at 08:34

## 2019-07-20 RX ADMIN — ATORVASTATIN CALCIUM 20 MG: 20 TABLET, FILM COATED ORAL at 20:18

## 2019-07-20 RX ADMIN — HYDRALAZINE HYDROCHLORIDE 75 MG: 50 TABLET, FILM COATED ORAL at 20:18

## 2019-07-20 RX ADMIN — ACETAMINOPHEN 650 MG: 325 TABLET ORAL at 22:11

## 2019-07-20 RX ADMIN — HYDRALAZINE HYDROCHLORIDE 75 MG: 50 TABLET, FILM COATED ORAL at 14:10

## 2019-07-20 RX ADMIN — ACETAMINOPHEN 650 MG: 325 TABLET ORAL at 00:09

## 2019-07-20 RX ADMIN — Medication 10 ML: at 20:19

## 2019-07-20 RX ADMIN — DILTIAZEM HYDROCHLORIDE 240 MG: 240 CAPSULE, COATED, EXTENDED RELEASE ORAL at 20:19

## 2019-07-20 RX ADMIN — FUROSEMIDE 40 MG: 10 INJECTION, SOLUTION INTRAMUSCULAR; INTRAVENOUS at 08:33

## 2019-07-20 RX ADMIN — HYDRALAZINE HYDROCHLORIDE 50 MG: 50 TABLET, FILM COATED ORAL at 08:33

## 2019-07-20 RX ADMIN — ACETAMINOPHEN 650 MG: 325 TABLET ORAL at 14:50

## 2019-07-20 RX ADMIN — MEROPENEM 2 G: 1 INJECTION, POWDER, FOR SOLUTION INTRAVENOUS at 19:27

## 2019-07-20 RX ADMIN — CARBIDOPA AND LEVODOPA 2 TABLET: 25; 100 TABLET ORAL at 20:19

## 2019-07-20 RX ADMIN — FUROSEMIDE 40 MG: 10 INJECTION, SOLUTION INTRAMUSCULAR; INTRAVENOUS at 17:21

## 2019-07-20 ASSESSMENT — PAIN DESCRIPTION - PROGRESSION: CLINICAL_PROGRESSION: NOT CHANGED

## 2019-07-20 ASSESSMENT — PAIN DESCRIPTION - PAIN TYPE
TYPE: ACUTE PAIN
TYPE: CHRONIC PAIN

## 2019-07-20 ASSESSMENT — PAIN DESCRIPTION - DESCRIPTORS
DESCRIPTORS: ACHING;DISCOMFORT;SORE
DESCRIPTORS: DISCOMFORT;STABBING;TENDER

## 2019-07-20 ASSESSMENT — PAIN SCALES - GENERAL
PAINLEVEL_OUTOF10: 6
PAINLEVEL_OUTOF10: 0
PAINLEVEL_OUTOF10: 6
PAINLEVEL_OUTOF10: 0
PAINLEVEL_OUTOF10: 7

## 2019-07-20 ASSESSMENT — PAIN - FUNCTIONAL ASSESSMENT
PAIN_FUNCTIONAL_ASSESSMENT: PREVENTS OR INTERFERES SOME ACTIVE ACTIVITIES AND ADLS
PAIN_FUNCTIONAL_ASSESSMENT: PREVENTS OR INTERFERES SOME ACTIVE ACTIVITIES AND ADLS

## 2019-07-20 ASSESSMENT — PAIN DESCRIPTION - ORIENTATION
ORIENTATION: LEFT
ORIENTATION: LEFT

## 2019-07-20 ASSESSMENT — PAIN DESCRIPTION - ONSET: ONSET: ON-GOING

## 2019-07-20 ASSESSMENT — PAIN DESCRIPTION - LOCATION
LOCATION: LEG
LOCATION: FOOT;LEG

## 2019-07-20 ASSESSMENT — PAIN DESCRIPTION - FREQUENCY: FREQUENCY: INTERMITTENT

## 2019-07-20 NOTE — PROGRESS NOTES
[facility] Progress Note    Admitting Date and Time: 7/17/2019  2:44 PM  Admit Dx: Left leg cellulitis [A02.812]  Left leg cellulitis [L03.116]    Subjective:  Patient is being followed for Left leg cellulitis [Q82.368]  Left leg cellulitis [V41.130]   Pt feels ok, reported having pain in his left leg, doesn't like the fact that he is urinating a lot due to lasix    ROS: denies fever, chills, cp, sob, n/v, HA unless stated above.  hydrALAZINE  75 mg Oral TID    meropenem  1 g Intravenous Q8H    furosemide  40 mg Intravenous BID    Petrolatum   Topical Daily    atorvastatin  20 mg Oral Nightly    carbidopa-levodopa  2 tablet Oral Nightly    diltiazem  240 mg Oral Nightly    doxazosin  8 mg Oral Nightly    insulin lispro  0-6 Units Subcutaneous TID WC    insulin lispro  0-3 Units Subcutaneous Nightly    sodium chloride flush  10 mL Intravenous 2 times per day    enoxaparin  40 mg Subcutaneous Daily       glucose 15 g PRN   dextrose 12.5 g PRN   glucagon (rDNA) 1 mg PRN   dextrose 100 mL/hr PRN   sodium chloride flush 10 mL PRN   magnesium hydroxide 30 mL Daily PRN   ondansetron 4 mg Q6H PRN   acetaminophen 650 mg Q4H PRN        Objective:    BP (!) 171/74   Pulse 78   Temp 98.2 °F (36.8 °C) (Oral)   Resp 18   Ht 6' 4\" (1.93 m)   Wt (!) 418 lb 8 oz (189.8 kg)   SpO2 93%   BMI 50.94 kg/m²     General Appearance: alert and oriented to person, place and time  Skin: warm and dry  Head: normocephalic and atraumatic  Eyes: pupils equal, round, and reactive to light, extraocular eye movements intact, conjunctivae normal  Neck: neck supple and non tender without mass   Pulmonary/Chest: clear to auscultation bilaterally- no wheezes, rales or rhonchi, normal air movement, no respiratory distress  Cardiovascular: normal rate, normal S1 and S2 and no carotid bruits  Abdomen: soft, non-tender, non-distended, obese  Extremities: edema and erythema throughout left leg.   Neurologic: no

## 2019-07-21 LAB
METER GLUCOSE: 117 MG/DL (ref 74–99)
METER GLUCOSE: 123 MG/DL (ref 74–99)
METER GLUCOSE: 160 MG/DL (ref 74–99)
METER GLUCOSE: 191 MG/DL (ref 74–99)
ORGANISM: ABNORMAL
WOUND/ABSCESS: ABNORMAL
WOUND/ABSCESS: ABNORMAL

## 2019-07-21 PROCEDURE — 82962 GLUCOSE BLOOD TEST: CPT

## 2019-07-21 PROCEDURE — 6360000002 HC RX W HCPCS: Performed by: INTERNAL MEDICINE

## 2019-07-21 PROCEDURE — 6370000000 HC RX 637 (ALT 250 FOR IP): Performed by: INTERNAL MEDICINE

## 2019-07-21 PROCEDURE — 99233 SBSQ HOSP IP/OBS HIGH 50: CPT | Performed by: INTERNAL MEDICINE

## 2019-07-21 PROCEDURE — 2580000003 HC RX 258: Performed by: INTERNAL MEDICINE

## 2019-07-21 PROCEDURE — 1200000000 HC SEMI PRIVATE

## 2019-07-21 RX ADMIN — HYDRALAZINE HYDROCHLORIDE 75 MG: 50 TABLET, FILM COATED ORAL at 08:00

## 2019-07-21 RX ADMIN — Medication 10 ML: at 21:01

## 2019-07-21 RX ADMIN — HYDRALAZINE HYDROCHLORIDE 75 MG: 50 TABLET, FILM COATED ORAL at 21:00

## 2019-07-21 RX ADMIN — ENOXAPARIN SODIUM 40 MG: 40 INJECTION SUBCUTANEOUS at 08:00

## 2019-07-21 RX ADMIN — MEROPENEM 2 G: 1 INJECTION, POWDER, FOR SOLUTION INTRAVENOUS at 11:52

## 2019-07-21 RX ADMIN — DOXAZOSIN MESYLATE 8 MG: 4 TABLET ORAL at 21:00

## 2019-07-21 RX ADMIN — Medication 10 ML: at 08:00

## 2019-07-21 RX ADMIN — MEROPENEM 2 G: 1 INJECTION, POWDER, FOR SOLUTION INTRAVENOUS at 21:00

## 2019-07-21 RX ADMIN — DILTIAZEM HYDROCHLORIDE 240 MG: 240 CAPSULE, COATED, EXTENDED RELEASE ORAL at 21:00

## 2019-07-21 RX ADMIN — MINOCYCLINE HYDROCHLORIDE 100 MG: 50 CAPSULE ORAL at 21:00

## 2019-07-21 RX ADMIN — MEROPENEM 2 G: 1 INJECTION, POWDER, FOR SOLUTION INTRAVENOUS at 03:30

## 2019-07-21 RX ADMIN — HYDRALAZINE HYDROCHLORIDE 75 MG: 50 TABLET, FILM COATED ORAL at 13:19

## 2019-07-21 RX ADMIN — ATORVASTATIN CALCIUM 20 MG: 20 TABLET, FILM COATED ORAL at 21:00

## 2019-07-21 RX ADMIN — CARBIDOPA AND LEVODOPA 2 TABLET: 25; 100 TABLET ORAL at 21:00

## 2019-07-21 RX ADMIN — PETROLATUM: 420 OINTMENT TOPICAL at 08:00

## 2019-07-21 RX ADMIN — INSULIN LISPRO 1 UNITS: 100 INJECTION, SOLUTION INTRAVENOUS; SUBCUTANEOUS at 11:52

## 2019-07-21 RX ADMIN — MINOCYCLINE HYDROCHLORIDE 100 MG: 50 CAPSULE ORAL at 08:00

## 2019-07-21 RX ADMIN — INSULIN LISPRO 1 UNITS: 100 INJECTION, SOLUTION INTRAVENOUS; SUBCUTANEOUS at 21:01

## 2019-07-21 ASSESSMENT — PAIN SCALES - GENERAL
PAINLEVEL_OUTOF10: 0
PAINLEVEL_OUTOF10: 0

## 2019-07-22 LAB
BLOOD CULTURE, ROUTINE: NORMAL
CULTURE, BLOOD 2: NORMAL
METER GLUCOSE: 112 MG/DL (ref 74–99)
METER GLUCOSE: 137 MG/DL (ref 74–99)
METER GLUCOSE: 138 MG/DL (ref 74–99)
METER GLUCOSE: 184 MG/DL (ref 74–99)

## 2019-07-22 PROCEDURE — 99233 SBSQ HOSP IP/OBS HIGH 50: CPT | Performed by: INTERNAL MEDICINE

## 2019-07-22 PROCEDURE — 6370000000 HC RX 637 (ALT 250 FOR IP): Performed by: INTERNAL MEDICINE

## 2019-07-22 PROCEDURE — 6360000002 HC RX W HCPCS: Performed by: INTERNAL MEDICINE

## 2019-07-22 PROCEDURE — 6360000002 HC RX W HCPCS: Performed by: SPECIALIST

## 2019-07-22 PROCEDURE — 2580000003 HC RX 258: Performed by: INTERNAL MEDICINE

## 2019-07-22 PROCEDURE — 2580000003 HC RX 258: Performed by: SPECIALIST

## 2019-07-22 PROCEDURE — 82962 GLUCOSE BLOOD TEST: CPT

## 2019-07-22 PROCEDURE — 1200000000 HC SEMI PRIVATE

## 2019-07-22 RX ORDER — HEPARIN SODIUM (PORCINE) LOCK FLUSH IV SOLN 100 UNIT/ML 100 UNIT/ML
3 SOLUTION INTRAVENOUS PRN
Status: DISCONTINUED | OUTPATIENT
Start: 2019-07-22 | End: 2019-07-25 | Stop reason: HOSPADM

## 2019-07-22 RX ORDER — HEPARIN SODIUM (PORCINE) LOCK FLUSH IV SOLN 100 UNIT/ML 100 UNIT/ML
3 SOLUTION INTRAVENOUS EVERY 12 HOURS SCHEDULED
Status: DISCONTINUED | OUTPATIENT
Start: 2019-07-22 | End: 2019-07-25 | Stop reason: HOSPADM

## 2019-07-22 RX ORDER — SODIUM CHLORIDE 0.9 % (FLUSH) 0.9 %
10 SYRINGE (ML) INJECTION PRN
Status: DISCONTINUED | OUTPATIENT
Start: 2019-07-22 | End: 2019-07-25 | Stop reason: HOSPADM

## 2019-07-22 RX ORDER — LIDOCAINE HYDROCHLORIDE 10 MG/ML
5 INJECTION, SOLUTION EPIDURAL; INFILTRATION; INTRACAUDAL; PERINEURAL ONCE
Status: COMPLETED | OUTPATIENT
Start: 2019-07-22 | End: 2019-07-23

## 2019-07-22 RX ORDER — FUROSEMIDE 40 MG/1
40 TABLET ORAL DAILY
Status: DISCONTINUED | OUTPATIENT
Start: 2019-07-22 | End: 2019-07-25 | Stop reason: HOSPADM

## 2019-07-22 RX ORDER — POTASSIUM CHLORIDE 20 MEQ/1
40 TABLET, EXTENDED RELEASE ORAL DAILY
Status: DISCONTINUED | OUTPATIENT
Start: 2019-07-22 | End: 2019-07-23

## 2019-07-22 RX ADMIN — HYDRALAZINE HYDROCHLORIDE 75 MG: 50 TABLET, FILM COATED ORAL at 14:19

## 2019-07-22 RX ADMIN — Medication 10 ML: at 08:40

## 2019-07-22 RX ADMIN — PETROLATUM: 420 OINTMENT TOPICAL at 08:40

## 2019-07-22 RX ADMIN — INSULIN LISPRO 1 UNITS: 100 INJECTION, SOLUTION INTRAVENOUS; SUBCUTANEOUS at 12:30

## 2019-07-22 RX ADMIN — HYDRALAZINE HYDROCHLORIDE 75 MG: 50 TABLET, FILM COATED ORAL at 08:39

## 2019-07-22 RX ADMIN — ACETAMINOPHEN 650 MG: 325 TABLET ORAL at 13:27

## 2019-07-22 RX ADMIN — ATORVASTATIN CALCIUM 20 MG: 20 TABLET, FILM COATED ORAL at 21:29

## 2019-07-22 RX ADMIN — MEROPENEM 2 G: 1 INJECTION, POWDER, FOR SOLUTION INTRAVENOUS at 04:18

## 2019-07-22 RX ADMIN — ENOXAPARIN SODIUM 40 MG: 40 INJECTION SUBCUTANEOUS at 08:40

## 2019-07-22 RX ADMIN — MEROPENEM 2 G: 1 INJECTION, POWDER, FOR SOLUTION INTRAVENOUS at 13:22

## 2019-07-22 RX ADMIN — HYDRALAZINE HYDROCHLORIDE 75 MG: 50 TABLET, FILM COATED ORAL at 21:29

## 2019-07-22 RX ADMIN — FUROSEMIDE 40 MG: 40 TABLET ORAL at 11:24

## 2019-07-22 RX ADMIN — DILTIAZEM HYDROCHLORIDE 240 MG: 240 CAPSULE, COATED, EXTENDED RELEASE ORAL at 21:29

## 2019-07-22 RX ADMIN — SODIUM CHLORIDE 100 MG: 9 INJECTION, SOLUTION INTRAVENOUS at 16:28

## 2019-07-22 RX ADMIN — MINOCYCLINE HYDROCHLORIDE 100 MG: 50 CAPSULE ORAL at 08:40

## 2019-07-22 RX ADMIN — DOXAZOSIN MESYLATE 8 MG: 4 TABLET ORAL at 21:29

## 2019-07-22 RX ADMIN — Medication 10 ML: at 21:31

## 2019-07-22 RX ADMIN — MEROPENEM 2 G: 1 INJECTION, POWDER, FOR SOLUTION INTRAVENOUS at 22:11

## 2019-07-22 RX ADMIN — CARBIDOPA AND LEVODOPA 2 TABLET: 25; 100 TABLET ORAL at 21:31

## 2019-07-22 RX ADMIN — POTASSIUM CHLORIDE 40 MEQ: 20 TABLET, EXTENDED RELEASE ORAL at 11:24

## 2019-07-22 ASSESSMENT — PAIN SCALES - GENERAL
PAINLEVEL_OUTOF10: 3
PAINLEVEL_OUTOF10: 0
PAINLEVEL_OUTOF10: 0
PAINLEVEL_OUTOF10: 1

## 2019-07-22 ASSESSMENT — PAIN DESCRIPTION - LOCATION: LOCATION: FOOT;LEG

## 2019-07-22 ASSESSMENT — PAIN DESCRIPTION - ONSET: ONSET: GRADUAL

## 2019-07-22 ASSESSMENT — PAIN - FUNCTIONAL ASSESSMENT: PAIN_FUNCTIONAL_ASSESSMENT: ACTIVITIES ARE NOT PREVENTED

## 2019-07-22 ASSESSMENT — PAIN DESCRIPTION - PAIN TYPE: TYPE: CHRONIC PAIN

## 2019-07-22 ASSESSMENT — PAIN DESCRIPTION - FREQUENCY: FREQUENCY: INTERMITTENT

## 2019-07-22 ASSESSMENT — PAIN DESCRIPTION - ORIENTATION: ORIENTATION: RIGHT;LEFT

## 2019-07-22 ASSESSMENT — PAIN DESCRIPTION - DESCRIPTORS: DESCRIPTORS: ACHING;DISCOMFORT;SORE

## 2019-07-22 ASSESSMENT — PAIN DESCRIPTION - PROGRESSION: CLINICAL_PROGRESSION: GRADUALLY WORSENING

## 2019-07-22 NOTE — CARE COORDINATION
Social Work / Discharge Planning : SW spoke to ID and plan is Tygacil twice a day at discharge. SW met with patient and he would like [facility] ( no to [facility] per patient). SW called [facility] [phone] and spoke to mary anne. They transferred SW to [facility] to set up outpatient [phone]. SW left a detailed message and await response. SW will need signed script for IV antibiotic to set up [facility]. SW to follow. Electronically signed by SABRINA Foote on 7/22/19 at 2:46 PM      Addendum : [facility] intake returned SW call [phone]. They did check and they do HAVE Tygacil. However, without the script and duration/ dx. SW cannot set  up patient yet at the [facility]. Await signed script. SW to follow.  Electronically signed by SABRINA Foote on 7/22/19 at 2:56 PM

## 2019-07-22 NOTE — PROGRESS NOTES
resume home dose of Lasix. 4. Hypokalemia, supplements. 5. Patient denies having Parkinson's, on Sinemet due to his restless leg. 6. Weight loss explained. 7. Hyperlipidemia, on statins. 8. DC planning, will depend upon the culture sensitivity from lab for the use of colistin, minocycline, as well as meropenem.         Electronically signed by Brenna Floyd MD on 7/22/2019 at 8:32 AM

## 2019-07-23 ENCOUNTER — APPOINTMENT (OUTPATIENT)
Dept: GENERAL RADIOLOGY | Age: 74
DRG: 872 | End: 2019-07-23
Payer: MEDICARE

## 2019-07-23 LAB
ALBUMIN SERPL-MCNC: 3.2 G/DL (ref 3.5–5.2)
ALP BLD-CCNC: 131 U/L (ref 40–129)
ALT SERPL-CCNC: <5 U/L (ref 0–40)
ANION GAP SERPL CALCULATED.3IONS-SCNC: 12 MMOL/L (ref 7–16)
AST SERPL-CCNC: 32 U/L (ref 0–39)
BILIRUB SERPL-MCNC: 0.8 MG/DL (ref 0–1.2)
BUN BLDV-MCNC: 20 MG/DL (ref 8–23)
CALCIUM SERPL-MCNC: 8.5 MG/DL (ref 8.6–10.2)
CHLORIDE BLD-SCNC: 95 MMOL/L (ref 98–107)
CO2: 32 MMOL/L (ref 22–29)
CREAT SERPL-MCNC: 0.8 MG/DL (ref 0.7–1.2)
GFR AFRICAN AMERICAN: >60
GFR NON-AFRICAN AMERICAN: >60 ML/MIN/1.73
GLUCOSE BLD-MCNC: 124 MG/DL (ref 74–99)
METER GLUCOSE: 115 MG/DL (ref 74–99)
METER GLUCOSE: 120 MG/DL (ref 74–99)
METER GLUCOSE: 153 MG/DL (ref 74–99)
METER GLUCOSE: 97 MG/DL (ref 74–99)
POTASSIUM SERPL-SCNC: 2.8 MMOL/L (ref 3.5–5)
SODIUM BLD-SCNC: 139 MMOL/L (ref 132–146)
TOTAL PROTEIN: 7.8 G/DL (ref 6.4–8.3)

## 2019-07-23 PROCEDURE — 6360000002 HC RX W HCPCS: Performed by: INTERNAL MEDICINE

## 2019-07-23 PROCEDURE — 2580000003 HC RX 258: Performed by: INTERNAL MEDICINE

## 2019-07-23 PROCEDURE — 6370000000 HC RX 637 (ALT 250 FOR IP): Performed by: INTERNAL MEDICINE

## 2019-07-23 PROCEDURE — 71045 X-RAY EXAM CHEST 1 VIEW: CPT

## 2019-07-23 PROCEDURE — 76937 US GUIDE VASCULAR ACCESS: CPT

## 2019-07-23 PROCEDURE — 99233 SBSQ HOSP IP/OBS HIGH 50: CPT | Performed by: INTERNAL MEDICINE

## 2019-07-23 PROCEDURE — 36415 COLL VENOUS BLD VENIPUNCTURE: CPT

## 2019-07-23 PROCEDURE — 6370000000 HC RX 637 (ALT 250 FOR IP): Performed by: SPECIALIST

## 2019-07-23 PROCEDURE — 2580000003 HC RX 258: Performed by: SPECIALIST

## 2019-07-23 PROCEDURE — C1751 CATH, INF, PER/CENT/MIDLINE: HCPCS

## 2019-07-23 PROCEDURE — 02HV33Z INSERTION OF INFUSION DEVICE INTO SUPERIOR VENA CAVA, PERCUTANEOUS APPROACH: ICD-10-PCS | Performed by: INTERNAL MEDICINE

## 2019-07-23 PROCEDURE — 82962 GLUCOSE BLOOD TEST: CPT

## 2019-07-23 PROCEDURE — 1200000000 HC SEMI PRIVATE

## 2019-07-23 PROCEDURE — 2500000003 HC RX 250 WO HCPCS: Performed by: SPECIALIST

## 2019-07-23 PROCEDURE — 6360000002 HC RX W HCPCS: Performed by: SPECIALIST

## 2019-07-23 PROCEDURE — 36569 INSJ PICC 5 YR+ W/O IMAGING: CPT

## 2019-07-23 PROCEDURE — 80053 COMPREHEN METABOLIC PANEL: CPT

## 2019-07-23 RX ORDER — AMOXICILLIN AND CLAVULANATE POTASSIUM 562.5; 437.5; 62.5 MG/1; MG/1; MG/1
2 TABLET, FILM COATED, EXTENDED RELEASE ORAL EVERY 12 HOURS SCHEDULED
Status: DISCONTINUED | OUTPATIENT
Start: 2019-07-23 | End: 2019-07-25 | Stop reason: HOSPADM

## 2019-07-23 RX ORDER — POTASSIUM CHLORIDE 20 MEQ/1
40 TABLET, EXTENDED RELEASE ORAL 2 TIMES DAILY
Status: DISCONTINUED | OUTPATIENT
Start: 2019-07-23 | End: 2019-07-25 | Stop reason: HOSPADM

## 2019-07-23 RX ADMIN — ENOXAPARIN SODIUM 40 MG: 40 INJECTION SUBCUTANEOUS at 08:43

## 2019-07-23 RX ADMIN — INSULIN LISPRO 1 UNITS: 100 INJECTION, SOLUTION INTRAVENOUS; SUBCUTANEOUS at 11:45

## 2019-07-23 RX ADMIN — FUROSEMIDE 40 MG: 40 TABLET ORAL at 08:42

## 2019-07-23 RX ADMIN — HYDRALAZINE HYDROCHLORIDE 75 MG: 50 TABLET, FILM COATED ORAL at 08:42

## 2019-07-23 RX ADMIN — DOXAZOSIN MESYLATE 8 MG: 4 TABLET ORAL at 20:11

## 2019-07-23 RX ADMIN — HYDRALAZINE HYDROCHLORIDE 75 MG: 50 TABLET, FILM COATED ORAL at 13:29

## 2019-07-23 RX ADMIN — ATORVASTATIN CALCIUM 20 MG: 20 TABLET, FILM COATED ORAL at 20:11

## 2019-07-23 RX ADMIN — POTASSIUM CHLORIDE 40 MEQ: 20 TABLET, EXTENDED RELEASE ORAL at 20:11

## 2019-07-23 RX ADMIN — DILTIAZEM HYDROCHLORIDE 240 MG: 240 CAPSULE, COATED, EXTENDED RELEASE ORAL at 20:11

## 2019-07-23 RX ADMIN — AMOXICILLIN AND CLAVULANATE POTASSIUM 2 TABLET: 562.5; 437.5; 62.5 TABLET, MULTILAYER, EXTENDED RELEASE ORAL at 20:13

## 2019-07-23 RX ADMIN — Medication 40 ML: at 11:18

## 2019-07-23 RX ADMIN — ACETAMINOPHEN 650 MG: 325 TABLET ORAL at 23:53

## 2019-07-23 RX ADMIN — LIDOCAINE HYDROCHLORIDE 2 ML: 10 INJECTION, SOLUTION EPIDURAL; INFILTRATION; INTRACAUDAL; PERINEURAL at 10:50

## 2019-07-23 RX ADMIN — CARBIDOPA AND LEVODOPA 2 TABLET: 25; 100 TABLET ORAL at 20:11

## 2019-07-23 RX ADMIN — HYDRALAZINE HYDROCHLORIDE 75 MG: 50 TABLET, FILM COATED ORAL at 20:11

## 2019-07-23 RX ADMIN — SODIUM CHLORIDE, PRESERVATIVE FREE 300 UNITS: 5 INJECTION INTRAVENOUS at 20:12

## 2019-07-23 RX ADMIN — POTASSIUM CHLORIDE 40 MEQ: 20 TABLET, EXTENDED RELEASE ORAL at 08:42

## 2019-07-23 RX ADMIN — MEROPENEM 2 G: 1 INJECTION, POWDER, FOR SOLUTION INTRAVENOUS at 15:01

## 2019-07-23 RX ADMIN — MEROPENEM 2 G: 1 INJECTION, POWDER, FOR SOLUTION INTRAVENOUS at 05:09

## 2019-07-23 RX ADMIN — SODIUM CHLORIDE 50 MG: 9 INJECTION, SOLUTION INTRAVENOUS at 14:58

## 2019-07-23 RX ADMIN — Medication 10 ML: at 08:43

## 2019-07-23 RX ADMIN — SODIUM CHLORIDE 50 MG: 9 INJECTION, SOLUTION INTRAVENOUS at 03:20

## 2019-07-23 ASSESSMENT — PAIN SCALES - GENERAL
PAINLEVEL_OUTOF10: 5
PAINLEVEL_OUTOF10: 0
PAINLEVEL_OUTOF10: 0

## 2019-07-23 ASSESSMENT — PAIN DESCRIPTION - FREQUENCY: FREQUENCY: INTERMITTENT

## 2019-07-23 ASSESSMENT — PAIN DESCRIPTION - PROGRESSION: CLINICAL_PROGRESSION: NOT CHANGED

## 2019-07-23 ASSESSMENT — PAIN DESCRIPTION - DESCRIPTORS: DESCRIPTORS: ACHING;STABBING

## 2019-07-23 ASSESSMENT — PAIN DESCRIPTION - ONSET: ONSET: ON-GOING

## 2019-07-23 ASSESSMENT — PAIN - FUNCTIONAL ASSESSMENT: PAIN_FUNCTIONAL_ASSESSMENT: PREVENTS OR INTERFERES SOME ACTIVE ACTIVITIES AND ADLS

## 2019-07-23 ASSESSMENT — PAIN DESCRIPTION - PAIN TYPE: TYPE: CHRONIC PAIN

## 2019-07-23 ASSESSMENT — PAIN DESCRIPTION - ORIENTATION: ORIENTATION: LEFT

## 2019-07-23 ASSESSMENT — PAIN DESCRIPTION - LOCATION: LOCATION: FOOT;LEG

## 2019-07-23 NOTE — PROGRESS NOTES
PRN   sodium chloride flush 10 mL PRN   magnesium hydroxide 30 mL Daily PRN   ondansetron 4 mg Q6H PRN   acetaminophen 650 mg Q4H PRN        Objective:    /72 Comment: manual  Pulse 72   Temp 99.1 °F (37.3 °C) (Oral)   Resp 18   Ht 6' 4\" (1.93 m)   Wt (!) 415 lb 8 oz (188.5 kg)   SpO2 94%   BMI 50.58 kg/m²   General Appearance: alert and oriented to person, place and time, well-developed and well-nourished, in no acute distress  Skin: warm and dry, no rash or erythema  Head: normocephalic and atraumatic  Eyes: pupils equal, round, and reactive to light, extraocular eye movements intact, conjunctivae normal  ENT: tympanic membrane, external ear and ear canal normal bilaterally, oropharynx clear and moist with normal mucous membranes  Neck: neck supple and non tender without mass, no thyromegaly or thyroid nodules, no cervical lymphadenopathy   Pulmonary/Chest: clear to auscultation bilaterally- no wheezes, rales or rhonchi, normal air movement, no respiratory distress  Cardiovascular: normal rate, normal S1 and S2, no gallops, intact distal pulses and no carotid bruits  Abdomen: soft, non-tender, non-distended, normal bowel sounds, no masses or organomegaly      Recent Labs     07/23/19  0431      K 2.8*   CL 95*   CO2 32*   BUN 20   CREATININE 0.8   GLUCOSE 124*   CALCIUM 8.5*       No results for input(s): WBC, RBC, HGB, HCT, MCV, MCH, MCHC, RDW, PLT, MPV in the last 72 hours. Micro data  Staph aureus as well as Acinetobacter/MDR. ID checking sensitivity to minocycline as well as colistin. Radiology:   XR CHEST 1 VW   Final Result   CHF. Pericardial effusion is considered. US DUP LOWER EXTREMITY LEFT MARCO   Final Result      No evidence of deep vein thrombosis involving visualized veins of left   lower extremity.           Assessment:    Principal Problem:    Left leg cellulitis  Active Problems:    DM (diabetes mellitus) ([address]    HTN (hypertension)    Non-pressure chronic ulcer of left lower leg with fat layer exposed ([address]    Morbid obesity (HCC)    RLS (restless legs syndrome)    CKD stage 2 due to type 2 diabetes mellitus ([address]    Hypokalemia  Resolved Problems:    * No resolved hospital problems. *      Plan:  1. Left leg cellulitis, at this time remains on antibiotics as per ID, receiving meropenem as well as minocycline. 2. Diabetes, controlled, no change  3. Hypertension, now controlled, on home dose of Cardizem, also increased dose of hydralazine as compared to home dose, on home dose of Lasix. Will DC [facility]. 4. Hypokalemia, supplements. 5. Patient denies having Parkinson's, on Sinemet due to his restless leg. 6. Weight loss explained. 7. Hyperlipidemia, on statins. 8. DC planning, will depend upon the culture sensitivity from lab /the extended panel, for the use of colistin, tigecycline as well as meropenem.         Electronically signed by Umair Gaffney MD on 7/23/2019 at 7:51 AM

## 2019-07-23 NOTE — PROGRESS NOTES
[address] [place] [address] [facility]  Progress Note    SUBJECTIVE:  Chief Complaint   Patient presents with    Fever    Altered Mental Status    Other     leg redness     The patient feels better. PICC was inserted. He still has some redness of his left thigh. Less pain. The wound was changed today and there was a wound on the top of the foot. Apparently a scab came out leaving no open wound. He was very happy about this. Review of systems:  As stated above in the chief complaint, otherwise negative. Medications:  Scheduled Meds:   potassium chloride  40 mEq Oral BID    furosemide  40 mg Oral Daily    tigecycline (TYGACIL) IVPB  50 mg Intravenous Q12H    heparin flush  3 mL Intravenous 2 times per day    hydrALAZINE  75 mg Oral TID    meropenem  2 g Intravenous Q8H    Petrolatum   Topical Daily    atorvastatin  20 mg Oral Nightly    carbidopa-levodopa  2 tablet Oral Nightly    diltiazem  240 mg Oral Nightly    doxazosin  8 mg Oral Nightly    insulin lispro  0-6 Units Subcutaneous TID WC    insulin lispro  0-3 Units Subcutaneous Nightly    sodium chloride flush  10 mL Intravenous 2 times per day    enoxaparin  40 mg Subcutaneous Daily     Continuous Infusions:   dextrose       PRN Meds:sodium chloride flush, heparin flush, glucose, dextrose, glucagon (rDNA), dextrose, sodium chloride flush, magnesium hydroxide, ondansetron, acetaminophen    OBJECTIVE:  BP (!) 158/68 Comment: MANUAL  Pulse 77   Temp 98.2 °F (36.8 °C) (Oral)   Resp 16   Ht 6' 4\" (1.93 m)   Wt (!) 415 lb 8 oz (188.5 kg)   SpO2 93%   BMI 50.58 kg/m²   Temp  Av.7 °F (37.1 °C)  Min: 98.2 °F (36.8 °C)  Max: 99.1 °F (37.3 °C)  Constitutional: The patient is lying in bed. Morbidly obese. No distress. Skin: No rash. HEENT: Eyes show round, and reactive pupils. No jaundice. Moist mucous membranes, no ulcerations, no thrush. Snuff in oral cavity. Neck: Supple to movements.    Chest: No wheezing,

## 2019-07-24 LAB
METER GLUCOSE: 106 MG/DL (ref 74–99)
METER GLUCOSE: 115 MG/DL (ref 74–99)
METER GLUCOSE: 137 MG/DL (ref 74–99)
METER GLUCOSE: 172 MG/DL (ref 74–99)

## 2019-07-24 PROCEDURE — 6360000002 HC RX W HCPCS: Performed by: SPECIALIST

## 2019-07-24 PROCEDURE — 2580000003 HC RX 258: Performed by: SPECIALIST

## 2019-07-24 PROCEDURE — 6370000000 HC RX 637 (ALT 250 FOR IP): Performed by: INTERNAL MEDICINE

## 2019-07-24 PROCEDURE — 6360000002 HC RX W HCPCS: Performed by: INTERNAL MEDICINE

## 2019-07-24 PROCEDURE — 82962 GLUCOSE BLOOD TEST: CPT

## 2019-07-24 PROCEDURE — 6370000000 HC RX 637 (ALT 250 FOR IP): Performed by: SPECIALIST

## 2019-07-24 PROCEDURE — 2580000003 HC RX 258: Performed by: INTERNAL MEDICINE

## 2019-07-24 PROCEDURE — 1200000000 HC SEMI PRIVATE

## 2019-07-24 PROCEDURE — 99232 SBSQ HOSP IP/OBS MODERATE 35: CPT | Performed by: INTERNAL MEDICINE

## 2019-07-24 RX ORDER — POTASSIUM CHLORIDE 20 MEQ/1
40 TABLET, EXTENDED RELEASE ORAL ONCE
Status: COMPLETED | OUTPATIENT
Start: 2019-07-24 | End: 2019-07-24

## 2019-07-24 RX ORDER — TIGECYCLINE 50 MG/10ML
50 INJECTION, POWDER, LYOPHILIZED, FOR SOLUTION INTRAVENOUS EVERY 12 HOURS
Qty: 1500 MG | Refills: 0 | Status: SHIPPED | OUTPATIENT
Start: 2019-07-24 | End: 2019-08-08 | Stop reason: ALTCHOICE

## 2019-07-24 RX ORDER — AMOXICILLIN AND CLAVULANATE POTASSIUM 562.5; 437.5; 62.5 MG/1; MG/1; MG/1
2 TABLET, FILM COATED, EXTENDED RELEASE ORAL 2 TIMES DAILY
Qty: 56 TABLET | Refills: 0 | Status: SHIPPED | OUTPATIENT
Start: 2019-07-24 | End: 2019-08-08 | Stop reason: ALTCHOICE

## 2019-07-24 RX ORDER — POTASSIUM CHLORIDE 20 MEQ/1
40 TABLET, EXTENDED RELEASE ORAL DAILY
Qty: 60 TABLET | Refills: 3 | Status: ON HOLD | OUTPATIENT
Start: 2019-07-24 | End: 2019-12-27 | Stop reason: SDUPTHER

## 2019-07-24 RX ADMIN — ENOXAPARIN SODIUM 40 MG: 40 INJECTION SUBCUTANEOUS at 08:09

## 2019-07-24 RX ADMIN — PETROLATUM: 420 OINTMENT TOPICAL at 16:10

## 2019-07-24 RX ADMIN — SODIUM CHLORIDE 50 MG: 9 INJECTION, SOLUTION INTRAVENOUS at 03:25

## 2019-07-24 RX ADMIN — AMOXICILLIN AND CLAVULANATE POTASSIUM 2 TABLET: 562.5; 437.5; 62.5 TABLET, MULTILAYER, EXTENDED RELEASE ORAL at 21:01

## 2019-07-24 RX ADMIN — POTASSIUM CHLORIDE 40 MEQ: 20 TABLET, EXTENDED RELEASE ORAL at 21:01

## 2019-07-24 RX ADMIN — SODIUM CHLORIDE, PRESERVATIVE FREE 300 UNITS: 5 INJECTION INTRAVENOUS at 21:03

## 2019-07-24 RX ADMIN — DOXAZOSIN MESYLATE 8 MG: 4 TABLET ORAL at 21:01

## 2019-07-24 RX ADMIN — INSULIN LISPRO 1 UNITS: 100 INJECTION, SOLUTION INTRAVENOUS; SUBCUTANEOUS at 21:03

## 2019-07-24 RX ADMIN — SODIUM CHLORIDE 50 MG: 9 INJECTION, SOLUTION INTRAVENOUS at 16:10

## 2019-07-24 RX ADMIN — Medication 10 ML: at 21:02

## 2019-07-24 RX ADMIN — FUROSEMIDE 40 MG: 40 TABLET ORAL at 08:08

## 2019-07-24 RX ADMIN — DILTIAZEM HYDROCHLORIDE 240 MG: 240 CAPSULE, COATED, EXTENDED RELEASE ORAL at 21:01

## 2019-07-24 RX ADMIN — HYDRALAZINE HYDROCHLORIDE 75 MG: 50 TABLET, FILM COATED ORAL at 08:08

## 2019-07-24 RX ADMIN — ATORVASTATIN CALCIUM 20 MG: 20 TABLET, FILM COATED ORAL at 21:01

## 2019-07-24 RX ADMIN — AMOXICILLIN AND CLAVULANATE POTASSIUM 2 TABLET: 562.5; 437.5; 62.5 TABLET, MULTILAYER, EXTENDED RELEASE ORAL at 08:08

## 2019-07-24 RX ADMIN — POTASSIUM CHLORIDE 40 MEQ: 20 TABLET, EXTENDED RELEASE ORAL at 08:09

## 2019-07-24 RX ADMIN — SODIUM CHLORIDE, PRESERVATIVE FREE 300 UNITS: 5 INJECTION INTRAVENOUS at 08:09

## 2019-07-24 RX ADMIN — HYDRALAZINE HYDROCHLORIDE 75 MG: 50 TABLET, FILM COATED ORAL at 21:01

## 2019-07-24 RX ADMIN — POTASSIUM CHLORIDE 40 MEQ: 20 TABLET, EXTENDED RELEASE ORAL at 17:07

## 2019-07-24 RX ADMIN — Medication 10 ML: at 08:09

## 2019-07-24 RX ADMIN — HYDRALAZINE HYDROCHLORIDE 75 MG: 50 TABLET, FILM COATED ORAL at 16:10

## 2019-07-24 RX ADMIN — CARBIDOPA AND LEVODOPA 2 TABLET: 25; 100 TABLET ORAL at 21:01

## 2019-07-24 ASSESSMENT — PAIN SCALES - GENERAL
PAINLEVEL_OUTOF10: 0
PAINLEVEL_OUTOF10: 0

## 2019-07-25 VITALS
TEMPERATURE: 97.4 F | HEIGHT: 76 IN | RESPIRATION RATE: 18 BRPM | HEART RATE: 74 BPM | OXYGEN SATURATION: 92 % | DIASTOLIC BLOOD PRESSURE: 60 MMHG | BODY MASS INDEX: 38.36 KG/M2 | SYSTOLIC BLOOD PRESSURE: 122 MMHG | WEIGHT: 315 LBS

## 2019-07-25 LAB
ANION GAP SERPL CALCULATED.3IONS-SCNC: 11 MMOL/L (ref 7–16)
BUN BLDV-MCNC: 27 MG/DL (ref 8–23)
CALCIUM SERPL-MCNC: 8.6 MG/DL (ref 8.6–10.2)
CHLORIDE BLD-SCNC: 94 MMOL/L (ref 98–107)
CO2: 32 MMOL/L (ref 22–29)
CREAT SERPL-MCNC: 0.9 MG/DL (ref 0.7–1.2)
GFR AFRICAN AMERICAN: >60
GFR NON-AFRICAN AMERICAN: >60 ML/MIN/1.73
GLUCOSE BLD-MCNC: 116 MG/DL (ref 74–99)
METER GLUCOSE: 110 MG/DL (ref 74–99)
METER GLUCOSE: 112 MG/DL (ref 74–99)
METER GLUCOSE: 124 MG/DL (ref 74–99)
ORGANISM: ABNORMAL
POTASSIUM SERPL-SCNC: 3.5 MMOL/L (ref 3.5–5)
SODIUM BLD-SCNC: 137 MMOL/L (ref 132–146)
WOUND/ABSCESS: ABNORMAL

## 2019-07-25 PROCEDURE — 2580000003 HC RX 258: Performed by: SPECIALIST

## 2019-07-25 PROCEDURE — 2580000003 HC RX 258: Performed by: INTERNAL MEDICINE

## 2019-07-25 PROCEDURE — 6360000002 HC RX W HCPCS: Performed by: INTERNAL MEDICINE

## 2019-07-25 PROCEDURE — 80048 BASIC METABOLIC PNL TOTAL CA: CPT

## 2019-07-25 PROCEDURE — 36415 COLL VENOUS BLD VENIPUNCTURE: CPT

## 2019-07-25 PROCEDURE — 87449 NOS EACH ORGANISM AG IA: CPT

## 2019-07-25 PROCEDURE — 6370000000 HC RX 637 (ALT 250 FOR IP): Performed by: INTERNAL MEDICINE

## 2019-07-25 PROCEDURE — 6360000002 HC RX W HCPCS: Performed by: SPECIALIST

## 2019-07-25 PROCEDURE — 99239 HOSP IP/OBS DSCHRG MGMT >30: CPT | Performed by: INTERNAL MEDICINE

## 2019-07-25 PROCEDURE — 87324 CLOSTRIDIUM AG IA: CPT

## 2019-07-25 PROCEDURE — 6370000000 HC RX 637 (ALT 250 FOR IP): Performed by: SPECIALIST

## 2019-07-25 PROCEDURE — 82962 GLUCOSE BLOOD TEST: CPT

## 2019-07-25 RX ADMIN — AMOXICILLIN AND CLAVULANATE POTASSIUM 2 TABLET: 562.5; 437.5; 62.5 TABLET, MULTILAYER, EXTENDED RELEASE ORAL at 17:49

## 2019-07-25 RX ADMIN — FUROSEMIDE 40 MG: 40 TABLET ORAL at 09:53

## 2019-07-25 RX ADMIN — AMOXICILLIN AND CLAVULANATE POTASSIUM 2 TABLET: 562.5; 437.5; 62.5 TABLET, MULTILAYER, EXTENDED RELEASE ORAL at 09:53

## 2019-07-25 RX ADMIN — Medication 10 ML: at 15:10

## 2019-07-25 RX ADMIN — HYDRALAZINE HYDROCHLORIDE 75 MG: 50 TABLET, FILM COATED ORAL at 09:53

## 2019-07-25 RX ADMIN — POTASSIUM CHLORIDE 40 MEQ: 20 TABLET, EXTENDED RELEASE ORAL at 09:53

## 2019-07-25 RX ADMIN — SODIUM CHLORIDE, PRESERVATIVE FREE 300 UNITS: 5 INJECTION INTRAVENOUS at 09:54

## 2019-07-25 RX ADMIN — HYDRALAZINE HYDROCHLORIDE 75 MG: 50 TABLET, FILM COATED ORAL at 15:10

## 2019-07-25 RX ADMIN — ENOXAPARIN SODIUM 40 MG: 40 INJECTION SUBCUTANEOUS at 09:53

## 2019-07-25 RX ADMIN — SODIUM CHLORIDE 50 MG: 9 INJECTION, SOLUTION INTRAVENOUS at 03:09

## 2019-07-25 RX ADMIN — SODIUM CHLORIDE 50 MG: 9 INJECTION, SOLUTION INTRAVENOUS at 15:10

## 2019-07-25 RX ADMIN — Medication 10 ML: at 09:53

## 2019-07-25 ASSESSMENT — PAIN SCALES - GENERAL: PAINLEVEL_OUTOF10: 0

## 2019-07-25 NOTE — PLAN OF CARE
Problem: Risk for Impaired Skin Integrity  Goal: Tissue integrity - skin and mucous membranes  Description  Structural intactness and normal physiological function of skin and  mucous membranes.   Outcome: Met This Shift     Problem: Falls - Risk of:  Goal: Will remain free from falls  Description  Will remain free from falls  Outcome: Met This Shift     Problem: Pain:  Goal: Pain level will decrease  Description  Pain level will decrease  Outcome: Met This Shift

## 2019-07-25 NOTE — PROGRESS NOTES
[address] [place] [address] [facility]  Progress Note    SUBJECTIVE:  Chief Complaint   Patient presents with    Fever    Altered Mental Status    Other     leg redness     The patient is doing well today. He has no new complaints. Denies any nausea, vomiting. He does report some diarrhea and wants some antimotility agents. Minimal pain in the left lower leg. Review of systems:  As stated above in the chief complaint, otherwise negative. Medications:  Scheduled Meds:   potassium chloride  40 mEq Oral BID    amoxicillin-clavulanate  2 tablet Oral 2 times per day    furosemide  40 mg Oral Daily    tigecycline (TYGACIL) IVPB  50 mg Intravenous Q12H    heparin flush  3 mL Intravenous 2 times per day    hydrALAZINE  75 mg Oral TID    Petrolatum   Topical Daily    atorvastatin  20 mg Oral Nightly    carbidopa-levodopa  2 tablet Oral Nightly    diltiazem  240 mg Oral Nightly    doxazosin  8 mg Oral Nightly    insulin lispro  0-6 Units Subcutaneous TID WC    insulin lispro  0-3 Units Subcutaneous Nightly    sodium chloride flush  10 mL Intravenous 2 times per day    enoxaparin  40 mg Subcutaneous Daily     Continuous Infusions:   dextrose       PRN Meds:sodium chloride flush, heparin flush, glucose, dextrose, glucagon (rDNA), dextrose, sodium chloride flush, magnesium hydroxide, ondansetron, acetaminophen    OBJECTIVE:  /60   Pulse 74   Temp 97.4 °F (36.3 °C) (Oral)   Resp 18   Ht 6' 4\" (1.93 m)   Wt (!) 413 lb (187.3 kg)   SpO2 92%   BMI 50.27 kg/m²   Temp  Av.8 °F (36.6 °C)  Min: 97.4 °F (36.3 °C)  Max: 98.1 °F (36.7 °C)  Constitutional: The patient is lying in bed. He is awake and in no distress. Skin: No rash outside of the left lower extremity. HEENT: No jaundice. No ulcerations or thrush. Neck: Supple to movements. Chest: No wheezing, crackles, or rhonchi. Cardiovascular: S1 and S2 are rhythmic and regular. No murmurs appreciated.    Abdomen: Round, foot    PLAN:  · Continue Tigecycline and Augmentin XR x2 weeks. Reconciled.     · Patient says that he will be going to [facility] where they will try to arrange for IV antibiotics at [facility] once there is availability  · The patient can be discharged from [place] [id number]  7/25/2019  7:26 PM

## 2019-07-25 NOTE — DISCHARGE SUMMARY
when to take this     furosemide 40 MG tablet  Commonly known as:  LASIX  Take 1 tablet by mouth daily. What changed:    · when to take this  · reasons to take this     pioglitazone 45 MG tablet  Commonly known as:  ACTOS  Take 1 tablet by mouth daily. What changed:  when to take this        CONTINUE taking these medications    atorvastatin 10 MG tablet  Commonly known as:  LIPITOR     carbidopa-levodopa  MG per tablet  Commonly known as:  SINEMET     diltiazem 240 MG extended release capsule  Commonly known as:  CARDIZEM CD  Take 1 capsule by mouth nightly     hydrALAZINE 50 MG tablet  Commonly known as:  APRESOLINE  Take 1 tablet by mouth 3 times daily     ibuprofen 600 MG tablet  Commonly known as:  ADVIL;MOTRIN  Take 1 tablet by mouth every 8 hours as needed           Where to Get Your Medications      These medications were sent to [address] [place] [address], [address] [facility]    Phone:  [phone]   · amoxicillin-clavulanate 1000-62.5 MG per extended release tablet  · enoxaparin 40 MG/0.4ML injection  · potassium chloride 20 MEQ extended release tablet     You can get these medications from any pharmacy    Bring a paper prescription for each of these medications  · tigecycline 50 MG injection           Note that more than 30 minutes was spent in preparing discharge papers, discussing discharge with patient, medication review, etc.  Addendum-  Patient who came with Infection, cellulitis, fever and altered mental state did have evidence of sepsis due to cellulitis on presentation. Signed:  Electronically signed by Umair Gaffney MD on 7/25/2019 at 2:16 PM    NOTE: This report was transcribed using voice recognition software. Every effort was made to ensure accuracy; however, inadvertent computerized transcription errors may be present.

## 2019-07-25 NOTE — DISCHARGE INSTR - COC
Assisted  Bathing  Assisted  Dressing  Assisted  Toileting  Assisted  Feeding  Independent  Med Admin  Independent  Med Delivery   whole    Wound Care Documentation and Therapy:  Wound 08/31/18 Foot Dorsal #20 Left Dorsal Foot DFU II(Acquired August 2018) (Active)   Number of days: 328       Wound 07/17/19 Foot Left;Dorsal (Active)   Wound Image   7/19/2019  9:57 AM   Dressing Status Clean;Dry; Intact 7/25/2019  9:00 AM   Dressing Changed Changed/New 7/24/2019  4:12 PM   Dressing/Treatment Other (comment) 7/24/2019  8:50 PM   Wound Cleansed Rinsed/Irrigated with saline 7/24/2019  4:12 PM   Dressing Change Due 07/26/19 7/25/2019  9:00 AM   Wound Length (cm) 5 cm 7/24/2019  4:12 PM   Wound Width (cm) 2.5 cm 7/24/2019  4:12 PM   Wound Depth (cm) 0.4 cm 7/24/2019  4:12 PM   Wound Surface Area (cm^2) 12.5 cm^2 7/24/2019  4:12 PM   Change in Wound Size % (l*w) -38.89 7/24/2019  4:12 PM   Wound Volume (cm^3) 5 cm^3 7/24/2019  4:12 PM   Wound Healing % -178 7/24/2019  4:12 PM   Wound Assessment KEISHA 7/25/2019  9:00 AM   Drainage Amount Small 7/24/2019  4:12 PM   Drainage Description Yellow 7/24/2019  4:12 PM   Odor Mild 7/24/2019  4:12 PM   Margins KEISHA 7/23/2019  7:39 PM   Belén-wound Assessment Red 7/24/2019  4:12 PM   Culture Taken Yes 7/18/2019  3:29 AM   Number of days: 7       Wound 07/17/19 Leg Lower; Outer (Active)   Wound Image   7/19/2019  9:57 AM   Dressing Status Clean;Dry; Intact 7/25/2019  9:00 AM   Dressing Changed Changed/New 7/24/2019  4:12 PM   Dressing/Treatment ABD 7/24/2019  8:50 PM   Wound Cleansed Rinsed/Irrigated with saline 7/24/2019  4:12 PM   Dressing Change Due 07/26/19 7/25/2019  9:00 AM   Wound Length (cm) 8 cm 7/24/2019  4:12 PM   Wound Width (cm) 7 cm 7/24/2019  4:12 PM   Wound Depth (cm) 0.4 cm 7/19/2019  9:57 AM   Wound Surface Area (cm^2) 56 cm^2 7/24/2019  4:12 PM   Change in Wound Size % (l*w) -43.59 7/24/2019  4:12 PM   Wound Volume (cm^3) 24 cm^3 7/19/2019  9:57 AM   Wound Healing % -208 7/19/2019  9:57 AM   Wound Assessment KEISHA 7/25/2019  9:00 AM   Drainage Amount Moderate 7/24/2019  4:12 PM   Drainage Description Yellow 7/24/2019  4:12 PM   Odor Mild 7/24/2019  4:12 PM   Margins KEISHA 7/23/2019  7:39 PM   Belén-wound Assessment Red 7/24/2019  4:12 PM   Non-staged Wound Description Full thickness 7/19/2019  9:57 AM   Culture Taken Yes 7/18/2019  3:29 AM   Number of days: 7        Elimination:  Continence:   · Bowel: {YES / WN:53402}  · Bladder: {YES / AF:93879}  Urinary Catheter: Insertion Date: 7/20/19   Colostomy/Ileostomy/Ileal Conduit: {YES / EV:26179}       Date of Last BM: ***    Intake/Output Summary (Last 24 hours) at 7/25/2019 1415  Last data filed at 7/25/2019 1133  Gross per 24 hour   Intake 100 ml   Output 3951 ml   Net -3851 ml     I/O last 3 completed shifts: In: 100 [IV Piggyback:100]  Out: 4558 [Urine:3475; Stool:1]    Safety Concerns: At Risk for Falls    Impairments/Disabilities:      None and Amputation - right BKA    Nutrition Therapy:  Current Nutrition Therapy:   - Oral Diet:  General    Routes of Feeding: Oral  Liquids: {Slp liquid thickness:59648}  Daily Fluid Restriction: no  Last Modified Barium Swallow with Video (Video Swallowing Test): not done    Treatments at the Time of Hospital Discharge:   Respiratory Treatments: ***  Oxygen Therapy:  is not on home oxygen therapy.   Ventilator:    - No ventilator support    Rehab Therapies: {THERAPEUTIC INTERVENTION:8894604444}  Weight Bearing Status/Restrictions: none  Other Medical Equipment (for information only, NOT a DME order):  {EQUIPMENT:769232789}  Other Treatments: ***    Patient's personal belongings (please select all that are sent with patient):  {[facility] DME Belongings:569445877}    RN SIGNATURE:  Electronically signed by Janie Miller RN on 7/25/19 at 4:09 PM    CASE MANAGEMENT/SOCIAL WORK SECTION    Inpatient Status Date: ***    Readmission Risk Assessment Score:  Readmission Risk              Risk of Unplanned

## 2019-07-26 LAB — C DIFF TOXIN/ANTIGEN: NORMAL

## 2019-07-28 LAB
Lab: NORMAL
REPORT: NORMAL
THIS TEST SENT TO: NORMAL

## 2019-07-31 LAB
Lab: NORMAL
REPORT: NORMAL
THIS TEST SENT TO: NORMAL

## 2019-12-17 ENCOUNTER — APPOINTMENT (OUTPATIENT)
Dept: GENERAL RADIOLOGY | Age: 74
DRG: 291 | End: 2019-12-17
Payer: MEDICARE

## 2019-12-17 ENCOUNTER — HOSPITAL ENCOUNTER (INPATIENT)
Age: 74
LOS: 10 days | Discharge: HOME HEALTH | DRG: 291 | End: 2019-12-27
Attending: EMERGENCY MEDICINE | Admitting: INTERNAL MEDICINE
Payer: MEDICARE

## 2019-12-17 DIAGNOSIS — I50.9 CHF WITH UNKNOWN LVEF (HCC): Primary | ICD-10-CM

## 2019-12-17 DIAGNOSIS — N17.9 ACUTE KIDNEY INJURY SUPERIMPOSED ON CHRONIC KIDNEY DISEASE (HCC): ICD-10-CM

## 2019-12-17 DIAGNOSIS — N18.9 ACUTE KIDNEY INJURY SUPERIMPOSED ON CHRONIC KIDNEY DISEASE (HCC): ICD-10-CM

## 2019-12-17 LAB
ANION GAP SERPL CALCULATED.3IONS-SCNC: 14 MMOL/L (ref 7–16)
ANISOCYTOSIS: ABNORMAL
BASOPHILS ABSOLUTE: 0.04 E9/L (ref 0–0.2)
BASOPHILS RELATIVE PERCENT: 0.9 % (ref 0–2)
BUN BLDV-MCNC: 32 MG/DL (ref 8–23)
BURR CELLS: ABNORMAL
CALCIUM SERPL-MCNC: 8.9 MG/DL (ref 8.6–10.2)
CHLORIDE BLD-SCNC: 101 MMOL/L (ref 98–107)
CO2: 21 MMOL/L (ref 22–29)
CREAT SERPL-MCNC: 3 MG/DL (ref 0.7–1.2)
EOSINOPHILS ABSOLUTE: 0.15 E9/L (ref 0.05–0.5)
EOSINOPHILS RELATIVE PERCENT: 3.6 % (ref 0–6)
GFR AFRICAN AMERICAN: 25
GFR NON-AFRICAN AMERICAN: 21 ML/MIN/1.73
GLUCOSE BLD-MCNC: 154 MG/DL (ref 74–99)
HCT VFR BLD CALC: 31.8 % (ref 37–54)
HEMOGLOBIN: 9.9 G/DL (ref 12.5–16.5)
LYMPHOCYTES ABSOLUTE: 0.13 E9/L (ref 1.5–4)
LYMPHOCYTES RELATIVE PERCENT: 2.7 % (ref 20–42)
MCH RBC QN AUTO: 28.9 PG (ref 26–35)
MCHC RBC AUTO-ENTMCNC: 31.1 % (ref 32–34.5)
MCV RBC AUTO: 93 FL (ref 80–99.9)
METER GLUCOSE: 109 MG/DL (ref 74–99)
METER GLUCOSE: 99 MG/DL (ref 74–99)
MONOCYTES ABSOLUTE: 0.04 E9/L (ref 0.1–0.95)
MONOCYTES RELATIVE PERCENT: 0.9 % (ref 2–12)
NEUTROPHILS ABSOLUTE: 3.96 E9/L (ref 1.8–7.3)
NEUTROPHILS RELATIVE PERCENT: 91.9 % (ref 43–80)
NUCLEATED RED BLOOD CELLS: 0 /100 WBC
OVALOCYTES: ABNORMAL
PDW BLD-RTO: 16.3 FL (ref 11.5–15)
PLATELET # BLD: 148 E9/L (ref 130–450)
PMV BLD AUTO: 10.5 FL (ref 7–12)
POIKILOCYTES: ABNORMAL
POLYCHROMASIA: ABNORMAL
POTASSIUM REFLEX MAGNESIUM: 4.3 MMOL/L (ref 3.5–5)
PRO-BNP: 3031 PG/ML (ref 0–125)
RBC # BLD: 3.42 E12/L (ref 3.8–5.8)
SODIUM BLD-SCNC: 136 MMOL/L (ref 132–146)
TROPONIN: <0.01 NG/ML (ref 0–0.03)
TROPONIN: <0.01 NG/ML (ref 0–0.03)
WBC # BLD: 4.3 E9/L (ref 4.5–11.5)

## 2019-12-17 PROCEDURE — 2580000003 HC RX 258: Performed by: PHYSICIAN ASSISTANT

## 2019-12-17 PROCEDURE — 83880 ASSAY OF NATRIURETIC PEPTIDE: CPT

## 2019-12-17 PROCEDURE — 6370000000 HC RX 637 (ALT 250 FOR IP): Performed by: PHYSICIAN ASSISTANT

## 2019-12-17 PROCEDURE — 85025 COMPLETE CBC W/AUTO DIFF WBC: CPT

## 2019-12-17 PROCEDURE — 99223 1ST HOSP IP/OBS HIGH 75: CPT | Performed by: INTERNAL MEDICINE

## 2019-12-17 PROCEDURE — 84484 ASSAY OF TROPONIN QUANT: CPT

## 2019-12-17 PROCEDURE — APPSS45 APP SPLIT SHARED TIME 31-45 MINUTES: Performed by: PHYSICIAN ASSISTANT

## 2019-12-17 PROCEDURE — 99285 EMERGENCY DEPT VISIT HI MDM: CPT

## 2019-12-17 PROCEDURE — 82962 GLUCOSE BLOOD TEST: CPT

## 2019-12-17 PROCEDURE — 93005 ELECTROCARDIOGRAM TRACING: CPT | Performed by: EMERGENCY MEDICINE

## 2019-12-17 PROCEDURE — 6360000002 HC RX W HCPCS: Performed by: PHYSICIAN ASSISTANT

## 2019-12-17 PROCEDURE — 71045 X-RAY EXAM CHEST 1 VIEW: CPT

## 2019-12-17 PROCEDURE — 2060000000 HC ICU INTERMEDIATE R&B

## 2019-12-17 PROCEDURE — 36415 COLL VENOUS BLD VENIPUNCTURE: CPT

## 2019-12-17 PROCEDURE — 94761 N-INVAS EAR/PLS OXIMETRY MLT: CPT

## 2019-12-17 PROCEDURE — 80048 BASIC METABOLIC PNL TOTAL CA: CPT

## 2019-12-17 RX ORDER — ONDANSETRON 2 MG/ML
4 INJECTION INTRAMUSCULAR; INTRAVENOUS EVERY 6 HOURS PRN
Status: DISCONTINUED | OUTPATIENT
Start: 2019-12-17 | End: 2019-12-27 | Stop reason: HOSPADM

## 2019-12-17 RX ORDER — DOXAZOSIN MESYLATE 4 MG/1
8 TABLET ORAL NIGHTLY
Status: DISCONTINUED | OUTPATIENT
Start: 2019-12-17 | End: 2019-12-27 | Stop reason: HOSPADM

## 2019-12-17 RX ORDER — NICOTINE POLACRILEX 4 MG
15 LOZENGE BUCCAL PRN
Status: DISCONTINUED | OUTPATIENT
Start: 2019-12-17 | End: 2019-12-27 | Stop reason: HOSPADM

## 2019-12-17 RX ORDER — DILTIAZEM HYDROCHLORIDE 240 MG/1
240 CAPSULE, COATED, EXTENDED RELEASE ORAL NIGHTLY
Status: DISCONTINUED | OUTPATIENT
Start: 2019-12-17 | End: 2019-12-27 | Stop reason: HOSPADM

## 2019-12-17 RX ORDER — DEXTROSE MONOHYDRATE 25 G/50ML
12.5 INJECTION, SOLUTION INTRAVENOUS PRN
Status: DISCONTINUED | OUTPATIENT
Start: 2019-12-17 | End: 2019-12-27 | Stop reason: HOSPADM

## 2019-12-17 RX ORDER — SODIUM CHLORIDE 0.9 % (FLUSH) 0.9 %
10 SYRINGE (ML) INJECTION EVERY 12 HOURS SCHEDULED
Status: DISCONTINUED | OUTPATIENT
Start: 2019-12-17 | End: 2019-12-27 | Stop reason: HOSPADM

## 2019-12-17 RX ORDER — DEXTROSE MONOHYDRATE 50 MG/ML
100 INJECTION, SOLUTION INTRAVENOUS PRN
Status: DISCONTINUED | OUTPATIENT
Start: 2019-12-17 | End: 2019-12-27 | Stop reason: HOSPADM

## 2019-12-17 RX ORDER — HYDRALAZINE HYDROCHLORIDE 50 MG/1
50 TABLET, FILM COATED ORAL NIGHTLY
Status: DISCONTINUED | OUTPATIENT
Start: 2019-12-17 | End: 2019-12-18

## 2019-12-17 RX ORDER — FUROSEMIDE 10 MG/ML
40 INJECTION INTRAMUSCULAR; INTRAVENOUS 2 TIMES DAILY
Status: DISCONTINUED | OUTPATIENT
Start: 2019-12-17 | End: 2019-12-20

## 2019-12-17 RX ORDER — SODIUM CHLORIDE 0.9 % (FLUSH) 0.9 %
10 SYRINGE (ML) INJECTION PRN
Status: DISCONTINUED | OUTPATIENT
Start: 2019-12-17 | End: 2019-12-27 | Stop reason: HOSPADM

## 2019-12-17 RX ORDER — ATORVASTATIN CALCIUM 20 MG/1
20 TABLET, FILM COATED ORAL NIGHTLY
Status: DISCONTINUED | OUTPATIENT
Start: 2019-12-17 | End: 2019-12-27 | Stop reason: HOSPADM

## 2019-12-17 RX ORDER — POTASSIUM CHLORIDE 20 MEQ/1
40 TABLET, EXTENDED RELEASE ORAL DAILY
Status: DISCONTINUED | OUTPATIENT
Start: 2019-12-17 | End: 2019-12-27 | Stop reason: HOSPADM

## 2019-12-17 RX ADMIN — HYDRALAZINE HYDROCHLORIDE 50 MG: 50 TABLET, FILM COATED ORAL at 21:28

## 2019-12-17 RX ADMIN — CARBIDOPA AND LEVODOPA 2 TABLET: 25; 100 TABLET ORAL at 22:23

## 2019-12-17 RX ADMIN — DOXAZOSIN MESYLATE 8 MG: 4 TABLET ORAL at 22:23

## 2019-12-17 RX ADMIN — FUROSEMIDE 40 MG: 10 INJECTION, SOLUTION INTRAMUSCULAR; INTRAVENOUS at 21:27

## 2019-12-17 RX ADMIN — ENOXAPARIN SODIUM 40 MG: 40 INJECTION SUBCUTANEOUS at 21:27

## 2019-12-17 RX ADMIN — DILTIAZEM HYDROCHLORIDE 240 MG: 240 CAPSULE, COATED, EXTENDED RELEASE ORAL at 21:26

## 2019-12-17 RX ADMIN — POTASSIUM CHLORIDE 40 MEQ: 20 TABLET, EXTENDED RELEASE ORAL at 21:27

## 2019-12-17 RX ADMIN — SODIUM CHLORIDE, PRESERVATIVE FREE 10 ML: 5 INJECTION INTRAVENOUS at 22:24

## 2019-12-17 RX ADMIN — ATORVASTATIN CALCIUM 20 MG: 20 TABLET, FILM COATED ORAL at 21:27

## 2019-12-17 ASSESSMENT — PAIN DESCRIPTION - PAIN TYPE: TYPE: ACUTE PAIN

## 2019-12-17 ASSESSMENT — PAIN DESCRIPTION - FREQUENCY: FREQUENCY: INTERMITTENT

## 2019-12-17 ASSESSMENT — PAIN DESCRIPTION - LOCATION: LOCATION: LEG

## 2019-12-17 ASSESSMENT — ENCOUNTER SYMPTOMS
ABDOMINAL PAIN: 0
SHORTNESS OF BREATH: 1
BACK PAIN: 0

## 2019-12-17 ASSESSMENT — PAIN SCALES - GENERAL
PAINLEVEL_OUTOF10: 0

## 2019-12-17 ASSESSMENT — PAIN DESCRIPTION - ORIENTATION: ORIENTATION: RIGHT

## 2019-12-18 ENCOUNTER — APPOINTMENT (OUTPATIENT)
Dept: GENERAL RADIOLOGY | Age: 74
DRG: 291 | End: 2019-12-18
Payer: MEDICARE

## 2019-12-18 ENCOUNTER — APPOINTMENT (OUTPATIENT)
Dept: ULTRASOUND IMAGING | Age: 74
DRG: 291 | End: 2019-12-18
Payer: MEDICARE

## 2019-12-18 LAB
ALBUMIN SERPL-MCNC: 3.6 G/DL (ref 3.5–5.2)
ALP BLD-CCNC: 106 U/L (ref 40–129)
ALT SERPL-CCNC: <5 U/L (ref 0–40)
ANION GAP SERPL CALCULATED.3IONS-SCNC: 12 MMOL/L (ref 7–16)
ANISOCYTOSIS: ABNORMAL
AST SERPL-CCNC: 19 U/L (ref 0–39)
BACTERIA: ABNORMAL /HPF
BASOPHILS ABSOLUTE: 0.02 E9/L (ref 0–0.2)
BASOPHILS RELATIVE PERCENT: 0.5 % (ref 0–2)
BILIRUB SERPL-MCNC: 0.6 MG/DL (ref 0–1.2)
BILIRUBIN URINE: NEGATIVE
BLOOD, URINE: ABNORMAL
BUN BLDV-MCNC: 32 MG/DL (ref 8–23)
BURR CELLS: ABNORMAL
CALCIUM SERPL-MCNC: 8.6 MG/DL (ref 8.6–10.2)
CASTS 2: ABNORMAL /LPF
CHLORIDE BLD-SCNC: 103 MMOL/L (ref 98–107)
CLARITY: ABNORMAL
CO2: 23 MMOL/L (ref 22–29)
COLOR: ABNORMAL
CREAT SERPL-MCNC: 3.5 MG/DL (ref 0.7–1.2)
CREATININE URINE: 30 MG/DL (ref 40–278)
EKG ATRIAL RATE: 76 BPM
EKG Q-T INTERVAL: 430 MS
EKG QRS DURATION: 106 MS
EKG QTC CALCULATION (BAZETT): 464 MS
EKG R AXIS: 82 DEGREES
EKG T AXIS: 43 DEGREES
EKG VENTRICULAR RATE: 70 BPM
EOSINOPHILS ABSOLUTE: 0.09 E9/L (ref 0.05–0.5)
EOSINOPHILS RELATIVE PERCENT: 2.2 % (ref 0–6)
EPITHELIAL CELLS, UA: ABNORMAL /HPF
GFR AFRICAN AMERICAN: 21
GFR NON-AFRICAN AMERICAN: 17 ML/MIN/1.73
GLUCOSE BLD-MCNC: 125 MG/DL (ref 74–99)
GLUCOSE URINE: NEGATIVE MG/DL
HCT VFR BLD CALC: 30.1 % (ref 37–54)
HEMOGLOBIN: 9.4 G/DL (ref 12.5–16.5)
IMMATURE GRANULOCYTES #: 0.01 E9/L
IMMATURE GRANULOCYTES %: 0.2 % (ref 0–5)
KETONES, URINE: NEGATIVE MG/DL
LEUKOCYTE ESTERASE, URINE: ABNORMAL
LYMPHOCYTES ABSOLUTE: 0.31 E9/L (ref 1.5–4)
LYMPHOCYTES RELATIVE PERCENT: 7.5 % (ref 20–42)
MCH RBC QN AUTO: 28.6 PG (ref 26–35)
MCHC RBC AUTO-ENTMCNC: 31.2 % (ref 32–34.5)
MCV RBC AUTO: 91.5 FL (ref 80–99.9)
METER GLUCOSE: 100 MG/DL (ref 74–99)
METER GLUCOSE: 107 MG/DL (ref 74–99)
METER GLUCOSE: 108 MG/DL (ref 74–99)
METER GLUCOSE: 112 MG/DL (ref 74–99)
MICROALBUMIN UR-MCNC: 95.2 MG/L
MICROALBUMIN/CREAT UR-RTO: 317.3 (ref 0–30)
MONOCYTES ABSOLUTE: 0.64 E9/L (ref 0.1–0.95)
MONOCYTES RELATIVE PERCENT: 15.4 % (ref 2–12)
MUCUS: PRESENT
NEUTROPHILS ABSOLUTE: 3.09 E9/L (ref 1.8–7.3)
NEUTROPHILS RELATIVE PERCENT: 74.2 % (ref 43–80)
NITRITE, URINE: NEGATIVE
OVALOCYTES: ABNORMAL
PDW BLD-RTO: 16.3 FL (ref 11.5–15)
PH UA: 5.5 (ref 5–9)
PLATELET # BLD: 144 E9/L (ref 130–450)
PMV BLD AUTO: 10.4 FL (ref 7–12)
POIKILOCYTES: ABNORMAL
POLYCHROMASIA: ABNORMAL
POTASSIUM REFLEX MAGNESIUM: 4.2 MMOL/L (ref 3.5–5)
PROTEIN UA: NEGATIVE MG/DL
RBC # BLD: 3.29 E12/L (ref 3.8–5.8)
RBC UA: >20 /HPF (ref 0–2)
RENAL EPITHELIAL, UA: ABNORMAL /HPF
SEDIMENTATION RATE, ERYTHROCYTE: 55 MM/HR (ref 0–15)
SODIUM BLD-SCNC: 138 MMOL/L (ref 132–146)
SODIUM URINE: 95 MMOL/L
SPECIFIC GRAVITY UA: 1.01 (ref 1–1.03)
TOTAL PROTEIN: 7.6 G/DL (ref 6.4–8.3)
TROPONIN: <0.01 NG/ML (ref 0–0.03)
UROBILINOGEN, URINE: 0.2 E.U./DL
WBC # BLD: 4.2 E9/L (ref 4.5–11.5)
WBC UA: ABNORMAL /HPF (ref 0–5)
YEAST: ABNORMAL

## 2019-12-18 PROCEDURE — 76770 US EXAM ABDO BACK WALL COMP: CPT

## 2019-12-18 PROCEDURE — 2060000000 HC ICU INTERMEDIATE R&B

## 2019-12-18 PROCEDURE — 84484 ASSAY OF TROPONIN QUANT: CPT

## 2019-12-18 PROCEDURE — 73630 X-RAY EXAM OF FOOT: CPT

## 2019-12-18 PROCEDURE — 99223 1ST HOSP IP/OBS HIGH 75: CPT | Performed by: INTERNAL MEDICINE

## 2019-12-18 PROCEDURE — 86255 FLUORESCENT ANTIBODY SCREEN: CPT

## 2019-12-18 PROCEDURE — APPSS60 APP SPLIT SHARED TIME 46-60 MINUTES: Performed by: NURSE PRACTITIONER

## 2019-12-18 PROCEDURE — 87205 SMEAR GRAM STAIN: CPT

## 2019-12-18 PROCEDURE — 85651 RBC SED RATE NONAUTOMATED: CPT

## 2019-12-18 PROCEDURE — 6370000000 HC RX 637 (ALT 250 FOR IP): Performed by: PHYSICIAN ASSISTANT

## 2019-12-18 PROCEDURE — 93010 ELECTROCARDIOGRAM REPORT: CPT | Performed by: INTERNAL MEDICINE

## 2019-12-18 PROCEDURE — 80053 COMPREHEN METABOLIC PANEL: CPT

## 2019-12-18 PROCEDURE — 87075 CULTR BACTERIA EXCEPT BLOOD: CPT

## 2019-12-18 PROCEDURE — 6360000002 HC RX W HCPCS: Performed by: PHYSICIAN ASSISTANT

## 2019-12-18 PROCEDURE — 86038 ANTINUCLEAR ANTIBODIES: CPT

## 2019-12-18 PROCEDURE — 36415 COLL VENOUS BLD VENIPUNCTURE: CPT

## 2019-12-18 PROCEDURE — 82044 UR ALBUMIN SEMIQUANTITATIVE: CPT

## 2019-12-18 PROCEDURE — 82962 GLUCOSE BLOOD TEST: CPT

## 2019-12-18 PROCEDURE — 82570 ASSAY OF URINE CREATININE: CPT

## 2019-12-18 PROCEDURE — 2580000003 HC RX 258: Performed by: PHYSICIAN ASSISTANT

## 2019-12-18 PROCEDURE — 84300 ASSAY OF URINE SODIUM: CPT

## 2019-12-18 PROCEDURE — 99233 SBSQ HOSP IP/OBS HIGH 50: CPT | Performed by: INTERNAL MEDICINE

## 2019-12-18 PROCEDURE — 87070 CULTURE OTHR SPECIMN AEROBIC: CPT

## 2019-12-18 PROCEDURE — 81001 URINALYSIS AUTO W/SCOPE: CPT

## 2019-12-18 PROCEDURE — 73590 X-RAY EXAM OF LOWER LEG: CPT

## 2019-12-18 PROCEDURE — 6370000000 HC RX 637 (ALT 250 FOR IP): Performed by: INTERNAL MEDICINE

## 2019-12-18 PROCEDURE — 85025 COMPLETE CBC W/AUTO DIFF WBC: CPT

## 2019-12-18 RX ORDER — HYDRALAZINE HYDROCHLORIDE 50 MG/1
50 TABLET, FILM COATED ORAL 3 TIMES DAILY
Status: DISCONTINUED | OUTPATIENT
Start: 2019-12-18 | End: 2019-12-18

## 2019-12-18 RX ADMIN — POTASSIUM CHLORIDE 40 MEQ: 20 TABLET, EXTENDED RELEASE ORAL at 09:00

## 2019-12-18 RX ADMIN — ENOXAPARIN SODIUM 40 MG: 40 INJECTION SUBCUTANEOUS at 09:00

## 2019-12-18 RX ADMIN — FUROSEMIDE 40 MG: 10 INJECTION, SOLUTION INTRAMUSCULAR; INTRAVENOUS at 09:00

## 2019-12-18 RX ADMIN — ATORVASTATIN CALCIUM 20 MG: 20 TABLET, FILM COATED ORAL at 20:46

## 2019-12-18 RX ADMIN — HYDRALAZINE HYDROCHLORIDE 75 MG: 50 TABLET, FILM COATED ORAL at 20:46

## 2019-12-18 RX ADMIN — CARBIDOPA AND LEVODOPA 1 TABLET: 25; 100 TABLET ORAL at 17:01

## 2019-12-18 RX ADMIN — DOXAZOSIN MESYLATE 8 MG: 4 TABLET ORAL at 20:46

## 2019-12-18 RX ADMIN — DILTIAZEM HYDROCHLORIDE 240 MG: 240 CAPSULE, COATED, EXTENDED RELEASE ORAL at 20:45

## 2019-12-18 RX ADMIN — SODIUM CHLORIDE, PRESERVATIVE FREE 10 ML: 5 INJECTION INTRAVENOUS at 09:00

## 2019-12-18 RX ADMIN — SODIUM CHLORIDE, PRESERVATIVE FREE 10 ML: 5 INJECTION INTRAVENOUS at 20:49

## 2019-12-18 RX ADMIN — CARBIDOPA AND LEVODOPA 1 TABLET: 25; 100 TABLET ORAL at 20:46

## 2019-12-18 RX ADMIN — FUROSEMIDE 40 MG: 10 INJECTION, SOLUTION INTRAMUSCULAR; INTRAVENOUS at 17:02

## 2019-12-18 ASSESSMENT — PAIN SCALES - GENERAL: PAINLEVEL_OUTOF10: 0

## 2019-12-19 LAB
ALBUMIN SERPL-MCNC: 3.7 G/DL (ref 3.5–5.2)
ALP BLD-CCNC: 109 U/L (ref 40–129)
ALT SERPL-CCNC: <5 U/L (ref 0–40)
ANION GAP SERPL CALCULATED.3IONS-SCNC: 13 MMOL/L (ref 7–16)
AST SERPL-CCNC: 15 U/L (ref 0–39)
BILIRUB SERPL-MCNC: 0.7 MG/DL (ref 0–1.2)
BUN BLDV-MCNC: 34 MG/DL (ref 8–23)
CALCIUM SERPL-MCNC: 8.4 MG/DL (ref 8.6–10.2)
CHLORIDE BLD-SCNC: 101 MMOL/L (ref 98–107)
CO2: 23 MMOL/L (ref 22–29)
CREAT SERPL-MCNC: 3.8 MG/DL (ref 0.7–1.2)
EOSINOPHIL, URINE: 0 % (ref 0–1)
GFR AFRICAN AMERICAN: 19
GFR NON-AFRICAN AMERICAN: 16 ML/MIN/1.73
GLUCOSE BLD-MCNC: 144 MG/DL (ref 74–99)
HCT VFR BLD CALC: 30 % (ref 37–54)
HEMOGLOBIN: 9.5 G/DL (ref 12.5–16.5)
MCH RBC QN AUTO: 28.9 PG (ref 26–35)
MCHC RBC AUTO-ENTMCNC: 31.7 % (ref 32–34.5)
MCV RBC AUTO: 91.2 FL (ref 80–99.9)
METER GLUCOSE: 102 MG/DL (ref 74–99)
METER GLUCOSE: 107 MG/DL (ref 74–99)
METER GLUCOSE: 108 MG/DL (ref 74–99)
METER GLUCOSE: 138 MG/DL (ref 74–99)
PDW BLD-RTO: 16.2 FL (ref 11.5–15)
PLATELET # BLD: 141 E9/L (ref 130–450)
PMV BLD AUTO: 10.4 FL (ref 7–12)
POTASSIUM SERPL-SCNC: 4.1 MMOL/L (ref 3.5–5)
RBC # BLD: 3.29 E12/L (ref 3.8–5.8)
SODIUM BLD-SCNC: 137 MMOL/L (ref 132–146)
TOTAL PROTEIN: 7.6 G/DL (ref 6.4–8.3)
WBC # BLD: 4.1 E9/L (ref 4.5–11.5)

## 2019-12-19 PROCEDURE — 80053 COMPREHEN METABOLIC PANEL: CPT

## 2019-12-19 PROCEDURE — 36415 COLL VENOUS BLD VENIPUNCTURE: CPT

## 2019-12-19 PROCEDURE — 99232 SBSQ HOSP IP/OBS MODERATE 35: CPT | Performed by: INTERNAL MEDICINE

## 2019-12-19 PROCEDURE — 97165 OT EVAL LOW COMPLEX 30 MIN: CPT

## 2019-12-19 PROCEDURE — 6360000002 HC RX W HCPCS: Performed by: INTERNAL MEDICINE

## 2019-12-19 PROCEDURE — 97530 THERAPEUTIC ACTIVITIES: CPT

## 2019-12-19 PROCEDURE — 97162 PT EVAL MOD COMPLEX 30 MIN: CPT

## 2019-12-19 PROCEDURE — 2060000000 HC ICU INTERMEDIATE R&B

## 2019-12-19 PROCEDURE — 82962 GLUCOSE BLOOD TEST: CPT

## 2019-12-19 PROCEDURE — 6370000000 HC RX 637 (ALT 250 FOR IP): Performed by: PHYSICIAN ASSISTANT

## 2019-12-19 PROCEDURE — 85027 COMPLETE CBC AUTOMATED: CPT

## 2019-12-19 PROCEDURE — 6370000000 HC RX 637 (ALT 250 FOR IP): Performed by: INTERNAL MEDICINE

## 2019-12-19 PROCEDURE — 6360000002 HC RX W HCPCS: Performed by: PHYSICIAN ASSISTANT

## 2019-12-19 PROCEDURE — 2580000003 HC RX 258: Performed by: PHYSICIAN ASSISTANT

## 2019-12-19 PROCEDURE — 97110 THERAPEUTIC EXERCISES: CPT

## 2019-12-19 RX ORDER — DIPHENHYDRAMINE HYDROCHLORIDE 50 MG/ML
12.5 INJECTION INTRAMUSCULAR; INTRAVENOUS
Status: COMPLETED | OUTPATIENT
Start: 2019-12-19 | End: 2019-12-19

## 2019-12-19 RX ADMIN — FUROSEMIDE 40 MG: 10 INJECTION, SOLUTION INTRAMUSCULAR; INTRAVENOUS at 10:39

## 2019-12-19 RX ADMIN — HYDRALAZINE HYDROCHLORIDE 75 MG: 50 TABLET, FILM COATED ORAL at 10:39

## 2019-12-19 RX ADMIN — CARBIDOPA AND LEVODOPA 1 TABLET: 25; 100 TABLET ORAL at 10:39

## 2019-12-19 RX ADMIN — ENOXAPARIN SODIUM 40 MG: 40 INJECTION SUBCUTANEOUS at 10:39

## 2019-12-19 RX ADMIN — DILTIAZEM HYDROCHLORIDE 240 MG: 240 CAPSULE, COATED, EXTENDED RELEASE ORAL at 21:33

## 2019-12-19 RX ADMIN — HYDRALAZINE HYDROCHLORIDE 75 MG: 50 TABLET, FILM COATED ORAL at 21:33

## 2019-12-19 RX ADMIN — POTASSIUM CHLORIDE 40 MEQ: 20 TABLET, EXTENDED RELEASE ORAL at 10:39

## 2019-12-19 RX ADMIN — FUROSEMIDE 40 MG: 10 INJECTION, SOLUTION INTRAMUSCULAR; INTRAVENOUS at 17:49

## 2019-12-19 RX ADMIN — DIPHENHYDRAMINE HYDROCHLORIDE 12.5 MG: 50 INJECTION, SOLUTION INTRAMUSCULAR; INTRAVENOUS at 22:40

## 2019-12-19 RX ADMIN — CARBIDOPA AND LEVODOPA 1 TABLET: 25; 100 TABLET ORAL at 21:33

## 2019-12-19 RX ADMIN — HYDRALAZINE HYDROCHLORIDE 75 MG: 50 TABLET, FILM COATED ORAL at 14:13

## 2019-12-19 RX ADMIN — SODIUM CHLORIDE, PRESERVATIVE FREE 10 ML: 5 INJECTION INTRAVENOUS at 21:34

## 2019-12-19 RX ADMIN — SODIUM CHLORIDE, PRESERVATIVE FREE 10 ML: 5 INJECTION INTRAVENOUS at 10:40

## 2019-12-19 RX ADMIN — ATORVASTATIN CALCIUM 20 MG: 20 TABLET, FILM COATED ORAL at 21:33

## 2019-12-19 RX ADMIN — CARBIDOPA AND LEVODOPA 1 TABLET: 25; 100 TABLET ORAL at 14:13

## 2019-12-19 RX ADMIN — DOXAZOSIN MESYLATE 8 MG: 4 TABLET ORAL at 21:33

## 2019-12-19 ASSESSMENT — PAIN SCALES - WONG BAKER: WONGBAKER_NUMERICALRESPONSE: 0

## 2019-12-19 ASSESSMENT — PAIN SCALES - GENERAL
PAINLEVEL_OUTOF10: 0
PAINLEVEL_OUTOF10: 0

## 2019-12-20 LAB
ALBUMIN SERPL-MCNC: 3.6 G/DL (ref 3.5–5.2)
ALP BLD-CCNC: 104 U/L (ref 40–129)
ALT SERPL-CCNC: <5 U/L (ref 0–40)
ANAEROBIC CULTURE: NORMAL
ANION GAP SERPL CALCULATED.3IONS-SCNC: 13 MMOL/L (ref 7–16)
ANTI-NUCLEAR ANTIBODY (ANA): NEGATIVE
AST SERPL-CCNC: 15 U/L (ref 0–39)
BILIRUB SERPL-MCNC: 0.8 MG/DL (ref 0–1.2)
BUN BLDV-MCNC: 36 MG/DL (ref 8–23)
CALCIUM SERPL-MCNC: 8.5 MG/DL (ref 8.6–10.2)
CHLORIDE BLD-SCNC: 100 MMOL/L (ref 98–107)
CO2: 24 MMOL/L (ref 22–29)
CREAT SERPL-MCNC: 4 MG/DL (ref 0.7–1.2)
GFR AFRICAN AMERICAN: 18
GFR NON-AFRICAN AMERICAN: 15 ML/MIN/1.73
GLUCOSE BLD-MCNC: 108 MG/DL (ref 74–99)
HCT VFR BLD CALC: 30 % (ref 37–54)
HEMOGLOBIN: 9.4 G/DL (ref 12.5–16.5)
MCH RBC QN AUTO: 28.5 PG (ref 26–35)
MCHC RBC AUTO-ENTMCNC: 31.3 % (ref 32–34.5)
MCV RBC AUTO: 90.9 FL (ref 80–99.9)
METER GLUCOSE: 109 MG/DL (ref 74–99)
METER GLUCOSE: 143 MG/DL (ref 74–99)
METER GLUCOSE: 98 MG/DL (ref 74–99)
PDW BLD-RTO: 16 FL (ref 11.5–15)
PLATELET # BLD: 131 E9/L (ref 130–450)
PMV BLD AUTO: 10.2 FL (ref 7–12)
POTASSIUM SERPL-SCNC: 4.1 MMOL/L (ref 3.5–5)
RBC # BLD: 3.3 E12/L (ref 3.8–5.8)
SODIUM BLD-SCNC: 137 MMOL/L (ref 132–146)
TOTAL PROTEIN: 7.5 G/DL (ref 6.4–8.3)
WBC # BLD: 4.2 E9/L (ref 4.5–11.5)

## 2019-12-20 PROCEDURE — 99233 SBSQ HOSP IP/OBS HIGH 50: CPT | Performed by: INTERNAL MEDICINE

## 2019-12-20 PROCEDURE — 97110 THERAPEUTIC EXERCISES: CPT

## 2019-12-20 PROCEDURE — 6370000000 HC RX 637 (ALT 250 FOR IP): Performed by: INTERNAL MEDICINE

## 2019-12-20 PROCEDURE — 80053 COMPREHEN METABOLIC PANEL: CPT

## 2019-12-20 PROCEDURE — 6370000000 HC RX 637 (ALT 250 FOR IP): Performed by: PHYSICIAN ASSISTANT

## 2019-12-20 PROCEDURE — 97535 SELF CARE MNGMENT TRAINING: CPT

## 2019-12-20 PROCEDURE — 36415 COLL VENOUS BLD VENIPUNCTURE: CPT

## 2019-12-20 PROCEDURE — 2500000003 HC RX 250 WO HCPCS: Performed by: INTERNAL MEDICINE

## 2019-12-20 PROCEDURE — 6360000002 HC RX W HCPCS: Performed by: PHYSICIAN ASSISTANT

## 2019-12-20 PROCEDURE — 2580000003 HC RX 258: Performed by: PHYSICIAN ASSISTANT

## 2019-12-20 PROCEDURE — 82962 GLUCOSE BLOOD TEST: CPT

## 2019-12-20 PROCEDURE — 1200000000 HC SEMI PRIVATE

## 2019-12-20 PROCEDURE — 97530 THERAPEUTIC ACTIVITIES: CPT

## 2019-12-20 PROCEDURE — APPSS30 APP SPLIT SHARED TIME 16-30 MINUTES: Performed by: NURSE PRACTITIONER

## 2019-12-20 PROCEDURE — 85027 COMPLETE CBC AUTOMATED: CPT

## 2019-12-20 RX ORDER — METOLAZONE 2.5 MG/1
2.5 TABLET ORAL DAILY
Status: DISCONTINUED | OUTPATIENT
Start: 2019-12-20 | End: 2019-12-25 | Stop reason: ALTCHOICE

## 2019-12-20 RX ORDER — FUROSEMIDE 40 MG/1
40 TABLET ORAL 2 TIMES DAILY
Status: DISCONTINUED | OUTPATIENT
Start: 2019-12-20 | End: 2019-12-22

## 2019-12-20 RX ORDER — PETROLATUM 420 MG/G
OINTMENT TOPICAL 2 TIMES DAILY
Status: DISCONTINUED | OUTPATIENT
Start: 2019-12-20 | End: 2019-12-27 | Stop reason: HOSPADM

## 2019-12-20 RX ADMIN — HYDRALAZINE HYDROCHLORIDE 75 MG: 50 TABLET, FILM COATED ORAL at 15:13

## 2019-12-20 RX ADMIN — PETROLATUM: 420 OINTMENT TOPICAL at 20:43

## 2019-12-20 RX ADMIN — CARBIDOPA AND LEVODOPA 1 TABLET: 25; 100 TABLET ORAL at 10:48

## 2019-12-20 RX ADMIN — ATORVASTATIN CALCIUM 20 MG: 20 TABLET, FILM COATED ORAL at 20:42

## 2019-12-20 RX ADMIN — HYDRALAZINE HYDROCHLORIDE 75 MG: 50 TABLET, FILM COATED ORAL at 10:48

## 2019-12-20 RX ADMIN — POTASSIUM CHLORIDE 40 MEQ: 20 TABLET, EXTENDED RELEASE ORAL at 10:48

## 2019-12-20 RX ADMIN — INSULIN LISPRO 2 UNITS: 100 INJECTION, SOLUTION INTRAVENOUS; SUBCUTANEOUS at 17:30

## 2019-12-20 RX ADMIN — FUROSEMIDE 40 MG: 10 INJECTION, SOLUTION INTRAMUSCULAR; INTRAVENOUS at 10:48

## 2019-12-20 RX ADMIN — FUROSEMIDE 40 MG: 40 TABLET ORAL at 17:31

## 2019-12-20 RX ADMIN — MICONAZOLE NITRATE: 20 POWDER TOPICAL at 20:43

## 2019-12-20 RX ADMIN — SODIUM CHLORIDE, PRESERVATIVE FREE 10 ML: 5 INJECTION INTRAVENOUS at 10:48

## 2019-12-20 RX ADMIN — SODIUM CHLORIDE, PRESERVATIVE FREE 10 ML: 5 INJECTION INTRAVENOUS at 20:43

## 2019-12-20 RX ADMIN — CARBIDOPA AND LEVODOPA 1 TABLET: 25; 100 TABLET ORAL at 15:13

## 2019-12-20 RX ADMIN — HYDRALAZINE HYDROCHLORIDE 75 MG: 50 TABLET, FILM COATED ORAL at 20:42

## 2019-12-20 RX ADMIN — CARBIDOPA AND LEVODOPA 1 TABLET: 25; 100 TABLET ORAL at 20:42

## 2019-12-20 RX ADMIN — ENOXAPARIN SODIUM 40 MG: 40 INJECTION SUBCUTANEOUS at 10:48

## 2019-12-20 RX ADMIN — DILTIAZEM HYDROCHLORIDE 240 MG: 240 CAPSULE, COATED, EXTENDED RELEASE ORAL at 20:42

## 2019-12-20 RX ADMIN — METOLAZONE 2.5 MG: 2.5 TABLET ORAL at 15:13

## 2019-12-20 RX ADMIN — PETROLATUM: 420 OINTMENT TOPICAL at 14:06

## 2019-12-20 RX ADMIN — MICONAZOLE NITRATE: 20 POWDER TOPICAL at 11:01

## 2019-12-20 RX ADMIN — DOXAZOSIN MESYLATE 8 MG: 4 TABLET ORAL at 20:42

## 2019-12-20 ASSESSMENT — PAIN SCALES - GENERAL
PAINLEVEL_OUTOF10: 0

## 2019-12-20 ASSESSMENT — PAIN SCALES - WONG BAKER: WONGBAKER_NUMERICALRESPONSE: 0

## 2019-12-21 LAB
ALBUMIN SERPL-MCNC: 3.5 G/DL (ref 3.5–5.2)
ALP BLD-CCNC: 105 U/L (ref 40–129)
ALT SERPL-CCNC: <5 U/L (ref 0–40)
ANION GAP SERPL CALCULATED.3IONS-SCNC: 13 MMOL/L (ref 7–16)
AST SERPL-CCNC: 17 U/L (ref 0–39)
BILIRUB SERPL-MCNC: 0.7 MG/DL (ref 0–1.2)
BUN BLDV-MCNC: 38 MG/DL (ref 8–23)
CALCIUM SERPL-MCNC: 8.4 MG/DL (ref 8.6–10.2)
CHLORIDE BLD-SCNC: 98 MMOL/L (ref 98–107)
CO2: 25 MMOL/L (ref 22–29)
CREAT SERPL-MCNC: 4.1 MG/DL (ref 0.7–1.2)
GFR AFRICAN AMERICAN: 17
GFR NON-AFRICAN AMERICAN: 14 ML/MIN/1.73
GLUCOSE BLD-MCNC: 102 MG/DL (ref 74–99)
HCT VFR BLD CALC: 30.2 % (ref 37–54)
HEMOGLOBIN: 9.5 G/DL (ref 12.5–16.5)
MCH RBC QN AUTO: 28.4 PG (ref 26–35)
MCHC RBC AUTO-ENTMCNC: 31.5 % (ref 32–34.5)
MCV RBC AUTO: 90.1 FL (ref 80–99.9)
METER GLUCOSE: 107 MG/DL (ref 74–99)
METER GLUCOSE: 115 MG/DL (ref 74–99)
METER GLUCOSE: 146 MG/DL (ref 74–99)
METER GLUCOSE: 95 MG/DL (ref 74–99)
PDW BLD-RTO: 15.9 FL (ref 11.5–15)
PLATELET # BLD: 144 E9/L (ref 130–450)
PMV BLD AUTO: 10.8 FL (ref 7–12)
POTASSIUM REFLEX MAGNESIUM: 4 MMOL/L (ref 3.5–5)
RBC # BLD: 3.35 E12/L (ref 3.8–5.8)
SODIUM BLD-SCNC: 136 MMOL/L (ref 132–146)
TOTAL PROTEIN: 7.6 G/DL (ref 6.4–8.3)
WBC # BLD: 4.4 E9/L (ref 4.5–11.5)
WOUND/ABSCESS: NORMAL

## 2019-12-21 PROCEDURE — 99232 SBSQ HOSP IP/OBS MODERATE 35: CPT | Performed by: INTERNAL MEDICINE

## 2019-12-21 PROCEDURE — 85027 COMPLETE CBC AUTOMATED: CPT

## 2019-12-21 PROCEDURE — 6370000000 HC RX 637 (ALT 250 FOR IP): Performed by: INTERNAL MEDICINE

## 2019-12-21 PROCEDURE — 1200000000 HC SEMI PRIVATE

## 2019-12-21 PROCEDURE — 2500000003 HC RX 250 WO HCPCS: Performed by: INTERNAL MEDICINE

## 2019-12-21 PROCEDURE — 36415 COLL VENOUS BLD VENIPUNCTURE: CPT

## 2019-12-21 PROCEDURE — 2580000003 HC RX 258: Performed by: PHYSICIAN ASSISTANT

## 2019-12-21 PROCEDURE — 80053 COMPREHEN METABOLIC PANEL: CPT

## 2019-12-21 PROCEDURE — 6370000000 HC RX 637 (ALT 250 FOR IP): Performed by: CLINICAL NURSE SPECIALIST

## 2019-12-21 PROCEDURE — 82962 GLUCOSE BLOOD TEST: CPT

## 2019-12-21 PROCEDURE — 6370000000 HC RX 637 (ALT 250 FOR IP): Performed by: PHYSICIAN ASSISTANT

## 2019-12-21 PROCEDURE — APPSS30 APP SPLIT SHARED TIME 16-30 MINUTES: Performed by: NURSE PRACTITIONER

## 2019-12-21 PROCEDURE — 6360000002 HC RX W HCPCS: Performed by: PHYSICIAN ASSISTANT

## 2019-12-21 RX ORDER — HYDRALAZINE HYDROCHLORIDE 50 MG/1
100 TABLET, FILM COATED ORAL 3 TIMES DAILY
Status: DISCONTINUED | OUTPATIENT
Start: 2019-12-21 | End: 2019-12-27 | Stop reason: HOSPADM

## 2019-12-21 RX ADMIN — MELATONIN 3 MG ORAL TABLET 5 MG: 3 TABLET ORAL at 23:46

## 2019-12-21 RX ADMIN — PETROLATUM: 420 OINTMENT TOPICAL at 08:40

## 2019-12-21 RX ADMIN — CARBIDOPA AND LEVODOPA 1 TABLET: 25; 100 TABLET ORAL at 08:39

## 2019-12-21 RX ADMIN — FUROSEMIDE 40 MG: 40 TABLET ORAL at 17:15

## 2019-12-21 RX ADMIN — CARBIDOPA AND LEVODOPA 1 TABLET: 25; 100 TABLET ORAL at 16:11

## 2019-12-21 RX ADMIN — SODIUM CHLORIDE, PRESERVATIVE FREE 10 ML: 5 INJECTION INTRAVENOUS at 16:54

## 2019-12-21 RX ADMIN — CARBIDOPA AND LEVODOPA 1 TABLET: 25; 100 TABLET ORAL at 21:21

## 2019-12-21 RX ADMIN — METOLAZONE 2.5 MG: 2.5 TABLET ORAL at 08:39

## 2019-12-21 RX ADMIN — POTASSIUM CHLORIDE 40 MEQ: 20 TABLET, EXTENDED RELEASE ORAL at 08:38

## 2019-12-21 RX ADMIN — SODIUM CHLORIDE, PRESERVATIVE FREE 10 ML: 5 INJECTION INTRAVENOUS at 08:39

## 2019-12-21 RX ADMIN — MICONAZOLE NITRATE: 20 POWDER TOPICAL at 21:21

## 2019-12-21 RX ADMIN — HYDRALAZINE HYDROCHLORIDE 100 MG: 50 TABLET, FILM COATED ORAL at 21:21

## 2019-12-21 RX ADMIN — MICONAZOLE NITRATE: 20 POWDER TOPICAL at 08:40

## 2019-12-21 RX ADMIN — DILTIAZEM HYDROCHLORIDE 240 MG: 240 CAPSULE, COATED, EXTENDED RELEASE ORAL at 21:21

## 2019-12-21 RX ADMIN — INSULIN LISPRO 1 UNITS: 100 INJECTION, SOLUTION INTRAVENOUS; SUBCUTANEOUS at 21:23

## 2019-12-21 RX ADMIN — HYDRALAZINE HYDROCHLORIDE 75 MG: 50 TABLET, FILM COATED ORAL at 08:39

## 2019-12-21 RX ADMIN — DOXAZOSIN MESYLATE 8 MG: 4 TABLET ORAL at 21:21

## 2019-12-21 RX ADMIN — SODIUM CHLORIDE, PRESERVATIVE FREE 10 ML: 5 INJECTION INTRAVENOUS at 21:22

## 2019-12-21 RX ADMIN — ATORVASTATIN CALCIUM 20 MG: 20 TABLET, FILM COATED ORAL at 21:21

## 2019-12-21 RX ADMIN — FUROSEMIDE 40 MG: 40 TABLET ORAL at 08:39

## 2019-12-21 RX ADMIN — ENOXAPARIN SODIUM 40 MG: 40 INJECTION SUBCUTANEOUS at 08:38

## 2019-12-21 RX ADMIN — PETROLATUM: 420 OINTMENT TOPICAL at 21:22

## 2019-12-21 ASSESSMENT — PAIN SCALES - GENERAL
PAINLEVEL_OUTOF10: 0
PAINLEVEL_OUTOF10: 0

## 2019-12-22 LAB
ALBUMIN SERPL-MCNC: 3.5 G/DL (ref 3.5–5.2)
ALP BLD-CCNC: 100 U/L (ref 40–129)
ALT SERPL-CCNC: <5 U/L (ref 0–40)
ANCA IFA: NORMAL
ANION GAP SERPL CALCULATED.3IONS-SCNC: 10 MMOL/L (ref 7–16)
AST SERPL-CCNC: 17 U/L (ref 0–39)
BILIRUB SERPL-MCNC: 0.8 MG/DL (ref 0–1.2)
BUN BLDV-MCNC: 41 MG/DL (ref 8–23)
CALCIUM SERPL-MCNC: 8.7 MG/DL (ref 8.6–10.2)
CHLORIDE BLD-SCNC: 95 MMOL/L (ref 98–107)
CO2: 28 MMOL/L (ref 22–29)
CREAT SERPL-MCNC: 4 MG/DL (ref 0.7–1.2)
GFR AFRICAN AMERICAN: 18
GFR NON-AFRICAN AMERICAN: 15 ML/MIN/1.73
GLUCOSE BLD-MCNC: 138 MG/DL (ref 74–99)
HBA1C MFR BLD: 5.6 % (ref 4–5.6)
HCT VFR BLD CALC: 30.5 % (ref 37–54)
HEMOGLOBIN: 9.7 G/DL (ref 12.5–16.5)
MAGNESIUM: 2.2 MG/DL (ref 1.6–2.6)
MCH RBC QN AUTO: 28.8 PG (ref 26–35)
MCHC RBC AUTO-ENTMCNC: 31.8 % (ref 32–34.5)
MCV RBC AUTO: 90.5 FL (ref 80–99.9)
METER GLUCOSE: 101 MG/DL (ref 74–99)
METER GLUCOSE: 126 MG/DL (ref 74–99)
METER GLUCOSE: 136 MG/DL (ref 74–99)
METER GLUCOSE: 157 MG/DL (ref 74–99)
PDW BLD-RTO: 15.8 FL (ref 11.5–15)
PLATELET # BLD: 130 E9/L (ref 130–450)
PMV BLD AUTO: 9.7 FL (ref 7–12)
POTASSIUM REFLEX MAGNESIUM: 3.8 MMOL/L (ref 3.5–5)
RBC # BLD: 3.37 E12/L (ref 3.8–5.8)
SODIUM BLD-SCNC: 133 MMOL/L (ref 132–146)
TOTAL PROTEIN: 7.9 G/DL (ref 6.4–8.3)
WBC # BLD: 4 E9/L (ref 4.5–11.5)

## 2019-12-22 PROCEDURE — 6370000000 HC RX 637 (ALT 250 FOR IP): Performed by: INTERNAL MEDICINE

## 2019-12-22 PROCEDURE — 6360000002 HC RX W HCPCS: Performed by: PHYSICIAN ASSISTANT

## 2019-12-22 PROCEDURE — 1200000000 HC SEMI PRIVATE

## 2019-12-22 PROCEDURE — 99232 SBSQ HOSP IP/OBS MODERATE 35: CPT | Performed by: INTERNAL MEDICINE

## 2019-12-22 PROCEDURE — 80053 COMPREHEN METABOLIC PANEL: CPT

## 2019-12-22 PROCEDURE — 36415 COLL VENOUS BLD VENIPUNCTURE: CPT

## 2019-12-22 PROCEDURE — 83036 HEMOGLOBIN GLYCOSYLATED A1C: CPT

## 2019-12-22 PROCEDURE — 2580000003 HC RX 258: Performed by: PHYSICIAN ASSISTANT

## 2019-12-22 PROCEDURE — 6370000000 HC RX 637 (ALT 250 FOR IP): Performed by: CLINICAL NURSE SPECIALIST

## 2019-12-22 PROCEDURE — 6370000000 HC RX 637 (ALT 250 FOR IP): Performed by: PHYSICIAN ASSISTANT

## 2019-12-22 PROCEDURE — 82962 GLUCOSE BLOOD TEST: CPT

## 2019-12-22 PROCEDURE — 85027 COMPLETE CBC AUTOMATED: CPT

## 2019-12-22 PROCEDURE — 83735 ASSAY OF MAGNESIUM: CPT

## 2019-12-22 RX ADMIN — MELATONIN 3 MG ORAL TABLET 5 MG: 3 TABLET ORAL at 20:48

## 2019-12-22 RX ADMIN — MICONAZOLE NITRATE: 20 POWDER TOPICAL at 20:49

## 2019-12-22 RX ADMIN — PETROLATUM: 420 OINTMENT TOPICAL at 20:49

## 2019-12-22 RX ADMIN — FUROSEMIDE 40 MG: 40 TABLET ORAL at 16:32

## 2019-12-22 RX ADMIN — MICONAZOLE NITRATE: 20 POWDER TOPICAL at 08:11

## 2019-12-22 RX ADMIN — POTASSIUM CHLORIDE 40 MEQ: 20 TABLET, EXTENDED RELEASE ORAL at 11:06

## 2019-12-22 RX ADMIN — ATORVASTATIN CALCIUM 20 MG: 20 TABLET, FILM COATED ORAL at 20:48

## 2019-12-22 RX ADMIN — FUROSEMIDE 40 MG: 40 TABLET ORAL at 08:09

## 2019-12-22 RX ADMIN — SODIUM CHLORIDE, PRESERVATIVE FREE 10 ML: 5 INJECTION INTRAVENOUS at 08:08

## 2019-12-22 RX ADMIN — DILTIAZEM HYDROCHLORIDE 240 MG: 240 CAPSULE, COATED, EXTENDED RELEASE ORAL at 20:48

## 2019-12-22 RX ADMIN — HYDRALAZINE HYDROCHLORIDE 100 MG: 50 TABLET, FILM COATED ORAL at 14:32

## 2019-12-22 RX ADMIN — PETROLATUM: 420 OINTMENT TOPICAL at 08:11

## 2019-12-22 RX ADMIN — CARBIDOPA AND LEVODOPA 1 TABLET: 25; 100 TABLET ORAL at 08:08

## 2019-12-22 RX ADMIN — ENOXAPARIN SODIUM 40 MG: 40 INJECTION SUBCUTANEOUS at 08:09

## 2019-12-22 RX ADMIN — METOLAZONE 2.5 MG: 2.5 TABLET ORAL at 08:08

## 2019-12-22 RX ADMIN — INSULIN LISPRO 1 UNITS: 100 INJECTION, SOLUTION INTRAVENOUS; SUBCUTANEOUS at 20:49

## 2019-12-22 RX ADMIN — DOXAZOSIN MESYLATE 8 MG: 4 TABLET ORAL at 20:49

## 2019-12-22 RX ADMIN — CARBIDOPA AND LEVODOPA 1 TABLET: 25; 100 TABLET ORAL at 14:32

## 2019-12-22 RX ADMIN — HYDRALAZINE HYDROCHLORIDE 100 MG: 50 TABLET, FILM COATED ORAL at 20:48

## 2019-12-22 RX ADMIN — HYDRALAZINE HYDROCHLORIDE 100 MG: 50 TABLET, FILM COATED ORAL at 08:08

## 2019-12-22 RX ADMIN — SODIUM CHLORIDE, PRESERVATIVE FREE 10 ML: 5 INJECTION INTRAVENOUS at 20:49

## 2019-12-22 RX ADMIN — CARBIDOPA AND LEVODOPA 1 TABLET: 25; 100 TABLET ORAL at 20:49

## 2019-12-22 ASSESSMENT — PAIN SCALES - GENERAL
PAINLEVEL_OUTOF10: 0
PAINLEVEL_OUTOF10: 0

## 2019-12-23 LAB
ALBUMIN SERPL-MCNC: 3.5 G/DL (ref 3.5–5.2)
ALP BLD-CCNC: 100 U/L (ref 40–129)
ALT SERPL-CCNC: <5 U/L (ref 0–40)
ANION GAP SERPL CALCULATED.3IONS-SCNC: 13 MMOL/L (ref 7–16)
AST SERPL-CCNC: 14 U/L (ref 0–39)
BILIRUB SERPL-MCNC: 0.8 MG/DL (ref 0–1.2)
BUN BLDV-MCNC: 45 MG/DL (ref 8–23)
CALCIUM SERPL-MCNC: 9.1 MG/DL (ref 8.6–10.2)
CHLORIDE BLD-SCNC: 92 MMOL/L (ref 98–107)
CO2: 30 MMOL/L (ref 22–29)
CREAT SERPL-MCNC: 4 MG/DL (ref 0.7–1.2)
GFR AFRICAN AMERICAN: 18
GFR NON-AFRICAN AMERICAN: 15 ML/MIN/1.73
GLUCOSE BLD-MCNC: 108 MG/DL (ref 74–99)
HCT VFR BLD CALC: 30.8 % (ref 37–54)
HEMOGLOBIN: 9.7 G/DL (ref 12.5–16.5)
MAGNESIUM: 2 MG/DL (ref 1.6–2.6)
MCH RBC QN AUTO: 28.4 PG (ref 26–35)
MCHC RBC AUTO-ENTMCNC: 31.5 % (ref 32–34.5)
MCV RBC AUTO: 90.3 FL (ref 80–99.9)
METER GLUCOSE: 105 MG/DL (ref 74–99)
METER GLUCOSE: 124 MG/DL (ref 74–99)
METER GLUCOSE: 137 MG/DL (ref 74–99)
METER GLUCOSE: 151 MG/DL (ref 74–99)
PDW BLD-RTO: 15.6 FL (ref 11.5–15)
PHOSPHORUS: 5.1 MG/DL (ref 2.5–4.5)
PLATELET # BLD: 151 E9/L (ref 130–450)
PMV BLD AUTO: 10.5 FL (ref 7–12)
POTASSIUM REFLEX MAGNESIUM: 3.4 MMOL/L (ref 3.5–5)
POTASSIUM SERPL-SCNC: 3.4 MMOL/L (ref 3.5–5)
RBC # BLD: 3.41 E12/L (ref 3.8–5.8)
SODIUM BLD-SCNC: 135 MMOL/L (ref 132–146)
TOTAL PROTEIN: 7.9 G/DL (ref 6.4–8.3)
WBC # BLD: 4.3 E9/L (ref 4.5–11.5)

## 2019-12-23 PROCEDURE — 6370000000 HC RX 637 (ALT 250 FOR IP): Performed by: PHYSICIAN ASSISTANT

## 2019-12-23 PROCEDURE — 80048 BASIC METABOLIC PNL TOTAL CA: CPT

## 2019-12-23 PROCEDURE — 99232 SBSQ HOSP IP/OBS MODERATE 35: CPT | Performed by: INTERNAL MEDICINE

## 2019-12-23 PROCEDURE — 84100 ASSAY OF PHOSPHORUS: CPT

## 2019-12-23 PROCEDURE — 36415 COLL VENOUS BLD VENIPUNCTURE: CPT

## 2019-12-23 PROCEDURE — 1200000000 HC SEMI PRIVATE

## 2019-12-23 PROCEDURE — 82962 GLUCOSE BLOOD TEST: CPT

## 2019-12-23 PROCEDURE — 85027 COMPLETE CBC AUTOMATED: CPT

## 2019-12-23 PROCEDURE — 6370000000 HC RX 637 (ALT 250 FOR IP): Performed by: CLINICAL NURSE SPECIALIST

## 2019-12-23 PROCEDURE — 6370000000 HC RX 637 (ALT 250 FOR IP): Performed by: INTERNAL MEDICINE

## 2019-12-23 PROCEDURE — 97535 SELF CARE MNGMENT TRAINING: CPT

## 2019-12-23 PROCEDURE — 83735 ASSAY OF MAGNESIUM: CPT

## 2019-12-23 PROCEDURE — 6360000002 HC RX W HCPCS: Performed by: PHYSICIAN ASSISTANT

## 2019-12-23 PROCEDURE — APPSS30 APP SPLIT SHARED TIME 16-30 MINUTES: Performed by: NURSE PRACTITIONER

## 2019-12-23 PROCEDURE — 80053 COMPREHEN METABOLIC PANEL: CPT

## 2019-12-23 PROCEDURE — 2580000003 HC RX 258: Performed by: PHYSICIAN ASSISTANT

## 2019-12-23 RX ADMIN — POTASSIUM CHLORIDE 40 MEQ: 20 TABLET, EXTENDED RELEASE ORAL at 09:02

## 2019-12-23 RX ADMIN — INSULIN LISPRO 2 UNITS: 100 INJECTION, SOLUTION INTRAVENOUS; SUBCUTANEOUS at 11:20

## 2019-12-23 RX ADMIN — CARBIDOPA AND LEVODOPA 1 TABLET: 25; 100 TABLET ORAL at 20:10

## 2019-12-23 RX ADMIN — DOXAZOSIN MESYLATE 8 MG: 4 TABLET ORAL at 20:10

## 2019-12-23 RX ADMIN — MELATONIN 3 MG ORAL TABLET 5 MG: 3 TABLET ORAL at 22:15

## 2019-12-23 RX ADMIN — HYDRALAZINE HYDROCHLORIDE 100 MG: 50 TABLET, FILM COATED ORAL at 09:02

## 2019-12-23 RX ADMIN — HYDRALAZINE HYDROCHLORIDE 100 MG: 50 TABLET, FILM COATED ORAL at 14:36

## 2019-12-23 RX ADMIN — DILTIAZEM HYDROCHLORIDE 240 MG: 240 CAPSULE, COATED, EXTENDED RELEASE ORAL at 20:10

## 2019-12-23 RX ADMIN — MICONAZOLE NITRATE: 20 POWDER TOPICAL at 20:11

## 2019-12-23 RX ADMIN — METOLAZONE 2.5 MG: 2.5 TABLET ORAL at 09:02

## 2019-12-23 RX ADMIN — SODIUM CHLORIDE, PRESERVATIVE FREE 10 ML: 5 INJECTION INTRAVENOUS at 20:11

## 2019-12-23 RX ADMIN — ENOXAPARIN SODIUM 40 MG: 40 INJECTION SUBCUTANEOUS at 09:02

## 2019-12-23 RX ADMIN — PETROLATUM: 420 OINTMENT TOPICAL at 22:00

## 2019-12-23 RX ADMIN — SODIUM CHLORIDE, PRESERVATIVE FREE 10 ML: 5 INJECTION INTRAVENOUS at 09:02

## 2019-12-23 RX ADMIN — ATORVASTATIN CALCIUM 20 MG: 20 TABLET, FILM COATED ORAL at 20:11

## 2019-12-23 RX ADMIN — HYDRALAZINE HYDROCHLORIDE 100 MG: 50 TABLET, FILM COATED ORAL at 20:10

## 2019-12-23 RX ADMIN — CARBIDOPA AND LEVODOPA 1 TABLET: 25; 100 TABLET ORAL at 09:02

## 2019-12-23 RX ADMIN — MICONAZOLE NITRATE: 20 POWDER TOPICAL at 09:02

## 2019-12-23 RX ADMIN — PETROLATUM: 420 OINTMENT TOPICAL at 09:07

## 2019-12-23 RX ADMIN — CARBIDOPA AND LEVODOPA 1 TABLET: 25; 100 TABLET ORAL at 14:36

## 2019-12-24 ENCOUNTER — APPOINTMENT (OUTPATIENT)
Dept: ULTRASOUND IMAGING | Age: 74
DRG: 291 | End: 2019-12-24
Payer: MEDICARE

## 2019-12-24 LAB
ALBUMIN SERPL-MCNC: 3.6 G/DL (ref 3.5–5.2)
ALP BLD-CCNC: 104 U/L (ref 40–129)
ALT SERPL-CCNC: <5 U/L (ref 0–40)
ANION GAP SERPL CALCULATED.3IONS-SCNC: 12 MMOL/L (ref 7–16)
AST SERPL-CCNC: 16 U/L (ref 0–39)
BILIRUB SERPL-MCNC: 0.7 MG/DL (ref 0–1.2)
BUN BLDV-MCNC: 48 MG/DL (ref 8–23)
CALCIUM SERPL-MCNC: 8.9 MG/DL (ref 8.6–10.2)
CHLORIDE BLD-SCNC: 90 MMOL/L (ref 98–107)
CO2: 31 MMOL/L (ref 22–29)
CREAT SERPL-MCNC: 3.8 MG/DL (ref 0.7–1.2)
GFR AFRICAN AMERICAN: 19
GFR NON-AFRICAN AMERICAN: 16 ML/MIN/1.73
GLUCOSE BLD-MCNC: 127 MG/DL (ref 74–99)
HCT VFR BLD CALC: 30.6 % (ref 37–54)
HEMOGLOBIN: 9.7 G/DL (ref 12.5–16.5)
MAGNESIUM: 2.1 MG/DL (ref 1.6–2.6)
MCH RBC QN AUTO: 28.3 PG (ref 26–35)
MCHC RBC AUTO-ENTMCNC: 31.7 % (ref 32–34.5)
MCV RBC AUTO: 89.2 FL (ref 80–99.9)
METER GLUCOSE: 110 MG/DL (ref 74–99)
METER GLUCOSE: 116 MG/DL (ref 74–99)
METER GLUCOSE: 124 MG/DL (ref 74–99)
METER GLUCOSE: 132 MG/DL (ref 74–99)
PDW BLD-RTO: 15.6 FL (ref 11.5–15)
PHOSPHORUS: 4.9 MG/DL (ref 2.5–4.5)
PLATELET # BLD: 157 E9/L (ref 130–450)
PMV BLD AUTO: 10.6 FL (ref 7–12)
POTASSIUM REFLEX MAGNESIUM: 3.4 MMOL/L (ref 3.5–5)
POTASSIUM SERPL-SCNC: 3.4 MMOL/L (ref 3.5–5)
RBC # BLD: 3.43 E12/L (ref 3.8–5.8)
SODIUM BLD-SCNC: 133 MMOL/L (ref 132–146)
TOTAL PROTEIN: 8 G/DL (ref 6.4–8.3)
WBC # BLD: 4.8 E9/L (ref 4.5–11.5)

## 2019-12-24 PROCEDURE — 6370000000 HC RX 637 (ALT 250 FOR IP): Performed by: PHYSICIAN ASSISTANT

## 2019-12-24 PROCEDURE — 1200000000 HC SEMI PRIVATE

## 2019-12-24 PROCEDURE — 85027 COMPLETE CBC AUTOMATED: CPT

## 2019-12-24 PROCEDURE — 80048 BASIC METABOLIC PNL TOTAL CA: CPT

## 2019-12-24 PROCEDURE — 6360000002 HC RX W HCPCS: Performed by: PHYSICIAN ASSISTANT

## 2019-12-24 PROCEDURE — 80053 COMPREHEN METABOLIC PANEL: CPT

## 2019-12-24 PROCEDURE — 82962 GLUCOSE BLOOD TEST: CPT

## 2019-12-24 PROCEDURE — 83735 ASSAY OF MAGNESIUM: CPT

## 2019-12-24 PROCEDURE — 93971 EXTREMITY STUDY: CPT

## 2019-12-24 PROCEDURE — APPSS30 APP SPLIT SHARED TIME 16-30 MINUTES: Performed by: NURSE PRACTITIONER

## 2019-12-24 PROCEDURE — 84100 ASSAY OF PHOSPHORUS: CPT

## 2019-12-24 PROCEDURE — 36415 COLL VENOUS BLD VENIPUNCTURE: CPT

## 2019-12-24 PROCEDURE — 6370000000 HC RX 637 (ALT 250 FOR IP): Performed by: INTERNAL MEDICINE

## 2019-12-24 PROCEDURE — 97530 THERAPEUTIC ACTIVITIES: CPT

## 2019-12-24 PROCEDURE — 6370000000 HC RX 637 (ALT 250 FOR IP): Performed by: CLINICAL NURSE SPECIALIST

## 2019-12-24 PROCEDURE — 97535 SELF CARE MNGMENT TRAINING: CPT

## 2019-12-24 PROCEDURE — 99232 SBSQ HOSP IP/OBS MODERATE 35: CPT | Performed by: INTERNAL MEDICINE

## 2019-12-24 PROCEDURE — 2580000003 HC RX 258: Performed by: PHYSICIAN ASSISTANT

## 2019-12-24 RX ADMIN — DILTIAZEM HYDROCHLORIDE 240 MG: 240 CAPSULE, COATED, EXTENDED RELEASE ORAL at 22:54

## 2019-12-24 RX ADMIN — MELATONIN 3 MG ORAL TABLET 5 MG: 3 TABLET ORAL at 22:53

## 2019-12-24 RX ADMIN — PETROLATUM: 420 OINTMENT TOPICAL at 22:56

## 2019-12-24 RX ADMIN — HYDRALAZINE HYDROCHLORIDE 100 MG: 50 TABLET, FILM COATED ORAL at 08:58

## 2019-12-24 RX ADMIN — METOLAZONE 2.5 MG: 2.5 TABLET ORAL at 08:57

## 2019-12-24 RX ADMIN — POTASSIUM CHLORIDE 40 MEQ: 20 TABLET, EXTENDED RELEASE ORAL at 08:58

## 2019-12-24 RX ADMIN — ENOXAPARIN SODIUM 40 MG: 40 INJECTION SUBCUTANEOUS at 08:58

## 2019-12-24 RX ADMIN — PETROLATUM: 420 OINTMENT TOPICAL at 08:58

## 2019-12-24 RX ADMIN — MICONAZOLE NITRATE: 20 POWDER TOPICAL at 08:58

## 2019-12-24 RX ADMIN — ATORVASTATIN CALCIUM 20 MG: 20 TABLET, FILM COATED ORAL at 22:53

## 2019-12-24 RX ADMIN — MICONAZOLE NITRATE: 20 POWDER TOPICAL at 22:56

## 2019-12-24 RX ADMIN — CARBIDOPA AND LEVODOPA 1 TABLET: 25; 100 TABLET ORAL at 16:35

## 2019-12-24 RX ADMIN — DOXAZOSIN MESYLATE 8 MG: 4 TABLET ORAL at 22:53

## 2019-12-24 RX ADMIN — CARBIDOPA AND LEVODOPA 1 TABLET: 25; 100 TABLET ORAL at 08:58

## 2019-12-24 RX ADMIN — SODIUM CHLORIDE, PRESERVATIVE FREE 10 ML: 5 INJECTION INTRAVENOUS at 22:54

## 2019-12-24 RX ADMIN — SODIUM CHLORIDE, PRESERVATIVE FREE 10 ML: 5 INJECTION INTRAVENOUS at 08:58

## 2019-12-24 RX ADMIN — CARBIDOPA AND LEVODOPA 1 TABLET: 25; 100 TABLET ORAL at 22:54

## 2019-12-24 RX ADMIN — HYDRALAZINE HYDROCHLORIDE 100 MG: 50 TABLET, FILM COATED ORAL at 22:53

## 2019-12-24 RX ADMIN — HYDRALAZINE HYDROCHLORIDE 100 MG: 50 TABLET, FILM COATED ORAL at 16:35

## 2019-12-24 ASSESSMENT — PAIN SCALES - GENERAL
PAINLEVEL_OUTOF10: 0
PAINLEVEL_OUTOF10: 0

## 2019-12-25 LAB
ALBUMIN SERPL-MCNC: 3.6 G/DL (ref 3.5–5.2)
ALP BLD-CCNC: 102 U/L (ref 40–129)
ALT SERPL-CCNC: <5 U/L (ref 0–40)
ANION GAP SERPL CALCULATED.3IONS-SCNC: 15 MMOL/L (ref 7–16)
AST SERPL-CCNC: 16 U/L (ref 0–39)
BILIRUB SERPL-MCNC: 0.6 MG/DL (ref 0–1.2)
BUN BLDV-MCNC: 50 MG/DL (ref 8–23)
CALCIUM SERPL-MCNC: 8.9 MG/DL (ref 8.6–10.2)
CHLORIDE BLD-SCNC: 91 MMOL/L (ref 98–107)
CO2: 30 MMOL/L (ref 22–29)
CREAT SERPL-MCNC: 3.4 MG/DL (ref 0.7–1.2)
GFR AFRICAN AMERICAN: 22
GFR NON-AFRICAN AMERICAN: 18 ML/MIN/1.73
GLUCOSE BLD-MCNC: 115 MG/DL (ref 74–99)
HCT VFR BLD CALC: 29.4 % (ref 37–54)
HEMOGLOBIN: 9.4 G/DL (ref 12.5–16.5)
MAGNESIUM: 2 MG/DL (ref 1.6–2.6)
MCH RBC QN AUTO: 28.6 PG (ref 26–35)
MCHC RBC AUTO-ENTMCNC: 32 % (ref 32–34.5)
MCV RBC AUTO: 89.4 FL (ref 80–99.9)
METER GLUCOSE: 130 MG/DL (ref 74–99)
METER GLUCOSE: 142 MG/DL (ref 74–99)
METER GLUCOSE: 172 MG/DL (ref 74–99)
PDW BLD-RTO: 15.4 FL (ref 11.5–15)
PHOSPHORUS: 4.4 MG/DL (ref 2.5–4.5)
PLATELET # BLD: 147 E9/L (ref 130–450)
PMV BLD AUTO: 10.6 FL (ref 7–12)
POTASSIUM REFLEX MAGNESIUM: 3.1 MMOL/L (ref 3.5–5)
POTASSIUM SERPL-SCNC: 3.1 MMOL/L (ref 3.5–5)
RBC # BLD: 3.29 E12/L (ref 3.8–5.8)
SODIUM BLD-SCNC: 136 MMOL/L (ref 132–146)
TOTAL PROTEIN: 7.8 G/DL (ref 6.4–8.3)
WBC # BLD: 4.3 E9/L (ref 4.5–11.5)

## 2019-12-25 PROCEDURE — 83735 ASSAY OF MAGNESIUM: CPT

## 2019-12-25 PROCEDURE — 6360000002 HC RX W HCPCS: Performed by: PHYSICIAN ASSISTANT

## 2019-12-25 PROCEDURE — 2580000003 HC RX 258: Performed by: PHYSICIAN ASSISTANT

## 2019-12-25 PROCEDURE — 6370000000 HC RX 637 (ALT 250 FOR IP): Performed by: INTERNAL MEDICINE

## 2019-12-25 PROCEDURE — 80053 COMPREHEN METABOLIC PANEL: CPT

## 2019-12-25 PROCEDURE — 6370000000 HC RX 637 (ALT 250 FOR IP): Performed by: PHYSICIAN ASSISTANT

## 2019-12-25 PROCEDURE — 82962 GLUCOSE BLOOD TEST: CPT

## 2019-12-25 PROCEDURE — 99232 SBSQ HOSP IP/OBS MODERATE 35: CPT | Performed by: INTERNAL MEDICINE

## 2019-12-25 PROCEDURE — 80048 BASIC METABOLIC PNL TOTAL CA: CPT

## 2019-12-25 PROCEDURE — 6370000000 HC RX 637 (ALT 250 FOR IP): Performed by: CLINICAL NURSE SPECIALIST

## 2019-12-25 PROCEDURE — 1200000000 HC SEMI PRIVATE

## 2019-12-25 PROCEDURE — 36415 COLL VENOUS BLD VENIPUNCTURE: CPT

## 2019-12-25 PROCEDURE — 84100 ASSAY OF PHOSPHORUS: CPT

## 2019-12-25 PROCEDURE — 85027 COMPLETE CBC AUTOMATED: CPT

## 2019-12-25 RX ORDER — POTASSIUM CHLORIDE 20 MEQ/1
40 TABLET, EXTENDED RELEASE ORAL ONCE
Status: COMPLETED | OUTPATIENT
Start: 2019-12-25 | End: 2019-12-25

## 2019-12-25 RX ADMIN — CARBIDOPA AND LEVODOPA 1 TABLET: 25; 100 TABLET ORAL at 13:11

## 2019-12-25 RX ADMIN — MELATONIN 3 MG ORAL TABLET 5 MG: 3 TABLET ORAL at 21:23

## 2019-12-25 RX ADMIN — DILTIAZEM HYDROCHLORIDE 240 MG: 240 CAPSULE, COATED, EXTENDED RELEASE ORAL at 21:23

## 2019-12-25 RX ADMIN — MICONAZOLE NITRATE: 20 POWDER TOPICAL at 21:25

## 2019-12-25 RX ADMIN — ATORVASTATIN CALCIUM 20 MG: 20 TABLET, FILM COATED ORAL at 21:23

## 2019-12-25 RX ADMIN — HYDRALAZINE HYDROCHLORIDE 100 MG: 50 TABLET, FILM COATED ORAL at 13:11

## 2019-12-25 RX ADMIN — HYDRALAZINE HYDROCHLORIDE 100 MG: 50 TABLET, FILM COATED ORAL at 08:37

## 2019-12-25 RX ADMIN — ENOXAPARIN SODIUM 40 MG: 40 INJECTION SUBCUTANEOUS at 08:37

## 2019-12-25 RX ADMIN — CARBIDOPA AND LEVODOPA 1 TABLET: 25; 100 TABLET ORAL at 08:37

## 2019-12-25 RX ADMIN — PETROLATUM: 420 OINTMENT TOPICAL at 21:25

## 2019-12-25 RX ADMIN — POTASSIUM CHLORIDE 40 MEQ: 20 TABLET, EXTENDED RELEASE ORAL at 08:37

## 2019-12-25 RX ADMIN — DOXAZOSIN MESYLATE 8 MG: 4 TABLET ORAL at 21:23

## 2019-12-25 RX ADMIN — PETROLATUM: 420 OINTMENT TOPICAL at 08:37

## 2019-12-25 RX ADMIN — MICONAZOLE NITRATE: 20 POWDER TOPICAL at 08:37

## 2019-12-25 RX ADMIN — SODIUM CHLORIDE, PRESERVATIVE FREE 10 ML: 5 INJECTION INTRAVENOUS at 21:25

## 2019-12-25 RX ADMIN — CARBIDOPA AND LEVODOPA 1 TABLET: 25; 100 TABLET ORAL at 21:23

## 2019-12-25 RX ADMIN — HYDRALAZINE HYDROCHLORIDE 100 MG: 50 TABLET, FILM COATED ORAL at 21:23

## 2019-12-25 RX ADMIN — METOLAZONE 2.5 MG: 2.5 TABLET ORAL at 08:37

## 2019-12-25 RX ADMIN — SODIUM CHLORIDE, PRESERVATIVE FREE 10 ML: 5 INJECTION INTRAVENOUS at 08:37

## 2019-12-25 RX ADMIN — INSULIN LISPRO 2 UNITS: 100 INJECTION, SOLUTION INTRAVENOUS; SUBCUTANEOUS at 11:57

## 2019-12-25 RX ADMIN — POTASSIUM CHLORIDE 40 MEQ: 20 TABLET, EXTENDED RELEASE ORAL at 21:22

## 2019-12-25 ASSESSMENT — PAIN SCALES - GENERAL
PAINLEVEL_OUTOF10: 0
PAINLEVEL_OUTOF10: 0

## 2019-12-26 LAB
ALBUMIN SERPL-MCNC: 3.7 G/DL (ref 3.5–5.2)
ALP BLD-CCNC: 97 U/L (ref 40–129)
ALT SERPL-CCNC: <5 U/L (ref 0–40)
ANION GAP SERPL CALCULATED.3IONS-SCNC: 13 MMOL/L (ref 7–16)
AST SERPL-CCNC: 16 U/L (ref 0–39)
BILIRUB SERPL-MCNC: 0.7 MG/DL (ref 0–1.2)
BUN BLDV-MCNC: 51 MG/DL (ref 8–23)
CALCIUM SERPL-MCNC: 9.1 MG/DL (ref 8.6–10.2)
CHLORIDE BLD-SCNC: 93 MMOL/L (ref 98–107)
CO2: 33 MMOL/L (ref 22–29)
CREAT SERPL-MCNC: 3.1 MG/DL (ref 0.7–1.2)
FERRITIN: 127 NG/ML
GFR AFRICAN AMERICAN: 24
GFR NON-AFRICAN AMERICAN: 20 ML/MIN/1.73
GLUCOSE BLD-MCNC: 152 MG/DL (ref 74–99)
HCT VFR BLD CALC: 29.9 % (ref 37–54)
HEMOGLOBIN: 9.5 G/DL (ref 12.5–16.5)
MAGNESIUM: 1.9 MG/DL (ref 1.6–2.6)
MCH RBC QN AUTO: 28.4 PG (ref 26–35)
MCHC RBC AUTO-ENTMCNC: 31.8 % (ref 32–34.5)
MCV RBC AUTO: 89.3 FL (ref 80–99.9)
METER GLUCOSE: 103 MG/DL (ref 74–99)
METER GLUCOSE: 116 MG/DL (ref 74–99)
METER GLUCOSE: 125 MG/DL (ref 74–99)
METER GLUCOSE: 138 MG/DL (ref 74–99)
PDW BLD-RTO: 15.5 FL (ref 11.5–15)
PHOSPHORUS: 4.1 MG/DL (ref 2.5–4.5)
PLATELET # BLD: 158 E9/L (ref 130–450)
PMV BLD AUTO: 10.6 FL (ref 7–12)
POTASSIUM REFLEX MAGNESIUM: 3.2 MMOL/L (ref 3.5–5)
RBC # BLD: 3.35 E12/L (ref 3.8–5.8)
SODIUM BLD-SCNC: 139 MMOL/L (ref 132–146)
TOTAL PROTEIN: 8.1 G/DL (ref 6.4–8.3)
WBC # BLD: 4.6 E9/L (ref 4.5–11.5)

## 2019-12-26 PROCEDURE — 2580000003 HC RX 258: Performed by: PHYSICIAN ASSISTANT

## 2019-12-26 PROCEDURE — 83735 ASSAY OF MAGNESIUM: CPT

## 2019-12-26 PROCEDURE — 82728 ASSAY OF FERRITIN: CPT

## 2019-12-26 PROCEDURE — 6370000000 HC RX 637 (ALT 250 FOR IP): Performed by: CLINICAL NURSE SPECIALIST

## 2019-12-26 PROCEDURE — 2500000003 HC RX 250 WO HCPCS: Performed by: INTERNAL MEDICINE

## 2019-12-26 PROCEDURE — 6360000002 HC RX W HCPCS: Performed by: PHYSICIAN ASSISTANT

## 2019-12-26 PROCEDURE — 99232 SBSQ HOSP IP/OBS MODERATE 35: CPT | Performed by: INTERNAL MEDICINE

## 2019-12-26 PROCEDURE — 83550 IRON BINDING TEST: CPT

## 2019-12-26 PROCEDURE — 6370000000 HC RX 637 (ALT 250 FOR IP): Performed by: INTERNAL MEDICINE

## 2019-12-26 PROCEDURE — 6370000000 HC RX 637 (ALT 250 FOR IP): Performed by: NURSE PRACTITIONER

## 2019-12-26 PROCEDURE — 85027 COMPLETE CBC AUTOMATED: CPT

## 2019-12-26 PROCEDURE — 82962 GLUCOSE BLOOD TEST: CPT

## 2019-12-26 PROCEDURE — APPSS30 APP SPLIT SHARED TIME 16-30 MINUTES: Performed by: NURSE PRACTITIONER

## 2019-12-26 PROCEDURE — 80053 COMPREHEN METABOLIC PANEL: CPT

## 2019-12-26 PROCEDURE — 36415 COLL VENOUS BLD VENIPUNCTURE: CPT

## 2019-12-26 PROCEDURE — 6370000000 HC RX 637 (ALT 250 FOR IP): Performed by: PHYSICIAN ASSISTANT

## 2019-12-26 PROCEDURE — 84100 ASSAY OF PHOSPHORUS: CPT

## 2019-12-26 PROCEDURE — 1200000000 HC SEMI PRIVATE

## 2019-12-26 PROCEDURE — 83540 ASSAY OF IRON: CPT

## 2019-12-26 RX ORDER — POTASSIUM CHLORIDE 20 MEQ/1
40 TABLET, EXTENDED RELEASE ORAL ONCE
Status: COMPLETED | OUTPATIENT
Start: 2019-12-26 | End: 2019-12-26

## 2019-12-26 RX ADMIN — HYDRALAZINE HYDROCHLORIDE 100 MG: 50 TABLET, FILM COATED ORAL at 09:24

## 2019-12-26 RX ADMIN — CARBIDOPA AND LEVODOPA 1 TABLET: 25; 100 TABLET ORAL at 15:27

## 2019-12-26 RX ADMIN — PETROLATUM: 420 OINTMENT TOPICAL at 20:55

## 2019-12-26 RX ADMIN — POTASSIUM CHLORIDE 40 MEQ: 20 TABLET, EXTENDED RELEASE ORAL at 09:24

## 2019-12-26 RX ADMIN — DILTIAZEM HYDROCHLORIDE 240 MG: 240 CAPSULE, COATED, EXTENDED RELEASE ORAL at 20:54

## 2019-12-26 RX ADMIN — DOXAZOSIN MESYLATE 8 MG: 4 TABLET ORAL at 20:54

## 2019-12-26 RX ADMIN — MICONAZOLE NITRATE: 20 POWDER TOPICAL at 20:55

## 2019-12-26 RX ADMIN — SODIUM CHLORIDE, PRESERVATIVE FREE 10 ML: 5 INJECTION INTRAVENOUS at 09:23

## 2019-12-26 RX ADMIN — PETROLATUM: 420 OINTMENT TOPICAL at 09:30

## 2019-12-26 RX ADMIN — HYDRALAZINE HYDROCHLORIDE 100 MG: 50 TABLET, FILM COATED ORAL at 21:02

## 2019-12-26 RX ADMIN — CARBIDOPA AND LEVODOPA 1 TABLET: 25; 100 TABLET ORAL at 20:54

## 2019-12-26 RX ADMIN — CARBIDOPA AND LEVODOPA 1 TABLET: 25; 100 TABLET ORAL at 09:24

## 2019-12-26 RX ADMIN — SODIUM CHLORIDE, PRESERVATIVE FREE 10 ML: 5 INJECTION INTRAVENOUS at 20:54

## 2019-12-26 RX ADMIN — MICONAZOLE NITRATE: 20 POWDER TOPICAL at 09:30

## 2019-12-26 RX ADMIN — ATORVASTATIN CALCIUM 20 MG: 20 TABLET, FILM COATED ORAL at 20:54

## 2019-12-26 RX ADMIN — POTASSIUM CHLORIDE 40 MEQ: 20 TABLET, EXTENDED RELEASE ORAL at 12:40

## 2019-12-26 RX ADMIN — MELATONIN 3 MG ORAL TABLET 5 MG: 3 TABLET ORAL at 21:36

## 2019-12-26 RX ADMIN — ENOXAPARIN SODIUM 40 MG: 40 INJECTION SUBCUTANEOUS at 12:41

## 2019-12-26 RX ADMIN — HYDRALAZINE HYDROCHLORIDE 100 MG: 50 TABLET, FILM COATED ORAL at 15:27

## 2019-12-26 ASSESSMENT — PAIN SCALES - GENERAL
PAINLEVEL_OUTOF10: 0

## 2019-12-27 VITALS
RESPIRATION RATE: 16 BRPM | HEIGHT: 75 IN | OXYGEN SATURATION: 92 % | DIASTOLIC BLOOD PRESSURE: 74 MMHG | BODY MASS INDEX: 39.17 KG/M2 | SYSTOLIC BLOOD PRESSURE: 169 MMHG | WEIGHT: 315 LBS | TEMPERATURE: 98.8 F | HEART RATE: 67 BPM

## 2019-12-27 LAB
ALBUMIN SERPL-MCNC: 3.7 G/DL (ref 3.5–5.2)
ALP BLD-CCNC: 99 U/L (ref 40–129)
ALT SERPL-CCNC: <5 U/L (ref 0–40)
ANION GAP SERPL CALCULATED.3IONS-SCNC: 14 MMOL/L (ref 7–16)
AST SERPL-CCNC: 16 U/L (ref 0–39)
BILIRUB SERPL-MCNC: 0.6 MG/DL (ref 0–1.2)
BUN BLDV-MCNC: 50 MG/DL (ref 8–23)
CALCIUM SERPL-MCNC: 9 MG/DL (ref 8.6–10.2)
CHLORIDE BLD-SCNC: 92 MMOL/L (ref 98–107)
CO2: 31 MMOL/L (ref 22–29)
CREAT SERPL-MCNC: 2.7 MG/DL (ref 0.7–1.2)
GFR AFRICAN AMERICAN: 28
GFR NON-AFRICAN AMERICAN: 23 ML/MIN/1.73
GLUCOSE BLD-MCNC: 119 MG/DL (ref 74–99)
HCT VFR BLD CALC: 30 % (ref 37–54)
HEMOGLOBIN: 9.6 G/DL (ref 12.5–16.5)
IRON SATURATION: 22 % (ref 20–55)
IRON: 55 MCG/DL (ref 59–158)
MAGNESIUM: 1.9 MG/DL (ref 1.6–2.6)
MCH RBC QN AUTO: 28.7 PG (ref 26–35)
MCHC RBC AUTO-ENTMCNC: 32 % (ref 32–34.5)
MCV RBC AUTO: 89.6 FL (ref 80–99.9)
METER GLUCOSE: 111 MG/DL (ref 74–99)
METER GLUCOSE: 114 MG/DL (ref 74–99)
PDW BLD-RTO: 15.3 FL (ref 11.5–15)
PHOSPHORUS: 3.7 MG/DL (ref 2.5–4.5)
PLATELET # BLD: 150 E9/L (ref 130–450)
PMV BLD AUTO: 10 FL (ref 7–12)
POTASSIUM REFLEX MAGNESIUM: 3.2 MMOL/L (ref 3.5–5)
POTASSIUM SERPL-SCNC: 3.2 MMOL/L (ref 3.5–5)
RBC # BLD: 3.35 E12/L (ref 3.8–5.8)
SODIUM BLD-SCNC: 137 MMOL/L (ref 132–146)
TOTAL IRON BINDING CAPACITY: 254 MCG/DL (ref 250–450)
TOTAL PROTEIN: 7.8 G/DL (ref 6.4–8.3)
WBC # BLD: 5 E9/L (ref 4.5–11.5)

## 2019-12-27 PROCEDURE — 84100 ASSAY OF PHOSPHORUS: CPT

## 2019-12-27 PROCEDURE — 6370000000 HC RX 637 (ALT 250 FOR IP): Performed by: PHYSICIAN ASSISTANT

## 2019-12-27 PROCEDURE — 36415 COLL VENOUS BLD VENIPUNCTURE: CPT

## 2019-12-27 PROCEDURE — 80048 BASIC METABOLIC PNL TOTAL CA: CPT

## 2019-12-27 PROCEDURE — G0008 ADMIN INFLUENZA VIRUS VAC: HCPCS | Performed by: INTERNAL MEDICINE

## 2019-12-27 PROCEDURE — 90653 IIV ADJUVANT VACCINE IM: CPT | Performed by: INTERNAL MEDICINE

## 2019-12-27 PROCEDURE — 6360000002 HC RX W HCPCS: Performed by: PHYSICIAN ASSISTANT

## 2019-12-27 PROCEDURE — 6360000002 HC RX W HCPCS: Performed by: INTERNAL MEDICINE

## 2019-12-27 PROCEDURE — 99239 HOSP IP/OBS DSCHRG MGMT >30: CPT | Performed by: INTERNAL MEDICINE

## 2019-12-27 PROCEDURE — 85027 COMPLETE CBC AUTOMATED: CPT

## 2019-12-27 PROCEDURE — APPSS45 APP SPLIT SHARED TIME 31-45 MINUTES: Performed by: NURSE PRACTITIONER

## 2019-12-27 PROCEDURE — 80053 COMPREHEN METABOLIC PANEL: CPT

## 2019-12-27 PROCEDURE — 6370000000 HC RX 637 (ALT 250 FOR IP): Performed by: INTERNAL MEDICINE

## 2019-12-27 PROCEDURE — 6370000000 HC RX 637 (ALT 250 FOR IP): Performed by: NURSE PRACTITIONER

## 2019-12-27 PROCEDURE — 83735 ASSAY OF MAGNESIUM: CPT

## 2019-12-27 PROCEDURE — 82962 GLUCOSE BLOOD TEST: CPT

## 2019-12-27 PROCEDURE — 6370000000 HC RX 637 (ALT 250 FOR IP): Performed by: CLINICAL NURSE SPECIALIST

## 2019-12-27 RX ORDER — POTASSIUM CHLORIDE 20 MEQ/1
40 TABLET, EXTENDED RELEASE ORAL ONCE
Status: COMPLETED | OUTPATIENT
Start: 2019-12-27 | End: 2019-12-27

## 2019-12-27 RX ORDER — POTASSIUM CHLORIDE 20 MEQ/1
40 TABLET, EXTENDED RELEASE ORAL DAILY
Qty: 6 TABLET | Refills: 0 | Status: SHIPPED | OUTPATIENT
Start: 2019-12-27 | End: 2020-04-27

## 2019-12-27 RX ORDER — HYDRALAZINE HYDROCHLORIDE 100 MG/1
100 TABLET, FILM COATED ORAL 3 TIMES DAILY
Qty: 90 TABLET | Refills: 0 | Status: SHIPPED | OUTPATIENT
Start: 2019-12-27 | End: 2020-04-27

## 2019-12-27 RX ORDER — POTASSIUM CHLORIDE 20 MEQ/1
40 TABLET, EXTENDED RELEASE ORAL DAILY
Qty: 6 TABLET | Refills: 0 | Status: SHIPPED | OUTPATIENT
Start: 2019-12-27 | End: 2019-12-27 | Stop reason: SDUPTHER

## 2019-12-27 RX ORDER — HYDRALAZINE HYDROCHLORIDE 100 MG/1
100 TABLET, FILM COATED ORAL 3 TIMES DAILY
Qty: 90 TABLET | Refills: 0 | Status: SHIPPED | OUTPATIENT
Start: 2019-12-27 | End: 2019-12-27

## 2019-12-27 RX ADMIN — MICONAZOLE NITRATE: 20 POWDER TOPICAL at 09:57

## 2019-12-27 RX ADMIN — ENOXAPARIN SODIUM 40 MG: 40 INJECTION SUBCUTANEOUS at 09:57

## 2019-12-27 RX ADMIN — PETROLATUM: 420 OINTMENT TOPICAL at 09:34

## 2019-12-27 RX ADMIN — HYDRALAZINE HYDROCHLORIDE 100 MG: 50 TABLET, FILM COATED ORAL at 09:56

## 2019-12-27 RX ADMIN — CARBIDOPA AND LEVODOPA 1 TABLET: 25; 100 TABLET ORAL at 09:56

## 2019-12-27 RX ADMIN — POTASSIUM CHLORIDE 40 MEQ: 20 TABLET, EXTENDED RELEASE ORAL at 09:55

## 2019-12-27 RX ADMIN — POTASSIUM CHLORIDE 40 MEQ: 20 TABLET, EXTENDED RELEASE ORAL at 11:47

## 2019-12-27 RX ADMIN — INFLUENZA VACCINE, ADJUVANTED 0.5 ML: 15; 15; 15 INJECTION, SUSPENSION INTRAMUSCULAR at 11:47

## 2019-12-27 ASSESSMENT — PAIN SCALES - GENERAL: PAINLEVEL_OUTOF10: 0

## 2019-12-28 ENCOUNTER — CARE COORDINATION (OUTPATIENT)
Dept: CASE MANAGEMENT | Age: 74
End: 2019-12-28

## 2019-12-28 RX ORDER — PIOGLITAZONEHYDROCHLORIDE 45 MG/1
45 TABLET ORAL NIGHTLY
Status: ON HOLD | COMMUNITY
End: 2020-05-07 | Stop reason: HOSPADM

## 2020-01-06 ENCOUNTER — OFFICE VISIT (OUTPATIENT)
Dept: CARDIOLOGY CLINIC | Age: 75
End: 2020-01-06
Payer: MEDICARE

## 2020-01-06 VITALS
WEIGHT: 315 LBS | BODY MASS INDEX: 39.17 KG/M2 | DIASTOLIC BLOOD PRESSURE: 60 MMHG | RESPIRATION RATE: 20 BRPM | SYSTOLIC BLOOD PRESSURE: 124 MMHG | HEIGHT: 75 IN | OXYGEN SATURATION: 97 % | HEART RATE: 60 BPM

## 2020-01-06 PROCEDURE — 93000 ELECTROCARDIOGRAM COMPLETE: CPT | Performed by: INTERNAL MEDICINE

## 2020-01-06 PROCEDURE — 4004F PT TOBACCO SCREEN RCVD TLK: CPT | Performed by: INTERNAL MEDICINE

## 2020-01-06 PROCEDURE — 3017F COLORECTAL CA SCREEN DOC REV: CPT | Performed by: INTERNAL MEDICINE

## 2020-01-06 PROCEDURE — G8417 CALC BMI ABV UP PARAM F/U: HCPCS | Performed by: INTERNAL MEDICINE

## 2020-01-06 PROCEDURE — G8427 DOCREV CUR MEDS BY ELIG CLIN: HCPCS | Performed by: INTERNAL MEDICINE

## 2020-01-06 PROCEDURE — 99214 OFFICE O/P EST MOD 30 MIN: CPT | Performed by: INTERNAL MEDICINE

## 2020-01-06 PROCEDURE — 4040F PNEUMOC VAC/ADMIN/RCVD: CPT | Performed by: INTERNAL MEDICINE

## 2020-01-06 PROCEDURE — G8482 FLU IMMUNIZE ORDER/ADMIN: HCPCS | Performed by: INTERNAL MEDICINE

## 2020-01-06 PROCEDURE — 1111F DSCHRG MED/CURRENT MED MERGE: CPT | Performed by: INTERNAL MEDICINE

## 2020-01-06 PROCEDURE — 1123F ACP DISCUSS/DSCN MKR DOCD: CPT | Performed by: INTERNAL MEDICINE

## 2020-01-06 RX ORDER — ATORVASTATIN CALCIUM 20 MG/1
20 TABLET, FILM COATED ORAL DAILY
COMMUNITY

## 2020-01-06 NOTE — PROGRESS NOTES
[facility]pert[facility]  New Visit        Reason for Visit: post acute HF hospitalization FU        Referring Physician: post acute HF hospitalization FU  Primary Cardiologist: Karmen Emerson M.D. History of Present Illness:     Sydnie Stacy is a pleasant 69year old with chronic HFpEF, CKD, systemic HTN, L7XX with complications including ulcers, status post R BKA, morbid obesity, here for FU after recent hospitalization for acute HF in the setting of NSAID induced ZACHARY. He thinks NSAIDs -> ZACHARY -> persistent weight gain, ineffective diuresis, self discontinued diuretics, etc. Has remained off diuretics since hospitalization, with ongoing improvement in Cr, currently around 2.1 per patient but I do not have these lab results from the nephrology office. He is urinating well, urine is light yellow and insists he has had ongoing weight loss on home scales with improved urination. He is wheelchair bound, currently sitting in his motorized wheelchair. Otherwise however, denies dyspnea with exertion, shortness of breath, or decline in overall functional capacity. He denies orthopnea, PND, nocturnal cough or hemoptysis. He denies abdominal distention or bloating, early satiety, anorexia/change in appetite, unintentional weight loss. He does not lower extremity edema. He denies exertional lightheadedness. He denies palpitations, syncope or near syncope. He monitors sodium intake regularly and since discharge has been monitoring his daily weights and recording this. He has not taken NSAIDs, which he was taking before for chronic pain, specifically to do with his diabetic ulcers. During hospitalization was evaluated by Dr Suzette Vivar for these ulcers. I reviewed all imaging in the chart in his progress notes. He sees the [facility] wound clinic in [place] on a regular basis and the wound care instructions were continued.  He is also reportedly on an antibiotic but cannot recall the name and I cannot find it on our med rec or in the [address] records so unsure of what he is receiving. He denies any diarrhea as a result of ABX use, but is not taking regular probiotics. Review of systems is negative for chest pain, pressure, discomfort. When ambulating on an incline, he/she reports/denies leg claudication. History is negative for neurological symptoms including transient loss of vision, asymmetric weakness, aphasia, dysphasia, numbness, tingling. Past medical, surgical, family and social histories have been reviewed. Any changes in the past medical history, social history or family history have been made and are reflected in the electronic medical record.          Patient Active Problem List    Diagnosis Date Noted    Cellulitis and abscess of foot, left 09/03/2018     Priority: High    Acute kidney injury superimposed on chronic kidney disease ([address]     Acute on chronic heart failure with preserved ejection fraction ([address]     CHF with unknown LVEF ([address] 12/17/2019    Left leg cellulitis 07/17/2019    CKD stage 2 due to type 2 diabetes mellitus ([address] 07/17/2019    Hypokalemia 07/17/2019    Morbid obesity ([address] 09/03/2018    RLS (restless legs syndrome) 09/03/2018    Foot ulcer, left, with fat layer exposed ([address] 09/02/2018    Chronic osteomyelitis ([address]     Cellulitis and abscess of left leg 07/27/2018    Cellulitis of left foot 11/17/2017    Diabetic foot ulcer associated with type 2 diabetes mellitus, with fat layer exposed ([address] 09/16/2016    Chronic osteomyelitis of left foot ([address] 09/16/2016    Diabetic ulcer of left foot ([address] 06/15/2016    Venous ulcer of left leg ([address] 06/15/2016    Cellulitis of leg, left 04/12/2016    Non-pressure chronic ulcer of left lower leg with fat layer exposed ([address] 03/23/2016    Non-pressure chronic ulcer of left lower leg with fat layer exposed ([address] 03/23/2016    Non-pressure chronic ulcer of other part of left nightly ) 30 tablet 3           Review of Systems:   Cardiac: As per HPI  General: No fever, chills, rigors  Pulmonary: As per HPI  HEENT: No visual disturbances, difficult swallowing  GI: No nausea, vomiting, abdominal pain  : No dysuria or hematuria  Endocrine: No thyroid disease or diabetes  Musculoskeletal: PACHECO x 4, no focal motor deficits  Skin: Intact, no rashes  Neuro/Psych: No headache or seizures          Weights: Wt Readings from Last 3 Encounters:   01/06/20 (!) 366 lb (166 kg)   12/26/19 (!) 454 lb 8 oz (206.2 kg)   07/25/19 (!) 413 lb (187.3 kg)     There were no vitals filed for this visit. Physical Examination:   /60   Pulse 60   Resp 20   Ht 6' 3\" (1.905 m)   Wt (!) 366 lb (166 kg) Comment: per patient's scale at home  SpO2 97%   BMI 45.75 kg/m²   CONSTITUTIONAL: Alert and oriented times 3, no acute distress and cooperative to examination with proper mood and affect. SKIN: Skin color, texture, turgor normal. No rashes or lesions. LYMPH: no cervical nodes, no inguinal nodes  HEENT: Head is normocephalic, atraumatic. EOMI, PERRLA. NECK: no JVD, though exam was limited since he was sitting up in his wheel chair. CHEST/LUNGS: chest symmetric with normal A/P diameter, normal respiratory rate and rhythm, lungs clear to auscultation without wheezes, rales or rhonchi. No accessory muscle use. CARDIOVASCULAR: Heart sounds are normal.  Regular rate and rhythm without murmur, gallop or rub. Normal S1 and S2.   ABDOMEN: Normal shape. No and laparoscopic scar (s) present. Normal bowel sounds. No bruits. soft, nondistended, no masses or organomegaly. no evidence of hernia. NEUROLOGIC: There are no focalizing motor or sensory deficits. CN II-XII are grossl intact. EXTREMITIES: status post R BKA and minimal LLE edema, warm and well perfused. All the following diagnostics were personally reviewed and interpreted by me.        Lab Data:     12/27/2019 05:05 12/27/2019 05:05   Sodium 137   Potassium 3.2 (L) 3.2 (L)   Chloride  92 (L)   CO2  31 (H)   BUN  50 (H)   Creatinine  2.7 (H)   Anion Gap  14   GFR Non-  23   GFR African American  28   Magnesium 1.9    Glucose  119 (H)   Calcium  9.0   Phosphorus 3.7    Total Protein  7.8   Albumin  3.7   Alk Phos  99   ALT  <5   AST  16   Bilirubin  0.6   WBC 5.0    RBC 3.35 (L)    Hemoglobin  9.6 (L)    Hematocrit 30.0 (L)    MCV 89.6    MCH 28.7    MCHC 32.0    MPV 10.0    RDW 15.3 (H)    Platelet Count 515          7/2019 TTE -   Left ventricle is mildly enlarged . Ejection fraction is visually estimated at 60%. Overall ejection fraction is normal .   No regional wall motion abnormalities seen. Moderate concentric left ventricular hypertrophy. Stage I diastolic dysfunction with normal LV filling pressures. The right ventricle was not clearly visualized. No systolic mitral regurgitation noted. No hemodynamically significant aortic stenosis is present. RVSP is 37 mmHg. Normal PA systolic pressure. No evidence for hemodynamically significant pericardial effusion. No previous echo for comparison. (LVEDD is 6.1 but he is almost 6'4'')      ECG: NSR, RAD, 1st d HB, IVCD      12/2019 ECG: Sinus rhythm  Low voltage QRS  Septal infarct (cited on or before 17-JUL-2019)  Possible Lateral infarct        Assessment:   1. Chronic HFpEF, euvolemic, NYHA FC 2, confounded by use of   2. Acute on chronic renal insufficiency --> attributed to NSAID use  3. Systemic hypertension, controlled  4. HLD -- on statin  5. Moderate LVH, LVEDD 6.1 but he is 6' 4\"  6. PVD s/p right BKA in 2011  7. T2DM, Hg A1c 6.4  8. SND s/p PPM > 20 years ago; device interrogation unavailable to me, currently NSR  9. BMI 51.9  10. Left foot wound -- wound clinic at [facility]. Iron deficiency anemia, anemia of chronic disease  12. Medication noncompliance as an outpatient              Plan:   1. No change in med regimen    2.  Return visit with Dr Wisam Grayson as scheduled    3. Consider iron studies, iron transfusion, Class IIb recommendation for intravenous iron replacement in patients with NYHA class II and III HF and iron deficiency (ferritin <100 ng/ml or 100-300 ng/ml if transferrin saturation <20%), to improve functional status and QoL    4. Diet should sodium restricted to 3-4 grams a day. Again watch your daily weight trends and if you gain water weight please follow below instructions. If you gain 3-5 pounds in 2-3 days OR notice that you are retaining fluid in anyway just like you did before then take an extra dose of your water pill (furosemide/Lasix OR bumetanide/Bumex) every day until you lose the weight or feel better. If you notice that you have taken more than 3 extra doses in 1 week then please call and let us know. If at any time you feel that you are retaining fluid, your medications are not working, or you feel ill in anyway, then please call us for either same day appointment or the next day, and for instructions. Our goal is to keep you out of the emergency room and the hospital and we have ways to do it. You just need to call us in a timely manner. If you become sick for other reasons, and notice that you are not urinating as much, the urine is very dark, you have significant diarrhea or vomiting, then please DO NOT take your water pill and CALL US immediately. Continue to weigh yourself on a regular basis. I spent > 25 minutes reviewing the entire medical record, diagnostics, counseling regarding prognosis. Nagi Noland M.D.  [facility]  Heart Failure and Pulmonary Hypertension  Mobile [phone]

## 2020-01-08 ENCOUNTER — CARE COORDINATION (OUTPATIENT)
Dept: CASE MANAGEMENT | Age: 75
End: 2020-01-08

## 2020-01-08 NOTE — CARE COORDINATION
[facility] Transitions Bundled Payments for Care Improvement (BPCI) Follow Up Call  Qualifying Diagnosis of Congestive Heart Failure    1/8/2020  Patient Name:  Kenny Shepherd   YOB: 1945  Discharge Date:  12/27/19  RARS:  Readmission Risk Score: 22    PCP:  Jeannine Sanchez MD     Message left. Stated who I was, representing the [facility], Care Transitions Team. Requested to place a call to their Physician with any immediate needs/questions/concerns. No future appointments.     Care Transitions will continue to follow per [facility].  Cassidy Campos RN, CTC

## 2020-01-15 ENCOUNTER — CARE COORDINATION (OUTPATIENT)
Dept: CASE MANAGEMENT | Age: 75
End: 2020-01-15

## 2020-02-13 ENCOUNTER — CARE COORDINATION (OUTPATIENT)
Dept: CASE MANAGEMENT | Age: 75
End: 2020-02-13

## 2020-02-13 NOTE — CARE COORDINATION
[facility] Transitions Follow Up Call    2020    Patient: Yoly Haney  Patient : 1945   MRN: <[id number]>  Reason for Admission:   Discharge Date: 19 RARS: Readmission Risk Score: 22         Attempted to contact patient for follow up BPCI-A transition call. Left voicemail message to return call with an update on condition since discharge. Contact information provided. Will continue to follow up. Care Transitions Subsequent and Final Call    Subsequent and Final Calls  Care Transitions Interventions                          Other Interventions: Follow Up  No future appointments.     April Willis LPN

## 2020-02-18 ENCOUNTER — CARE COORDINATION (OUTPATIENT)
Dept: CASE MANAGEMENT | Age: 75
End: 2020-02-18

## 2020-03-03 ENCOUNTER — CARE COORDINATION (OUTPATIENT)
Dept: CASE MANAGEMENT | Age: 75
End: 2020-03-03

## 2020-03-03 NOTE — CARE COORDINATION
[facility] Transitions Follow Up Call    3/3/2020    Patient: Georges Paula  Patient : 1945   MRN: [id number]  Reason for Admission:   Discharge Date: 19 RARS: Readmission Risk Score: 22    Follow Up: Attempted to contact patient for BPCI-A follow up. Unable to reach patient. Left message on voicemail with contact information and request for call back. No future appointments.     Hannah Pope RN

## 2020-03-13 ENCOUNTER — CARE COORDINATION (OUTPATIENT)
Dept: CASE MANAGEMENT | Age: 75
End: 2020-03-13

## 2020-04-27 ENCOUNTER — APPOINTMENT (OUTPATIENT)
Dept: GENERAL RADIOLOGY | Age: 75
DRG: 291 | End: 2020-04-27
Payer: MEDICARE

## 2020-04-27 ENCOUNTER — HOSPITAL ENCOUNTER (INPATIENT)
Age: 75
LOS: 10 days | Discharge: NURSING, REHAB OR LONG-TERM CARE | DRG: 291 | End: 2020-05-07
Attending: EMERGENCY MEDICINE | Admitting: INTERNAL MEDICINE
Payer: MEDICARE

## 2020-04-27 PROBLEM — N18.2 CKD STAGE 2 DUE TO TYPE 2 DIABETES MELLITUS (HCC): Chronic | Status: ACTIVE | Noted: 2019-07-17

## 2020-04-27 PROBLEM — E87.6 HYPOKALEMIA: Status: RESOLVED | Noted: 2019-07-17 | Resolved: 2020-04-27

## 2020-04-27 PROBLEM — E87.70 VOLUME OVERLOAD: Status: ACTIVE | Noted: 2020-04-27

## 2020-04-27 PROBLEM — E11.22 CKD STAGE 2 DUE TO TYPE 2 DIABETES MELLITUS (HCC): Chronic | Status: ACTIVE | Noted: 2019-07-17

## 2020-04-27 PROBLEM — Z95.0 S/P PLACEMENT OF CARDIAC PACEMAKER: Chronic | Status: ACTIVE | Noted: 2020-04-27

## 2020-04-27 PROBLEM — I50.9 CHF WITH UNKNOWN LVEF (HCC): Status: RESOLVED | Noted: 2019-12-17 | Resolved: 2020-04-27

## 2020-04-27 LAB
ANION GAP SERPL CALCULATED.3IONS-SCNC: 12 MMOL/L (ref 7–16)
ANISOCYTOSIS: ABNORMAL
BASOPHILS ABSOLUTE: 0 E9/L (ref 0–0.2)
BASOPHILS RELATIVE PERCENT: 0.2 % (ref 0–2)
BUN BLDV-MCNC: 28 MG/DL (ref 8–23)
CALCIUM SERPL-MCNC: 9.1 MG/DL (ref 8.6–10.2)
CHLORIDE BLD-SCNC: 103 MMOL/L (ref 98–107)
CO2: 25 MMOL/L (ref 22–29)
CREAT SERPL-MCNC: 1.5 MG/DL (ref 0.7–1.2)
EOSINOPHILS ABSOLUTE: 0.09 E9/L (ref 0.05–0.5)
EOSINOPHILS RELATIVE PERCENT: 1.7 % (ref 0–6)
GFR AFRICAN AMERICAN: 55
GFR NON-AFRICAN AMERICAN: 46 ML/MIN/1.73
GLUCOSE BLD-MCNC: 110 MG/DL (ref 74–99)
HCT VFR BLD CALC: 32.4 % (ref 37–54)
HEMOGLOBIN: 9.4 G/DL (ref 12.5–16.5)
LYMPHOCYTES ABSOLUTE: 0.2 E9/L (ref 1.5–4)
LYMPHOCYTES RELATIVE PERCENT: 4.3 % (ref 20–42)
MCH RBC QN AUTO: 28 PG (ref 26–35)
MCHC RBC AUTO-ENTMCNC: 29 % (ref 32–34.5)
MCV RBC AUTO: 96.4 FL (ref 80–99.9)
METER GLUCOSE: 100 MG/DL (ref 74–99)
MONOCYTES ABSOLUTE: 0.51 E9/L (ref 0.1–0.95)
MONOCYTES RELATIVE PERCENT: 9.6 % (ref 2–12)
NEUTROPHILS ABSOLUTE: 4.28 E9/L (ref 1.8–7.3)
NEUTROPHILS RELATIVE PERCENT: 84.3 % (ref 43–80)
PDW BLD-RTO: 16.8 FL (ref 11.5–15)
PLATELET # BLD: 140 E9/L (ref 130–450)
PMV BLD AUTO: 10 FL (ref 7–12)
POTASSIUM REFLEX MAGNESIUM: 4.3 MMOL/L (ref 3.5–5)
PRO-BNP: 3232 PG/ML (ref 0–125)
RBC # BLD: 3.36 E12/L (ref 3.8–5.8)
SARS-COV-2, NAAT: NOT DETECTED
SODIUM BLD-SCNC: 140 MMOL/L (ref 132–146)
TROPONIN: <0.01 NG/ML (ref 0–0.03)
WBC # BLD: 5.1 E9/L (ref 4.5–11.5)

## 2020-04-27 PROCEDURE — 6370000000 HC RX 637 (ALT 250 FOR IP): Performed by: INTERNAL MEDICINE

## 2020-04-27 PROCEDURE — U0002 COVID-19 LAB TEST NON-CDC: HCPCS

## 2020-04-27 PROCEDURE — 82962 GLUCOSE BLOOD TEST: CPT

## 2020-04-27 PROCEDURE — 6360000002 HC RX W HCPCS: Performed by: EMERGENCY MEDICINE

## 2020-04-27 PROCEDURE — 99285 EMERGENCY DEPT VISIT HI MDM: CPT

## 2020-04-27 PROCEDURE — 2580000003 HC RX 258: Performed by: INTERNAL MEDICINE

## 2020-04-27 PROCEDURE — 2060000000 HC ICU INTERMEDIATE R&B

## 2020-04-27 PROCEDURE — 6360000002 HC RX W HCPCS: Performed by: INTERNAL MEDICINE

## 2020-04-27 PROCEDURE — 93005 ELECTROCARDIOGRAM TRACING: CPT | Performed by: EMERGENCY MEDICINE

## 2020-04-27 PROCEDURE — 80048 BASIC METABOLIC PNL TOTAL CA: CPT

## 2020-04-27 PROCEDURE — 83880 ASSAY OF NATRIURETIC PEPTIDE: CPT

## 2020-04-27 PROCEDURE — 2700000000 HC OXYGEN THERAPY PER DAY

## 2020-04-27 PROCEDURE — 71045 X-RAY EXAM CHEST 1 VIEW: CPT

## 2020-04-27 PROCEDURE — 85025 COMPLETE CBC W/AUTO DIFF WBC: CPT

## 2020-04-27 PROCEDURE — 84484 ASSAY OF TROPONIN QUANT: CPT

## 2020-04-27 RX ORDER — DILTIAZEM HYDROCHLORIDE 60 MG/1
120 CAPSULE, EXTENDED RELEASE ORAL 2 TIMES DAILY
Status: DISCONTINUED | OUTPATIENT
Start: 2020-04-27 | End: 2020-04-28

## 2020-04-27 RX ORDER — PETROLATUM 42 G/100G
OINTMENT TOPICAL 2 TIMES DAILY
Status: DISCONTINUED | OUTPATIENT
Start: 2020-04-27 | End: 2020-05-07 | Stop reason: HOSPADM

## 2020-04-27 RX ORDER — SODIUM CHLORIDE 0.9 % (FLUSH) 0.9 %
10 SYRINGE (ML) INJECTION PRN
Status: DISCONTINUED | OUTPATIENT
Start: 2020-04-27 | End: 2020-05-07 | Stop reason: HOSPADM

## 2020-04-27 RX ORDER — FUROSEMIDE 40 MG/1
40 TABLET ORAL DAILY
Status: ON HOLD | COMMUNITY
End: 2020-05-07 | Stop reason: HOSPADM

## 2020-04-27 RX ORDER — COLCHICINE 0.6 MG/1
0.6 TABLET ORAL DAILY
Status: ON HOLD | COMMUNITY
End: 2020-05-07 | Stop reason: HOSPADM

## 2020-04-27 RX ORDER — ACETAMINOPHEN 325 MG/1
650 TABLET ORAL EVERY 6 HOURS PRN
Status: DISCONTINUED | OUTPATIENT
Start: 2020-04-27 | End: 2020-05-07 | Stop reason: HOSPADM

## 2020-04-27 RX ORDER — ACETAMINOPHEN 160 MG
2000 TABLET,DISINTEGRATING ORAL DAILY
Status: ON HOLD | COMMUNITY
End: 2020-05-07 | Stop reason: HOSPADM

## 2020-04-27 RX ORDER — ATORVASTATIN CALCIUM 20 MG/1
20 TABLET, FILM COATED ORAL DAILY
Status: DISCONTINUED | OUTPATIENT
Start: 2020-04-27 | End: 2020-05-07 | Stop reason: HOSPADM

## 2020-04-27 RX ORDER — HYDRALAZINE HYDROCHLORIDE 50 MG/1
50 TABLET, FILM COATED ORAL 3 TIMES DAILY
Status: DISCONTINUED | OUTPATIENT
Start: 2020-04-27 | End: 2020-05-07 | Stop reason: HOSPADM

## 2020-04-27 RX ORDER — FUROSEMIDE 10 MG/ML
20 INJECTION INTRAMUSCULAR; INTRAVENOUS ONCE
Status: COMPLETED | OUTPATIENT
Start: 2020-04-27 | End: 2020-04-27

## 2020-04-27 RX ORDER — DOXAZOSIN 8 MG/1
8 TABLET ORAL NIGHTLY
COMMUNITY

## 2020-04-27 RX ORDER — ONDANSETRON 2 MG/ML
4 INJECTION INTRAMUSCULAR; INTRAVENOUS EVERY 6 HOURS PRN
Status: DISCONTINUED | OUTPATIENT
Start: 2020-04-27 | End: 2020-05-07 | Stop reason: HOSPADM

## 2020-04-27 RX ORDER — POLYETHYLENE GLYCOL 3350 17 G/17G
17 POWDER, FOR SOLUTION ORAL DAILY PRN
Status: DISCONTINUED | OUTPATIENT
Start: 2020-04-27 | End: 2020-05-07 | Stop reason: HOSPADM

## 2020-04-27 RX ORDER — PROMETHAZINE HYDROCHLORIDE 25 MG/1
12.5 TABLET ORAL EVERY 6 HOURS PRN
Status: DISCONTINUED | OUTPATIENT
Start: 2020-04-27 | End: 2020-05-07 | Stop reason: HOSPADM

## 2020-04-27 RX ORDER — HYDRALAZINE HYDROCHLORIDE 50 MG/1
50 TABLET, FILM COATED ORAL DAILY
Status: ON HOLD | COMMUNITY
End: 2020-05-07 | Stop reason: HOSPADM

## 2020-04-27 RX ORDER — ACETAMINOPHEN 650 MG/1
650 SUPPOSITORY RECTAL EVERY 6 HOURS PRN
Status: DISCONTINUED | OUTPATIENT
Start: 2020-04-27 | End: 2020-05-07 | Stop reason: HOSPADM

## 2020-04-27 RX ORDER — DOXAZOSIN MESYLATE 4 MG/1
8 TABLET ORAL NIGHTLY
Status: DISCONTINUED | OUTPATIENT
Start: 2020-04-27 | End: 2020-05-07 | Stop reason: HOSPADM

## 2020-04-27 RX ORDER — FUROSEMIDE 10 MG/ML
40 INJECTION INTRAMUSCULAR; INTRAVENOUS 2 TIMES DAILY
Status: DISCONTINUED | OUTPATIENT
Start: 2020-04-27 | End: 2020-04-30

## 2020-04-27 RX ORDER — DILTIAZEM HYDROCHLORIDE 120 MG/1
120 CAPSULE, EXTENDED RELEASE ORAL 2 TIMES DAILY
Status: ON HOLD | COMMUNITY
End: 2020-05-07 | Stop reason: HOSPADM

## 2020-04-27 RX ORDER — SODIUM CHLORIDE 0.9 % (FLUSH) 0.9 %
10 SYRINGE (ML) INJECTION EVERY 12 HOURS SCHEDULED
Status: DISCONTINUED | OUTPATIENT
Start: 2020-04-27 | End: 2020-05-07 | Stop reason: HOSPADM

## 2020-04-27 RX ADMIN — FUROSEMIDE 20 MG: 10 INJECTION, SOLUTION INTRAVENOUS at 16:09

## 2020-04-27 RX ADMIN — ACETAMINOPHEN 650 MG: 325 TABLET, FILM COATED ORAL at 20:21

## 2020-04-27 RX ADMIN — ENOXAPARIN SODIUM 40 MG: 40 INJECTION SUBCUTANEOUS at 20:21

## 2020-04-27 RX ADMIN — CARBIDOPA AND LEVODOPA 1 TABLET: 25; 100 TABLET ORAL at 21:39

## 2020-04-27 RX ADMIN — HYDRALAZINE HYDROCHLORIDE 50 MG: 50 TABLET, FILM COATED ORAL at 20:21

## 2020-04-27 RX ADMIN — Medication 10 ML: at 20:21

## 2020-04-27 RX ADMIN — DILTIAZEM HYDROCHLORIDE 120 MG: 60 CAPSULE, EXTENDED RELEASE ORAL at 20:22

## 2020-04-27 RX ADMIN — DOXAZOSIN 8 MG: 4 TABLET ORAL at 20:22

## 2020-04-27 RX ADMIN — ATORVASTATIN CALCIUM 20 MG: 20 TABLET, FILM COATED ORAL at 20:22

## 2020-04-27 RX ADMIN — FUROSEMIDE 40 MG: 10 INJECTION, SOLUTION INTRAMUSCULAR; INTRAVENOUS at 22:48

## 2020-04-27 ASSESSMENT — PAIN DESCRIPTION - LOCATION: LOCATION: LEG

## 2020-04-27 ASSESSMENT — PAIN DESCRIPTION - DESCRIPTORS: DESCRIPTORS: ACHING

## 2020-04-27 ASSESSMENT — PAIN DESCRIPTION - ONSET: ONSET: ON-GOING

## 2020-04-27 ASSESSMENT — PAIN DESCRIPTION - FREQUENCY: FREQUENCY: CONTINUOUS

## 2020-04-27 ASSESSMENT — PAIN SCALES - GENERAL
PAINLEVEL_OUTOF10: 5
PAINLEVEL_OUTOF10: 0

## 2020-04-27 ASSESSMENT — PAIN DESCRIPTION - ORIENTATION: ORIENTATION: LEFT

## 2020-04-27 NOTE — ED PROVIDER NOTES
with bilateral small pleural effusions. Chest x-ray coupled with patient's clinical exam concerning for acute on chronic CHF exacerbation. Patient admits to not taking his Lasix as prescribed so he does not have to go to the bathroom more. Patient given a dose of IV Lasix in the department and improvement in shortness of breath. Educated patient on symptoms, diagnosis and need to stay in hospital for further evaluation and care. Consult placed to on-call hospitalist who accepted. Patient was admitted.            ----------------------------------------------- PAST HISTORY --------------------------------------------  Past Medical History:  has a past medical history of Diabetes mellitus ([facility] Diabetic foot ulcer associated with type 2 diabetes mellitus, with fat layer exposed ([facility] Diabetic peripheral neuropathy ([facility] Hyperlipidemia, Hypertension, and RLS (restless legs syndrome). Past Surgical History:  has a past surgical history that includes Foot Amputation; other surgical history (9/20/2014); Foot surgery (Left, 5/29/16); pacemaker placement; and Insert Picc Line (7/23/2019). Social History:  reports that he quit smoking about 35 years ago. His smoking use included cigarettes. He started smoking about 56 years ago. He smoked 2.00 packs per day. His smokeless tobacco use includes chew. He reports that he does not drink alcohol or use drugs. Family History: family history includes Cancer in his brother; Diabetes in his mother; Heart Attack in his brother; Heart Disease in his mother; Other in his father; Parkinsonism in his sister. The patients home medications have been reviewed. Allergies: Zosyn [piperacillin sod-tazobactam so];  Morphine; and Vancomycin    ------------------------------------------------ RESULTS ---------------------------------------------------    LABS:  Results for orders placed or performed during the hospital encounter of 04/27/20   CBC Auto Differential   Result Radiology results interpreted by Radiologist unless otherwise noted. XR CHEST PORTABLE   Final Result   Tortuous ectatic aorta   Cardiomegaly    Airspace disease compatible with atelectasis or pneumonia, at both   lung bases with likely bilateral small pleural effusions   The chest appears to be worse in the interval                      EKG:  This EKG is signed and interpreted by ED Physician. Time:  1453   Rate: 52  Rhythm: Sinus. First-degree AV block  Interpretation: sinus bradycardia. No STEMI. First-degree AV block. . Comparison: changes compared to previous EKG.    ---------------------------- NURSING NOTES AND VITALS REVIEWED -------------------------   The nursing notes within the ED encounter and vital signs as below have been reviewed.    /72   Pulse 50   Temp 97.6 °F (36.4 °C) (Temporal)   Resp 20   Ht 6' 4\" (1.93 m)   Wt (!) 420 lb 9 oz (190.8 kg)   SpO2 93%   BMI 51.19 kg/m²   Oxygen Saturation Interpretation: Improved after treatment      ------------------------------------------PROGRESS NOTES -------------------------------------------    ED COURSE MEDICATIONS:                Medications   atorvastatin (LIPITOR) tablet 20 mg (20 mg Oral Given 4/27/20 2022)   carbidopa-levodopa (SINEMET)  MG per tablet 1 tablet (1 tablet Oral Given 4/27/20 2139)   dilTIAZem (CARDIZEM 12 HR) extended release capsule 120 mg (120 mg Oral Given 4/27/20 2022)   doxazosin (CARDURA) tablet 8 mg (8 mg Oral Given 4/27/20 2022)   hydrALAZINE (APRESOLINE) tablet 50 mg (50 mg Oral Given 4/27/20 2021)   sodium chloride flush 0.9 % injection 10 mL (10 mLs Intravenous Given 4/27/20 2021)   sodium chloride flush 0.9 % injection 10 mL (has no administration in time range)   acetaminophen (TYLENOL) tablet 650 mg (650 mg Oral Given 4/27/20 2021)     Or   acetaminophen (TYLENOL) suppository 650 mg ( Rectal See Alternative 4/27/20 2021)   polyethylene glycol (GLYCOLAX) packet 17 g (has no administration

## 2020-04-28 LAB
ANION GAP SERPL CALCULATED.3IONS-SCNC: 12 MMOL/L (ref 7–16)
BUN BLDV-MCNC: 30 MG/DL (ref 8–23)
CALCIUM SERPL-MCNC: 8.7 MG/DL (ref 8.6–10.2)
CHLORIDE BLD-SCNC: 103 MMOL/L (ref 98–107)
CO2: 27 MMOL/L (ref 22–29)
CREAT SERPL-MCNC: 1.7 MG/DL (ref 0.7–1.2)
GFR AFRICAN AMERICAN: 48
GFR NON-AFRICAN AMERICAN: 40 ML/MIN/1.73
GLUCOSE BLD-MCNC: 114 MG/DL (ref 74–99)
METER GLUCOSE: 115 MG/DL (ref 74–99)
METER GLUCOSE: 117 MG/DL (ref 74–99)
METER GLUCOSE: 138 MG/DL (ref 74–99)
METER GLUCOSE: 156 MG/DL (ref 74–99)
POTASSIUM SERPL-SCNC: 4.4 MMOL/L (ref 3.5–5)
SODIUM BLD-SCNC: 142 MMOL/L (ref 132–146)

## 2020-04-28 PROCEDURE — 36415 COLL VENOUS BLD VENIPUNCTURE: CPT

## 2020-04-28 PROCEDURE — 6370000000 HC RX 637 (ALT 250 FOR IP): Performed by: INTERNAL MEDICINE

## 2020-04-28 PROCEDURE — 2580000003 HC RX 258: Performed by: INTERNAL MEDICINE

## 2020-04-28 PROCEDURE — 82962 GLUCOSE BLOOD TEST: CPT

## 2020-04-28 PROCEDURE — 97162 PT EVAL MOD COMPLEX 30 MIN: CPT

## 2020-04-28 PROCEDURE — 2060000000 HC ICU INTERMEDIATE R&B

## 2020-04-28 PROCEDURE — 97535 SELF CARE MNGMENT TRAINING: CPT

## 2020-04-28 PROCEDURE — 2700000000 HC OXYGEN THERAPY PER DAY

## 2020-04-28 PROCEDURE — 97530 THERAPEUTIC ACTIVITIES: CPT

## 2020-04-28 PROCEDURE — 97166 OT EVAL MOD COMPLEX 45 MIN: CPT

## 2020-04-28 PROCEDURE — 6360000002 HC RX W HCPCS: Performed by: INTERNAL MEDICINE

## 2020-04-28 PROCEDURE — 94640 AIRWAY INHALATION TREATMENT: CPT

## 2020-04-28 PROCEDURE — 80048 BASIC METABOLIC PNL TOTAL CA: CPT

## 2020-04-28 RX ORDER — DEXTROSE MONOHYDRATE 25 G/50ML
12.5 INJECTION, SOLUTION INTRAVENOUS PRN
Status: DISCONTINUED | OUTPATIENT
Start: 2020-04-28 | End: 2020-05-07 | Stop reason: HOSPADM

## 2020-04-28 RX ORDER — NICOTINE POLACRILEX 4 MG
15 LOZENGE BUCCAL PRN
Status: DISCONTINUED | OUTPATIENT
Start: 2020-04-28 | End: 2020-05-07 | Stop reason: HOSPADM

## 2020-04-28 RX ORDER — IPRATROPIUM BROMIDE AND ALBUTEROL SULFATE 2.5; .5 MG/3ML; MG/3ML
1 SOLUTION RESPIRATORY (INHALATION)
Status: DISCONTINUED | OUTPATIENT
Start: 2020-04-28 | End: 2020-05-05

## 2020-04-28 RX ORDER — IPRATROPIUM BROMIDE AND ALBUTEROL SULFATE 2.5; .5 MG/3ML; MG/3ML
1 SOLUTION RESPIRATORY (INHALATION)
Status: DISCONTINUED | OUTPATIENT
Start: 2020-04-28 | End: 2020-04-28

## 2020-04-28 RX ORDER — DILTIAZEM HYDROCHLORIDE 60 MG/1
120 CAPSULE, EXTENDED RELEASE ORAL DAILY
Status: DISCONTINUED | OUTPATIENT
Start: 2020-04-28 | End: 2020-04-29

## 2020-04-28 RX ORDER — DEXTROSE MONOHYDRATE 50 MG/ML
100 INJECTION, SOLUTION INTRAVENOUS PRN
Status: DISCONTINUED | OUTPATIENT
Start: 2020-04-28 | End: 2020-05-07 | Stop reason: HOSPADM

## 2020-04-28 RX ADMIN — DOXAZOSIN 8 MG: 4 TABLET ORAL at 21:16

## 2020-04-28 RX ADMIN — IPRATROPIUM BROMIDE AND ALBUTEROL SULFATE 1 AMPULE: 2.5; .5 SOLUTION RESPIRATORY (INHALATION) at 20:24

## 2020-04-28 RX ADMIN — CARBIDOPA AND LEVODOPA 1 TABLET: 25; 100 TABLET ORAL at 08:07

## 2020-04-28 RX ADMIN — CARBIDOPA AND LEVODOPA 1 TABLET: 25; 100 TABLET ORAL at 21:21

## 2020-04-28 RX ADMIN — FUROSEMIDE 40 MG: 10 INJECTION, SOLUTION INTRAMUSCULAR; INTRAVENOUS at 08:07

## 2020-04-28 RX ADMIN — HYDRALAZINE HYDROCHLORIDE 50 MG: 50 TABLET, FILM COATED ORAL at 21:17

## 2020-04-28 RX ADMIN — PETROLATUM: 42 OINTMENT TOPICAL at 07:41

## 2020-04-28 RX ADMIN — Medication 10 ML: at 08:08

## 2020-04-28 RX ADMIN — HYDRALAZINE HYDROCHLORIDE 50 MG: 50 TABLET, FILM COATED ORAL at 08:07

## 2020-04-28 RX ADMIN — INSULIN LISPRO 2 UNITS: 100 INJECTION, SOLUTION INTRAVENOUS; SUBCUTANEOUS at 11:26

## 2020-04-28 RX ADMIN — ATORVASTATIN CALCIUM 20 MG: 20 TABLET, FILM COATED ORAL at 08:07

## 2020-04-28 RX ADMIN — ACETAMINOPHEN 650 MG: 325 TABLET, FILM COATED ORAL at 08:08

## 2020-04-28 RX ADMIN — DILTIAZEM HYDROCHLORIDE 120 MG: 60 CAPSULE, EXTENDED RELEASE ORAL at 19:57

## 2020-04-28 RX ADMIN — Medication 10 ML: at 21:17

## 2020-04-28 RX ADMIN — PETROLATUM: 42 OINTMENT TOPICAL at 22:21

## 2020-04-28 RX ADMIN — ENOXAPARIN SODIUM 40 MG: 40 INJECTION SUBCUTANEOUS at 08:08

## 2020-04-28 RX ADMIN — FUROSEMIDE 40 MG: 10 INJECTION, SOLUTION INTRAMUSCULAR; INTRAVENOUS at 17:33

## 2020-04-28 RX ADMIN — ACETAMINOPHEN 650 MG: 325 TABLET, FILM COATED ORAL at 13:46

## 2020-04-28 RX ADMIN — HYDRALAZINE HYDROCHLORIDE 50 MG: 50 TABLET, FILM COATED ORAL at 13:46

## 2020-04-28 RX ADMIN — SODIUM CHLORIDE, PRESERVATIVE FREE 10 ML: 5 INJECTION INTRAVENOUS at 17:33

## 2020-04-28 ASSESSMENT — PAIN DESCRIPTION - LOCATION
LOCATION: GENERALIZED
LOCATION: SCROTUM

## 2020-04-28 ASSESSMENT — ENCOUNTER SYMPTOMS
EYE REDNESS: 0
NAUSEA: 0
ABDOMINAL PAIN: 0
EYE PAIN: 0
SHORTNESS OF BREATH: 1
SINUS PRESSURE: 0
COUGH: 1
WHEEZING: 0
BACK PAIN: 0
SORE THROAT: 0
VOMITING: 0
EYE DISCHARGE: 0
DIARRHEA: 0

## 2020-04-28 ASSESSMENT — PAIN - FUNCTIONAL ASSESSMENT
PAIN_FUNCTIONAL_ASSESSMENT: ACTIVITIES ARE NOT PREVENTED
PAIN_FUNCTIONAL_ASSESSMENT: ACTIVITIES ARE NOT PREVENTED

## 2020-04-28 ASSESSMENT — PAIN SCALES - GENERAL
PAINLEVEL_OUTOF10: 5
PAINLEVEL_OUTOF10: 5
PAINLEVEL_OUTOF10: 3
PAINLEVEL_OUTOF10: 0
PAINLEVEL_OUTOF10: 5

## 2020-04-28 ASSESSMENT — PAIN DESCRIPTION - PROGRESSION
CLINICAL_PROGRESSION: NOT CHANGED
CLINICAL_PROGRESSION: NOT CHANGED

## 2020-04-28 ASSESSMENT — PAIN DESCRIPTION - DESCRIPTORS
DESCRIPTORS: DISCOMFORT
DESCRIPTORS: ACHING;DISCOMFORT

## 2020-04-28 ASSESSMENT — PAIN DESCRIPTION - ONSET
ONSET: ON-GOING
ONSET: ON-GOING

## 2020-04-28 ASSESSMENT — PAIN DESCRIPTION - FREQUENCY
FREQUENCY: CONTINUOUS
FREQUENCY: CONTINUOUS

## 2020-04-28 ASSESSMENT — PAIN DESCRIPTION - PAIN TYPE
TYPE: CHRONIC PAIN
TYPE: ACUTE PAIN

## 2020-04-28 NOTE — FLOWSHEET NOTE
Inpatient Wound Care(initial evaluation) 4510    Admit Date: 4/27/2020  2:27 PM    Reason for consult:  Left lower leg    Significant history:    Includes shortness of breath. He was not taking his diuretics regularly. He is seen in the emergency room, felt to have congestive heart failure, is admitted for further evaluation and treatment.     PAST MEDICAL HISTORY:  Hypertension; diabetes mellitus; morbid obesity; hyperlipidemia; chronic diastolic congestive heart failure; peripheral vascular disease, status post right below-knee amputation; CKD II; chronic wound, left foot, followed by [facility].     Wound history:  Admitted with wounds  Active in [facility] wound care center  Spoke to wound care nurse    Findings:       04/28/20 1315   Skin Integrity   Skin Integrity Bruising   Location BUE   Skin Integrity Site 2   Skin Integrity Location 2   (dry thick layers of skin, raised bumps,chronic discoloration)   Location 2 LLE   Skin Integrity Site 3   Skin Integrity Location 3   (thick raises bumps)    Location 3 pannus   Skin Integrity Site 4   Skin Integrity Location 4   (old healed, deformed )   Location 4   (left foot)   Wound 12/17/19 Leg Left; Lower; Lateral   Date First Assessed/Time First Assessed: 12/17/19 1741   Present on Hospital Admission: Yes  Location: Leg  Wound Location Orientation: Left; Lower; Lateral   Wound Image    Wound Venous   Dressing Changed Changed/New   Dressing/Treatment Silver dressing  (coflex TLC)   Wound Cleansed Rinsed/Irrigated with saline   Wound Length (cm) 8 cm   Wound Width (cm) 7 cm   Wound Depth (cm) 0.4 cm   Wound Surface Area (cm^2) 56 cm^2   Change in Wound Size % (l*w) 30   Wound Volume (cm^3) 22.4 cm^3   Wound Healing % 78   Wound Assessment Red;Yellow   Drainage Amount None   Belén-wound Assessment Dry   Red%Wound Bed 20   Yellow%Wound Bed 80   Wound 12/17/19 Foot Left;Dorsal   Date First Assessed/Time First Assessed: 12/17/19 1725   Present on [facility] Admission: Yes  Location: Foot  Wound Location Orientation: Left;Dorsal   Wound Image    Dressing Changed Changed/New   Dressing/Treatment   (mesalt, coflex TLC)   Wound Cleansed Rinsed/Irrigated with saline   Wound Length (cm) 3 cm   Wound Width (cm) 2 cm   Wound Depth (cm) 0.1 cm   Wound Surface Area (cm^2) 6 cm^2   Change in Wound Size % (l*w) 75   Wound Volume (cm^3) 0.6 cm^3   Wound Healing % 92   Wound Assessment Pink   Drainage Amount None   Belén-wound Assessment Intact   [place]%Wound Bed 100   Wound 04/28/20 Left;Plantar second plantar toe   Date First Assessed/Time First Assessed: 04/28/20 1315   Present on Hospital Admission: Yes  Wound Location Orientation: Left;Plantar  Wound Description (Comments): second plantar toe   Wound Image    Dressing Changed Changed/New   Dressing/Treatment   (mesalt, coflex TLC)   Wound Cleansed Rinsed/Irrigated with saline   Wound Length (cm) 3 cm   Wound Width (cm) 4 cm   Wound Depth (cm) 0.1 cm   Wound Surface Area (cm^2) 12 cm^2   Wound Volume (cm^3) 1.2 cm^3   Wound Assessment Red;Yellow   Drainage Amount Scant   Drainage Description Serosanguinous   Odor None   Belén-wound Assessment Intact   Red%Wound Bed 50   Yellow%Wound Bed 50   Right bka  Left foot contracted/deformed  **Informed Consent**    The patient has given verbal consent to have photos taken of wound  and inserted into their chart as part of their permanent medical record for purposes of documentation, treatment management and/or medical review. All Images taken on 4/28/20 of patient name: Cezar Martinez were transmitted and stored on secured AFAR located within [facility] by a registered [employer] Mobile Application Device.    Plan:  Silver dressing left lower leg  mesalt to dorsal aspect and between toes left foot  Plan of care discussed with patient after talking to [address] [place] [address] wound care nurse  Will need continued preventative care  bariatric cr [place] 4/28/2020 2:04

## 2020-04-28 NOTE — PROGRESS NOTES
Occupational Therapy  OCCUPATIONAL THERAPY INITIAL EVALUATION      Date:2020  Patient Name: Hawk Bueno  MRN: [id number]  : 1945  Room: [address] [place] [address]-A    Referring Physician:  Telly Giraldo DO    Evaluating OT:  Odette GraciaANGUS, OTR/L #[id number]      AM-PAC Daily Activity Raw Score:    Recommended Adaptive Equipment:  TBD as pt progresses/Reacher     Reason for Admission:  Pt was admitted w/ recent weight gain of ~ 15# causing decreased mobility    Diagnosis:  CHF, ZACHARY     Procedures this admission:  None     Pertinent Medical History:  DM, R BKA, Left Charcot Foot     Precautions:  Falls  R BKA - Prosthesis at b/s  Left Charcot Foot/Wounds    Home Living: Pt lives with his wife in a 1-story house with Ramp entry JOVANI   Bathroom setup:  Tub-Shower, High Commode   Equipment owned:  Hilda More, BSC, W/C, FWW    Available Family Assist:  Wife can assist PRN w/ Set up/Clean up    Prior Level of Function:  Pt reports he is able to complete own dressing/Sponge Bathing (d/t LE wounds) and Toileting. Uses BSC at b/s for HS Use. Able to don/doff own Prosthesis. IND with Transfers and Mobility using W/C for Household Mobility, FWW vs Furniture in rooms not W/C accessible. Driving:  ?? Occupation:  None reported    Pain Level:  Did not quantify - c/o Pain \"all over\" - attributed it to Hospital bed and Scrotal edema - inability to reposition self in bed - Nsg aware   Additional Complaints:  C/o SOB    Vitals/Lab Values:    O2 sats 96% prior to initiation of any ax;  91% flat in supine; Increased to 94% w/ HOB Elevated;  HR 48    Cognition: A & O x 4   Able to Follow Multi-Step Commands    Memory:  good    Sequencing:  good    Problem solving:  good    Judgement/safety:  good   Additional Comments:  Pt was pleasant but reported having pain \"all over\" especially in his Scrotum w/ All movement d/t recent swelling/weight gain.   Ax greatly limited       Functional Assessment:   Initial Eval Status  Date: 4-28-20 Treatment Status  Date: Short Term/Long Term Goals  Treatment frequency: PRN 2-4 x/week  1-2 weeks   Feeding Set up      NA   Grooming Set up    Able to perform tasks after set up in high-pool position  Unable to sit EOB for task  Min A  Standing At The Sink   UB Dressing Mod A/Set up    Able to thread Lev UEs into hospital gown Max A to wrap garment around back in supine (Simulated)  Min A   LB Dressing Dep    Max A to don sock L LE  Unable to don under-garments in supine despite Max A of 2 for bed mobility  Max A   Bathing NT    Pt declined  Max A   Toileting NT    Unable to use urinal at baseline d/t habitus/scrotal swelling  Max A   Bed Mobility  Rolling: Max A of 2  Repositioning:  Max A of 2   Supine to Sit:  Dep    Sit to Supine:  Dep     Pt required Max A of 2 to roll side to side in bed despite VCs/Pt ed for techs to facilitate task/use of side-rails, unable to sit EOB   Max A   Functional Transfers Sit to stand:  NT  Stand to sit:  NT      Unable to sit EOB  Max A   Functional Mobility NT    Unable to sit EOB  Max A   Balance Sitting:  NT     Static:  NT    Dynamic:  NT     Standing:  NT     Static:  NT    Dynamic:  NT      Activity Tolerance Tolerated Sitting:  NT  w/ functional ax  Tolerated Standing:  NT w/ functional ax     Visual/  Perceptual WFL  Glasses:  No      Hearing WFL  Hearing Aids  No       Hand dominance: Right    UE ROM: RUE:  WFL      LUE:  WFL    Strength: RUE: grossly 5/5     LUE: grossly 5/5     Strength:  WFL Lev UEs    Fine Motor Coordination:  WFL Lev UEs    Sensation:  Denies numbness or tingling Lev UEs  Tone:  WFL Lev UEs  Edema:  Global Edema d/t CHF, Moderate-Severe Scrotal Edema; Moderate Left Forearm                             Upon arrival, pt was found in semi-supine. He was agreeable to participate in therapeutic ax. No family members present during session. Received permission from RN prior to engaging pt in OT services.       Treatment:      Provided Delirium prevention/treatment  []  Other:  [x]    Pt would benefit from continued skilled OT services to increase safety and independence with completion of ADL/IADL tasks for functional independence and quality of life. Pt/Family actively participated in the establishment of goals. Rehab Potential:  Fair for established goals    Patient / Family Goal:  Decrease pain     Patient and/or Family were instructed on Functional Diagnosis, Prognosis/Goals and OT Plan of Care. Demonstrated Good understanding. Evaluation Time includes thorough review of current medical information, gathering information on past medical history/social history and prior level of function, completion of standardized testing/informal observation of tasks, assessment of data and education on plan of care and goals.      Eval Complexity: Mod  Profile and History - Mod  Assessment of Occupational Performance and Identification of Deficits - Mod  Clinical Decision Making - Mod       Mod Evaluation + 10 timed treatment minutes    Treatment Time In:  7026              Treatment Time Out:  1016    Treatment Charges: Mins Units   Ther Ex  95186     Manual Therapy 02197     Thera Activities 99415     ADL/Home Mgt 83198 10 1   Neuro Re-ed 21937     Group Therapy      Orthotic manage/training  73755     Non-Billable Time     Total Timed Treatment 10 308 [facility], [address] [place] [address], OTR/L #[id number]

## 2020-04-28 NOTE — H&P
[address]                  [address] [facility],  [facility]                              HISTORY AND PHYSICAL    PATIENT NAME: Eduardo Emery                   :        1945  MED REC NO:   [id number]                            ROOM:       4510  ACCOUNT NO:   [de-identified]                           ADMIT DATE: 2020  PROVIDER:     Viv Sparks DO    CHIEF COMPLAINT:  Shortness of breath. HISTORY OF PRESENT ILLNESS:  The patient is a 81-year-old  male  who presented to the emergency room complaining of several-day history  of increasing shortness of breath. He was not taking his diuretics  regularly. He is seen in the emergency room, felt to have congestive  heart failure, is admitted for further evaluation and treatment. PAST MEDICAL HISTORY:  Hypertension; diabetes mellitus; morbid obesity;  hyperlipidemia; chronic diastolic congestive heart failure; peripheral  vascular disease, status post right below-knee amputation; CKD II;  chronic wound, left foot, followed by [facility]. PAST SURGICAL HISTORY:  Right BKA, left foot I and D, pacemaker  placement which is nonfunctional.    REVIEW OF SYSTEMS:  Remarkable for above-stated chief complaint. ALLERGIES:  ZOSYN, MORPHINE, VANCOMYCIN. SOCIAL HISTORY:  No tobacco, no alcohol. PRIMARY CARE PHYSICIAN:  Ashley Clayton MD    MEDICATIONS PRIOR TO ADMISSION:  Diltiazem, Cardura, Apresoline, Lasix,  vitamin D, Lipitor, Actos, Sinemet, colchicine. PHYSICAL EXAMINATION:  GENERAL APPEARANCE:  Reveals a 81-year-old  male who is alert  and oriented x3, cooperative and a fair historian. VITAL SIGNS:  On admission, temperature 97.8, pulse 47, respirations 20,  blood pressure 131/63. HEENT:  Head:  Normocephalic, atraumatic. Eyes:  Pupils equal and  reactive to light. Extraocular muscles intact. Fundi not well  visualized.   Nose, no

## 2020-04-28 NOTE — PROGRESS NOTES
Exercises:  NA    Patient education  Pt educated on purpose of PT assessment, importance of mobility, safety with mobility, proper breathing techniques    Patient response to education:   Pt verbalized understanding Pt demonstrated skill Pt requires further education in this area   Yes  Partially with verbal cues and assist Yes      ASSESSMENT:    Comments:  Patient cleared by RN and agreeable to treatment. Patient found in high Solorio's but slouched in bed and leaning to the L. Patient reported just being assisted with repositioning and reported pain/discomfort from bed being too small. HOB lowered and patient positioned to the [facility] and assisted with rolling L/R for better positioning. Patient reported increased SOB in flat position and HOB elevated to comfort. SpO2 dropped to 91% when supine but recovered to 94% once HOB elevated. Patient not willing to perform supine<>sit or sit EOB due to scrotal edema/discomfort. Patient repositioned to the center of the bed utilizing TAPS under L side. LUE elevated on a pillow for edema management/positioning/comfort. HOB elevated to comfort with call light and tray table in reach. Will attempt further mobility once edema is controlled/managed and patient willing to participate. Treatment:  Patient practiced and was instructed in the following treatment:     Bed mobility: Verbal/tactile cues for sequencing BUEs/BLEs for safe technique with rolling/supine<>sit task. Unable to achieve seated due to scrotal edema.  Therapeutic exercises: NA    Pt's/ family goals   1. To feel better. Patient and or family understand(s) diagnosis, prognosis, and plan of care. Yes     PLAN:    PT care will be provided in accordance with the objectives noted above. Exercises and functional mobility practice will be used as well as appropriate assistive devices or modalities to obtain goals. Patient and family education will also be administered as needed.     Frequency of treatments:

## 2020-04-28 NOTE — PLAN OF CARE
Problem: Falls - Risk of:  Goal: Will remain free from falls  Description: Will remain free from falls  Outcome: Met This Shift     Problem: Pain Control  Goal: Maintain pain level at or below patient's acceptable level (or 5 if patient is unable to determine acceptable level)  Outcome: Met This Shift     Problem: Ineffective Breathing Pattern  Goal: Use of effective breathing techniques  Outcome: Met This Shift  Goal: STG - Patient will utilize pursed lip breathing techniques  Outcome: Met This Shift

## 2020-04-29 ENCOUNTER — APPOINTMENT (OUTPATIENT)
Dept: GENERAL RADIOLOGY | Age: 75
DRG: 291 | End: 2020-04-29
Payer: MEDICARE

## 2020-04-29 LAB
ANION GAP SERPL CALCULATED.3IONS-SCNC: 13 MMOL/L (ref 7–16)
BUN BLDV-MCNC: 32 MG/DL (ref 8–23)
CALCIUM SERPL-MCNC: 9.1 MG/DL (ref 8.6–10.2)
CHLORIDE BLD-SCNC: 99 MMOL/L (ref 98–107)
CO2: 26 MMOL/L (ref 22–29)
CREAT SERPL-MCNC: 1.8 MG/DL (ref 0.7–1.2)
EKG ATRIAL RATE: 52 BPM
EKG P-R INTERVAL: 336 MS
EKG Q-T INTERVAL: 498 MS
EKG QRS DURATION: 102 MS
EKG QTC CALCULATION (BAZETT): 463 MS
EKG R AXIS: 23 DEGREES
EKG T AXIS: 27 DEGREES
EKG VENTRICULAR RATE: 52 BPM
GFR AFRICAN AMERICAN: 45
GFR NON-AFRICAN AMERICAN: 37 ML/MIN/1.73
GLUCOSE BLD-MCNC: 106 MG/DL (ref 74–99)
METER GLUCOSE: 104 MG/DL (ref 74–99)
METER GLUCOSE: 110 MG/DL (ref 74–99)
METER GLUCOSE: 118 MG/DL (ref 74–99)
METER GLUCOSE: 147 MG/DL (ref 74–99)
POTASSIUM SERPL-SCNC: 4.5 MMOL/L (ref 3.5–5)
SODIUM BLD-SCNC: 138 MMOL/L (ref 132–146)

## 2020-04-29 PROCEDURE — 94640 AIRWAY INHALATION TREATMENT: CPT

## 2020-04-29 PROCEDURE — 80048 BASIC METABOLIC PNL TOTAL CA: CPT

## 2020-04-29 PROCEDURE — 2700000000 HC OXYGEN THERAPY PER DAY

## 2020-04-29 PROCEDURE — 2580000003 HC RX 258: Performed by: INTERNAL MEDICINE

## 2020-04-29 PROCEDURE — 71045 X-RAY EXAM CHEST 1 VIEW: CPT

## 2020-04-29 PROCEDURE — 6370000000 HC RX 637 (ALT 250 FOR IP): Performed by: INTERNAL MEDICINE

## 2020-04-29 PROCEDURE — 6360000002 HC RX W HCPCS: Performed by: INTERNAL MEDICINE

## 2020-04-29 PROCEDURE — 36415 COLL VENOUS BLD VENIPUNCTURE: CPT

## 2020-04-29 PROCEDURE — 82962 GLUCOSE BLOOD TEST: CPT

## 2020-04-29 PROCEDURE — 2060000000 HC ICU INTERMEDIATE R&B

## 2020-04-29 RX ADMIN — CARBIDOPA AND LEVODOPA 1 TABLET: 25; 100 TABLET ORAL at 21:21

## 2020-04-29 RX ADMIN — IPRATROPIUM BROMIDE AND ALBUTEROL SULFATE 1 AMPULE: 2.5; .5 SOLUTION RESPIRATORY (INHALATION) at 19:55

## 2020-04-29 RX ADMIN — HYDRALAZINE HYDROCHLORIDE 50 MG: 50 TABLET, FILM COATED ORAL at 21:21

## 2020-04-29 RX ADMIN — INSULIN LISPRO 2 UNITS: 100 INJECTION, SOLUTION INTRAVENOUS; SUBCUTANEOUS at 12:14

## 2020-04-29 RX ADMIN — IPRATROPIUM BROMIDE AND ALBUTEROL SULFATE 1 AMPULE: 2.5; .5 SOLUTION RESPIRATORY (INHALATION) at 09:23

## 2020-04-29 RX ADMIN — IPRATROPIUM BROMIDE AND ALBUTEROL SULFATE 1 AMPULE: 2.5; .5 SOLUTION RESPIRATORY (INHALATION) at 13:07

## 2020-04-29 RX ADMIN — HYDRALAZINE HYDROCHLORIDE 50 MG: 50 TABLET, FILM COATED ORAL at 14:27

## 2020-04-29 RX ADMIN — Medication 10 ML: at 09:03

## 2020-04-29 RX ADMIN — IPRATROPIUM BROMIDE AND ALBUTEROL SULFATE 1 AMPULE: 2.5; .5 SOLUTION RESPIRATORY (INHALATION) at 16:47

## 2020-04-29 RX ADMIN — FUROSEMIDE 40 MG: 10 INJECTION, SOLUTION INTRAMUSCULAR; INTRAVENOUS at 17:34

## 2020-04-29 RX ADMIN — ATORVASTATIN CALCIUM 20 MG: 20 TABLET, FILM COATED ORAL at 09:03

## 2020-04-29 RX ADMIN — PETROLATUM: 42 OINTMENT TOPICAL at 10:21

## 2020-04-29 RX ADMIN — PETROLATUM: 42 OINTMENT TOPICAL at 21:22

## 2020-04-29 RX ADMIN — FUROSEMIDE 40 MG: 10 INJECTION, SOLUTION INTRAMUSCULAR; INTRAVENOUS at 09:03

## 2020-04-29 RX ADMIN — CARBIDOPA AND LEVODOPA 1 TABLET: 25; 100 TABLET ORAL at 14:12

## 2020-04-29 RX ADMIN — Medication 10 ML: at 21:21

## 2020-04-29 RX ADMIN — DOXAZOSIN 8 MG: 4 TABLET ORAL at 21:21

## 2020-04-29 RX ADMIN — HYDRALAZINE HYDROCHLORIDE 50 MG: 50 TABLET, FILM COATED ORAL at 09:03

## 2020-04-29 RX ADMIN — ENOXAPARIN SODIUM 40 MG: 40 INJECTION SUBCUTANEOUS at 09:03

## 2020-04-29 ASSESSMENT — PAIN SCALES - GENERAL
PAINLEVEL_OUTOF10: 0

## 2020-04-29 NOTE — PROGRESS NOTES
ejection fraction (HCC)  Active Problems:    DM (diabetes mellitus) ([facility]    HTN (hypertension)    Hyperlipidemia    Diabetic ulcer of left foot ([address]    CKD stage 2 due to type 2 diabetes mellitus ([facility]    S/P placement of cardiac pacemaker  Resolved Problems:    * No resolved hospital problems.  *      Plan:  Stop diltiazem cont diuresis serial lab check cxr        Mace Reasoner  11:33 AM  4/29/2020

## 2020-04-30 ENCOUNTER — APPOINTMENT (OUTPATIENT)
Dept: GENERAL RADIOLOGY | Age: 75
DRG: 291 | End: 2020-04-30
Payer: MEDICARE

## 2020-04-30 LAB
AADO2: 122.2 MMHG
ANION GAP SERPL CALCULATED.3IONS-SCNC: 13 MMOL/L (ref 7–16)
B.E.: 1.2 MMOL/L (ref -3–3)
B.E.: 1.7 MMOL/L (ref -3–3)
BUN BLDV-MCNC: 40 MG/DL (ref 8–23)
CALCIUM SERPL-MCNC: 9.3 MG/DL (ref 8.6–10.2)
CHLORIDE BLD-SCNC: 99 MMOL/L (ref 98–107)
CO2: 26 MMOL/L (ref 22–29)
COHB: 1 % (ref 0–1.5)
COHB: 1.1 % (ref 0–1.5)
CREAT SERPL-MCNC: 1.9 MG/DL (ref 0.7–1.2)
CRITICAL: ABNORMAL
CRITICAL: ABNORMAL
DATE ANALYZED: ABNORMAL
DATE ANALYZED: ABNORMAL
DATE OF COLLECTION: ABNORMAL
DATE OF COLLECTION: ABNORMAL
FIO2: 50 %
GFR AFRICAN AMERICAN: 42
GFR NON-AFRICAN AMERICAN: 35 ML/MIN/1.73
GLUCOSE BLD-MCNC: 99 MG/DL (ref 74–99)
HCO3: 30.5 MMOL/L (ref 22–26)
HCO3: 31.1 MMOL/L (ref 22–26)
HHB: 1.6 % (ref 0–5)
HHB: 10.8 % (ref 0–5)
LAB: ABNORMAL
METER GLUCOSE: 101 MG/DL (ref 74–99)
METER GLUCOSE: 114 MG/DL (ref 74–99)
METER GLUCOSE: 122 MG/DL (ref 74–99)
METER GLUCOSE: 97 MG/DL (ref 74–99)
METHB: 0.3 % (ref 0–1.5)
METHB: 0.5 % (ref 0–1.5)
MODE: ABNORMAL
MODE: ABNORMAL
O2 CONTENT: 12.8 ML/DL
O2 CONTENT: 14.2 ML/DL
O2 SATURATION: 89 % (ref 92–98.5)
O2 SATURATION: 98.4 % (ref 92–98.5)
O2HB: 87.8 % (ref 94–97)
O2HB: 96.9 % (ref 94–97)
OPERATOR ID: ABNORMAL
OPERATOR ID: ABNORMAL
PATIENT TEMP: 37 C
PATIENT TEMP: 37 C
PCO2: 78.5 MMHG (ref 35–45)
PCO2: 80.2 MMHG (ref 35–45)
PEEP/CPAP: 7 CMH2O
PFO2: 2.63 MMHG/%
PH BLOOD GAS: 7.21 (ref 7.35–7.45)
PH BLOOD GAS: 7.21 (ref 7.35–7.45)
PO2: 131.6 MMHG (ref 75–100)
PO2: 62.1 MMHG (ref 75–100)
POTASSIUM SERPL-SCNC: 4.8 MMOL/L (ref 3.5–5)
RI(T): 0.93
RR MECHANICAL: 16 B/MIN
SODIUM BLD-SCNC: 138 MMOL/L (ref 132–146)
SOURCE, BLOOD GAS: ABNORMAL
SOURCE, BLOOD GAS: ABNORMAL
THB: 10.2 G/DL (ref 11.5–16.5)
THB: 10.3 G/DL (ref 11.5–16.5)
TIME ANALYZED: 2015
TIME ANALYZED: 2201
TROPONIN: <0.01 NG/ML (ref 0–0.03)
VT MECHANICAL: 500 ML

## 2020-04-30 PROCEDURE — 6360000002 HC RX W HCPCS: Performed by: INTERNAL MEDICINE

## 2020-04-30 PROCEDURE — 2700000000 HC OXYGEN THERAPY PER DAY

## 2020-04-30 PROCEDURE — 2060000000 HC ICU INTERMEDIATE R&B

## 2020-04-30 PROCEDURE — 6370000000 HC RX 637 (ALT 250 FOR IP): Performed by: INTERNAL MEDICINE

## 2020-04-30 PROCEDURE — 82962 GLUCOSE BLOOD TEST: CPT

## 2020-04-30 PROCEDURE — 99233 SBSQ HOSP IP/OBS HIGH 50: CPT | Performed by: SURGERY

## 2020-04-30 PROCEDURE — 97535 SELF CARE MNGMENT TRAINING: CPT

## 2020-04-30 PROCEDURE — 2580000003 HC RX 258: Performed by: INTERNAL MEDICINE

## 2020-04-30 PROCEDURE — 36415 COLL VENOUS BLD VENIPUNCTURE: CPT

## 2020-04-30 PROCEDURE — 94660 CPAP INITIATION&MGMT: CPT

## 2020-04-30 PROCEDURE — 82805 BLOOD GASES W/O2 SATURATION: CPT

## 2020-04-30 PROCEDURE — 97530 THERAPEUTIC ACTIVITIES: CPT

## 2020-04-30 PROCEDURE — 93005 ELECTROCARDIOGRAM TRACING: CPT | Performed by: INTERNAL MEDICINE

## 2020-04-30 PROCEDURE — 2500000003 HC RX 250 WO HCPCS: Performed by: INTERNAL MEDICINE

## 2020-04-30 PROCEDURE — 94640 AIRWAY INHALATION TREATMENT: CPT

## 2020-04-30 PROCEDURE — 84484 ASSAY OF TROPONIN QUANT: CPT

## 2020-04-30 PROCEDURE — 80048 BASIC METABOLIC PNL TOTAL CA: CPT

## 2020-04-30 PROCEDURE — 71045 X-RAY EXAM CHEST 1 VIEW: CPT

## 2020-04-30 RX ORDER — METHYLPREDNISOLONE SODIUM SUCCINATE 40 MG/ML
40 INJECTION, POWDER, LYOPHILIZED, FOR SOLUTION INTRAMUSCULAR; INTRAVENOUS EVERY 12 HOURS
Status: DISCONTINUED | OUTPATIENT
Start: 2020-04-30 | End: 2020-04-30

## 2020-04-30 RX ORDER — BUMETANIDE 0.25 MG/ML
1 INJECTION, SOLUTION INTRAMUSCULAR; INTRAVENOUS ONCE
Status: COMPLETED | OUTPATIENT
Start: 2020-04-30 | End: 2020-04-30

## 2020-04-30 RX ORDER — BUMETANIDE 0.25 MG/ML
2 INJECTION, SOLUTION INTRAMUSCULAR; INTRAVENOUS 2 TIMES DAILY
Status: DISCONTINUED | OUTPATIENT
Start: 2020-05-01 | End: 2020-05-01

## 2020-04-30 RX ORDER — BUDESONIDE 0.25 MG/2ML
500 INHALANT ORAL 2 TIMES DAILY
Status: DISCONTINUED | OUTPATIENT
Start: 2020-04-30 | End: 2020-05-07 | Stop reason: HOSPADM

## 2020-04-30 RX ORDER — INSULIN GLARGINE 100 [IU]/ML
6 INJECTION, SOLUTION SUBCUTANEOUS NIGHTLY
Status: DISCONTINUED | OUTPATIENT
Start: 2020-04-30 | End: 2020-04-30

## 2020-04-30 RX ORDER — BUMETANIDE 0.25 MG/ML
INJECTION, SOLUTION INTRAMUSCULAR; INTRAVENOUS
Status: DISPENSED
Start: 2020-04-30 | End: 2020-05-01

## 2020-04-30 RX ORDER — FUROSEMIDE 10 MG/ML
40 INJECTION INTRAMUSCULAR; INTRAVENOUS 3 TIMES DAILY
Status: DISCONTINUED | OUTPATIENT
Start: 2020-04-30 | End: 2020-04-30

## 2020-04-30 RX ORDER — HEPARIN SODIUM 10000 [USP'U]/ML
5000 INJECTION, SOLUTION INTRAVENOUS; SUBCUTANEOUS 3 TIMES DAILY
Status: DISCONTINUED | OUTPATIENT
Start: 2020-04-30 | End: 2020-05-02 | Stop reason: CLARIF

## 2020-04-30 RX ORDER — ARFORMOTEROL TARTRATE 15 UG/2ML
15 SOLUTION RESPIRATORY (INHALATION) 2 TIMES DAILY
Status: DISCONTINUED | OUTPATIENT
Start: 2020-04-30 | End: 2020-05-07 | Stop reason: HOSPADM

## 2020-04-30 RX ADMIN — ATORVASTATIN CALCIUM 20 MG: 20 TABLET, FILM COATED ORAL at 08:54

## 2020-04-30 RX ADMIN — FUROSEMIDE 40 MG: 10 INJECTION, SOLUTION INTRAMUSCULAR; INTRAVENOUS at 15:09

## 2020-04-30 RX ADMIN — IPRATROPIUM BROMIDE AND ALBUTEROL SULFATE 1 AMPULE: 2.5; .5 SOLUTION RESPIRATORY (INHALATION) at 16:56

## 2020-04-30 RX ADMIN — PETROLATUM: 42 OINTMENT TOPICAL at 21:29

## 2020-04-30 RX ADMIN — DOXAZOSIN 8 MG: 4 TABLET ORAL at 21:28

## 2020-04-30 RX ADMIN — HYDRALAZINE HYDROCHLORIDE 50 MG: 50 TABLET, FILM COATED ORAL at 15:09

## 2020-04-30 RX ADMIN — SODIUM CHLORIDE, PRESERVATIVE FREE 10 ML: 5 INJECTION INTRAVENOUS at 15:09

## 2020-04-30 RX ADMIN — CARBIDOPA AND LEVODOPA 1 TABLET: 25; 100 TABLET ORAL at 21:28

## 2020-04-30 RX ADMIN — ACETAMINOPHEN 650 MG: 325 TABLET, FILM COATED ORAL at 04:15

## 2020-04-30 RX ADMIN — IPRATROPIUM BROMIDE AND ALBUTEROL SULFATE 1 AMPULE: 2.5; .5 SOLUTION RESPIRATORY (INHALATION) at 02:08

## 2020-04-30 RX ADMIN — HEPARIN SODIUM 5000 UNITS: 10000 INJECTION INTRAVENOUS; SUBCUTANEOUS at 21:32

## 2020-04-30 RX ADMIN — BUMETANIDE 1 MG: 0.25 INJECTION, SOLUTION INTRAMUSCULAR; INTRAVENOUS at 21:17

## 2020-04-30 RX ADMIN — CARBIDOPA AND LEVODOPA 1 TABLET: 25; 100 TABLET ORAL at 13:22

## 2020-04-30 RX ADMIN — Medication 10 ML: at 21:29

## 2020-04-30 RX ADMIN — IPRATROPIUM BROMIDE AND ALBUTEROL SULFATE 1 AMPULE: 2.5; .5 SOLUTION RESPIRATORY (INHALATION) at 08:36

## 2020-04-30 RX ADMIN — FUROSEMIDE 40 MG: 10 INJECTION, SOLUTION INTRAMUSCULAR; INTRAVENOUS at 19:47

## 2020-04-30 RX ADMIN — Medication 10 ML: at 08:54

## 2020-04-30 RX ADMIN — ARFORMOTEROL TARTRATE 15 MCG: 15 SOLUTION RESPIRATORY (INHALATION) at 23:21

## 2020-04-30 RX ADMIN — BUDESONIDE 500 MCG: 0.25 SUSPENSION RESPIRATORY (INHALATION) at 23:21

## 2020-04-30 RX ADMIN — HYDRALAZINE HYDROCHLORIDE 50 MG: 50 TABLET, FILM COATED ORAL at 08:54

## 2020-04-30 RX ADMIN — HYDRALAZINE HYDROCHLORIDE 50 MG: 50 TABLET, FILM COATED ORAL at 21:28

## 2020-04-30 RX ADMIN — ENOXAPARIN SODIUM 40 MG: 40 INJECTION SUBCUTANEOUS at 08:54

## 2020-04-30 RX ADMIN — FUROSEMIDE 40 MG: 10 INJECTION, SOLUTION INTRAMUSCULAR; INTRAVENOUS at 08:54

## 2020-04-30 RX ADMIN — CARBIDOPA AND LEVODOPA 1 TABLET: 25; 100 TABLET ORAL at 08:54

## 2020-04-30 RX ADMIN — IPRATROPIUM BROMIDE AND ALBUTEROL SULFATE 1 AMPULE: 2.5; .5 SOLUTION RESPIRATORY (INHALATION) at 12:10

## 2020-04-30 RX ADMIN — PETROLATUM: 42 OINTMENT TOPICAL at 08:54

## 2020-04-30 RX ADMIN — IPRATROPIUM BROMIDE AND ALBUTEROL SULFATE 1 AMPULE: 2.5; .5 SOLUTION RESPIRATORY (INHALATION) at 19:45

## 2020-04-30 ASSESSMENT — PAIN SCALES - GENERAL
PAINLEVEL_OUTOF10: 0
PAINLEVEL_OUTOF10: 4
PAINLEVEL_OUTOF10: 0

## 2020-04-30 NOTE — PROGRESS NOTES
[facility] Medicine    Subjective:  Pt alert conversive mild sob      Current Facility-Administered Medications:     furosemide (LASIX) injection 40 mg, 40 mg, Intravenous, TID, Nereida Sarah Sanabria, DO    glucose (GLUTOSE) 40 % oral gel 15 g, 15 g, Oral, PRN, Nereida Sarah Seemer, DO    dextrose 50 % IV solution, 12.5 g, Intravenous, PRN, Nereida Sarah Seemer, DO    glucagon (rDNA) injection 1 mg, 1 mg, Intramuscular, PRN, Nereida Sarah Seemer, DO    dextrose 5 % solution, 100 mL/hr, Intravenous, PRN, Nereida Sarah Seemer, DO    ipratropium-albuterol (DUONEB) nebulizer solution 1 ampule, 1 ampule, Inhalation, Q4H WA, Nereida Sarah Sanabria, DO, 1 ampule at 04/30/20 0208    atorvastatin (LIPITOR) tablet 20 mg, 20 mg, Oral, Daily, Nereida Sarah Sanabria, DO, 20 mg at 04/29/20 3106    carbidopa-levodopa (SINEMET)  MG per tablet 1 tablet, 1 tablet, Oral, 4x Daily, Brian Drew, DO, 1 tablet at 04/29/20 2121    doxazosin (CARDURA) tablet 8 mg, 8 mg, Oral, Nightly, Nereida Sarah Sanabria, DO, 8 mg at 04/29/20 2121    hydrALAZINE (APRESOLINE) tablet 50 mg, 50 mg, Oral, TID, Nereida Sanabria, DO, 50 mg at 04/29/20 2121    sodium chloride flush 0.9 % injection 10 mL, 10 mL, Intravenous, 2 times per day, Stareladio Dexters, DO, 10 mL at 04/29/20 2121    sodium chloride flush 0.9 % injection 10 mL, 10 mL, Intravenous, PRN, Nereida Sarah Sanabria, DO, 10 mL at 04/28/20 1733    acetaminophen (TYLENOL) tablet 650 mg, 650 mg, Oral, Q6H PRN, 650 mg at 04/30/20 0415 **OR** acetaminophen (TYLENOL) suppository 650 mg, 650 mg, Rectal, Q6H PRN, Nereida Sarah Sanabria, DO    polyethylene glycol (GLYCOLAX) packet 17 g, 17 g, Oral, Daily PRN, Nereida Sanabria DO    promethazine (PHENERGAN) tablet 12.5 mg, 12.5 mg, Oral, Q6H PRN **OR** ondansetron (ZOFRAN) injection 4 mg, 4 mg, Intravenous, Q6H PRN, Nereida Sanabria DO    enoxaparin (LOVENOX) injection 40 mg, 40 mg, Subcutaneous, Daily, Nereida Sanabria, , 40 mg at 04/29/20 0903    insulin lispro (HUMALOG) (hypertension)    Hyperlipidemia    Diabetic ulcer of left foot ([facility]    CKD stage 2 due to type 2 diabetes mellitus ([facility]    S/P placement of cardiac pacemaker  Resolved Problems:    * No resolved hospital problems.  *      Plan:  Increase diuretic serial lab        Jesus Diego  7:53 AM  4/30/2020

## 2020-04-30 NOTE — PROGRESS NOTES
Occupational Therapy  Treatment Session    Date:2020  Patient Name: Kelly Lopez  MRN: [id number]  : 1945  Room: [address] [place] [address]A    Referring Physician:  Katy Guidry DO    Evaluating OT:  ANGUS Xiao, OTR/L #[id number]      AM-PAC Daily Activity Raw Score:    Recommended Adaptive Equipment:  TBD as pt progresses/Reacher     Reason for Admission:  Pt was admitted w/ recent weight gain of ~ 15# causing decreased mobility    Diagnosis:  CHF, ZACHARY     Procedures this admission:  None     Pertinent Medical History:  DM, R BKA, Left Charcot Foot     Precautions:  Falls  R BKA - Prosthesis at b/s  Left Charcot Foot/Wounds  Allred Catheter  NC O2 6 LPM  Bariatric Bed    Home Living: Pt lives with his wife in a 1-story house with Ramp entry JOVANI   Bathroom setup:  Tub-Shower, High Commode   Equipment owned:  [address] [place] [address], BSC, W/C, FWW    Available Family Assist:  Wife can assist PRN w/ Set up/Clean up    Prior Level of Function:  Pt reports he is able to complete own dressing/Sponge Bathing (d/t LE wounds) and Toileting. Uses BSC at b/s for HS Use. Able to don/doff own Prosthesis. IND with Transfers and Mobility using W/C for Household Mobility, FWW vs Furniture in rooms not W/C accessible. Driving:  ?? Occupation:  None reported    Pain Level:  C/o Generalized pain, less this date. Nsg aware   Additional Complaints:  C/o SOB    Vitals/Lab Values:    O2 sats 91% prior to initiation of any ax in semi-supine;  93% flat in supine; Decreased to 87% w/ Ax; Increased to 93% w/ HOB Elevated after ax;     Cognition: A & O x 4   Able to Follow Multi-Step Commands    Memory:  good    Sequencing:  good    Problem solving:  good    Judgement/safety:  good   Additional Comments:  Pt presented with a Flat affect this date, Fair eye contact.   Decreased Ax level this session       Functional Assessment:   Initial Eval Status  Date: 20 Treatment Status  Date:    20 Short Term/Long Term Goals  Treatment frequency: PRN 2-4 x/week  1-2 weeks   Feeding Set up     Set up NA   Grooming Set up    Able to perform tasks after set up in high-pool position  Unable to sit EOB for task Set up    Able to wash hands and face w/ cloth after set up while in semi-supine   Min A  Standing At The Sink   UB Dressing Mod A/Set up    Able to thread Lev UEs into hospital gown Max A to wrap garment around back in supine (Simulated) Max A/Set up    Able to thread UEs in/out of garment w/ Min A, Max A to wrap garment around back (Simulated)   Min A   LB Dressing Dep    Max A to don sock L LE  Unable to don under-garments in supine despite Max A of 2 for bed mobility Dep    Max A of 2 for clothing adjustment and bed mobility for simulated task    Max A   Bathing NT    Pt declined  Max A   Toileting NT    Unable to use urinal at baseline d/t habitus/scrotal swelling Dep    Allred Catheter/Bed pan w/ Max A of 2 (simulated) Max A   Bed Mobility  Rolling: Max A of 2  Repositioning:  Max A of 2   Supine to Sit:  Dep    Sit to Supine:  Dep     Pt required Max A of 2 to roll side to side in bed despite VCs/Pt ed for techs to facilitate task/use of side-rails, unable to sit EOB  Rolling:   Max A of 2  Repositioning:  Max A of 2   Supine to Sit:  NT    Sit to Supine: NT    Unable to attempt to transfer to EOB d/t weakness and little contribution to rolling and repositioning - unsafe to attempt to sit EOB/high fall risk    Max A   Functional Transfers Sit to stand:  NT  Stand to sit:  NT      Unable to sit EOB NT    unsafe Max A   Functional Mobility NT    Unable to sit EOB NT    unsafe   Max A   Balance Sitting:  NT     Static:  NT    Dynamic:  NT     Standing:  NT     Static:  NT    Dynamic:  NT  NT    unsafe      Activity Tolerance Tolerated Sitting:  NT  w/ functional ax  Tolerated Standing:  NT w/ functional ax Poor(+)    SOB w/ Rolling in bed, minimal ax    Visual/  Perceptual WFL  Glasses:  No      Hearing WFL  Hearing Aids  No       Hand dominance: Right    UE ROM: RUE:  WFL      LUE:  WFL    Strength: RUE: grossly 5/5     LUE: grossly 5/5     Strength:  WFL Lev UEs    Fine Motor Coordination:  WFL Lev UEs    Sensation:  Denies numbness or tingling Lev UEs  Tone:  WFL Lev UEs  Edema:  Global Edema d/t CHF, Moderate-Severe Scrotal Edema; Severe Pitting Edema Left UE - Elevated UE on 3 pillows, educated on edema control techs, instructed to perform techs/exercise hourly for edema control                             Upon arrival, pt was found in semi-supine - Natural Tendency to lean toward his Left  He was agreeable to participate in therapeutic ax. No family members present during session. Received permission from RN prior to engaging pt in OT services. Treatment:      Provided Skilled SUP/Assist w/ Pt safety, Proper Positioning, ADLs, Transfers and Functional Mobility as noted above, as well as set up and clean up for session. Skilled monitoring of Vitals and pts response to treatment. Consulted RN, PT    -- Education:  Provided Pt/Family ed re: Benefits/Purpose of OT services;  OT Plan of Care;  Safety w/ Functional Transfers; Techs for Bed/mobility; Benefits of/Techs for use of DME/AD/Adaptive equip/techs to increase safety/IND with Functional Ax; Techs to increase Safety/Safety Awareness w/ Functional Ax; Energy Conservation Techs/Pursed-Lip Breathing;  Positioning for Pain mgmt Techs/Edema Control; Benefits of Cont'd Participation in OT services during hospitalization and at D/C      Pt and/or Family verbalized/demonstrated a Good understanding of education provided. Will Review PRN. At the end of the session, patient was properly positioned in Semi-Supine w/ use of TAPS turned toward his Right to off-load and elevate Left UE. Call light and phone within reach, all lines and tubes intact. Oriented pt to call bell. Made all appropriate Environmental Modifications to facilitate pt's level of IND and safety.   All

## 2020-05-01 LAB
AADO2: 114.6 MMHG
AADO2: 124.5 MMHG
AADO2: 72 MMHG
AMMONIA: 35 UMOL/L (ref 16–60)
ANION GAP SERPL CALCULATED.3IONS-SCNC: 11 MMOL/L (ref 7–16)
ANION GAP SERPL CALCULATED.3IONS-SCNC: 13 MMOL/L (ref 7–16)
B.E.: -2 MMOL/L (ref -3–3)
B.E.: 0.6 MMOL/L (ref -3–3)
B.E.: 1 MMOL/L (ref -3–3)
B.E.: 3.9 MMOL/L (ref -3–0)
BUN BLDV-MCNC: 44 MG/DL (ref 8–23)
BUN BLDV-MCNC: 44 MG/DL (ref 8–23)
C-REACTIVE PROTEIN: 4.4 MG/DL (ref 0–0.4)
CALCIUM SERPL-MCNC: 9.4 MG/DL (ref 8.6–10.2)
CALCIUM SERPL-MCNC: 9.7 MG/DL (ref 8.6–10.2)
CHLORIDE BLD-SCNC: 100 MMOL/L (ref 98–107)
CHLORIDE BLD-SCNC: 97 MMOL/L (ref 98–107)
CHLORIDE URINE RANDOM: 65 MMOL/L
CO2: 28 MMOL/L (ref 22–29)
CO2: 31 MMOL/L (ref 22–29)
COHB: 0.4 % (ref 0–1.5)
COHB: 0.6 % (ref 0–1.5)
COHB: 0.7 % (ref 0–1.5)
CREAT SERPL-MCNC: 1.7 MG/DL (ref 0.7–1.2)
CREAT SERPL-MCNC: 1.8 MG/DL (ref 0.7–1.2)
CREATININE URINE: 61 MG/DL (ref 40–278)
CRITICAL: ABNORMAL
DATE ANALYZED: ABNORMAL
DATE OF COLLECTION: ABNORMAL
DEVICE: ABNORMAL
EKG ATRIAL RATE: 72 BPM
EKG P AXIS: 1 DEGREES
EKG P-R INTERVAL: 280 MS
EKG Q-T INTERVAL: 414 MS
EKG QRS DURATION: 104 MS
EKG QTC CALCULATION (BAZETT): 453 MS
EKG R AXIS: 24 DEGREES
EKG T AXIS: 55 DEGREES
EKG VENTRICULAR RATE: 72 BPM
FERRITIN: 109 NG/ML
FIO2 ARTERIAL: 40
FIO2: 40 %
GFR AFRICAN AMERICAN: 45
GFR AFRICAN AMERICAN: 48
GFR NON-AFRICAN AMERICAN: 37 ML/MIN/1.73
GFR NON-AFRICAN AMERICAN: 40 ML/MIN/1.73
GLUCOSE BLD-MCNC: 101 MG/DL (ref 74–99)
GLUCOSE BLD-MCNC: 88 MG/DL (ref 74–99)
HCO3 ARTERIAL: 31.8 MMOL/L (ref 22–26)
HCO3: 25.8 MMOL/L (ref 22–26)
HCO3: 28.1 MMOL/L (ref 22–26)
HCO3: 28.5 MMOL/L (ref 22–26)
HHB: 1.5 % (ref 0–5)
HHB: 3.3 % (ref 0–5)
HHB: 4.5 % (ref 0–5)
IRON SATURATION: 21 % (ref 20–55)
IRON: 52 MCG/DL (ref 59–158)
LAB: ABNORMAL
MAGNESIUM: 2.5 MG/DL (ref 1.6–2.6)
METER GLUCOSE: 85 MG/DL (ref 74–99)
METER GLUCOSE: 85 MG/DL (ref 74–99)
METER GLUCOSE: 90 MG/DL (ref 74–99)
METER GLUCOSE: 95 MG/DL (ref 74–99)
METHB: 0.3 % (ref 0–1.5)
METHB: 0.4 % (ref 0–1.5)
METHB: 0.4 % (ref 0–1.5)
MODE: ABNORMAL
O2 CONTENT: 13.4 ML/DL
O2 CONTENT: 14 ML/DL
O2 CONTENT: 14.2 ML/DL
O2 SATURATION: 94 % (ref 92–98.5)
O2 SATURATION: 95.5 % (ref 92–98.5)
O2 SATURATION: 96.7 % (ref 92–98.5)
O2 SATURATION: 98.5 % (ref 92–98.5)
O2HB: 94.5 % (ref 94–97)
O2HB: 95.6 % (ref 94–97)
O2HB: 97.8 % (ref 94–97)
OPERATOR ID: 421
OPERATOR ID: 7490
OPERATOR ID: 7490
OPERATOR ID: ABNORMAL
PATIENT TEMP: 37
PATIENT TEMP: 37 C
PCO2 (TEMP CORRECTED): 66.4 MMHG (ref 35–45)
PCO2: 59.8 MMHG (ref 35–45)
PCO2: 61.2 MMHG (ref 35–45)
PCO2: 61.3 MMHG (ref 35–45)
PEEP/CPAP: 8 CMH2O
PFO2: 2.01 MMHG/%
PFO2: 2.25 MMHG/%
PFO2: 3.36 MMHG/%
PH (TEMPERATURE CORRECTED): 7.29 (ref 7.35–7.45)
PH BLOOD GAS: 7.25 (ref 7.35–7.45)
PH BLOOD GAS: 7.28 (ref 7.35–7.45)
PH BLOOD GAS: 7.29 (ref 7.35–7.45)
PO2 (TEMP CORRECTED): 81.8 MMHG (ref 60–80)
PO2: 134.4 MMHG (ref 75–100)
PO2: 80.3 MMHG (ref 75–100)
PO2: 90.1 MMHG (ref 75–100)
POSITIVE END EXP PRESS: 8 CMH2O
POTASSIUM SERPL-SCNC: 4.3 MMOL/L (ref 3.5–5)
POTASSIUM SERPL-SCNC: 4.5 MMOL/L (ref 3.5–5)
POTASSIUM, UR: 40.7 MMOL/L
PROCALCITONIN: 0.16 NG/ML (ref 0–0.08)
RESPIRATORY RATE: 20 B/MIN
RI(T): 1.27
RI(T): 1.55
RI(T): 54 %
RR MECHANICAL: 20 B/MIN
SODIUM BLD-SCNC: 136 MMOL/L (ref 132–146)
SODIUM BLD-SCNC: 144 MMOL/L (ref 132–146)
SODIUM URINE: 27 MMOL/L
SOURCE, BLOOD GAS: ABNORMAL
THB: 10 G/DL (ref 11.5–16.5)
THB: 10.1 G/DL (ref 11.5–16.5)
THB: 10.3 G/DL (ref 11.5–16.5)
TIDAL VOLUME: 570 ML
TIME ANALYZED: 1443
TIME ANALYZED: 547
TIME ANALYZED: 7
TOTAL IRON BINDING CAPACITY: 244 MCG/DL (ref 250–450)
TRANSFERRIN: 209 MG/DL (ref 200–360)
TROPONIN: <0.01 NG/ML (ref 0–0.03)
UREA NITROGEN, UR: 349 MG/DL (ref 800–1666)
VT MECHANICAL: 500 ML
VT MECHANICAL: 500 ML
VT MECHANICAL: 570 ML

## 2020-05-01 PROCEDURE — 82805 BLOOD GASES W/O2 SATURATION: CPT

## 2020-05-01 PROCEDURE — 36415 COLL VENOUS BLD VENIPUNCTURE: CPT

## 2020-05-01 PROCEDURE — 82570 ASSAY OF URINE CREATININE: CPT

## 2020-05-01 PROCEDURE — 84133 ASSAY OF URINE POTASSIUM: CPT

## 2020-05-01 PROCEDURE — 2580000003 HC RX 258: Performed by: INTERNAL MEDICINE

## 2020-05-01 PROCEDURE — 82140 ASSAY OF AMMONIA: CPT

## 2020-05-01 PROCEDURE — 82803 BLOOD GASES ANY COMBINATION: CPT

## 2020-05-01 PROCEDURE — 6370000000 HC RX 637 (ALT 250 FOR IP): Performed by: INTERNAL MEDICINE

## 2020-05-01 PROCEDURE — 84540 ASSAY OF URINE/UREA-N: CPT

## 2020-05-01 PROCEDURE — 84466 ASSAY OF TRANSFERRIN: CPT

## 2020-05-01 PROCEDURE — 83550 IRON BINDING TEST: CPT

## 2020-05-01 PROCEDURE — 99222 1ST HOSP IP/OBS MODERATE 55: CPT | Performed by: INTERNAL MEDICINE

## 2020-05-01 PROCEDURE — 2500000003 HC RX 250 WO HCPCS: Performed by: INTERNAL MEDICINE

## 2020-05-01 PROCEDURE — 84300 ASSAY OF URINE SODIUM: CPT

## 2020-05-01 PROCEDURE — 6360000002 HC RX W HCPCS: Performed by: INTERNAL MEDICINE

## 2020-05-01 PROCEDURE — 83735 ASSAY OF MAGNESIUM: CPT

## 2020-05-01 PROCEDURE — 94640 AIRWAY INHALATION TREATMENT: CPT

## 2020-05-01 PROCEDURE — 84145 PROCALCITONIN (PCT): CPT

## 2020-05-01 PROCEDURE — APPSS60 APP SPLIT SHARED TIME 46-60 MINUTES: Performed by: NURSE PRACTITIONER

## 2020-05-01 PROCEDURE — 86140 C-REACTIVE PROTEIN: CPT

## 2020-05-01 PROCEDURE — 82436 ASSAY OF URINE CHLORIDE: CPT

## 2020-05-01 PROCEDURE — 82962 GLUCOSE BLOOD TEST: CPT

## 2020-05-01 PROCEDURE — 2060000000 HC ICU INTERMEDIATE R&B

## 2020-05-01 PROCEDURE — 82728 ASSAY OF FERRITIN: CPT

## 2020-05-01 PROCEDURE — 94660 CPAP INITIATION&MGMT: CPT

## 2020-05-01 PROCEDURE — 83540 ASSAY OF IRON: CPT

## 2020-05-01 PROCEDURE — 80048 BASIC METABOLIC PNL TOTAL CA: CPT

## 2020-05-01 PROCEDURE — 2500000003 HC RX 250 WO HCPCS: Performed by: NURSE PRACTITIONER

## 2020-05-01 PROCEDURE — 84484 ASSAY OF TROPONIN QUANT: CPT

## 2020-05-01 PROCEDURE — 99223 1ST HOSP IP/OBS HIGH 75: CPT | Performed by: INTERNAL MEDICINE

## 2020-05-01 PROCEDURE — 2580000003 HC RX 258: Performed by: NURSE PRACTITIONER

## 2020-05-01 RX ADMIN — BUDESONIDE 500 MCG: 0.25 SUSPENSION RESPIRATORY (INHALATION) at 08:17

## 2020-05-01 RX ADMIN — Medication 10 ML: at 20:47

## 2020-05-01 RX ADMIN — ARFORMOTEROL TARTRATE 15 MCG: 15 SOLUTION RESPIRATORY (INHALATION) at 21:30

## 2020-05-01 RX ADMIN — SODIUM CHLORIDE 1 MG/HR: 900 INJECTION, SOLUTION INTRAVENOUS at 12:28

## 2020-05-01 RX ADMIN — SODIUM CHLORIDE 1 MG/HR: 900 INJECTION, SOLUTION INTRAVENOUS at 21:59

## 2020-05-01 RX ADMIN — PETROLATUM: 42 OINTMENT TOPICAL at 20:46

## 2020-05-01 RX ADMIN — SODIUM CHLORIDE, PRESERVATIVE FREE 10 ML: 5 INJECTION INTRAVENOUS at 12:28

## 2020-05-01 RX ADMIN — IPRATROPIUM BROMIDE AND ALBUTEROL SULFATE 1 AMPULE: 2.5; .5 SOLUTION RESPIRATORY (INHALATION) at 16:56

## 2020-05-01 RX ADMIN — DEXMEDETOMIDINE 0.2 MCG/KG/HR: 100 INJECTION, SOLUTION, CONCENTRATE INTRAVENOUS at 18:34

## 2020-05-01 RX ADMIN — Medication 10 ML: at 09:06

## 2020-05-01 RX ADMIN — BUDESONIDE 500 MCG: 0.25 SUSPENSION RESPIRATORY (INHALATION) at 21:32

## 2020-05-01 RX ADMIN — PETROLATUM: 42 OINTMENT TOPICAL at 09:02

## 2020-05-01 RX ADMIN — ACETAMINOPHEN 650 MG: 325 TABLET, FILM COATED ORAL at 03:02

## 2020-05-01 RX ADMIN — IPRATROPIUM BROMIDE AND ALBUTEROL SULFATE 1 AMPULE: 2.5; .5 SOLUTION RESPIRATORY (INHALATION) at 21:31

## 2020-05-01 RX ADMIN — HEPARIN SODIUM 5000 UNITS: 10000 INJECTION INTRAVENOUS; SUBCUTANEOUS at 09:02

## 2020-05-01 RX ADMIN — BUMETANIDE 2 MG: 0.25 INJECTION INTRAMUSCULAR; INTRAVENOUS at 09:06

## 2020-05-01 RX ADMIN — ARFORMOTEROL TARTRATE 15 MCG: 15 SOLUTION RESPIRATORY (INHALATION) at 08:16

## 2020-05-01 RX ADMIN — HEPARIN SODIUM 5000 UNITS: 10000 INJECTION INTRAVENOUS; SUBCUTANEOUS at 19:44

## 2020-05-01 RX ADMIN — IPRATROPIUM BROMIDE AND ALBUTEROL SULFATE 1 AMPULE: 2.5; .5 SOLUTION RESPIRATORY (INHALATION) at 08:17

## 2020-05-01 RX ADMIN — IPRATROPIUM BROMIDE AND ALBUTEROL SULFATE 1 AMPULE: 2.5; .5 SOLUTION RESPIRATORY (INHALATION) at 12:28

## 2020-05-01 RX ADMIN — HEPARIN SODIUM 5000 UNITS: 10000 INJECTION INTRAVENOUS; SUBCUTANEOUS at 14:40

## 2020-05-01 ASSESSMENT — PAIN SCALES - GENERAL
PAINLEVEL_OUTOF10: 0
PAINLEVEL_OUTOF10: 5
PAINLEVEL_OUTOF10: 0
PAINLEVEL_OUTOF10: 3
PAINLEVEL_OUTOF10: 0

## 2020-05-01 ASSESSMENT — PAIN DESCRIPTION - ORIENTATION: ORIENTATION: RIGHT

## 2020-05-01 ASSESSMENT — PAIN DESCRIPTION - LOCATION: LOCATION: SCROTUM

## 2020-05-01 ASSESSMENT — PAIN DESCRIPTION - PAIN TYPE: TYPE: ACUTE PAIN

## 2020-05-01 ASSESSMENT — PAIN DESCRIPTION - DESCRIPTORS: DESCRIPTORS: DISCOMFORT;NAGGING;SHARP

## 2020-05-01 NOTE — CONSULTS
Advanced Heart Failure and Pulmonary Hypertension Consult Note      Reason for Consultation: Heart Failure       Referring Physician: Dr. Rosy Chanel  Primary Cardiologist: Dr. Ruth Frank        History of Present Illness:     Please note that patient has AVAPS in place and lethargic and therefore HPI obtained EMR    Myla Jj is a pleasant 70year old with chronic HFpEF, CKD, systemic HTN, K8TJ with complications including ulcers, status post R BKA, morbid obesity. He presented to [facility] ED on 4/27/2020 with complaints of increased shortness of breath, lower extremity edema, abdominal bloating and a 15-20 lb weight gain. He was having difficulty getting out of bed to urinate frequently and therefore self decreased diuretic dosing. He was hypoxic upon arrival and admitted for acute heart failure. He was IV diuresed however yesterday evening became lethargic with decreased O2 requiring high flow O2 and an RRT was called. His diuretic was increased and he was placed on AVAPS. Unable to obtain ROS    Past medical, surgical, family and social histories have been reviewed. Any changes in the past medical history, social history or family history have been made and are reflected in the electronic medical record.      Patient Active Problem List    Diagnosis Date Noted    Cellulitis and abscess of foot, left 09/03/2018     Priority: High    Acute on chronic respiratory failure with hypoxia and hypercapnia (HCC)     Volume overload 04/27/2020    S/P placement of cardiac pacemaker 04/27/2020    Acute on chronic diastolic (congestive) heart failure (HCC)     Acute on chronic heart failure with preserved ejection fraction (HCC)     Congestive heart failure ([address] 12/17/2019    Left leg cellulitis 07/17/2019    CKD stage 2 due to type 2 diabetes mellitus ([address] 07/17/2019    Morbid obesity ([address] 09/03/2018    RLS (restless legs syndrome) 09/03/2018    Foot ulcer, left, with fat layer exposed ([address] 09/02/2018    disturbances, difficult swallowing  GI: No nausea, vomiting, abdominal pain  : No dysuria or hematuria  Endocrine: No thyroid disease or diabetes  Musculoskeletal: PACHECO x 4, no focal motor deficits  Skin: Intact, no rashes  Neuro/Psych: No headache or seizures      Weights: Wt Readings from Last 3 Encounters:   05/01/20 (!) 425 lb 9.6 oz (193.1 kg)   01/06/20 (!) 366 lb (166 kg)   12/26/19 (!) 454 lb 8 oz (206.2 kg)       Physical Examination:     BP (!) 142/77   Pulse 70   Temp 96.7 °F (35.9 °C) (Temporal)   Resp 26   Ht 6' 4\" (1.93 m)   Wt (!) 425 lb 9.6 oz (193.1 kg)   SpO2 95%   BMI 51.81 kg/m²     CONSTITUTIONAL: AVAPS in place, lethargic  SKIN: Skin color, texture, turgor normal. No rashes or lesions. LYMPH: no cervical nodes, no inguinal nodes  HEENT: Head is normocephalic, atraumatic. EOMI, PERRLA. NECK: Supple, symmetrical, trachea midline, no adenopathy, thyroid symmetric, not enlarged and no tenderness, skin normal.  CHEST/LUNGS: chest symmetric with normal A/P diameter, normal respiratory rate and rhythm, lungs clear to auscultation without wheezes, rales or rhonchi. No accessory muscle use. Scars None   CARDIOVASCULAR: Heart sounds are normal.  Regular rate and rhythm without murmur, gallop or rub. Normal S1 and S2. . Carotid and femoral pulses 2+/4 and equal bilaterally. ABDOMEN: Morbidly obese. No and Laparoscopic scar(s) present. Normal bowel sounds. No bruits. soft, nondistended, no masses or organomegaly. no evidence of hernia. Percussion: Normal without hepatosplenomegally. Tenderness: absent. RECTAL: deferred, not clinically indicated  NEUROLOGIC: Lethargic  EXTREMITIES: no cyanosis, no clubbing. 2-3+ pitting edema extending into thighs and abdomen. All the following diagnostics were personally reviewed and interpreted by me.        LAB DATA:     4/30/2020 04:46 5/1/2020 03:26   Sodium 138 136   Potassium 4.8 4.5   Chloride 99 97 (L)   CO2 26 28   BUN 40 (H) 44 (H)   Creatinine

## 2020-05-01 NOTE — SIGNIFICANT EVENT
No  Motor strength:  [] Equal  [] Focal deficit:   Diagnostic Test:  Blood Sugar:  122 mg/dL  EKG:    pending  ABG:      Lab Results   Component Value Date    PH 7.207 04/30/2020    PCO2 78.5 04/30/2020    PO2 62.1 04/30/2020    HCO3 30.5 04/30/2020    O2SAT 89.0 04/30/2020     Medication(s) Given:  []  Narcan (Naloxone)   []  Romazicon (Flumazenil)    [x]  Breathing Treatment    []  Steroids (Prednisone, Solu-Medrol)  []  Adenosine  [] Cardiac Medicines:     Infusion(s):   [] Normal Saline    Amount:  cc/hr      [] Lactate Ringers      [] Other:     Imaging:   [] CXR:   [] Normal         [] Pneumothorax         [] Pulmonary Edema  [] Infiltrate          [] CT Head  [] Normal          [] ICB          [] [facility]          [] Other:     Laboratory Tests:     CBC with Differential:    Lab Results   Component Value Date    WBC 5.1 04/27/2020    RBC 3.36 04/27/2020    HGB 9.4 04/27/2020    HCT 32.4 04/27/2020     04/27/2020    MCV 96.4 04/27/2020    MCH 28.0 04/27/2020    MCHC 29.0 04/27/2020    RDW 16.8 04/27/2020    NRBC 0.0 12/17/2019    BANDSPCT 2 09/19/2014    LYMPHOPCT 4.3 04/27/2020    MONOPCT 9.6 04/27/2020    BASOPCT 0.2 04/27/2020    MONOSABS 0.51 04/27/2020    LYMPHSABS 0.20 04/27/2020    EOSABS 0.09 04/27/2020    BASOSABS 0.00 04/27/2020     BMP:    Lab Results   Component Value Date     04/30/2020    K 4.8 04/30/2020    K 4.3 04/27/2020    CL 99 04/30/2020    CO2 26 04/30/2020    BUN 40 04/30/2020    LABALBU 3.7 12/27/2019    CREATININE 1.9 04/30/2020    CALCIUM 9.3 04/30/2020    GFRAA 42 04/30/2020    LABGLOM 35 04/30/2020    GLUCOSE 99 04/30/2020     ABG:    Lab Results   Component Value Date    PH 7.207 04/30/2020    PCO2 78.5 04/30/2020    PO2 62.1 04/30/2020    HCO3 30.5 04/30/2020    BE 1.2 04/30/2020    O2SAT 89.0 04/30/2020       Teams Assessment and Plan:    Assessment:  1. Acute hypoxic and hypercapnic resp failre  2. Likely obesity hypoventilation syndrome / morbid obesity  3.  Acute on

## 2020-05-01 NOTE — FLOWSHEET NOTE
Inpatient Wound Care    Admit Date: 4/27/2020  2:27 PM    Reason for consult:  ***    Significant history:  ***    Wound history:  ***    Findings:  ***     05/01/20 1315   Wound 12/17/19 Leg Left; Lower; Lateral   Date First Assessed/Time First Assessed: 12/17/19 1741   Present on Hospital Admission: Yes  Location: Leg  Wound Location Orientation: Left; Lower; Lateral   Wound Venous   Dressing Changed Changed/New   Dressing/Treatment Silver dressing  (cofelx tlc)   Wound Cleansed Rinsed/Irrigated with saline   Wound Assessment Pink   Drainage Amount Scant   Drainage Description Serosanguinous   Odor None   Belén-wound Assessment Dry   Wound 12/17/19 Foot Left;Dorsal   Date First Assessed/Time First Assessed: 12/17/19 1725   Present on Hospital Admission: Yes  Location: Foot  Wound Location Orientation: Left;Dorsal   Dressing Changed Changed/New   Dressing/Treatment   (mesalt, coflex TLC)   Wound Cleansed Rinsed/Irrigated with saline   Wound Assessment Pink   Drainage Amount Scant   Drainage Description Serosanguinous   Odor None   Belén-wound Assessment Dry   [place]%Wound Bed 100   Wound 04/28/20 Left;Plantar second plantar toe   Date First Assessed/Time First Assessed: 04/28/20 1315   Present on Hospital Admission: Yes  Wound Location Orientation: Left;Plantar  Wound Description (Comments): second plantar toe   Dressing Changed Changed/New   Dressing/Treatment   (mesalt, coflex TLC)   Wound Cleansed Rinsed/Irrigated with saline   Wound Assessment Yellow; Red   Drainage Amount Scant   Drainage Description Serosanguinous   Belén-wound Assessment Intact   Red%Wound Bed 50   Yellow%Wound Bed 50       Impression:  ***    Interventions in place:  ***    Plan:***      Mehran Olsen 5/1/2020 1:40 PM

## 2020-05-01 NOTE — CONSULTS
Social History     Substance and Sexual Activity   Drug Use No             HOME MEDICATIONS:  Prior to Admission medications    Medication Sig Start Date End Date Taking?  Authorizing Provider   dilTIAZem (CARDIZEM 12 HR) 120 MG extended release capsule Take 120 mg by mouth 2 times daily   Yes Historical Provider, MD   doxazosin (CARDURA) 8 MG tablet Take 8 mg by mouth nightly   Yes Historical Provider, MD   hydrALAZINE (APRESOLINE) 50 MG tablet Take 50 mg by mouth daily In the evening   Yes Historical Provider, MD   furosemide (LASIX) 40 MG tablet Take 40 mg by mouth daily   Yes Historical Provider, MD   Cholecalciferol (VITAMIN D3) 50 MCG (2000 UT) CAPS Take 2,000 Units by mouth daily   Yes Historical Provider, MD   atorvastatin (LIPITOR) 20 MG tablet Take 20 mg by mouth daily    Yes Historical Provider, MD   pioglitazone (ACTOS) 45 MG tablet Take 45 mg by mouth nightly   Yes Historical Provider, MD   carbidopa-levodopa (SINEMET)  MG per tablet Take 1 tablet by mouth 4 times daily    Yes Historical Provider, MD   colchicine (COLCRYS) 0.6 MG tablet Take 0.6 mg by mouth daily    Historical Provider, MD       CURRENT MEDICATIONS:  Current Facility-Administered Medications: bumetanide (BUMEX) injection 2 mg, 2 mg, Intravenous, BID  budesonide (PULMICORT) nebulizer suspension 500 mcg, 500 mcg, Nebulization, BID  Arformoterol Tartrate (BROVANA) nebulizer solution 15 mcg, 15 mcg, Nebulization, BID  insulin lispro (HUMALOG) injection vial 0-6 Units, 0-6 Units, Subcutaneous, TID WC  insulin lispro (HUMALOG) injection vial 0-3 Units, 0-3 Units, Subcutaneous, Nightly  heparin (porcine) injection 5,000 Units, 5,000 Units, Subcutaneous, TID  glucose (GLUTOSE) 40 % oral gel 15 g, 15 g, Oral, PRN  dextrose 50 % IV solution, 12.5 g, Intravenous, PRN  glucagon (rDNA) injection 1 mg, 1 mg, Intramuscular, PRN  dextrose 5 % solution, 100 mL/hr, Intravenous, PRN  ipratropium-albuterol (DUONEB) nebulizer solution 1 ampule, 1

## 2020-05-01 NOTE — PROGRESS NOTES
Dr. Abdullahi Exon notified that patient will not keep BIPAP mask on and is not following commands at this time. Asked is restraints vs medication would be better at this time.

## 2020-05-01 NOTE — PROGRESS NOTES
04/11/2016    PROTIME 19.7 (H) 07/17/2019    PROTIME 13.1 (H) 05/27/2016    PROTIME 15.3 (H) 04/11/2016       Assessment:    Principal Problem:    Acute on chronic heart failure with preserved ejection fraction (HCC)  Active Problems:    DM (diabetes mellitus) ([facility]    HTN (hypertension)    Hyperlipidemia    Diabetic ulcer of left foot ([facility]    CKD stage 2 due to type 2 diabetes mellitus (HCC)    Congestive heart failure (HCC)    S/P placement of cardiac pacemaker    Acute on chronic respiratory failure with hypoxia and hypercapnia ([facility])  Resolved Problems:    * No resolved hospital problems.  *      Plan:  pulm and cardio to see cont bipap cont diuretic        Haresh Brock  8:06 AM  5/1/2020

## 2020-05-01 NOTE — CARE COORDINATION
Patient requiring continuous bipap today. [facility] plans to accept when patient is stable for discharge. HENS and ambulance on soft chart.

## 2020-05-01 NOTE — CONSULTS
Called For Patient   Start AVAPS  IP max 34  IP min 12  EPAP 8       Rate 16  ABG in 1 h  Increase Bumex noted

## 2020-05-02 PROBLEM — G25.81 RLS (RESTLESS LEGS SYNDROME): Chronic | Status: RESOLVED | Noted: 2018-09-03 | Resolved: 2020-05-02

## 2020-05-02 PROBLEM — L02.619 CELLULITIS AND ABSCESS OF FOOT: Status: RESOLVED | Noted: 2018-09-03 | Resolved: 2020-05-02

## 2020-05-02 PROBLEM — E87.70 VOLUME OVERLOAD: Status: RESOLVED | Noted: 2020-04-27 | Resolved: 2020-05-02

## 2020-05-02 PROBLEM — L97.522 FOOT ULCER, LEFT, WITH FAT LAYER EXPOSED (HCC): Status: RESOLVED | Noted: 2018-09-02 | Resolved: 2020-05-02

## 2020-05-02 PROBLEM — L02.416 CELLULITIS AND ABSCESS OF LEFT LEG: Status: RESOLVED | Noted: 2018-07-27 | Resolved: 2020-05-02

## 2020-05-02 PROBLEM — L03.116 CELLULITIS OF LEFT FOOT: Status: RESOLVED | Noted: 2017-11-17 | Resolved: 2020-05-02

## 2020-05-02 PROBLEM — L03.116 LEFT LEG CELLULITIS: Status: RESOLVED | Noted: 2019-07-17 | Resolved: 2020-05-02

## 2020-05-02 PROBLEM — L03.119 CELLULITIS AND ABSCESS OF FOOT: Status: RESOLVED | Noted: 2018-09-03 | Resolved: 2020-05-02

## 2020-05-02 PROBLEM — L03.116 CELLULITIS AND ABSCESS OF LEFT LEG: Status: RESOLVED | Noted: 2018-07-27 | Resolved: 2020-05-02

## 2020-05-02 PROBLEM — I50.9 CONGESTIVE HEART FAILURE (HCC): Status: RESOLVED | Noted: 2019-12-17 | Resolved: 2020-05-02

## 2020-05-02 LAB
ANION GAP SERPL CALCULATED.3IONS-SCNC: 12 MMOL/L (ref 7–16)
ANION GAP SERPL CALCULATED.3IONS-SCNC: 12 MMOL/L (ref 7–16)
ANION GAP SERPL CALCULATED.3IONS-SCNC: 13 MMOL/L (ref 7–16)
ANION GAP SERPL CALCULATED.3IONS-SCNC: 13 MMOL/L (ref 7–16)
BUN BLDV-MCNC: 44 MG/DL (ref 8–23)
BUN BLDV-MCNC: 45 MG/DL (ref 8–23)
BUN BLDV-MCNC: 45 MG/DL (ref 8–23)
BUN BLDV-MCNC: 46 MG/DL (ref 8–23)
CALCIUM SERPL-MCNC: 9.2 MG/DL (ref 8.6–10.2)
CALCIUM SERPL-MCNC: 9.3 MG/DL (ref 8.6–10.2)
CALCIUM SERPL-MCNC: 9.3 MG/DL (ref 8.6–10.2)
CALCIUM SERPL-MCNC: 9.5 MG/DL (ref 8.6–10.2)
CHLORIDE BLD-SCNC: 100 MMOL/L (ref 98–107)
CHLORIDE BLD-SCNC: 101 MMOL/L (ref 98–107)
CHLORIDE BLD-SCNC: 98 MMOL/L (ref 98–107)
CHLORIDE BLD-SCNC: 99 MMOL/L (ref 98–107)
CO2: 28 MMOL/L (ref 22–29)
CO2: 29 MMOL/L (ref 22–29)
CO2: 29 MMOL/L (ref 22–29)
CO2: 30 MMOL/L (ref 22–29)
CREAT SERPL-MCNC: 1.6 MG/DL (ref 0.7–1.2)
CREAT SERPL-MCNC: 1.7 MG/DL (ref 0.7–1.2)
GFR AFRICAN AMERICAN: 48
GFR AFRICAN AMERICAN: 51
GFR NON-AFRICAN AMERICAN: 40 ML/MIN/1.73
GFR NON-AFRICAN AMERICAN: 43 ML/MIN/1.73
GLUCOSE BLD-MCNC: 73 MG/DL (ref 74–99)
GLUCOSE BLD-MCNC: 76 MG/DL (ref 74–99)
GLUCOSE BLD-MCNC: 78 MG/DL (ref 74–99)
GLUCOSE BLD-MCNC: 82 MG/DL (ref 74–99)
HBA1C MFR BLD: 5.1 % (ref 4–5.6)
MAGNESIUM: 2.5 MG/DL (ref 1.6–2.6)
METER GLUCOSE: 67 MG/DL (ref 74–99)
METER GLUCOSE: 72 MG/DL (ref 74–99)
METER GLUCOSE: 76 MG/DL (ref 74–99)
METER GLUCOSE: 77 MG/DL (ref 74–99)
METER GLUCOSE: 94 MG/DL (ref 74–99)
POTASSIUM SERPL-SCNC: 3.6 MMOL/L (ref 3.5–5)
POTASSIUM SERPL-SCNC: 3.8 MMOL/L (ref 3.5–5)
POTASSIUM SERPL-SCNC: 3.8 MMOL/L (ref 3.5–5)
POTASSIUM SERPL-SCNC: 4.1 MMOL/L (ref 3.5–5)
PRO-BNP: 7328 PG/ML (ref 0–125)
SODIUM BLD-SCNC: 140 MMOL/L (ref 132–146)
SODIUM BLD-SCNC: 141 MMOL/L (ref 132–146)
SODIUM BLD-SCNC: 141 MMOL/L (ref 132–146)
SODIUM BLD-SCNC: 142 MMOL/L (ref 132–146)

## 2020-05-02 PROCEDURE — 2500000003 HC RX 250 WO HCPCS: Performed by: NURSE PRACTITIONER

## 2020-05-02 PROCEDURE — 6360000002 HC RX W HCPCS: Performed by: INTERNAL MEDICINE

## 2020-05-02 PROCEDURE — 2580000003 HC RX 258: Performed by: NURSE PRACTITIONER

## 2020-05-02 PROCEDURE — 99291 CRITICAL CARE FIRST HOUR: CPT | Performed by: INTERNAL MEDICINE

## 2020-05-02 PROCEDURE — 83036 HEMOGLOBIN GLYCOSYLATED A1C: CPT

## 2020-05-02 PROCEDURE — 2580000003 HC RX 258: Performed by: INTERNAL MEDICINE

## 2020-05-02 PROCEDURE — 94660 CPAP INITIATION&MGMT: CPT

## 2020-05-02 PROCEDURE — 2500000003 HC RX 250 WO HCPCS: Performed by: INTERNAL MEDICINE

## 2020-05-02 PROCEDURE — 83735 ASSAY OF MAGNESIUM: CPT

## 2020-05-02 PROCEDURE — 36415 COLL VENOUS BLD VENIPUNCTURE: CPT

## 2020-05-02 PROCEDURE — 80048 BASIC METABOLIC PNL TOTAL CA: CPT

## 2020-05-02 PROCEDURE — 94640 AIRWAY INHALATION TREATMENT: CPT

## 2020-05-02 PROCEDURE — 6370000000 HC RX 637 (ALT 250 FOR IP): Performed by: INTERNAL MEDICINE

## 2020-05-02 PROCEDURE — 82962 GLUCOSE BLOOD TEST: CPT

## 2020-05-02 PROCEDURE — 83880 ASSAY OF NATRIURETIC PEPTIDE: CPT

## 2020-05-02 PROCEDURE — 2700000000 HC OXYGEN THERAPY PER DAY

## 2020-05-02 PROCEDURE — 2060000000 HC ICU INTERMEDIATE R&B

## 2020-05-02 PROCEDURE — 99233 SBSQ HOSP IP/OBS HIGH 50: CPT | Performed by: INTERNAL MEDICINE

## 2020-05-02 RX ORDER — HEPARIN SODIUM 10000 [USP'U]/ML
5000 INJECTION, SOLUTION INTRAVENOUS; SUBCUTANEOUS EVERY 8 HOURS
Status: DISCONTINUED | OUTPATIENT
Start: 2020-05-02 | End: 2020-05-07 | Stop reason: HOSPADM

## 2020-05-02 RX ADMIN — SODIUM CHLORIDE 1 MG/HR: 900 INJECTION, SOLUTION INTRAVENOUS at 08:55

## 2020-05-02 RX ADMIN — DEXTROSE MONOHYDRATE 12.5 G: 25 INJECTION, SOLUTION INTRAVENOUS at 11:17

## 2020-05-02 RX ADMIN — SODIUM CHLORIDE 1 MG/HR: 900 INJECTION, SOLUTION INTRAVENOUS at 21:27

## 2020-05-02 RX ADMIN — IPRATROPIUM BROMIDE AND ALBUTEROL SULFATE 1 AMPULE: 2.5; .5 SOLUTION RESPIRATORY (INHALATION) at 08:20

## 2020-05-02 RX ADMIN — SODIUM CHLORIDE, PRESERVATIVE FREE 10 ML: 5 INJECTION INTRAVENOUS at 11:17

## 2020-05-02 RX ADMIN — CARBIDOPA AND LEVODOPA 1 TABLET: 25; 100 TABLET ORAL at 21:30

## 2020-05-02 RX ADMIN — PETROLATUM: 42 OINTMENT TOPICAL at 09:19

## 2020-05-02 RX ADMIN — HYDRALAZINE HYDROCHLORIDE 50 MG: 50 TABLET, FILM COATED ORAL at 21:30

## 2020-05-02 RX ADMIN — IPRATROPIUM BROMIDE AND ALBUTEROL SULFATE 1 AMPULE: 2.5; .5 SOLUTION RESPIRATORY (INHALATION) at 12:00

## 2020-05-02 RX ADMIN — DEXMEDETOMIDINE 0.1 MCG/KG/HR: 100 INJECTION, SOLUTION, CONCENTRATE INTRAVENOUS at 11:00

## 2020-05-02 RX ADMIN — Medication 10 ML: at 21:30

## 2020-05-02 RX ADMIN — IPRATROPIUM BROMIDE AND ALBUTEROL SULFATE 1 AMPULE: 2.5; .5 SOLUTION RESPIRATORY (INHALATION) at 19:35

## 2020-05-02 RX ADMIN — DOXAZOSIN 8 MG: 4 TABLET ORAL at 21:30

## 2020-05-02 RX ADMIN — Medication 10 ML: at 09:19

## 2020-05-02 RX ADMIN — ARFORMOTEROL TARTRATE 15 MCG: 15 SOLUTION RESPIRATORY (INHALATION) at 19:35

## 2020-05-02 RX ADMIN — BUDESONIDE 500 MCG: 0.25 SUSPENSION RESPIRATORY (INHALATION) at 19:35

## 2020-05-02 RX ADMIN — HEPARIN SODIUM 5000 UNITS: 10000 INJECTION INTRAVENOUS; SUBCUTANEOUS at 17:58

## 2020-05-02 RX ADMIN — PETROLATUM: 42 OINTMENT TOPICAL at 21:31

## 2020-05-02 RX ADMIN — HEPARIN SODIUM 5000 UNITS: 10000 INJECTION INTRAVENOUS; SUBCUTANEOUS at 08:55

## 2020-05-02 RX ADMIN — ARFORMOTEROL TARTRATE 15 MCG: 15 SOLUTION RESPIRATORY (INHALATION) at 08:19

## 2020-05-02 RX ADMIN — BUDESONIDE 500 MCG: 0.25 SUSPENSION RESPIRATORY (INHALATION) at 08:20

## 2020-05-02 RX ADMIN — IPRATROPIUM BROMIDE AND ALBUTEROL SULFATE 1 AMPULE: 2.5; .5 SOLUTION RESPIRATORY (INHALATION) at 15:29

## 2020-05-02 ASSESSMENT — PAIN DESCRIPTION - LOCATION: LOCATION: LEG

## 2020-05-02 ASSESSMENT — PAIN SCALES - GENERAL
PAINLEVEL_OUTOF10: 0
PAINLEVEL_OUTOF10: 0
PAINLEVEL_OUTOF10: 3
PAINLEVEL_OUTOF10: 3
PAINLEVEL_OUTOF10: 0
PAINLEVEL_OUTOF10: 3

## 2020-05-02 ASSESSMENT — PAIN DESCRIPTION - PROGRESSION: CLINICAL_PROGRESSION: NOT CHANGED

## 2020-05-02 ASSESSMENT — PAIN DESCRIPTION - DESCRIPTORS: DESCRIPTORS: CRAMPING

## 2020-05-02 ASSESSMENT — PAIN DESCRIPTION - ONSET: ONSET: ON-GOING

## 2020-05-02 ASSESSMENT — PAIN DESCRIPTION - ORIENTATION: ORIENTATION: RIGHT

## 2020-05-02 ASSESSMENT — PAIN DESCRIPTION - FREQUENCY: FREQUENCY: INTERMITTENT

## 2020-05-02 ASSESSMENT — PAIN DESCRIPTION - PAIN TYPE: TYPE: CHRONIC PAIN

## 2020-05-02 NOTE — PROGRESS NOTES
04/27/2020    MCV 96.4 04/27/2020       BMP:    Lab Results   Component Value Date     05/02/2020    K 3.8 05/02/2020    K 4.3 04/27/2020     05/02/2020    CO2 28 05/02/2020    BUN 46 05/02/2020    CREATININE 1.6 05/02/2020    GLUCOSE 73 05/02/2020    CALCIUM 9.3 05/02/2020       TSH:  No results found for: TSH    Imaging Studies:    RADIOLOGY: Films were read/reviewed and or discussed with radiology and the consulting teams which show congestion    EKG: ICU Rhythm Strips were reviewed and discussed. Sinus, Rare Ectopy    Assessment    Lucho Berrios is a 70 y.o. male was seen, examined and discussed with on multi-disciplinary team rounds. This note may be separate or in addition to the resident records. I have personally seen and examined the patient and the key elements of the encounter were performed by me. The medications & laboratory data was discussed and adjusted where necessary. The radiographic images were reviewed either as a group or with radiologist if felt dis-concordant with the exam or history. The above findings were corroborated, plans confirmed and changes made if needed. Current issues are :  1. Acute on chronic respiratory failure with hypoxia and hypercapnia   2. COPD  3. Morbid Obesity  4. Acute on chronic HFpEF  5. DM (diabetes mellitus)    6. HTN (hypertension)  7. Hyperlipidemia  8. Diabetic ulcer of left foot   9. Morbid obesity   10. CKD stage 2 due to type 2 diabetes mellitus   11. Acute on chronic diastolic (congestive) heart failure   12. S/P placement of cardiac pacemaker  13. Arm swelling   14. PVD with ambutee 2011  15. Anemia             Plan   Family was updated at the bedside if present and available. Key issues of the case were discussed among consultants. Critical Care time is documented if appropriate. 1. Precedex gtt  2. US left upper arm  3. Decrease Basal insulin  4. Bumex to remain  5. Wound care  6. CoVID negative  7. AVAPS adjusted  8.  Check TSH      Conrado Crockett DO, MPH, [facility].  [address]

## 2020-05-02 NOTE — PROGRESS NOTES
itching. : denies hematuria, dysuria   GI: denies vomiting, diarrhea   Psych: denies mood changed, anxiety, depression. Physical Exam   /70   Pulse 65   Temp 97.7 °F (36.5 °C) (Temporal)   Resp 18   Ht 6' 4\" (1.93 m)   Wt (!) 425 lb 1.6 oz (192.8 kg)   SpO2 96%   BMI 51.74 kg/m²   Constitutional: Oriented to person, place, and time. No distress. Well developed. Head: Normocephalic and atraumatic. Neck: Neck supple. No hepatojugular reflux. No JVD present. Carotid bruit is not present. No tracheal deviation present. No thyromegaly present. Cardiovascular: Normal rate, regular rhythm, normal heart sounds. intact distal pulses. No gallop and no friction rub. No murmur heard. Pulmonary: Breath sounds normal. No respiratory distress. No wheezes. No rales. Chest: Effort normal. No tenderness. Abdominal: Soft. Bowel sounds are normal. No distension or mass. No tenderness, rebound or guarding. Musculoskeletal: . No tenderness. No clubbing or cyanosis. Extremitites: Intact distal pulses. No edema  Neurological: Alert and oriented to person, place, and time. Skin: Skin is warm and dry. No rash noted. Not diaphoretic. No erythema. Psychiatric: Normal mood and affect. Behavior is normal.   Lymphadenopathy: No cervical adenopathy. No groin adenopathy.       CBC:   Lab Results   Component Value Date    WBC 5.1 04/27/2020    RBC 3.36 04/27/2020    HGB 9.4 04/27/2020    HCT 32.4 04/27/2020    MCV 96.4 04/27/2020    MCH 28.0 04/27/2020    MCHC 29.0 04/27/2020    RDW 16.8 04/27/2020     04/27/2020    MPV 10.0 04/27/2020     BMP:   Lab Results   Component Value Date     05/02/2020    K 3.8 05/02/2020    K 4.3 04/27/2020    CL 99 05/02/2020    CO2 29 05/02/2020    BUN 45 05/02/2020    LABALBU 3.7 12/27/2019    CREATININE 1.6 05/02/2020    CALCIUM 9.3 05/02/2020    GFRAA 51 05/02/2020    LABGLOM 43 05/02/2020     Magnesium:    Lab Results   Component Value Date    MG 2.5 05/02/2020 Cardiac Enzymes:   Lab Results   Component Value Date    CKTOTAL 38 11/17/2017    CKTOTAL 105 04/11/2016    CKTOTAL 25 10/16/2014    CKMB <1.0 04/11/2016    TROPONINI <0.01 05/01/2020    TROPONINI <0.01 04/30/2020    TROPONINI <0.01 04/27/2020      PT/INR:    Lab Results   Component Value Date    PROTIME 19.7 07/17/2019    INR 1.7 07/17/2019     TSH:  No results found for: TSH    Rhythm Strip: normal sinus rhythm. TTE (7/18/2019, Dr. Kristy Heller)   Summary   Left ventricle is mildly enlarged . Ejection fraction is visually estimated at 60%. Overall ejection fraction is normal .   No regional wall motion abnormalities seen. Moderate concentric left ventricular hypertrophy. Stage I diastolic dysfunction with normal LV filling pressures. The right ventricle was not clearly visualized. No systolic mitral regurgitation noted. No hemodynamically significant aortic stenosis is present. RVSP is 37 mmHg. Normal PA systolic pressure. No evidence for hemodynamically significant pericardial effusion. No previous echo for comparison. ASSESSMENT & PLAN:    Patient Active Problem List   Diagnosis    DM (diabetes mellitus) ([address]    HTN (hypertension)    Hyperlipidemia    Diabetic ulcer of left foot ([address]    Morbid obesity ([address]    CKD stage 2 due to type 2 diabetes mellitus ([address]    Acute on chronic diastolic (congestive) heart failure ([address]    S/P placement of cardiac pacemaker    Acute on chronic respiratory failure with hypoxia and hypercapnia ([address]     1. Acute on chronic HFpEF:    Bumex gtt. -7.7 liters. Hydralazine. 2. Acute hypoxic respiratory failure: Supportive care. 3. ZACHARY: Follow labs. 4. HTN: Observe. 5. Lipids: Statin. 6. PVD s/p right BKA in 2011    7. DM: Per primary service. 8. SND s/p PPM > 20 years ago    9. Left foot wound: Wound clinic at [facility]. Anemia: Follow labs. Neo Marmolejo D.O.   Cardiologist  Cardiology, [address] [place] [address]

## 2020-05-03 ENCOUNTER — APPOINTMENT (OUTPATIENT)
Dept: ULTRASOUND IMAGING | Age: 75
DRG: 291 | End: 2020-05-03
Payer: MEDICARE

## 2020-05-03 LAB
ANION GAP SERPL CALCULATED.3IONS-SCNC: 13 MMOL/L (ref 7–16)
ANION GAP SERPL CALCULATED.3IONS-SCNC: 14 MMOL/L (ref 7–16)
BUN BLDV-MCNC: 48 MG/DL (ref 8–23)
BUN BLDV-MCNC: 49 MG/DL (ref 8–23)
CALCIUM SERPL-MCNC: 8.9 MG/DL (ref 8.6–10.2)
CALCIUM SERPL-MCNC: 9.5 MG/DL (ref 8.6–10.2)
CHLORIDE BLD-SCNC: 100 MMOL/L (ref 98–107)
CHLORIDE BLD-SCNC: 99 MMOL/L (ref 98–107)
CO2: 28 MMOL/L (ref 22–29)
CO2: 30 MMOL/L (ref 22–29)
CREAT SERPL-MCNC: 1.4 MG/DL (ref 0.7–1.2)
CREAT SERPL-MCNC: 1.5 MG/DL (ref 0.7–1.2)
GFR AFRICAN AMERICAN: 55
GFR AFRICAN AMERICAN: >60
GFR NON-AFRICAN AMERICAN: 46 ML/MIN/1.73
GFR NON-AFRICAN AMERICAN: 50 ML/MIN/1.73
GLUCOSE BLD-MCNC: 84 MG/DL (ref 74–99)
GLUCOSE BLD-MCNC: 88 MG/DL (ref 74–99)
MAGNESIUM: 2.1 MG/DL (ref 1.6–2.6)
METER GLUCOSE: 107 MG/DL (ref 74–99)
METER GLUCOSE: 132 MG/DL (ref 74–99)
METER GLUCOSE: 142 MG/DL (ref 74–99)
METER GLUCOSE: 74 MG/DL (ref 74–99)
METER GLUCOSE: 89 MG/DL (ref 74–99)
METER GLUCOSE: 89 MG/DL (ref 74–99)
METER GLUCOSE: 95 MG/DL (ref 74–99)
METER GLUCOSE: 96 MG/DL (ref 74–99)
POTASSIUM SERPL-SCNC: 3.5 MMOL/L (ref 3.5–5)
POTASSIUM SERPL-SCNC: 3.6 MMOL/L (ref 3.5–5)
REASON FOR REJECTION: NORMAL
REASON FOR REJECTION: NORMAL
REJECTED TEST: NORMAL
REJECTED TEST: NORMAL
SODIUM BLD-SCNC: 142 MMOL/L (ref 132–146)
SODIUM BLD-SCNC: 142 MMOL/L (ref 132–146)

## 2020-05-03 PROCEDURE — 94660 CPAP INITIATION&MGMT: CPT

## 2020-05-03 PROCEDURE — 6360000002 HC RX W HCPCS: Performed by: INTERNAL MEDICINE

## 2020-05-03 PROCEDURE — 6370000000 HC RX 637 (ALT 250 FOR IP): Performed by: INTERNAL MEDICINE

## 2020-05-03 PROCEDURE — 2500000003 HC RX 250 WO HCPCS: Performed by: INTERNAL MEDICINE

## 2020-05-03 PROCEDURE — 2500000003 HC RX 250 WO HCPCS: Performed by: NURSE PRACTITIONER

## 2020-05-03 PROCEDURE — 2580000003 HC RX 258: Performed by: NURSE PRACTITIONER

## 2020-05-03 PROCEDURE — 94640 AIRWAY INHALATION TREATMENT: CPT

## 2020-05-03 PROCEDURE — 80048 BASIC METABOLIC PNL TOTAL CA: CPT

## 2020-05-03 PROCEDURE — 82962 GLUCOSE BLOOD TEST: CPT

## 2020-05-03 PROCEDURE — 99291 CRITICAL CARE FIRST HOUR: CPT | Performed by: INTERNAL MEDICINE

## 2020-05-03 PROCEDURE — 93971 EXTREMITY STUDY: CPT

## 2020-05-03 PROCEDURE — 36415 COLL VENOUS BLD VENIPUNCTURE: CPT

## 2020-05-03 PROCEDURE — 99233 SBSQ HOSP IP/OBS HIGH 50: CPT | Performed by: INTERNAL MEDICINE

## 2020-05-03 PROCEDURE — 2000000000 HC ICU R&B

## 2020-05-03 PROCEDURE — 83735 ASSAY OF MAGNESIUM: CPT

## 2020-05-03 PROCEDURE — 2580000003 HC RX 258: Performed by: INTERNAL MEDICINE

## 2020-05-03 RX ORDER — POTASSIUM CHLORIDE 7.45 MG/ML
10 INJECTION INTRAVENOUS
Status: COMPLETED | OUTPATIENT
Start: 2020-05-03 | End: 2020-05-03

## 2020-05-03 RX ADMIN — IPRATROPIUM BROMIDE AND ALBUTEROL SULFATE 1 AMPULE: 2.5; .5 SOLUTION RESPIRATORY (INHALATION) at 16:34

## 2020-05-03 RX ADMIN — ARFORMOTEROL TARTRATE 15 MCG: 15 SOLUTION RESPIRATORY (INHALATION) at 20:01

## 2020-05-03 RX ADMIN — PETROLATUM: 42 OINTMENT TOPICAL at 21:25

## 2020-05-03 RX ADMIN — POTASSIUM CHLORIDE 10 MEQ: 7.46 INJECTION, SOLUTION INTRAVENOUS at 12:23

## 2020-05-03 RX ADMIN — PETROLATUM: 42 OINTMENT TOPICAL at 10:46

## 2020-05-03 RX ADMIN — DOXAZOSIN 8 MG: 4 TABLET ORAL at 21:25

## 2020-05-03 RX ADMIN — CARBIDOPA AND LEVODOPA 1 TABLET: 25; 100 TABLET ORAL at 16:50

## 2020-05-03 RX ADMIN — HYDRALAZINE HYDROCHLORIDE 50 MG: 50 TABLET, FILM COATED ORAL at 14:26

## 2020-05-03 RX ADMIN — HYDRALAZINE HYDROCHLORIDE 50 MG: 50 TABLET, FILM COATED ORAL at 21:25

## 2020-05-03 RX ADMIN — Medication 10 ML: at 09:08

## 2020-05-03 RX ADMIN — IPRATROPIUM BROMIDE AND ALBUTEROL SULFATE 1 AMPULE: 2.5; .5 SOLUTION RESPIRATORY (INHALATION) at 11:41

## 2020-05-03 RX ADMIN — IPRATROPIUM BROMIDE AND ALBUTEROL SULFATE 1 AMPULE: 2.5; .5 SOLUTION RESPIRATORY (INHALATION) at 20:01

## 2020-05-03 RX ADMIN — HYDRALAZINE HYDROCHLORIDE 50 MG: 50 TABLET, FILM COATED ORAL at 08:49

## 2020-05-03 RX ADMIN — IPRATROPIUM BROMIDE AND ALBUTEROL SULFATE 1 AMPULE: 2.5; .5 SOLUTION RESPIRATORY (INHALATION) at 08:29

## 2020-05-03 RX ADMIN — ATORVASTATIN CALCIUM 20 MG: 20 TABLET, FILM COATED ORAL at 08:49

## 2020-05-03 RX ADMIN — HEPARIN SODIUM 5000 UNITS: 10000 INJECTION INTRAVENOUS; SUBCUTANEOUS at 08:00

## 2020-05-03 RX ADMIN — BUDESONIDE 500 MCG: 0.25 SUSPENSION RESPIRATORY (INHALATION) at 20:01

## 2020-05-03 RX ADMIN — POTASSIUM CHLORIDE 10 MEQ: 7.46 INJECTION, SOLUTION INTRAVENOUS at 10:47

## 2020-05-03 RX ADMIN — CARBIDOPA AND LEVODOPA 1 TABLET: 25; 100 TABLET ORAL at 08:49

## 2020-05-03 RX ADMIN — POTASSIUM CHLORIDE 10 MEQ: 7.46 INJECTION, SOLUTION INTRAVENOUS at 14:26

## 2020-05-03 RX ADMIN — Medication 10 ML: at 21:25

## 2020-05-03 RX ADMIN — DEXMEDETOMIDINE 0.1 MCG/KG/HR: 100 INJECTION, SOLUTION, CONCENTRATE INTRAVENOUS at 08:49

## 2020-05-03 RX ADMIN — ARFORMOTEROL TARTRATE 15 MCG: 15 SOLUTION RESPIRATORY (INHALATION) at 08:29

## 2020-05-03 RX ADMIN — CARBIDOPA AND LEVODOPA 1 TABLET: 25; 100 TABLET ORAL at 13:26

## 2020-05-03 RX ADMIN — HEPARIN SODIUM 5000 UNITS: 10000 INJECTION INTRAVENOUS; SUBCUTANEOUS at 01:13

## 2020-05-03 RX ADMIN — BUDESONIDE 500 MCG: 0.25 SUSPENSION RESPIRATORY (INHALATION) at 08:29

## 2020-05-03 RX ADMIN — CARBIDOPA AND LEVODOPA 1 TABLET: 25; 100 TABLET ORAL at 21:25

## 2020-05-03 RX ADMIN — SODIUM CHLORIDE 1 MG/HR: 900 INJECTION, SOLUTION INTRAVENOUS at 19:11

## 2020-05-03 RX ADMIN — SODIUM CHLORIDE 1 MG/HR: 900 INJECTION, SOLUTION INTRAVENOUS at 08:50

## 2020-05-03 RX ADMIN — HEPARIN SODIUM 5000 UNITS: 10000 INJECTION INTRAVENOUS; SUBCUTANEOUS at 16:47

## 2020-05-03 ASSESSMENT — PAIN SCALES - GENERAL
PAINLEVEL_OUTOF10: 0
PAINLEVEL_OUTOF10: 1
PAINLEVEL_OUTOF10: 1
PAINLEVEL_OUTOF10: 3
PAINLEVEL_OUTOF10: 1

## 2020-05-03 NOTE — PROGRESS NOTES
[address] [place] [address]  Internal Medicine Department  Division of Pulmonary, Critical Care and Sleep Medicine  Daily Intensive Care Unit Progress  Note    Admit Date: 4/27/2020    MRN: [id number]        Patient:  Betzy Hussein 70 y.o. male     PCP: Moira Angel MD    Date of Service: 5/3/2020      Allergy: Zosyn [piperacillin sod-tazobactam so]; Morphine; and Vancomycin    Subjective   Length of stay during current admission: 6  Events of the past 24 hours are in the residents notes and we discussed at bedside briefly. .. Remains on  BIPAP with saturations <70% off in minutes  Diuresis noted  US left arm negative swelling down  Following commands, more alert      Physical Exam:     VITALS:  BP (!) 144/77   Pulse 64   Temp 97.9 °F (36.6 °C) (Temporal)   Resp 22   Ht 6' 4\" (1.93 m)   Wt (!) 428 lb 3.2 oz (194.2 kg)   SpO2 95%   BMI 52.12 kg/m²   24HR INTAKE/OUTPUT:      Intake/Output Summary (Last 24 hours) at 5/3/2020 1341  Last data filed at 5/3/2020 0900  Gross per 24 hour   Intake 782.48 ml   Output 5000 ml   Net -4217.52 ml     General: Alert  HEENT: Conjunctiva/corneas clear, No icterus. No exudates. Neck: Supple, No JVD  Chest wall: Symmetric excursion  Lung: Course to auscultation No rales or wheezing  Heart: Regular rate and rhythm, No murmur, rub or  gallop. Abdomen: MO panus, BS +, soft, no rigidity, rebound or guarding  Extremities: SYDNI edema. Diminished pulses. Skin: Wounds  Musculokeletal: [place], other venous stasus  Lymph nodes: No adenopathy  Neurologic: Non focal examination    Medications   Home and Current medications were discussed & reviewed on rounds with team and pharmacy liaison. Labs &  Imaging Studies   The data collected below information that was obtained, reviewed, analyzed and interpreted today. Imaging test are reviewed with the radiologist during rounds. Comparison to previous images are always explored.      CBC:   Lab Results   Component Value Date WBC 5.1 04/27/2020    RBC 3.36 04/27/2020    HGB 9.4 04/27/2020    HCT 32.4 04/27/2020     04/27/2020    MCV 96.4 04/27/2020       BMP:    Lab Results   Component Value Date     05/03/2020    K 3.5 05/03/2020    K 4.3 04/27/2020    CL 99 05/03/2020    CO2 30 05/03/2020    BUN 48 05/03/2020    CREATININE 1.5 05/03/2020    GLUCOSE 84 05/03/2020    CALCIUM 8.9 05/03/2020       TSH:  No results found for: TSH    Imaging Studies:    RADIOLOGY: Films were read/reviewed and or discussed with radiology and the consulting teams which show congestion    EKG: ICU Rhythm Strips were reviewed and discussed. Sinus, Rare Ectopy    Assessment    Live Arambula is a 70 y.o. male was seen, examined and discussed with on multi-disciplinary team rounds. This note may be separate or in addition to the resident records. I have personally seen and examined the patient and the key elements of the encounter were performed by me. The medications & laboratory data was discussed and adjusted where necessary. The radiographic images were reviewed either as a group or with radiologist if felt dis-concordant with the exam or history. The above findings were corroborated, plans confirmed and changes made if needed. Current issues are :  1. Acute on chronic respiratory failure with hypoxia and hypercapnia   2. COPD  3. Morbid Obesity  4. Acute on chronic HFpEF  5. DM (diabetes mellitus)    6. HTN (hypertension)  7. Hyperlipidemia  8. Diabetic ulcer of left foot   9. Morbid obesity   10. CKD stage 2 due to type 2 diabetes mellitus   11. Acute on chronic diastolic (congestive) heart failure   12. S/P placement of cardiac pacemaker  13. Arm swelling   14. PVD with ambutee 2011  15. Anemia             Plan   Family was updated at the bedside if present and available. Key issues of the case were discussed among consultants. Critical Care time is documented if appropriate. 1. Precedex gtt  2.  Bumex to remain -11 liters

## 2020-05-03 NOTE — PROGRESS NOTES
Dr hannah thompson covering dr Bowen Dee    Subjective:    Somnolent. On bipap. Drops sats off bipap. Not eating. Refuses NG tube. Objective:    /72   Pulse 62   Temp 97.6 °F (36.4 °C) (Temporal)   Resp 25   Ht 6' 4\" (1.93 m)   Wt (!) 428 lb 3.2 oz (194.2 kg)   SpO2 97%   BMI 52.12 kg/m²     Current medications that patient is taking have been reviewed. Heart:  RRR, no murmurs, gallops, or rubs.   Lungs:  Decreased breath sounds bilater  Abd: bowel sounds present, soft, nontender, nondistended, no masses  Extrem:  No cyanosis or edema    CBC with Differential:    Lab Results   Component Value Date    WBC 5.1 04/27/2020    RBC 3.36 04/27/2020    HGB 9.4 04/27/2020    HCT 32.4 04/27/2020     04/27/2020    MCV 96.4 04/27/2020    MCH 28.0 04/27/2020    MCHC 29.0 04/27/2020    RDW 16.8 04/27/2020    NRBC 0.0 12/17/2019    BANDSPCT 2 09/19/2014    LYMPHOPCT 4.3 04/27/2020    MONOPCT 9.6 04/27/2020    BASOPCT 0.2 04/27/2020    MONOSABS 0.51 04/27/2020    LYMPHSABS 0.20 04/27/2020    EOSABS 0.09 04/27/2020    BASOSABS 0.00 04/27/2020     CMP:    Lab Results   Component Value Date     05/03/2020    K 3.5 05/03/2020    K 4.3 04/27/2020    CL 99 05/03/2020    CO2 30 05/03/2020    BUN 48 05/03/2020    CREATININE 1.5 05/03/2020    GFRAA 55 05/03/2020    LABGLOM 46 05/03/2020    GLUCOSE 84 05/03/2020    PROT 7.8 12/27/2019    LABALBU 3.7 12/27/2019    CALCIUM 8.9 05/03/2020    BILITOT 0.6 12/27/2019    ALKPHOS 99 12/27/2019    AST 16 12/27/2019    ALT <5 12/27/2019     BMP:    Lab Results   Component Value Date     05/03/2020    K 3.5 05/03/2020    K 4.3 04/27/2020    CL 99 05/03/2020    CO2 30 05/03/2020    BUN 48 05/03/2020    LABALBU 3.7 12/27/2019    CREATININE 1.5 05/03/2020    CALCIUM 8.9 05/03/2020    GFRAA 55 05/03/2020    LABGLOM 46 05/03/2020    GLUCOSE 84 05/03/2020     Magnesium:    Lab Results   Component Value Date    MG 2.1 05/03/2020     Phosphorus:    Lab Results   Component Value Date    PHOS 3.7 12/27/2019     PT/INR:    Lab Results   Component Value Date    PROTIME 19.7 07/17/2019    INR 1.7 07/17/2019     PTT:    Lab Results   Component Value Date    APTT 36.5 04/11/2016   [APTT}     Assessment:    Patient Active Problem List   Diagnosis    DM [facility]) [facility]    HTN (hypertension)    Hyperlipidemia    [facility]    [facility]    CKD stage 2 due to type 2 diabetes mellitus ([facility]    Acute on chronic diastolic (congestive) heart failure ([facility]    S/P placement of cardiac pacemaker    Acute on chronic respiratory failure with hypoxia and hypercapnia ([facility]       Plan:    Blood glucose ok, continue to adjust basal/bolus insulin therapy  Blood pressure ok, continue current medications  Continue aggressive lipid therapy  Wound care. Continue to encourage weight loss. Renal function at baseline. Diurese as BP and renal function allow. Appreciate cardiology assist.  Stable. Pulmonology on board.   Pt/Ot evaluations for discharge planning    Shana Cruz    10:11 AM  5/3/2020

## 2020-05-03 NOTE — PROGRESS NOTES
[facility] Cardiology Inpatient Progress Note    Patient is a 70 y.o. male of Yang Reed MD seen in hospital follow up. Chief complaint: CHF    HPI: Some SOB. No CP.      Patient Active Problem List   Diagnosis    DM [facility]diabetes mellitus) [facility]    HTN (hypertension)    Hyperlipidemia    Diabetic ulcer of left foot ([facility]    Morbid obesity ([facility]    CKD stage 2 due to type 2 diabetes mellitus [facility]    Acute on chronic diastolic (congestive) heart failure ([facility]    S/P placement of cardiac pacemaker    Acute on chronic respiratory failure with hypoxia and hypercapnia (HCC)       Allergies   Allergen Reactions    Zosyn [Piperacillin Sod-Tazobactam So] Itching    Morphine Rash    Vancomycin Rash       Current Facility-Administered Medications   Medication Dose Route Frequency Provider Last Rate Last Dose    heparin (porcine) injection 5,000 Units  5,000 Units Subcutaneous Q8H Juanita Thomas MD   5,000 Units at 05/03/20 0800    bumetanide (BUMEX) 12.5 mg in sodium chloride 0.9 % 125 mL infusion  1 mg/hr Intravenous Continuous Kevin Jackson APRN - CNP 10 mL/hr at 05/03/20 0850 1 mg/hr at 05/03/20 0850    dexmedetomidine (PRECEDEX) 400 mcg in sodium chloride 0.9 % 100 mL infusion  0.2 mcg/kg/hr Intravenous Continuous Myriam Guthrie MD 4.8 mL/hr at 05/03/20 0849 0.1 mcg/kg/hr at 05/03/20 0849    budesonide (PULMICORT) nebulizer suspension 500 mcg  500 mcg Nebulization BID Juanita Thomas MD   500 mcg at 05/03/20 0829    Arformoterol Tartrate (BROVANA) nebulizer solution 15 mcg  15 mcg Nebulization BID Juanita Thomas MD   15 mcg at 05/03/20 0829    insulin lispro (HUMALOG) injection vial 0-6 Units  0-6 Units Subcutaneous TID WC Juanita Thomas MD        insulin lispro (HUMALOG) injection vial 0-3 Units  0-3 Units Subcutaneous Nightly Juanita Thomas MD   Stopped at 05/01/20 2046    glucose (GLUTOSE) 40 % oral gel 15 g  15 g Oral PRN Mandeep Sanabria DO        dextrose 50 % IV solution  12.5 g Intravenous PRN Edna Jacobs, DO   12.5 g at 05/02/20 1117    glucagon (rDNA) injection 1 mg  1 mg Intramuscular PRN Delicia Edna Malmer, DO        dextrose 5 % solution  100 mL/hr Intravenous PRN Delicia Edna Malmer, DO        ipratropium-albuterol (DUONEB) nebulizer solution 1 ampule  1 ampule Inhalation Q4H WA Delicia Edna Malmer, DO   1 ampule at 05/03/20 2281    atorvastatin (LIPITOR) tablet 20 mg  20 mg Oral Daily Delicia Edna Malmer, DO   20 mg at 05/03/20 0849    carbidopa-levodopa (SINEMET)  MG per tablet 1 tablet  1 tablet Oral 4x Daily Delicia Edna Malmer, DO   1 tablet at 05/03/20 1693    doxazosin (CARDURA) tablet 8 mg  8 mg Oral Nightly Delicia Edna Malmer, DO   8 mg at 05/02/20 2130    hydrALAZINE (APRESOLINE) tablet 50 mg  50 mg Oral TID Delicia Edna Malmer, DO   50 mg at 05/03/20 4043    sodium chloride flush 0.9 % injection 10 mL  10 mL Intravenous 2 times per day Delicia Edna Malmer, DO   10 mL at 05/02/20 2130    sodium chloride flush 0.9 % injection 10 mL  10 mL Intravenous PRN Delicia Edna Malmer, DO   10 mL at 05/02/20 1117    acetaminophen (TYLENOL) tablet 650 mg  650 mg Oral Q6H PRN Delicia Edna Malmer, DO   650 mg at 05/01/20 0302    Or    acetaminophen (TYLENOL) suppository 650 mg  650 mg Rectal Q6H PRN Delicia Edna Malmer, DO        polyethylene glycol (GLYCOLAX) packet 17 g  17 g Oral Daily PRN Delicia Edna Malmer, DO        promethazine (PHENERGAN) tablet 12.5 mg  12.5 mg Oral Q6H PRN Delicia Edna Malmer, DO        Or    ondansetron [facility]) injection 4 mg  4 mg Intravenous Q6H PRN Delicia Edna Malmer, DO        mineral oil-hydrophilic petrolatum (HYDROPHOR) ointment   Topical BID Edna Kiser, DO           Review of systems:   Heart: as above   Lungs: as above   Eyes: denies changes in vision or discharge. Ears: denies changes in hearing or pain. Nose: denies epistaxis or masses   Throat: denies sore throat or trouble swallowing. Neuro: denies numbness, tingling, tremors. Skin: denies rashes or itching.

## 2020-05-03 NOTE — PLAN OF CARE
Problem: Falls - Risk of:  Goal: Will remain free from falls  Description: Will remain free from falls  5/3/2020 1158 by Jammie Sandoval, RN  Outcome: Met This Shift     Problem: Falls - Risk of:  Goal: Absence of physical injury  Description: Absence of physical injury  5/3/2020 1158 by Jammie Sandoval, RN  Outcome: Met This Shift     Problem: Ineffective Breathing Pattern  Goal: Use of effective breathing techniques  Outcome: Not Met This Shift     Problem: Ineffective Breathing Pattern  Goal: STG - Patient will utilize pursed lip breathing techniques  Outcome: Not Met This Shift

## 2020-05-04 ENCOUNTER — APPOINTMENT (OUTPATIENT)
Dept: GENERAL RADIOLOGY | Age: 75
DRG: 291 | End: 2020-05-04
Payer: MEDICARE

## 2020-05-04 LAB
ANION GAP SERPL CALCULATED.3IONS-SCNC: 12 MMOL/L (ref 7–16)
ANION GAP SERPL CALCULATED.3IONS-SCNC: 12 MMOL/L (ref 7–16)
ANION GAP SERPL CALCULATED.3IONS-SCNC: 13 MMOL/L (ref 7–16)
ANION GAP SERPL CALCULATED.3IONS-SCNC: 9 MMOL/L (ref 7–16)
B.E.: 8.7 MMOL/L (ref -3–3)
BUN BLDV-MCNC: 39 MG/DL (ref 8–23)
BUN BLDV-MCNC: 51 MG/DL (ref 8–23)
BUN BLDV-MCNC: 53 MG/DL (ref 8–23)
BUN BLDV-MCNC: 54 MG/DL (ref 8–23)
CALCIUM SERPL-MCNC: 6.7 MG/DL (ref 8.6–10.2)
CALCIUM SERPL-MCNC: 8.8 MG/DL (ref 8.6–10.2)
CALCIUM SERPL-MCNC: 8.8 MG/DL (ref 8.6–10.2)
CALCIUM SERPL-MCNC: 9 MG/DL (ref 8.6–10.2)
CHLORIDE BLD-SCNC: 100 MMOL/L (ref 98–107)
CHLORIDE BLD-SCNC: 109 MMOL/L (ref 98–107)
CHLORIDE BLD-SCNC: 99 MMOL/L (ref 98–107)
CHLORIDE BLD-SCNC: 99 MMOL/L (ref 98–107)
CO2: 23 MMOL/L (ref 22–29)
CO2: 30 MMOL/L (ref 22–29)
CO2: 34 MMOL/L (ref 22–29)
CO2: 35 MMOL/L (ref 22–29)
COHB: 0.5 % (ref 0–1.5)
CREAT SERPL-MCNC: 1.1 MG/DL (ref 0.7–1.2)
CREAT SERPL-MCNC: 1.3 MG/DL (ref 0.7–1.2)
CREAT SERPL-MCNC: 1.4 MG/DL (ref 0.7–1.2)
CREAT SERPL-MCNC: 1.4 MG/DL (ref 0.7–1.2)
CRITICAL: ABNORMAL
DATE ANALYZED: ABNORMAL
DATE OF COLLECTION: ABNORMAL
GFR AFRICAN AMERICAN: >60
GFR NON-AFRICAN AMERICAN: 50 ML/MIN/1.73
GFR NON-AFRICAN AMERICAN: 50 ML/MIN/1.73
GFR NON-AFRICAN AMERICAN: 54 ML/MIN/1.73
GFR NON-AFRICAN AMERICAN: >60 ML/MIN/1.73
GLUCOSE BLD-MCNC: 125 MG/DL (ref 74–99)
GLUCOSE BLD-MCNC: 143 MG/DL (ref 74–99)
GLUCOSE BLD-MCNC: 149 MG/DL (ref 74–99)
GLUCOSE BLD-MCNC: 81 MG/DL (ref 74–99)
HCO3: 36.5 MMOL/L (ref 22–26)
HHB: 9.8 % (ref 0–5)
LAB: ABNORMAL
Lab: ABNORMAL
MAGNESIUM: 1.5 MG/DL (ref 1.6–2.6)
METER GLUCOSE: 106 MG/DL (ref 74–99)
METER GLUCOSE: 111 MG/DL (ref 74–99)
METER GLUCOSE: 134 MG/DL (ref 74–99)
METER GLUCOSE: 136 MG/DL (ref 74–99)
METER GLUCOSE: 148 MG/DL (ref 74–99)
METHB: 0.5 % (ref 0–1.5)
MODE: ABNORMAL
O2 CONTENT: 12.6 ML/DL
O2 SATURATION: 90.1 % (ref 92–98.5)
O2HB: 89.2 % (ref 94–97)
OPERATOR ID: 882
PATIENT TEMP: 27 C
PCO2: 70.6 MMHG (ref 35–45)
PH BLOOD GAS: 7.33 (ref 7.35–7.45)
PO2: 60.9 MMHG (ref 75–100)
POTASSIUM SERPL-SCNC: 2.9 MMOL/L (ref 3.5–5)
POTASSIUM SERPL-SCNC: 3.4 MMOL/L (ref 3.5–5)
POTASSIUM SERPL-SCNC: 3.6 MMOL/L (ref 3.5–5)
POTASSIUM SERPL-SCNC: 3.7 MMOL/L (ref 3.5–5)
PRO-BNP: 8308 PG/ML (ref 0–125)
SODIUM BLD-SCNC: 141 MMOL/L (ref 132–146)
SODIUM BLD-SCNC: 143 MMOL/L (ref 132–146)
SODIUM BLD-SCNC: 145 MMOL/L (ref 132–146)
SODIUM BLD-SCNC: 146 MMOL/L (ref 132–146)
SOURCE, BLOOD GAS: ABNORMAL
THB: 10 G/DL (ref 11.5–16.5)
TIME ANALYZED: 1532

## 2020-05-04 PROCEDURE — 6360000002 HC RX W HCPCS: Performed by: INTERNAL MEDICINE

## 2020-05-04 PROCEDURE — 2500000003 HC RX 250 WO HCPCS: Performed by: INTERNAL MEDICINE

## 2020-05-04 PROCEDURE — 83735 ASSAY OF MAGNESIUM: CPT

## 2020-05-04 PROCEDURE — 6370000000 HC RX 637 (ALT 250 FOR IP): Performed by: INTERNAL MEDICINE

## 2020-05-04 PROCEDURE — 2580000003 HC RX 258: Performed by: INTERNAL MEDICINE

## 2020-05-04 PROCEDURE — 94640 AIRWAY INHALATION TREATMENT: CPT

## 2020-05-04 PROCEDURE — 2580000003 HC RX 258: Performed by: NURSE PRACTITIONER

## 2020-05-04 PROCEDURE — 36415 COLL VENOUS BLD VENIPUNCTURE: CPT

## 2020-05-04 PROCEDURE — 2700000000 HC OXYGEN THERAPY PER DAY

## 2020-05-04 PROCEDURE — 99233 SBSQ HOSP IP/OBS HIGH 50: CPT | Performed by: NURSE PRACTITIONER

## 2020-05-04 PROCEDURE — 82962 GLUCOSE BLOOD TEST: CPT

## 2020-05-04 PROCEDURE — 80048 BASIC METABOLIC PNL TOTAL CA: CPT

## 2020-05-04 PROCEDURE — 99233 SBSQ HOSP IP/OBS HIGH 50: CPT | Performed by: INTERNAL MEDICINE

## 2020-05-04 PROCEDURE — 83880 ASSAY OF NATRIURETIC PEPTIDE: CPT

## 2020-05-04 PROCEDURE — 94660 CPAP INITIATION&MGMT: CPT

## 2020-05-04 PROCEDURE — 71045 X-RAY EXAM CHEST 1 VIEW: CPT

## 2020-05-04 PROCEDURE — 82805 BLOOD GASES W/O2 SATURATION: CPT

## 2020-05-04 PROCEDURE — 2500000003 HC RX 250 WO HCPCS: Performed by: NURSE PRACTITIONER

## 2020-05-04 PROCEDURE — 94667 MNPJ CHEST WALL 1ST: CPT

## 2020-05-04 PROCEDURE — 6370000000 HC RX 637 (ALT 250 FOR IP): Performed by: NURSE PRACTITIONER

## 2020-05-04 PROCEDURE — 2000000000 HC ICU R&B

## 2020-05-04 RX ORDER — MAGNESIUM SULFATE IN WATER 40 MG/ML
2 INJECTION, SOLUTION INTRAVENOUS ONCE
Status: COMPLETED | OUTPATIENT
Start: 2020-05-04 | End: 2020-05-04

## 2020-05-04 RX ORDER — SODIUM CHLORIDE FOR INHALATION 3 %
4 VIAL, NEBULIZER (ML) INHALATION PRN
Status: DISCONTINUED | OUTPATIENT
Start: 2020-05-04 | End: 2020-05-07 | Stop reason: HOSPADM

## 2020-05-04 RX ORDER — POTASSIUM CHLORIDE 20 MEQ/1
40 TABLET, EXTENDED RELEASE ORAL PRN
Status: DISCONTINUED | OUTPATIENT
Start: 2020-05-04 | End: 2020-05-07 | Stop reason: HOSPADM

## 2020-05-04 RX ORDER — POTASSIUM CHLORIDE 20 MEQ/1
40 TABLET, EXTENDED RELEASE ORAL
Status: DISCONTINUED | OUTPATIENT
Start: 2020-05-04 | End: 2020-05-04

## 2020-05-04 RX ORDER — POTASSIUM CHLORIDE 7.45 MG/ML
10 INJECTION INTRAVENOUS PRN
Status: DISCONTINUED | OUTPATIENT
Start: 2020-05-04 | End: 2020-05-07 | Stop reason: HOSPADM

## 2020-05-04 RX ORDER — SPIRONOLACTONE 25 MG/1
25 TABLET ORAL DAILY
Status: DISCONTINUED | OUTPATIENT
Start: 2020-05-04 | End: 2020-05-07 | Stop reason: HOSPADM

## 2020-05-04 RX ADMIN — DEXMEDETOMIDINE 0.15 MCG/KG/HR: 100 INJECTION, SOLUTION, CONCENTRATE INTRAVENOUS at 20:51

## 2020-05-04 RX ADMIN — BUDESONIDE 500 MCG: 0.25 SUSPENSION RESPIRATORY (INHALATION) at 20:01

## 2020-05-04 RX ADMIN — CARBIDOPA AND LEVODOPA 1 TABLET: 25; 100 TABLET ORAL at 16:30

## 2020-05-04 RX ADMIN — SODIUM CHLORIDE 1 MG/HR: 900 INJECTION, SOLUTION INTRAVENOUS at 18:50

## 2020-05-04 RX ADMIN — HEPARIN SODIUM 5000 UNITS: 10000 INJECTION INTRAVENOUS; SUBCUTANEOUS at 01:18

## 2020-05-04 RX ADMIN — CARBIDOPA AND LEVODOPA 1 TABLET: 25; 100 TABLET ORAL at 20:52

## 2020-05-04 RX ADMIN — Medication 10 ML: at 20:52

## 2020-05-04 RX ADMIN — CARBIDOPA AND LEVODOPA 1 TABLET: 25; 100 TABLET ORAL at 09:16

## 2020-05-04 RX ADMIN — IPRATROPIUM BROMIDE AND ALBUTEROL SULFATE 1 AMPULE: 2.5; .5 SOLUTION RESPIRATORY (INHALATION) at 12:34

## 2020-05-04 RX ADMIN — HEPARIN SODIUM 5000 UNITS: 10000 INJECTION INTRAVENOUS; SUBCUTANEOUS at 16:23

## 2020-05-04 RX ADMIN — HYDRALAZINE HYDROCHLORIDE 50 MG: 50 TABLET, FILM COATED ORAL at 10:03

## 2020-05-04 RX ADMIN — BUDESONIDE 500 MCG: 0.25 SUSPENSION RESPIRATORY (INHALATION) at 08:42

## 2020-05-04 RX ADMIN — POTASSIUM BICARBONATE 40 MEQ: 782 TABLET, EFFERVESCENT ORAL at 11:00

## 2020-05-04 RX ADMIN — CARBIDOPA AND LEVODOPA 1 TABLET: 25; 100 TABLET ORAL at 14:11

## 2020-05-04 RX ADMIN — DOXAZOSIN 8 MG: 4 TABLET ORAL at 20:52

## 2020-05-04 RX ADMIN — IPRATROPIUM BROMIDE AND ALBUTEROL SULFATE 1 AMPULE: 2.5; .5 SOLUTION RESPIRATORY (INHALATION) at 20:01

## 2020-05-04 RX ADMIN — ACETAMINOPHEN 650 MG: 325 TABLET, FILM COATED ORAL at 01:50

## 2020-05-04 RX ADMIN — SPIRONOLACTONE 25 MG: 25 TABLET ORAL at 14:11

## 2020-05-04 RX ADMIN — HYDRALAZINE HYDROCHLORIDE 50 MG: 50 TABLET, FILM COATED ORAL at 20:52

## 2020-05-04 RX ADMIN — INSULIN LISPRO 1 UNITS: 100 INJECTION, SOLUTION INTRAVENOUS; SUBCUTANEOUS at 12:46

## 2020-05-04 RX ADMIN — MAGNESIUM SULFATE 2 G: 2 INJECTION INTRAVENOUS at 15:33

## 2020-05-04 RX ADMIN — HEPARIN SODIUM 5000 UNITS: 10000 INJECTION INTRAVENOUS; SUBCUTANEOUS at 09:16

## 2020-05-04 RX ADMIN — ARFORMOTEROL TARTRATE 15 MCG: 15 SOLUTION RESPIRATORY (INHALATION) at 08:41

## 2020-05-04 RX ADMIN — PETROLATUM: 42 OINTMENT TOPICAL at 20:53

## 2020-05-04 RX ADMIN — IPRATROPIUM BROMIDE AND ALBUTEROL SULFATE 1 AMPULE: 2.5; .5 SOLUTION RESPIRATORY (INHALATION) at 08:41

## 2020-05-04 RX ADMIN — ARFORMOTEROL TARTRATE 15 MCG: 15 SOLUTION RESPIRATORY (INHALATION) at 20:01

## 2020-05-04 RX ADMIN — ATORVASTATIN CALCIUM 20 MG: 20 TABLET, FILM COATED ORAL at 09:17

## 2020-05-04 RX ADMIN — POTASSIUM BICARBONATE 40 MEQ: 782 TABLET, EFFERVESCENT ORAL at 19:20

## 2020-05-04 RX ADMIN — PETROLATUM: 42 OINTMENT TOPICAL at 10:03

## 2020-05-04 RX ADMIN — DEXMEDETOMIDINE 0.1 MCG/KG/HR: 100 INJECTION, SOLUTION, CONCENTRATE INTRAVENOUS at 05:19

## 2020-05-04 RX ADMIN — POTASSIUM BICARBONATE 40 MEQ: 782 TABLET, EFFERVESCENT ORAL at 16:30

## 2020-05-04 RX ADMIN — SODIUM CHLORIDE 1 MG/HR: 900 INJECTION, SOLUTION INTRAVENOUS at 06:13

## 2020-05-04 RX ADMIN — IPRATROPIUM BROMIDE AND ALBUTEROL SULFATE 1 AMPULE: 2.5; .5 SOLUTION RESPIRATORY (INHALATION) at 15:39

## 2020-05-04 RX ADMIN — Medication 10 ML: at 10:04

## 2020-05-04 ASSESSMENT — PAIN DESCRIPTION - LOCATION
LOCATION: SCROTUM
LOCATION: GROIN;SCROTUM

## 2020-05-04 ASSESSMENT — PAIN SCALES - GENERAL
PAINLEVEL_OUTOF10: 8
PAINLEVEL_OUTOF10: 5
PAINLEVEL_OUTOF10: 0

## 2020-05-04 ASSESSMENT — PAIN DESCRIPTION - DESCRIPTORS
DESCRIPTORS: ACHING
DESCRIPTORS: CRAMPING

## 2020-05-04 ASSESSMENT — PAIN DESCRIPTION - PROGRESSION
CLINICAL_PROGRESSION: NOT CHANGED

## 2020-05-04 ASSESSMENT — PAIN DESCRIPTION - ORIENTATION
ORIENTATION: RIGHT
ORIENTATION: RIGHT

## 2020-05-04 ASSESSMENT — PAIN DESCRIPTION - ONSET
ONSET: ON-GOING
ONSET: ON-GOING

## 2020-05-04 ASSESSMENT — PAIN DESCRIPTION - FREQUENCY
FREQUENCY: CONTINUOUS
FREQUENCY: CONTINUOUS

## 2020-05-04 ASSESSMENT — PAIN DESCRIPTION - PAIN TYPE
TYPE: ACUTE PAIN;CHRONIC PAIN
TYPE: ACUTE PAIN;CHRONIC PAIN

## 2020-05-04 NOTE — PLAN OF CARE
Problem: Falls - Risk of:  Goal: Will remain free from falls  Description: Will remain free from falls  5/4/2020 1226 by Nicole James RN  Outcome: Met This Shift  5/4/2020 0036 by Evelia Baeza RN  Outcome: Met This Shift  5/3/2020 2226 by Evelia Baeza RN  Outcome: Met This Shift  Goal: Absence of physical injury  Description: Absence of physical injury  5/4/2020 1226 by Nicole James RN  Outcome: Met This Shift  5/4/2020 0036 by Evelia Baeza RN  Outcome: Met This Shift  5/3/2020 2226 by Evelia Baeza RN  Outcome: Met This Shift     Problem: Skin Integrity:  Goal: Will show no infection signs and symptoms  Description: Will show no infection signs and symptoms  Outcome: Ongoing  Goal: Absence of new skin breakdown  Description: Absence of new skin breakdown  Outcome: Ongoing     Problem: Pain Control  Goal: Maintain pain level at or below patient's acceptable level (or 5 if patient is unable to determine acceptable level)  Outcome: Met This Shift  Goal: STG - pain is manageable through therapies  Outcome: Ongoing

## 2020-05-04 NOTE — CARE COORDINATION
Pt transferred to ICU over weekend. He remains on continuous BiPAP. Precedex gtt. Bumex gtt. [facility] plans to accept when patient is medically stable. HENS and ambulance form on soft chart.

## 2020-05-04 NOTE — PLAN OF CARE
Problem: Falls - Risk of:  Goal: Will remain free from falls  Description: Will remain free from falls  5/4/2020 0036 by Cherylene School, RN  Outcome: Met This Shift     Problem: Falls - Risk of:  Goal: Absence of physical injury  Description: Absence of physical injury  5/4/2020 0036 by Cherylene School, RN  Outcome: Met This Shift

## 2020-05-04 NOTE — PLAN OF CARE
Problem: Falls - Risk of:  Goal: Will remain free from falls  Description: Will remain free from falls  5/3/2020 2226 by Angelique Mariscal RN  Outcome: Met This Shift     Problem: Falls - Risk of:  Goal: Absence of physical injury  Description: Absence of physical injury  5/3/2020 2226 by Angelique Mariscal RN  Outcome: Met This Shift

## 2020-05-04 NOTE — PROGRESS NOTES
hydrALAZINE  50 mg Oral TID    sodium chloride flush  10 mL Intravenous 2 times per day    mineral oil-hydrophilic petrolatum   Topical BID     Continuous Infusions:   bumetanide 0.1 mg/mL infusion 1 mg/hr (05/04/20 9274)    dexmedetomidine (PRECEDEX) IV infusion 0.1 mcg/kg/hr (05/04/20 0519)    dextrose       PRN Meds:   potassium chloride, 40 mEq, PRN    Or  potassium alternative oral replacement, 40 mEq, PRN    Or  potassium chloride, 10 mEq, PRN  glucose, 15 g, PRN  dextrose, 12.5 g, PRN  glucagon (rDNA), 1 mg, PRN  dextrose, 100 mL/hr, PRN  sodium chloride flush, 10 mL, PRN  acetaminophen, 650 mg, Q6H PRN    Or  acetaminophen, 650 mg, Q6H PRN  polyethylene glycol, 17 g, Daily PRN  promethazine, 12.5 mg, Q6H PRN    Or  ondansetron, 4 mg, Q6H PRN          VENT SETTINGS (Comprehensive) (if applicable):  Vent Information  Skin Assessment: Clean, dry, & intact  Equipment ID: [id number]  Vt Ordered: 570 mL  FiO2 : 40 %  SpO2: 92 %  SpO2/FiO2 ratio: 245  I Time/ I Time %: 0.9 s  Additional Respiratory  Assessments  Pulse: 73  Resp: 19  SpO2: 92 %  Oral Care: Mouth swabbed, Mouth moisturizer, Mouth suctioned, Suction toothette, Lip moisturizer applied    ABGs:   Recent Labs     05/01/20  1443   PH 7.286*   PCO2 61.3*   PO2 90.1   HCO3 28.5*   BE 1.0   O2SAT 96.7       Laboratory findings:    Complete Blood Count: No results for input(s): WBC, HGB, HCT, PLT in the last 72 hours.      Last 3 Blood Glucose:   Recent Labs     05/03/20  0501 05/03/20  2347 05/04/20  0614   GLUCOSE 84 125* 81        PT/INR:    Lab Results   Component Value Date    PROTIME 19.7 07/17/2019    INR 1.7 07/17/2019     PTT:    Lab Results   Component Value Date    APTT 36.5 04/11/2016       Comprehensive Metabolic Profile:   Recent Labs     05/03/20  0501 05/03/20  2347 05/04/20  0614    141 145   K 3.5 3.6 2.9*   CL 99 99 109*   CO2 30* 30* 23   BUN 48* 54* 39*   CREATININE 1.5* 1.4* 1.1   GLUCOSE 84 125* 81   CALCIUM 8.9 9.0 6.7* Supplement  Dietary Nutrition Supplements: Wound Healing Oral Supplement)    HOME MEDICATIONS RECONCILED: [x] No  [x] Yes    INSULIN DRIP:   [x] No   [] Yes    CONSULTATION NEEDED:  [x] No   [] Yes    FAMILY UPDATED:    [x] No   [] Yes    TRANSFER OUT OF ICU:   [x] No   [] Yes    ADDITIONAL PLAN:  1. ABG  2. CXR    30 CCT    ADI Olmos. YARED. P                     5/4/2020, 11:40 AM

## 2020-05-04 NOTE — PROGRESS NOTES
Lab Results   Component Value Date    WBC 5.1 04/27/2020    HGB 9.4 (L) 04/27/2020    HCT 32.4 (L) 04/27/2020    MCV 96.4 04/27/2020     04/27/2020     Lab Results   Component Value Date    CKTOTAL 38 11/17/2017    CKMB <1.0 04/11/2016    TROPONINI <0.01 05/01/2020     Lab Results   Component Value Date    INR 1.7 07/17/2019    INR 1.2 05/27/2016    INR 1.4 04/11/2016    PROTIME 19.7 (H) 07/17/2019    PROTIME 13.1 (H) 05/27/2016    PROTIME 15.3 (H) 04/11/2016     Lab Results   Component Value Date    ALT <5 12/27/2019    AST 16 12/27/2019    ALKPHOS 99 12/27/2019    BILITOT 0.6 12/27/2019     Lab Results   Component Value Date    LABA1C 5.1 05/02/2020       Current Medications:  Current Facility-Administered Medications   Medication Dose Route Frequency Provider Last Rate Last Dose    heparin (porcine) injection 5,000 Units  5,000 Units Subcutaneous Q8H Alexsander Sheets MD   5,000 Units at 05/04/20 0118    bumetanide (BUMEX) 12.5 mg in sodium chloride 0.9 % 125 mL infusion  1 mg/hr Intravenous Continuous Barrettifm PAPA Arce - CNP 10 mL/hr at 05/04/20 3798 1 mg/hr at 05/04/20 8312    dexmedetomidine (PRECEDEX) 400 mcg in sodium chloride 0.9 % 100 mL infusion  0.2 mcg/kg/hr Intravenous Continuous Myriam Guthrie MD 4.8 mL/hr at 05/04/20 0519 0.1 mcg/kg/hr at 05/04/20 0519    budesonide (PULMICORT) nebulizer suspension 500 mcg  500 mcg Nebulization BID Alexsander Sheets MD   500 mcg at 05/04/20 0842    Arformoterol Tartrate (BROVANA) nebulizer solution 15 mcg  15 mcg Nebulization BID Alexsander Sheets MD   15 mcg at 05/04/20 0841    insulin lispro (HUMALOG) injection vial 0-6 Units  0-6 Units Subcutaneous TID  Alexsander Sheets MD        insulin lispro (HUMALOG) injection vial 0-3 Units  0-3 Units Subcutaneous Nightly Alexsander Sehets MD   Stopped at 05/01/20 2046    glucose (GLUTOSE) 40 % oral gel 15 g  15 g Oral PRN Leida Sanabria DO        dextrose 50 % IV solution  12.5 g Intravenous PRN Leida Sanabria, DO   12.5 g at 05/02/20 1117    glucagon (rDNA) injection 1 mg  1 mg Intramuscular PRN Celestina Arnua Malmer, DO        dextrose 5 % solution  100 mL/hr Intravenous PRN Celestina Aruna Malmer, DO        ipratropium-albuterol (DUONEB) nebulizer solution 1 ampule  1 ampule Inhalation Q4H WA Celestina Aruna Malmer, DO   1 ampule at 05/04/20 0833    atorvastatin (LIPITOR) tablet 20 mg  20 mg Oral Daily Celestina Aruna Malmer, DO   20 mg at 05/03/20 0849    carbidopa-levodopa (SINEMET)  MG per tablet 1 tablet  1 tablet Oral 4x Daily Celestina Aruna Malmer, DO   1 tablet at 05/03/20 2125    doxazosin (CARDURA) tablet 8 mg  8 mg Oral Nightly Celestina Aruna Malmer, DO   8 mg at 05/03/20 2125    hydrALAZINE (APRESOLINE) tablet 50 mg  50 mg Oral TID Celestina Aruna Malmer, DO   50 mg at 05/03/20 2125    sodium chloride flush 0.9 % injection 10 mL  10 mL Intravenous 2 times per day Celestina Aruna Malmer, DO   10 mL at 05/03/20 2125    sodium chloride flush 0.9 % injection 10 mL  10 mL Intravenous PRN Celestina Aruna Malmer, DO   10 mL at 05/02/20 1117    acetaminophen (TYLENOL) tablet 650 mg  650 mg Oral Q6H PRN Celestina Aruna Malmer, DO   650 mg at 05/04/20 0150    Or    acetaminophen (TYLENOL) suppository 650 mg  650 mg Rectal Q6H PRN Celestina Aruna Malmer, DO        polyethylene glycol (GLYCOLAX) packet 17 g  17 g Oral Daily PRN Celestina Aruna Malmer, DO        promethazine (PHENERGAN) tablet 12.5 mg  12.5 mg Oral Q6H PRN Celestina Aruna Malmer, DO        Or    ondansetron [facility]) injection 4 mg  4 mg Intravenous Q6H PRN Celestina Aruna Malmer, DO        mineral oil-hydrophilic petrolatum (HYDROPHOR) ointment   Topical BID Celestina Aruna Malmer, DO          bumetanide 0.1 mg/mL infusion 1 mg/hr (05/04/20 4564)    dexmedetomidine (PRECEDEX) IV infusion 0.1 mcg/kg/hr (05/04/20 0519)    dextrose         IMAGING:     CXR (4/30/2020)  FINDINGS:   There are signs for bilateral pleural effusions with the increased size of the heart.    Patient has a permanent pacemaker with a single wire placed left Litzy     Thank you for allowing us to participate in the care of this patient. We will follow as medical course develops. Do not hesitate to contact us with further questions.     Tanika ELAM-CNP  Heart Failure and Pulmonary Hypertension  [facility].

## 2020-05-04 NOTE — PROGRESS NOTES
Date: 5/3/2020    Time: 10:14 PM    Patient Placed On BIPAP/CPAP/ Non-Invasive Ventilation? Yes    If no must comment. Facial area red/color change? No           If YES are Blister/Lesion present? No   If yes must notify nursing staff  BIPAP/CPAP skin barrier?   Yes    Skin barrier type:mepilex       Comments:        Landon Callas

## 2020-05-05 ENCOUNTER — APPOINTMENT (OUTPATIENT)
Dept: GENERAL RADIOLOGY | Age: 75
DRG: 291 | End: 2020-05-05
Payer: MEDICARE

## 2020-05-05 LAB
ALBUMIN FLUID: 2.3 G/DL
AMYLASE FLUID: 18 U/L
ANION GAP SERPL CALCULATED.3IONS-SCNC: 11 MMOL/L (ref 7–16)
ANION GAP SERPL CALCULATED.3IONS-SCNC: 12 MMOL/L (ref 7–16)
ANION GAP SERPL CALCULATED.3IONS-SCNC: 13 MMOL/L (ref 7–16)
ANION GAP SERPL CALCULATED.3IONS-SCNC: 14 MMOL/L (ref 7–16)
APPEARANCE FLUID: NORMAL
BUN BLDV-MCNC: 50 MG/DL (ref 8–23)
BUN BLDV-MCNC: 53 MG/DL (ref 8–23)
CALCIUM SERPL-MCNC: 8.9 MG/DL (ref 8.6–10.2)
CALCIUM SERPL-MCNC: 9 MG/DL (ref 8.6–10.2)
CALCIUM SERPL-MCNC: 9.1 MG/DL (ref 8.6–10.2)
CALCIUM SERPL-MCNC: 9.4 MG/DL (ref 8.6–10.2)
CELL COUNT FLUID TYPE: NORMAL
CHLORIDE BLD-SCNC: 100 MMOL/L (ref 98–107)
CHLORIDE BLD-SCNC: 97 MMOL/L (ref 98–107)
CHLORIDE BLD-SCNC: 98 MMOL/L (ref 98–107)
CHLORIDE BLD-SCNC: 98 MMOL/L (ref 98–107)
CO2: 34 MMOL/L (ref 22–29)
CO2: 37 MMOL/L (ref 22–29)
COLOR FLUID: YELLOW
CREAT SERPL-MCNC: 1.2 MG/DL (ref 0.7–1.2)
CREAT SERPL-MCNC: 1.3 MG/DL (ref 0.7–1.2)
CREAT SERPL-MCNC: 1.4 MG/DL (ref 0.7–1.2)
CREAT SERPL-MCNC: 1.4 MG/DL (ref 0.7–1.2)
FLUID TYPE: NORMAL
GFR AFRICAN AMERICAN: >60
GFR NON-AFRICAN AMERICAN: 50 ML/MIN/1.73
GFR NON-AFRICAN AMERICAN: 50 ML/MIN/1.73
GFR NON-AFRICAN AMERICAN: 54 ML/MIN/1.73
GFR NON-AFRICAN AMERICAN: 59 ML/MIN/1.73
GLUCOSE BLD-MCNC: 107 MG/DL (ref 74–99)
GLUCOSE BLD-MCNC: 123 MG/DL (ref 74–99)
GLUCOSE BLD-MCNC: 138 MG/DL (ref 74–99)
GLUCOSE BLD-MCNC: 189 MG/DL (ref 74–99)
GLUCOSE, FLUID: 131 MG/DL
LD, FLUID: 109 U/L
MAGNESIUM: 2.2 MG/DL (ref 1.6–2.6)
METER GLUCOSE: 103 MG/DL (ref 74–99)
METER GLUCOSE: 134 MG/DL (ref 74–99)
METER GLUCOSE: 176 MG/DL (ref 74–99)
METER GLUCOSE: 223 MG/DL (ref 74–99)
MONOCYTE, FLUID: 63 %
NEUTROPHIL, FLUID: 37 %
NUCLEATED CELLS FLUID: 114 /UL
POTASSIUM SERPL-SCNC: 3.6 MMOL/L (ref 3.5–5)
POTASSIUM SERPL-SCNC: 3.7 MMOL/L (ref 3.5–5)
POTASSIUM SERPL-SCNC: 3.8 MMOL/L (ref 3.5–5)
POTASSIUM SERPL-SCNC: 3.8 MMOL/L (ref 3.5–5)
PROTEIN FLUID: 4.3 G/DL
RBC FLUID: <2000 /UL
SODIUM BLD-SCNC: 144 MMOL/L (ref 132–146)
SODIUM BLD-SCNC: 145 MMOL/L (ref 132–146)
SODIUM BLD-SCNC: 146 MMOL/L (ref 132–146)
SODIUM BLD-SCNC: 147 MMOL/L (ref 132–146)

## 2020-05-05 PROCEDURE — 82042 OTHER SOURCE ALBUMIN QUAN EA: CPT

## 2020-05-05 PROCEDURE — 83615 LACTATE (LD) (LDH) ENZYME: CPT

## 2020-05-05 PROCEDURE — 6360000002 HC RX W HCPCS: Performed by: INTERNAL MEDICINE

## 2020-05-05 PROCEDURE — 6370000000 HC RX 637 (ALT 250 FOR IP): Performed by: INTERNAL MEDICINE

## 2020-05-05 PROCEDURE — 71045 X-RAY EXAM CHEST 1 VIEW: CPT

## 2020-05-05 PROCEDURE — 88305 TISSUE EXAM BY PATHOLOGIST: CPT

## 2020-05-05 PROCEDURE — 2580000003 HC RX 258: Performed by: NURSE PRACTITIONER

## 2020-05-05 PROCEDURE — P9047 ALBUMIN (HUMAN), 25%, 50ML: HCPCS | Performed by: INTERNAL MEDICINE

## 2020-05-05 PROCEDURE — 2700000000 HC OXYGEN THERAPY PER DAY

## 2020-05-05 PROCEDURE — 99233 SBSQ HOSP IP/OBS HIGH 50: CPT | Performed by: INTERNAL MEDICINE

## 2020-05-05 PROCEDURE — 82150 ASSAY OF AMYLASE: CPT

## 2020-05-05 PROCEDURE — 94668 MNPJ CHEST WALL SBSQ: CPT

## 2020-05-05 PROCEDURE — 2000000000 HC ICU R&B

## 2020-05-05 PROCEDURE — 87070 CULTURE OTHR SPECIMN AEROBIC: CPT

## 2020-05-05 PROCEDURE — 2500000003 HC RX 250 WO HCPCS: Performed by: NURSE PRACTITIONER

## 2020-05-05 PROCEDURE — 2580000003 HC RX 258: Performed by: INTERNAL MEDICINE

## 2020-05-05 PROCEDURE — 83735 ASSAY OF MAGNESIUM: CPT

## 2020-05-05 PROCEDURE — APPSS45 APP SPLIT SHARED TIME 31-45 MINUTES: Performed by: NURSE PRACTITIONER

## 2020-05-05 PROCEDURE — 94640 AIRWAY INHALATION TREATMENT: CPT

## 2020-05-05 PROCEDURE — 87205 SMEAR GRAM STAIN: CPT

## 2020-05-05 PROCEDURE — 80048 BASIC METABOLIC PNL TOTAL CA: CPT

## 2020-05-05 PROCEDURE — 88112 CYTOPATH CELL ENHANCE TECH: CPT

## 2020-05-05 PROCEDURE — 94660 CPAP INITIATION&MGMT: CPT

## 2020-05-05 PROCEDURE — 2500000003 HC RX 250 WO HCPCS: Performed by: INTERNAL MEDICINE

## 2020-05-05 PROCEDURE — 82962 GLUCOSE BLOOD TEST: CPT

## 2020-05-05 PROCEDURE — 0W9G3ZZ DRAINAGE OF PERITONEAL CAVITY, PERCUTANEOUS APPROACH: ICD-10-PCS | Performed by: INTERNAL MEDICINE

## 2020-05-05 PROCEDURE — 89051 BODY FLUID CELL COUNT: CPT

## 2020-05-05 PROCEDURE — 36415 COLL VENOUS BLD VENIPUNCTURE: CPT

## 2020-05-05 PROCEDURE — 6370000000 HC RX 637 (ALT 250 FOR IP): Performed by: NURSE PRACTITIONER

## 2020-05-05 PROCEDURE — 84157 ASSAY OF PROTEIN OTHER: CPT

## 2020-05-05 PROCEDURE — 82947 ASSAY GLUCOSE BLOOD QUANT: CPT

## 2020-05-05 PROCEDURE — 49083 ABD PARACENTESIS W/IMAGING: CPT | Performed by: INTERNAL MEDICINE

## 2020-05-05 RX ORDER — SODIUM PHOSPHATE, DIBASIC AND SODIUM PHOSPHATE, MONOBASIC 7; 19 G/133ML; G/133ML
1 ENEMA RECTAL
Status: ACTIVE | OUTPATIENT
Start: 2020-05-05 | End: 2020-05-05

## 2020-05-05 RX ORDER — ALBUTEROL SULFATE 2.5 MG/3ML
2.5 SOLUTION RESPIRATORY (INHALATION) 4 TIMES DAILY
Status: DISCONTINUED | OUTPATIENT
Start: 2020-05-05 | End: 2020-05-07 | Stop reason: HOSPADM

## 2020-05-05 RX ORDER — BISACODYL 10 MG
10 SUPPOSITORY, RECTAL RECTAL ONCE
Status: COMPLETED | OUTPATIENT
Start: 2020-05-05 | End: 2020-05-05

## 2020-05-05 RX ORDER — SENNA PLUS 8.6 MG/1
1 TABLET ORAL NIGHTLY
Status: DISCONTINUED | OUTPATIENT
Start: 2020-05-05 | End: 2020-05-07 | Stop reason: HOSPADM

## 2020-05-05 RX ORDER — ALBUMIN (HUMAN) 12.5 G/50ML
50 SOLUTION INTRAVENOUS ONCE
Status: COMPLETED | OUTPATIENT
Start: 2020-05-05 | End: 2020-05-05

## 2020-05-05 RX ORDER — DOCUSATE SODIUM 100 MG/1
100 CAPSULE, LIQUID FILLED ORAL DAILY
Status: DISCONTINUED | OUTPATIENT
Start: 2020-05-06 | End: 2020-05-07 | Stop reason: HOSPADM

## 2020-05-05 RX ORDER — METOLAZONE 2.5 MG/1
2.5 TABLET ORAL ONCE
Status: COMPLETED | OUTPATIENT
Start: 2020-05-05 | End: 2020-05-05

## 2020-05-05 RX ORDER — METHYLPREDNISOLONE SODIUM SUCCINATE 40 MG/ML
40 INJECTION, POWDER, LYOPHILIZED, FOR SOLUTION INTRAMUSCULAR; INTRAVENOUS EVERY 12 HOURS
Status: DISCONTINUED | OUTPATIENT
Start: 2020-05-05 | End: 2020-05-07 | Stop reason: HOSPADM

## 2020-05-05 RX ORDER — LIDOCAINE HYDROCHLORIDE 10 MG/ML
5 INJECTION, SOLUTION INFILTRATION; PERINEURAL ONCE
Status: COMPLETED | OUTPATIENT
Start: 2020-05-05 | End: 2020-05-05

## 2020-05-05 RX ADMIN — ALBUTEROL SULFATE 2.5 MG: 2.5 SOLUTION RESPIRATORY (INHALATION) at 13:31

## 2020-05-05 RX ADMIN — HYDRALAZINE HYDROCHLORIDE 50 MG: 50 TABLET, FILM COATED ORAL at 14:35

## 2020-05-05 RX ADMIN — INSULIN LISPRO 1 UNITS: 100 INJECTION, SOLUTION INTRAVENOUS; SUBCUTANEOUS at 16:46

## 2020-05-05 RX ADMIN — SPIRONOLACTONE 25 MG: 25 TABLET ORAL at 08:52

## 2020-05-05 RX ADMIN — SODIUM CHLORIDE 2 MG/HR: 900 INJECTION, SOLUTION INTRAVENOUS at 21:32

## 2020-05-05 RX ADMIN — PETROLATUM: 42 OINTMENT TOPICAL at 10:38

## 2020-05-05 RX ADMIN — CARBIDOPA AND LEVODOPA 1 TABLET: 25; 100 TABLET ORAL at 08:52

## 2020-05-05 RX ADMIN — METHYLPREDNISOLONE SODIUM SUCCINATE 40 MG: 40 INJECTION, POWDER, FOR SOLUTION INTRAMUSCULAR; INTRAVENOUS at 09:33

## 2020-05-05 RX ADMIN — PETROLATUM: 42 OINTMENT TOPICAL at 20:13

## 2020-05-05 RX ADMIN — ATORVASTATIN CALCIUM 20 MG: 20 TABLET, FILM COATED ORAL at 08:52

## 2020-05-05 RX ADMIN — METHYLPREDNISOLONE SODIUM SUCCINATE 40 MG: 40 INJECTION, POWDER, FOR SOLUTION INTRAMUSCULAR; INTRAVENOUS at 20:12

## 2020-05-05 RX ADMIN — CARBIDOPA AND LEVODOPA 1 TABLET: 25; 100 TABLET ORAL at 16:47

## 2020-05-05 RX ADMIN — HYDRALAZINE HYDROCHLORIDE 50 MG: 50 TABLET, FILM COATED ORAL at 20:12

## 2020-05-05 RX ADMIN — CARBIDOPA AND LEVODOPA 1 TABLET: 25; 100 TABLET ORAL at 12:57

## 2020-05-05 RX ADMIN — ARFORMOTEROL TARTRATE 15 MCG: 15 SOLUTION RESPIRATORY (INHALATION) at 19:30

## 2020-05-05 RX ADMIN — CARBIDOPA AND LEVODOPA 1 TABLET: 25; 100 TABLET ORAL at 20:12

## 2020-05-05 RX ADMIN — SODIUM CHLORIDE 1 MG/HR: 900 INJECTION, SOLUTION INTRAVENOUS at 08:51

## 2020-05-05 RX ADMIN — HEPARIN SODIUM 5000 UNITS: 10000 INJECTION INTRAVENOUS; SUBCUTANEOUS at 08:56

## 2020-05-05 RX ADMIN — Medication 10 ML: at 08:52

## 2020-05-05 RX ADMIN — ALBUTEROL SULFATE 2.5 MG: 2.5 SOLUTION RESPIRATORY (INHALATION) at 19:30

## 2020-05-05 RX ADMIN — BUDESONIDE 500 MCG: 0.25 SUSPENSION RESPIRATORY (INHALATION) at 19:31

## 2020-05-05 RX ADMIN — POLYETHYLENE GLYCOL 3350 17 G: 17 POWDER, FOR SOLUTION ORAL at 10:51

## 2020-05-05 RX ADMIN — LIDOCAINE HYDROCHLORIDE 5 ML: 10 INJECTION, SOLUTION INFILTRATION; PERINEURAL at 15:56

## 2020-05-05 RX ADMIN — ACETAMINOPHEN 650 MG: 325 TABLET, FILM COATED ORAL at 17:07

## 2020-05-05 RX ADMIN — HEPARIN SODIUM 5000 UNITS: 10000 INJECTION INTRAVENOUS; SUBCUTANEOUS at 16:46

## 2020-05-05 RX ADMIN — ALBUTEROL SULFATE 2.5 MG: 2.5 SOLUTION RESPIRATORY (INHALATION) at 09:41

## 2020-05-05 RX ADMIN — BISACODYL 10 MG: 10 SUPPOSITORY RECTAL at 21:20

## 2020-05-05 RX ADMIN — ALBUTEROL SULFATE 2.5 MG: 2.5 SOLUTION RESPIRATORY (INHALATION) at 15:39

## 2020-05-05 RX ADMIN — DOXAZOSIN 8 MG: 4 TABLET ORAL at 20:12

## 2020-05-05 RX ADMIN — ALBUMIN (HUMAN) 50 G: 0.25 INJECTION, SOLUTION INTRAVENOUS at 14:35

## 2020-05-05 RX ADMIN — METOLAZONE 2.5 MG: 2.5 TABLET ORAL at 12:57

## 2020-05-05 RX ADMIN — SODIUM CHLORIDE 2 MG/HR: 900 INJECTION, SOLUTION INTRAVENOUS at 16:46

## 2020-05-05 RX ADMIN — ARFORMOTEROL TARTRATE 15 MCG: 15 SOLUTION RESPIRATORY (INHALATION) at 09:40

## 2020-05-05 RX ADMIN — HYDRALAZINE HYDROCHLORIDE 50 MG: 50 TABLET, FILM COATED ORAL at 08:52

## 2020-05-05 RX ADMIN — HEPARIN SODIUM 5000 UNITS: 10000 INJECTION INTRAVENOUS; SUBCUTANEOUS at 00:24

## 2020-05-05 RX ADMIN — BUDESONIDE 500 MCG: 0.25 SUSPENSION RESPIRATORY (INHALATION) at 09:41

## 2020-05-05 RX ADMIN — INSULIN LISPRO 1 UNITS: 100 INJECTION, SOLUTION INTRAVENOUS; SUBCUTANEOUS at 20:13

## 2020-05-05 RX ADMIN — Medication 10 ML: at 20:12

## 2020-05-05 RX ADMIN — SENNOSIDES 8.6 MG: 8.6 TABLET, FILM COATED ORAL at 21:30

## 2020-05-05 ASSESSMENT — PAIN DESCRIPTION - ONSET
ONSET: GRADUAL
ONSET: GRADUAL

## 2020-05-05 ASSESSMENT — PAIN DESCRIPTION - PAIN TYPE
TYPE: ACUTE PAIN
TYPE: ACUTE PAIN

## 2020-05-05 ASSESSMENT — PAIN DESCRIPTION - FREQUENCY
FREQUENCY: INTERMITTENT
FREQUENCY: INTERMITTENT

## 2020-05-05 ASSESSMENT — PAIN DESCRIPTION - ORIENTATION
ORIENTATION: LEFT
ORIENTATION: LEFT

## 2020-05-05 ASSESSMENT — PAIN SCALES - GENERAL
PAINLEVEL_OUTOF10: 3
PAINLEVEL_OUTOF10: 0
PAINLEVEL_OUTOF10: 3
PAINLEVEL_OUTOF10: 0

## 2020-05-05 ASSESSMENT — PAIN DESCRIPTION - LOCATION
LOCATION: LEG
LOCATION: LEG

## 2020-05-05 ASSESSMENT — PAIN DESCRIPTION - DESCRIPTORS
DESCRIPTORS: BURNING;DISCOMFORT
DESCRIPTORS: BURNING;DISCOMFORT

## 2020-05-05 ASSESSMENT — PAIN DESCRIPTION - PROGRESSION
CLINICAL_PROGRESSION: GRADUALLY IMPROVING
CLINICAL_PROGRESSION: GRADUALLY IMPROVING

## 2020-05-05 NOTE — PROGRESS NOTES
05/05/2020     Lab Results   Component Value Date    WBC 5.1 04/27/2020    HGB 9.4 (L) 04/27/2020    HCT 32.4 (L) 04/27/2020    MCV 96.4 04/27/2020     04/27/2020     Lab Results   Component Value Date    CKTOTAL 38 11/17/2017    CKMB <1.0 04/11/2016    TROPONINI <0.01 05/01/2020     Lab Results   Component Value Date    INR 1.7 07/17/2019    INR 1.2 05/27/2016    INR 1.4 04/11/2016    PROTIME 19.7 (H) 07/17/2019    PROTIME 13.1 (H) 05/27/2016    PROTIME 15.3 (H) 04/11/2016     Lab Results   Component Value Date    ALT <5 12/27/2019    AST 16 12/27/2019    ALKPHOS 99 12/27/2019    BILITOT 0.6 12/27/2019     Lab Results   Component Value Date    LABA1C 5.1 05/02/2020       Current Medications:  Current Facility-Administered Medications   Medication Dose Route Frequency Provider Last Rate Last Dose    albuterol (PROVENTIL) nebulizer solution 2.5 mg  2.5 mg Nebulization 4x daily Vinita Mills MD   2.5 mg at 05/05/20 0941    methylPREDNISolone sodium (SOLU-MEDROL) injection 40 mg  40 mg Intravenous Q12H Vinita Mills MD   40 mg at 05/05/20 0933    spironolactone (ALDACTONE) tablet 25 mg  25 mg Oral Daily Willia Sha, APRN - CNP   25 mg at 05/05/20 3662    potassium chloride (KLOR-CON M) extended release tablet 40 mEq  40 mEq Oral PRN Willia Sha, APRN - CNP        Or    potassium bicarb-citric acid (EFFER-K) effervescent tablet 40 mEq  40 mEq Oral PRN Willia Sha, APRN - CNP        Or    potassium chloride 10 mEq/100 mL IVPB (Peripheral Line)  10 mEq Intravenous PRN Willia Sha, APRN - CNP        sodium chloride (Inhalant) 3 % nebulizer solution 4 mL  4 mL Nebulization PRN Vinita Mills MD        heparin (porcine) injection 5,000 Units  5,000 Units Subcutaneous Q8H Jameson Clark MD   5,000 Units at 05/05/20 0856    bumetanide (BUMEX) 12.5 mg in sodium chloride 0.9 % 125 mL infusion  1 mg/hr Intravenous Continuous PAPA Mendoza CNP 10 mL/hr at 05/05/20 0851 1 mg/hr

## 2020-05-05 NOTE — PROGRESS NOTES
Critical Care Team - Daily Progress Note         Date and time: 2020 8:25 AM  Patient's name:  [facility] [address], [address] Record Number: [id number]  Patient's account/billing number: [de-identified]  Patient's YOB: 1945  Age: 70 y.o. Date of Admission: 2020  2:27 PM  Length of stay during current admission: 8      Primary Care Physician: Malgorzata Felix MD  ICU Attending Physician: Dr. Francheska Cornelius    Code Status: Limited    Reason for ICU admission: Acute hypoxic and hypercapnic respiratory failure      SUBJECTIVE:     OVERNIGHT EVENTS:       Back on AVAPS with 40 percent O2. Left chest opacified on cxr. Denies dyspnea but coughing sputum that he does not view. Started on VEST.     CURRENT VENTILATION STATUS:     [] Ventilator  [x] BIPAP AVAPS [x] Nasal Cannula [] Room Air      IF INTUBATED, ET TUBE MARKING AT LOWER LIP:       cms    SECRETIONS Amount:  [x] Small [] Moderate  [] Large  [] None  Color:     [] White [] Colored  [] Bloody    SEDATION:  RAAS Score:  [] Propofol gtt  [] Versed gtt  [] Ativan gtt   [] No Sedation  Precedex gtt  PARALYZED:  [x] No    [] Yes      VASOPRESSORS:  [x] No    [] Yes    If yes -   [] Levophed       [] Dopamine     [] Vasopressin       [] Dobutamine  [] Phenylephrine         [] Epinephrine    CENTRAL LINES:     [x] No   [] Yes   (Date of Insertion:   )           If yes -     [] Right IJ     [] Left IJ [] Right Femoral [] Left Femoral                   [] Right Subclavian [] Left Subclavian       KELLY'S CATHETER:   [] No   [x] Yes  (Date of Insertion:   )     URINE OUTPUT:            [x] Good   [] Low              [] Anuric      OBJECTIVE:     VITAL SIGNS:  BP (!) 143/68   Pulse 68   Temp 96.8 °F (36 °C) (Temporal)   Resp 20   Ht 6' 4\" (1.93 m)   Wt (!) 426 lb 12.8 oz (193.6 kg)   SpO2 96%   BMI 51.95 kg/m²   Tmax over 24 hours:  Temp (24hrs), Av.3 °F (36.3 °C), Min:96.8 °F (36 °C), Max:98 °F (36.7 °C)      Patient Vitals for the past 6 sodium chloride flush  10 mL Intravenous 2 times per day    mineral oil-hydrophilic petrolatum   Topical BID     Continuous Infusions:   bumetanide 0.1 mg/mL infusion 1 mg/hr (05/04/20 1850)    dexmedetomidine (PRECEDEX) IV infusion Stopped (05/05/20 0216)    dextrose       PRN Meds:   potassium chloride, 40 mEq, PRN    Or  potassium alternative oral replacement, 40 mEq, PRN    Or  potassium chloride, 10 mEq, PRN  sodium chloride (Inhalant), 4 mL, PRN  glucose, 15 g, PRN  dextrose, 12.5 g, PRN  glucagon (rDNA), 1 mg, PRN  dextrose, 100 mL/hr, PRN  sodium chloride flush, 10 mL, PRN  acetaminophen, 650 mg, Q6H PRN    Or  acetaminophen, 650 mg, Q6H PRN  polyethylene glycol, 17 g, Daily PRN  promethazine, 12.5 mg, Q6H PRN    Or  ondansetron, 4 mg, Q6H PRN          VENT SETTINGS (Comprehensive) (if applicable):  Vent Information  Skin Assessment: Clean, dry, & intact  Equipment ID: [id number]  Vt Ordered: 570 mL  FiO2 : 40 %  SpO2: 96 %  SpO2/FiO2 ratio: 240  I Time/ I Time %: 0.9 s  Additional Respiratory  Assessments  Pulse: 68  Resp: 20  SpO2: 96 %  Oral Care: Mouth swabbed, Mouth moisturizer, Mouth suctioned    ABGs:   Recent Labs     05/04/20  1532   PH 7.331*   PCO2 70.6*   PO2 60.9*   HCO3 36.5*   BE 8.7*   O2SAT 90.1*       Laboratory findings:    Complete Blood Count: No results for input(s): WBC, HGB, HCT, PLT in the last 72 hours.      Last 3 Blood Glucose:   Recent Labs     05/04/20  1715 05/05/20  0036 05/05/20  0545   GLUCOSE 143* 123* 107*        PT/INR:    Lab Results   Component Value Date    PROTIME 19.7 07/17/2019    INR 1.7 07/17/2019     PTT:    Lab Results   Component Value Date    APTT 36.5 04/11/2016       Comprehensive Metabolic Profile:   Recent Labs     05/04/20  1715 05/05/20  0036 05/05/20  0545    144 147*   K 3.7 3.8 3.8    98 100   CO2 34* 34* 34*   BUN 51* 50* 50*   CREATININE 1.4* 1.4* 1.4*   GLUCOSE 143* 123* 107*   CALCIUM 8.8 9.1 8.9      Magnesium:   Lab Results   Component Value Date    MG 2.2 2020     Phosphorus:   Lab Results   Component Value Date    PHOS 3.7 2019     Ionized Calcium: No results found for: CAION     Urinalysis:     Troponin: No results for input(s): TROPONINI in the last 72 hours. Microbiology:    Cultures during this admission:     Blood cultures:                 [] None drawn      [x] Negative             []  Positive (Details:  )  Urine Culture:                   [] None drawn      [x] Negative             []  Positive (Details:  )  Sputum Culture:               [x] None drawn       [] Negative             []  Positive (Details:  )   Endotracheal aspirate:     [x] None drawn       [] Negative             []  Positive (Details:  )     Other pertinent Labs:   7.331/70.6/60.9/89.2 on 12 liters    Radiology/Imagin/4 cxr viewed and shows opacification of left chest  Chest Xray (2020):   ordered      ASSESSMENT:     Principal Problem:    Acute on chronic diastolic (congestive) heart failure ([address]  Active Problems:    DM (diabetes mellitus) ([address]    HTN (hypertension)    Hyperlipidemia    Diabetic ulcer of left foot ([address]    Morbid obesity ([address]    CKD stage 2 due to type 2 diabetes mellitus ([address]    S/P placement of cardiac pacemaker    Acute on chronic respiratory failure with hypoxia and hypercapnia ([address]  Resolved Problems:    Congestive heart failure ([address]    Acute on chronic heart failure with preserved ejection fraction ([address]      Additional assessment:    · Left lung atelectasis  · Epistaxis resolved at present  · Mild hypernatremia        PLAN:     WEAN PER PROTOCOL:  [] No   [] Yes  [x] N/A    DISCONTINUE ANY LABS:   [x] No   [] Yes    ICU PROPHYLAXIS:  Stress ulcer:  [] PPI Agent  [] [id number] [] Sucralfate  [] Other:  VTE:   [] Enoxaparin  [x] Unfract. Heparin Subcut  [] EPC Cuffs    NUTRITION:  [] NPO [] Tube Feeding (Specify: ) [] TPN  [x] PO (Diet: DIET CARB CONTROL;   Dietary Nutrition Supplements: Low Calorie High Protein Supplement  Dietary Nutrition Supplements: Wound Healing Oral Supplement)    HOME MEDICATIONS RECONCILED: [] No  [x] Yes    INSULIN DRIP:   [x] No   [] Yes    CONSULTATION NEEDED:  [x] No   [] Yes    FAMILY UPDATED:    [x] No   [] Yes    TRANSFER OUT OF ICU:   [x] No   [] Yes    ADDITIONAL PLAN:  1. Continue VEST,   2. Stop ipratropium  3. Repeat CXR  4. Continue 3% saline nebs  5. Start low dose steroids for wheezing  6. Try to rest of AVAPS  7. Watch Na while on bumetanide gtt, may need free water    30 CCT    Zita Gudino M.D. Kj JONES                     5/5/2020, 8:25 AM

## 2020-05-05 NOTE — PROGRESS NOTES
glucagon (rDNA) injection 1 mg, 1 mg, Intramuscular, PRN, Artem Cushing Malmer, DO    dextrose 5 % solution, 100 mL/hr, Intravenous, PRN, Artem Cushing Malmer, DO    atorvastatin (LIPITOR) tablet 20 mg, 20 mg, Oral, Daily, Artem Cushing Malmer, DO, 20 mg at 05/04/20 3565    carbidopa-levodopa (SINEMET)  MG per tablet 1 tablet, 1 tablet, Oral, 4x Daily, Artem Cushing Malmer, DO, 1 tablet at 05/04/20 2052    doxazosin (CARDURA) tablet 8 mg, 8 mg, Oral, Nightly, Artem Cushing Malmer, DO, 8 mg at 05/04/20 2052    hydrALAZINE (APRESOLINE) tablet 50 mg, 50 mg, Oral, TID, Artem Cushing Malmer, DO, 50 mg at 05/04/20 2052    sodium chloride flush 0.9 % injection 10 mL, 10 mL, Intravenous, 2 times per day, Obie Leaks, DO, 10 mL at 05/04/20 2052    sodium chloride flush 0.9 % injection 10 mL, 10 mL, Intravenous, PRN, Artem Cushing Malmer, DO, 10 mL at 05/02/20 1117    acetaminophen (TYLENOL) tablet 650 mg, 650 mg, Oral, Q6H PRN, 650 mg at 05/04/20 0150 **OR** acetaminophen (TYLENOL) suppository 650 mg, 650 mg, Rectal, Q6H PRN, Artem Ramseying Malmer, DO    polyethylene glycol (GLYCOLAX) packet 17 g, 17 g, Oral, Daily PRN, Artem Ramseying Malmer, DO    promethazine (PHENERGAN) tablet 12.5 mg, 12.5 mg, Oral, Q6H PRN **OR** ondansetron (ZOFRAN) injection 4 mg, 4 mg, Intravenous, Q6H PRN, Pete Cushing Malmer, DO    mineral oil-hydrophilic petrolatum (HYDROPHOR) ointment, , Topical, BID, Pete Cushing Malmer, DO    Objective:    BP (!) 143/68   Pulse 68   Temp 96.8 °F (36 °C) (Temporal)   Resp 20   Ht 6' 4\" (1.93 m)   Wt (!) 426 lb 12.8 oz (193.6 kg)   SpO2 96%   BMI 51.95 kg/m²     Heart:  Reg  Lungs:  rhonchi  Abd: bowel sounds present, nontender, nondistended  Extrem:  Edema l leg r bka    CBC with Differential:    Lab Results   Component Value Date    WBC 5.1 04/27/2020    RBC 3.36 04/27/2020    HGB 9.4 04/27/2020    HCT 32.4 04/27/2020     04/27/2020    MCV 96.4 04/27/2020    MCH 28.0 04/27/2020    MCHC 29.0 04/27/2020    RDW 16.8

## 2020-05-05 NOTE — CONSULTS
HISTORY  9/20/2014    left foot debridement, I&D exostectomy, bone biopsy; I&D right lower leg(BK stump site)    PACEMAKER PLACEMENT      has been shut off for 5 years       Family History   Problem Relation Age of Onset    Diabetes Mother     Heart Disease Mother     Other Father         cerebral aneurysm    Parkinsonism Sister     Heart Attack Brother     Cancer Brother         reports that he quit smoking about 35 years ago. His smoking use included cigarettes. He started smoking about 56 years ago. He smoked 2.00 packs per day. His smokeless tobacco use includes chew. He reports that he does not drink alcohol or use drugs. Allergies:  Zosyn [piperacillin sod-tazobactam so];  Morphine; and Vancomycin    Current Medications:    albuterol (PROVENTIL) nebulizer solution 2.5 mg, 4x daily  methylPREDNISolone sodium (SOLU-MEDROL) injection 40 mg, Q12H  metOLazone (ZAROXOLYN) tablet 2.5 mg, Once  spironolactone (ALDACTONE) tablet 25 mg, Daily  potassium chloride (KLOR-CON M) extended release tablet 40 mEq, PRN    Or  potassium bicarb-citric acid (EFFER-K) effervescent tablet 40 mEq, PRN    Or  potassium chloride 10 mEq/100 mL IVPB (Peripheral Line), PRN  sodium chloride (Inhalant) 3 % nebulizer solution 4 mL, PRN  heparin (porcine) injection 5,000 Units, Q8H  bumetanide (BUMEX) 12.5 mg in sodium chloride 0.9 % 125 mL infusion, Continuous  dexmedetomidine (PRECEDEX) 400 mcg in sodium chloride 0.9 % 100 mL infusion, Continuous  budesonide (PULMICORT) nebulizer suspension 500 mcg, BID  Arformoterol Tartrate (BROVANA) nebulizer solution 15 mcg, BID  insulin lispro (HUMALOG) injection vial 0-6 Units, TID WC  insulin lispro (HUMALOG) injection vial 0-3 Units, Nightly  glucose (GLUTOSE) 40 % oral gel 15 g, PRN  dextrose 50 % IV solution, PRN  glucagon (rDNA) injection 1 mg, PRN  dextrose 5 % solution, PRN  atorvastatin (LIPITOR) tablet 20 mg, Daily  carbidopa-levodopa (SINEMET)  MG per tablet 1 tablet, 4x HGB 9.4 (L) 2020    HGB 9.6 (L) 2019    HGB 9.5 (L) 2019    HCT 32.4 (L) 2020    HCT 30.0 (L) 2019    HCT 29.9 (L) 2019    MCV 96.4 2020     2020         Imaging:  Xr Chest Portable    Result Date: 2020  Patient MRN: [id number] : 1945 Age:  70 years Gender: Male Order Date: 2020 3:00 PM Exam: XR CHEST PORTABLE Number of Images: 2 views Indication:   SOB SOB Comparison: 2019 Findings: There is a suggestion of bilateral pleural effusions There is a pacemaker present. The heart is enlarged. The lung fields demonstrate evidence for airspace disease. The aorta is tortuous and ectatic. Tortuous ectatic aorta Cardiomegaly Airspace disease compatible with atelectasis or pneumonia, at both lung bases with likely bilateral small pleural effusions The chest appears to be worse in the interval       Assessment  1. Hypernatremia, most likely diuretic induced;  2. Acute exacerbation of chronic HFpEF, good response to diuretics  3. Mild metabolic alkalosis diuretic induced  4. Anemia due to chronic illness; dilutional component given severe generalized edema  5. Type 2 diabetes mellitus, controlled  6. Chronic kidney disease stage III; current creatinine within the range of his usual baseline  7. Mild metabolic alkalosis suspect diuretic induced  8. COPD  9. Hypertension with CKD    Plan  1. Continue aggressive diuresis with close monitoring of labs  2. Fluid and salt restriction  3. Monitor labs  4. Supplemental oxygen  5.  Patient is for paracentesis    Updated family via video    Will follow   Thanks      Donte Vega MD  12:52 PM  2020

## 2020-05-05 NOTE — PROGRESS NOTES
Nutrition Assessment    Type and Reason for Visit: Reassess    Nutrition Recommendations: Continue current diet, Continue current ONS    Nutrition Assessment: Pt remains at nutritional risk d/t increased nutrient needs for wound healing and noted variable/poor PO intakes since admit. Will continue current ONS and monitor. Malnutrition Assessment:  · Malnutrition Status: Insufficient data  · Context: Acute illness or injury  · Findings of the 6 clinical characteristics of malnutrition (Minimum of 2 out of 6 clinical characteristics is required to make the diagnosis of moderate or severe Protein Calorie Malnutrition based on AND/ASPEN Guidelines):  1. Energy Intake-Less than or equal to 75% of estimated energy requirement, Greater than or equal to 7 days    2. Weight Loss-Unable to assess,    3. Fat Loss-Unable to assess,    4. Muscle Loss-Unable to assess,    5. Fluid Accumulation-No significant fluid accumulation,    6.  Strength-Not measured    Nutrition Risk Level:  Moderate    Nutrient Needs:  · Estimated Daily Total Kcal: 5039-9980(8-11 kcals/kg )  · Estimated Daily Protein (g): 170-190(2.0-2.2 gm/kg)  · Estimated Daily Total Fluid (ml/day): 8245-8391    Nutrition Diagnosis:   · Problem: Increased nutrient needs  · Etiology: related to Increased demand for energy/nutrients     Signs and symptoms:  as evidenced by Presence of wounds    Objective Information:  · Nutrition-Focused Physical Findings: active BS, taut abd, R BKA, +1-2 generalized edema, -I/Os  · Wound Type: Multiple, Open Wounds, Venous Stasis  · Current Nutrition Therapies:  · Oral Diet Orders: Carb Control 4 Carbs/Meal   · Oral Diet intake: 26-50%  · Oral Nutrition Supplement (ONS) Orders: Wound Healing Oral Supplement, Low Calorie High Protein Supplement  · ONS intake: 26-50%  · Anthropometric Measures:  · Ht: 6' 4\" (193 cm)   · Current Body Wt: 426 lb (193.2 kg)(5/5 bed scale)  · Admission Body Wt: 420 lb (190.5 kg)(4/27

## 2020-05-05 NOTE — PROGRESS NOTES
Date: 5/4/2020    Time: 10:58 PM    Patient Placed On BIPAP/CPAP/ Non-Invasive Ventilation? No already on    If no must comment. Facial area red/color change? No           If YES are Blister/Lesion present? No   If yes must notify nursing staff  BIPAP/CPAP skin barrier?   Yes    Skin barrier type:mepilex       Comments:        Samir Polanco

## 2020-05-05 NOTE — FLOWSHEET NOTE
Inpatient Wound Care(follow up) 4524    Admit Date: 4/27/2020  2:27 PM    Reason for consult:  Dressing change    Findings:     05/05/20 1510   Wound 12/17/19 Leg Left; Lower; Lateral   Date First Assessed/Time First Assessed: 12/17/19 1741   Present on Hospital Admission: Yes  Location: Leg  Wound Location Orientation: Left; Lower; Lateral   Wound Image    Wound Venous   Dressing Changed Changed/New   Dressing/Treatment Silver dressing   Wound Cleansed Rinsed/Irrigated with saline   Wound Length (cm) 10 cm   Wound Width (cm) 7 cm   Wound Depth (cm) 0.6 cm   Wound Surface Area (cm^2) 70 cm^2   Change in Wound Size % (l*w) 12.5   Wound Volume (cm^3) 42 cm^3   Wound Healing % 58   Wound Assessment Red   Drainage Amount Moderate   Drainage Description Green;Yellow   Odor None   Belén-wound Assessment Dry   Red%Wound Bed 100   Wound 12/17/19 Foot Left;Dorsal   Date First Assessed/Time First Assessed: 12/17/19 1725   Present on Hospital Admission: Yes  Location: Foot  Wound Location Orientation: Left;Dorsal   Wound Image    Dressing Changed Changed/New   Dressing/Treatment   (mesalt, coflex)   Wound Assessment Red   Drainage Amount Scant   Drainage Description Serosanguinous   Odor None   Red%Wound Bed 100   Wound 04/28/20 Left;Plantar second plantar toe   Date First Assessed/Time First Assessed: 04/28/20 1315   Present on Hospital Admission: Yes  Wound Location Orientation: Left;Plantar  Wound Description (Comments): second plantar toe   Wound Image    Dressing Changed Changed/New   Dressing/Treatment   (mesalt, coflex )   Wound Length (cm) 3 cm   Wound Width (cm) 2 cm   Wound Depth (cm) 0.1 cm   Wound Surface Area (cm^2) 6 cm^2   Change in Wound Size % (l*w) 50   Wound Volume (cm^3) 0.6 cm^3   Wound Healing % 50   Wound Assessment Red;Yellow   Drainage Amount Scant   Drainage Description Serosanguinous   Odor None   Belén-wound Assessment Intact   Pink%Wound Bed 50   Yellow%Wound Bed 50     **Informed Consent**    The

## 2020-05-06 ENCOUNTER — APPOINTMENT (OUTPATIENT)
Dept: GENERAL RADIOLOGY | Age: 75
DRG: 291 | End: 2020-05-06
Payer: MEDICARE

## 2020-05-06 LAB
AMORPHOUS: ABNORMAL
ANION GAP SERPL CALCULATED.3IONS-SCNC: 11 MMOL/L (ref 7–16)
ANION GAP SERPL CALCULATED.3IONS-SCNC: 13 MMOL/L (ref 7–16)
ANION GAP SERPL CALCULATED.3IONS-SCNC: 13 MMOL/L (ref 7–16)
BACTERIA: ABNORMAL /HPF
BILIRUBIN URINE: NEGATIVE
BLOOD, URINE: NEGATIVE
BUN BLDV-MCNC: 54 MG/DL (ref 8–23)
BUN BLDV-MCNC: 55 MG/DL (ref 8–23)
BUN BLDV-MCNC: 56 MG/DL (ref 8–23)
CALCIUM SERPL-MCNC: 8.8 MG/DL (ref 8.6–10.2)
CALCIUM SERPL-MCNC: 8.9 MG/DL (ref 8.6–10.2)
CALCIUM SERPL-MCNC: 9.5 MG/DL (ref 8.6–10.2)
CHLORIDE BLD-SCNC: 92 MMOL/L (ref 98–107)
CHLORIDE BLD-SCNC: 93 MMOL/L (ref 98–107)
CHLORIDE BLD-SCNC: 94 MMOL/L (ref 98–107)
CLARITY: ABNORMAL
CO2: 35 MMOL/L (ref 22–29)
CO2: 36 MMOL/L (ref 22–29)
CO2: 40 MMOL/L (ref 22–29)
COLOR: YELLOW
CREAT SERPL-MCNC: 1.2 MG/DL (ref 0.7–1.2)
CREAT SERPL-MCNC: 1.2 MG/DL (ref 0.7–1.2)
CREAT SERPL-MCNC: 1.3 MG/DL (ref 0.7–1.2)
CRYSTALS, UA: ABNORMAL /HPF
GFR AFRICAN AMERICAN: >60
GFR NON-AFRICAN AMERICAN: 54 ML/MIN/1.73
GFR NON-AFRICAN AMERICAN: 59 ML/MIN/1.73
GFR NON-AFRICAN AMERICAN: 59 ML/MIN/1.73
GLUCOSE BLD-MCNC: 176 MG/DL (ref 74–99)
GLUCOSE BLD-MCNC: 192 MG/DL (ref 74–99)
GLUCOSE BLD-MCNC: 196 MG/DL (ref 74–99)
GLUCOSE URINE: NEGATIVE MG/DL
GRAM STAIN ORDERABLE: NORMAL
KETONES, URINE: NEGATIVE MG/DL
LEUKOCYTE ESTERASE, URINE: ABNORMAL
MAGNESIUM: 2.1 MG/DL (ref 1.6–2.6)
METER GLUCOSE: 155 MG/DL (ref 74–99)
METER GLUCOSE: 200 MG/DL (ref 74–99)
METER GLUCOSE: 220 MG/DL (ref 74–99)
METER GLUCOSE: 229 MG/DL (ref 74–99)
NITRITE, URINE: POSITIVE
PH UA: 7.5 (ref 5–9)
POTASSIUM SERPL-SCNC: 3.7 MMOL/L (ref 3.5–5)
POTASSIUM SERPL-SCNC: 3.7 MMOL/L (ref 3.5–5)
POTASSIUM SERPL-SCNC: 4.1 MMOL/L (ref 3.5–5)
PRO-BNP: ABNORMAL PG/ML (ref 0–125)
PROTEIN UA: NEGATIVE MG/DL
RBC UA: ABNORMAL /HPF (ref 0–2)
SODIUM BLD-SCNC: 140 MMOL/L (ref 132–146)
SODIUM BLD-SCNC: 142 MMOL/L (ref 132–146)
SODIUM BLD-SCNC: 145 MMOL/L (ref 132–146)
SPECIFIC GRAVITY UA: 1.01 (ref 1–1.03)
UROBILINOGEN, URINE: 0.2 E.U./DL
WBC UA: ABNORMAL /HPF (ref 0–5)

## 2020-05-06 PROCEDURE — 36415 COLL VENOUS BLD VENIPUNCTURE: CPT

## 2020-05-06 PROCEDURE — 71045 X-RAY EXAM CHEST 1 VIEW: CPT

## 2020-05-06 PROCEDURE — 6360000002 HC RX W HCPCS: Performed by: INTERNAL MEDICINE

## 2020-05-06 PROCEDURE — 6370000000 HC RX 637 (ALT 250 FOR IP): Performed by: NURSE PRACTITIONER

## 2020-05-06 PROCEDURE — 2580000003 HC RX 258: Performed by: INTERNAL MEDICINE

## 2020-05-06 PROCEDURE — 80048 BASIC METABOLIC PNL TOTAL CA: CPT

## 2020-05-06 PROCEDURE — 2700000000 HC OXYGEN THERAPY PER DAY

## 2020-05-06 PROCEDURE — APPSS45 APP SPLIT SHARED TIME 31-45 MINUTES: Performed by: NURSE PRACTITIONER

## 2020-05-06 PROCEDURE — 82962 GLUCOSE BLOOD TEST: CPT

## 2020-05-06 PROCEDURE — 6370000000 HC RX 637 (ALT 250 FOR IP): Performed by: INTERNAL MEDICINE

## 2020-05-06 PROCEDURE — 2500000003 HC RX 250 WO HCPCS: Performed by: NURSE PRACTITIONER

## 2020-05-06 PROCEDURE — 83880 ASSAY OF NATRIURETIC PEPTIDE: CPT

## 2020-05-06 PROCEDURE — 99233 SBSQ HOSP IP/OBS HIGH 50: CPT | Performed by: INTERNAL MEDICINE

## 2020-05-06 PROCEDURE — 94640 AIRWAY INHALATION TREATMENT: CPT

## 2020-05-06 PROCEDURE — 87186 SC STD MICRODIL/AGAR DIL: CPT

## 2020-05-06 PROCEDURE — 83735 ASSAY OF MAGNESIUM: CPT

## 2020-05-06 PROCEDURE — 94644 CONT INHLJ TX 1ST HOUR: CPT

## 2020-05-06 PROCEDURE — 2500000003 HC RX 250 WO HCPCS: Performed by: INTERNAL MEDICINE

## 2020-05-06 PROCEDURE — 2060000000 HC ICU INTERMEDIATE R&B

## 2020-05-06 PROCEDURE — 94669 MECHANICAL CHEST WALL OSCILL: CPT

## 2020-05-06 PROCEDURE — 2580000003 HC RX 258: Performed by: NURSE PRACTITIONER

## 2020-05-06 PROCEDURE — 94660 CPAP INITIATION&MGMT: CPT

## 2020-05-06 PROCEDURE — 87088 URINE BACTERIA CULTURE: CPT

## 2020-05-06 PROCEDURE — 81001 URINALYSIS AUTO W/SCOPE: CPT

## 2020-05-06 RX ADMIN — SODIUM CHLORIDE 1 MG/HR: 900 INJECTION, SOLUTION INTRAVENOUS at 16:06

## 2020-05-06 RX ADMIN — DOCUSATE SODIUM 100 MG: 100 CAPSULE, LIQUID FILLED ORAL at 08:04

## 2020-05-06 RX ADMIN — SPIRONOLACTONE 25 MG: 25 TABLET ORAL at 08:04

## 2020-05-06 RX ADMIN — SODIUM CHLORIDE, PRESERVATIVE FREE 10 ML: 5 INJECTION INTRAVENOUS at 04:58

## 2020-05-06 RX ADMIN — METHYLPREDNISOLONE SODIUM SUCCINATE 40 MG: 40 INJECTION, POWDER, FOR SOLUTION INTRAMUSCULAR; INTRAVENOUS at 21:05

## 2020-05-06 RX ADMIN — SODIUM CHLORIDE 2 MG/HR: 900 INJECTION, SOLUTION INTRAVENOUS at 11:36

## 2020-05-06 RX ADMIN — PETROLATUM: 42 OINTMENT TOPICAL at 08:05

## 2020-05-06 RX ADMIN — HYDRALAZINE HYDROCHLORIDE 50 MG: 50 TABLET, FILM COATED ORAL at 16:26

## 2020-05-06 RX ADMIN — INSULIN LISPRO 1 UNITS: 100 INJECTION, SOLUTION INTRAVENOUS; SUBCUTANEOUS at 21:04

## 2020-05-06 RX ADMIN — ALBUTEROL SULFATE 2.5 MG: 2.5 SOLUTION RESPIRATORY (INHALATION) at 09:27

## 2020-05-06 RX ADMIN — HEPARIN SODIUM 5000 UNITS: 10000 INJECTION INTRAVENOUS; SUBCUTANEOUS at 07:58

## 2020-05-06 RX ADMIN — HYDRALAZINE HYDROCHLORIDE 50 MG: 50 TABLET, FILM COATED ORAL at 08:05

## 2020-05-06 RX ADMIN — BUDESONIDE 500 MCG: 0.25 SUSPENSION RESPIRATORY (INHALATION) at 18:53

## 2020-05-06 RX ADMIN — DOXAZOSIN 8 MG: 4 TABLET ORAL at 21:05

## 2020-05-06 RX ADMIN — HYDRALAZINE HYDROCHLORIDE 50 MG: 50 TABLET, FILM COATED ORAL at 21:09

## 2020-05-06 RX ADMIN — ARFORMOTEROL TARTRATE 15 MCG: 15 SOLUTION RESPIRATORY (INHALATION) at 18:50

## 2020-05-06 RX ADMIN — CARBIDOPA AND LEVODOPA 1 TABLET: 25; 100 TABLET ORAL at 16:26

## 2020-05-06 RX ADMIN — HEPARIN SODIUM 5000 UNITS: 10000 INJECTION INTRAVENOUS; SUBCUTANEOUS at 00:34

## 2020-05-06 RX ADMIN — CARBIDOPA AND LEVODOPA 1 TABLET: 25; 100 TABLET ORAL at 12:27

## 2020-05-06 RX ADMIN — INSULIN LISPRO 2 UNITS: 100 INJECTION, SOLUTION INTRAVENOUS; SUBCUTANEOUS at 12:18

## 2020-05-06 RX ADMIN — HEPARIN SODIUM 5000 UNITS: 10000 INJECTION INTRAVENOUS; SUBCUTANEOUS at 23:52

## 2020-05-06 RX ADMIN — SODIUM CHLORIDE 2 MG/HR: 900 INJECTION, SOLUTION INTRAVENOUS at 05:08

## 2020-05-06 RX ADMIN — ALBUTEROL SULFATE 2.5 MG: 2.5 SOLUTION RESPIRATORY (INHALATION) at 13:54

## 2020-05-06 RX ADMIN — METHYLPREDNISOLONE SODIUM SUCCINATE 40 MG: 40 INJECTION, POWDER, FOR SOLUTION INTRAMUSCULAR; INTRAVENOUS at 08:04

## 2020-05-06 RX ADMIN — ALBUTEROL SULFATE 2.5 MG: 2.5 SOLUTION RESPIRATORY (INHALATION) at 18:50

## 2020-05-06 RX ADMIN — INSULIN LISPRO 2 UNITS: 100 INJECTION, SOLUTION INTRAVENOUS; SUBCUTANEOUS at 17:18

## 2020-05-06 RX ADMIN — Medication 10 ML: at 08:04

## 2020-05-06 RX ADMIN — PETROLATUM: 42 OINTMENT TOPICAL at 21:09

## 2020-05-06 RX ADMIN — SODIUM CHLORIDE, PRESERVATIVE FREE 10 ML: 5 INJECTION INTRAVENOUS at 00:00

## 2020-05-06 RX ADMIN — HEPARIN SODIUM 5000 UNITS: 10000 INJECTION INTRAVENOUS; SUBCUTANEOUS at 16:09

## 2020-05-06 RX ADMIN — BUDESONIDE 500 MCG: 0.25 SUSPENSION RESPIRATORY (INHALATION) at 09:28

## 2020-05-06 RX ADMIN — SENNOSIDES 8.6 MG: 8.6 TABLET, FILM COATED ORAL at 21:06

## 2020-05-06 RX ADMIN — POTASSIUM BICARBONATE 20 MEQ: 782 TABLET, EFFERVESCENT ORAL at 16:40

## 2020-05-06 RX ADMIN — ARFORMOTEROL TARTRATE 15 MCG: 15 SOLUTION RESPIRATORY (INHALATION) at 09:27

## 2020-05-06 RX ADMIN — Medication 10 ML: at 21:05

## 2020-05-06 RX ADMIN — INSULIN LISPRO 1 UNITS: 100 INJECTION, SOLUTION INTRAVENOUS; SUBCUTANEOUS at 08:00

## 2020-05-06 RX ADMIN — CARBIDOPA AND LEVODOPA 1 TABLET: 25; 100 TABLET ORAL at 08:04

## 2020-05-06 RX ADMIN — CARBIDOPA AND LEVODOPA 1 TABLET: 25; 100 TABLET ORAL at 21:05

## 2020-05-06 RX ADMIN — ATORVASTATIN CALCIUM 20 MG: 20 TABLET, FILM COATED ORAL at 08:04

## 2020-05-06 ASSESSMENT — PAIN SCALES - GENERAL
PAINLEVEL_OUTOF10: 0
PAINLEVEL_OUTOF10: 0

## 2020-05-06 NOTE — PROGRESS NOTES
Physical Therapy    Facility/Department: [address] PICU  Initial Assessment    NAME: Sloane Talbot  : 1945  MRN: [id number]    Date of Service: 2020    Physical therapy orders received/treatment attempted and chart review completed. Patient receiving wound care and not able to be seen. Will attempt at a later time/date as able. Thank you for the opportunity to assist in the care of this patient.     Max Calle, PT, DPT  License [id number]

## 2020-05-06 NOTE — PROGRESS NOTES
143/67   Pulse 67   Temp 98.1 °F (36.7 °C) (Temporal)   Resp 22   Ht 6' 4\" (1.93 m)   Wt (!) 408 lb 1.6 oz (185.1 kg)   SpO2 97%   BMI 49.68 kg/m²     Heart:  Reg  Lungs:  CTA bilaterally, no wheeze, rales or rhonchi  Abd: bowel sounds present, nontender, nondistended, no masses  Extrem:  Edema l leg    CBC with Differential:    Lab Results   Component Value Date    WBC 5.1 04/27/2020    RBC 3.36 04/27/2020    HGB 9.4 04/27/2020    HCT 32.4 04/27/2020     04/27/2020    MCV 96.4 04/27/2020    MCH 28.0 04/27/2020    MCHC 29.0 04/27/2020    RDW 16.8 04/27/2020    NRBC 0.0 12/17/2019    BANDSPCT 2 09/19/2014    LYMPHOPCT 4.3 04/27/2020    MONOPCT 9.6 04/27/2020    BASOPCT 0.2 04/27/2020    MONOSABS 0.51 04/27/2020    LYMPHSABS 0.20 04/27/2020    EOSABS 0.09 04/27/2020    BASOSABS 0.00 04/27/2020     CMP:    Lab Results   Component Value Date     05/06/2020    K 3.7 05/06/2020    K 4.3 04/27/2020    CL 93 05/06/2020    CO2 36 05/06/2020    BUN 54 05/06/2020    CREATININE 1.3 05/06/2020    GFRAA >60 05/06/2020    LABGLOM 54 05/06/2020    GLUCOSE 176 05/06/2020    PROT 7.8 12/27/2019    LABALBU 3.7 12/27/2019    CALCIUM 8.9 05/06/2020    BILITOT 0.6 12/27/2019    ALKPHOS 99 12/27/2019    AST 16 12/27/2019    ALT <5 12/27/2019     Warfarin PT/INR:    Lab Results   Component Value Date    INR 1.7 07/17/2019    INR 1.2 05/27/2016    INR 1.4 04/11/2016    PROTIME 19.7 (H) 07/17/2019    PROTIME 13.1 (H) 05/27/2016    PROTIME 15.3 (H) 04/11/2016       Assessment:    Principal Problem:    Acute on chronic diastolic (congestive) heart failure (HCC)  Active Problems:    DM (diabetes mellitus) ([facility])    HTN (hypertension)    Hyperlipidemia    Diabetic ulcer of left foot ([facility] [address]    Morbid obesity ([facility]    CKD stage 2 due to type 2 diabetes mellitus ([facility])    S/P placement of cardiac pacemaker    Acute on chronic respiratory failure with hypoxia and hypercapnia ([facility])  Resolved Problems:    Congestive heart failure

## 2020-05-06 NOTE — PROGRESS NOTES
Resp:    Temp:    SpO2:           General: Alert, in mild respiratory distress  Eyes: PERRL. No sclera icterus. No conjunctival injection. ENT: No discharge. Pharynx clear. Neck: Trachea midline. Normal thyroid. Lungs: Fine expiratory wheezes; decreased breath sounds in both bases  CV: Regular rate. Regular rhythm. No murmur or rub. .   Abd: distended, soft, edematous abdominal wall  Skin: Warm and dry. No nodule on exposed extremities. No rash on exposed extremities. Ext: R BKA; 3+ edema  Neuro: Awake. Follows commands. Positive pupils/gag/corneals. Normal pain response. Data:   Labs:  Lab Results   Component Value Date     2020     2020     2020    K 3.7 2020    K 3.7 2020    K 4.1 2020    CL 92 (L) 2020    CO2 40 (H) 2020    CO2 36 (H) 2020    CO2 35 (H) 2020    CREATININE 1.2 2020    CREATININE 1.3 (H) 2020    CREATININE 1.2 2020    BUN 56 (H) 2020    BUN 54 (H) 2020    BUN 55 (H) 2020    GLUCOSE 196 (H) 2020    GLUCOSE 176 (H) 2020    GLUCOSE 192 (H) 2020    PHOS 3.7 2019    PHOS 4.1 2019    PHOS 4.4 2019    WBC 5.1 2020    WBC 5.0 2019    WBC 4.6 2019    HGB 9.4 (L) 2020    HGB 9.6 (L) 2019    HGB 9.5 (L) 2019    HCT 32.4 (L) 2020    HCT 30.0 (L) 2019    HCT 29.9 (L) 2019    MCV 96.4 2020     2020         Imaging:  Xr Chest Portable    Result Date: 2020  Patient MRN: [id number] : 1945 Age:  70 years Gender: Male Order Date: 2020 3:00 PM Exam: XR CHEST PORTABLE Number of Images: 2 views Indication:   SOB SOB Comparison: 2019 Findings: There is a suggestion of bilateral pleural effusions There is a pacemaker present. The heart is enlarged. The lung fields demonstrate evidence for airspace disease. The aorta is tortuous and ectatic.      Tortuous ectatic aorta Cardiomegaly Airspace disease compatible with atelectasis or pneumonia, at both lung bases with likely bilateral small pleural effusions The chest appears to be worse in the interval     Assessment/Plans      1. Hypernatremia, most likely diuretic induced;   -Improved with diuresis, monitor  2. Acute exacerbation of chronic HFpEF, good response to diuretics  3. Mild metabolic alkalosis diuretic induced  4. Anemia due to chronic illness; dilutional component given severe generalized edema  5. Type 2 diabetes mellitus, controlled  6. Chronic kidney disease stage III; current creatinine within the range of his usual baseline  7. Mild metabolic alkalosis suspect diuretic induced  8. COPD  9.  Hypertension with CKD        Will follow   Thanks      Moira Reyes MD  5:14 PM  5/6/2020

## 2020-05-06 NOTE — PROGRESS NOTES
Critical Care Team - Daily Progress Note         Date and time: 2020 8:18 AM  Patient's name:  Harsh [facility] [address], [address] Record Number: [id number]  Patient's account/billing number: [de-identified]  Patient's YOB: 1945  Age: 70 y.o. Date of Admission: 2020  2:27 PM  Length of stay during current admission: 9      Primary Care Physician: Vincenzo Gordon MD  ICU Attending Physician: Dr. Malhotra Prior    Code Status: Limited    Reason for ICU admission: Acute hypoxic and hypercapnic respiratory failure      SUBJECTIVE:     OVERNIGHT EVENTS:       Had large volume paracentesis and breathing is much better. Minimal cough, no dyspnea and tolerating AVAPS at HS. Down to 10 liters saturating 98%.   CURRENT VENTILATION STATUS:     [] Ventilator  [] BIPAP   [x] Nasal Cannula [] Room Air      IF INTUBATED, ET TUBE MARKING AT LOWER LIP:       cms    SECRETIONS Amount:  [] Small [] Moderate  [] Large  [x] None  Color:     [] White [] Colored  [] Bloody    SEDATION:  RAAS Score:  [] Propofol gtt  [] Versed gtt  [] Ativan gtt   [] No Sedation  Precedex gtt  PARALYZED:  [x] No    [] Yes      VASOPRESSORS:  [x] No    [] Yes    If yes -   [] Levophed       [] Dopamine     [] Vasopressin       [] Dobutamine  [] Phenylephrine         [] Epinephrine    CENTRAL LINES:     [x] No   [] Yes   (Date of Insertion:   )           If yes -     [] Right IJ     [] Left IJ [] Right Femoral [] Left Femoral                   [] Right Subclavian [] Left Subclavian       KELLY'S CATHETER:   [] No   [x] Yes  (Date of Insertion:   )     URINE OUTPUT:            [x] Good   [] Low              [] Anuric      OBJECTIVE:     VITAL SIGNS:  BP (!) 143/67   Pulse 80   Temp 98.7 °F (37.1 °C) (Axillary)   Resp 22   Ht 6' 4\" (1.93 m)   Wt (!) 408 lb 1.6 oz (185.1 kg)   SpO2 97%   BMI 49.68 kg/m²   Tmax over 24 hours:  Temp (24hrs), Av.5 °F (36.9 °C), Min:97.7 °F (36.5 °C), Max:99.1 °F (37.3 °C)      Patient Vitals for the past 6 hrs:   BP Temp Temp src Pulse Resp SpO2 Weight   05/06/20 0805 (!) 143/67 -- -- -- -- -- --   05/06/20 0700 (!) 156/73 -- -- 80 22 97 % --   05/06/20 0600 (!) 161/74 -- -- 67 14 96 % --   05/06/20 0547 -- -- -- -- -- -- (!) 408 lb 1.6 oz (185.1 kg)   05/06/20 0500 (!) 153/74 -- -- 73 17 96 % --   05/06/20 0400 (!) 136/57 98.7 °F (37.1 °C) Axillary 70 18 96 % --   05/06/20 0300 (!) 159/66 -- -- 72 22 95 % --         Intake/Output Summary (Last 24 hours) at 5/6/2020 0818  Last data filed at 5/6/2020 0700  Gross per 24 hour   Intake 2347 ml   Output 09371 ml   Net -23319 ml     Wt Readings from Last 2 Encounters:   05/06/20 (!) 408 lb 1.6 oz (185.1 kg)   01/06/20 (!) 366 lb (166 kg)     Body mass index is 49.68 kg/m². PHYSICAL EXAMINATION:    General appearance - awake and in no distress  Mental status - awake and appropriate  Ears - right ear normal, left ear normal  Nose - normal and patent, no erythema, discharge or polyps  Mouth - mucous membranes moist, pharynx normal without lesions  Neck - supple, no significant adenopathy  Chest - diminished but clear  Heart - normal rate, regular rhythm, normal S1, S2, no murmurs, rubs, clicks or gallops  Abdomen - soft, nontender, much less distended, no masses or organomegaly  Neurological - alert, oriented, normal speech, no focal findings or movement disorder noted}  Extremities - right bka, no edema, wrapped LLE. Pulses intact bilateral UE.   Skin - no rashes     Any additional physical findings:    MEDICATIONS:    Scheduled Meds:   albuterol  2.5 mg Nebulization 4x daily    methylPREDNISolone  40 mg Intravenous Q12H    docusate sodium  100 mg Oral Daily    senna  1 tablet Oral Nightly    spironolactone  25 mg Oral Daily    heparin (porcine)  5,000 Units Subcutaneous Q8H    budesonide  500 mcg Nebulization BID    Arformoterol Tartrate  15 mcg Nebulization BID    insulin lispro  0-6 Units Subcutaneous TID WC    insulin lispro  0-3 Units Subcutaneous

## 2020-05-06 NOTE — PLAN OF CARE
Problem: Falls - Risk of:  Goal: Will remain free from falls  5/6/2020 0554 by Damon Lyons RN  Outcome: Met This Shift     Problem: Falls - Risk of:  Goal: Absence of physical injury  5/6/2020 0554 by Damon Lyons RN  Outcome: Met This Shift     Problem: Skin Integrity:  Goal: Will show no infection signs and symptoms  Outcome: Met This Shift     Problem: Skin Integrity:  Goal: Absence of new skin breakdown  5/6/2020 0554 by Damon Lyons RN  Outcome: Not Met This Shift     Plan of care discussed with patient / family.

## 2020-05-07 ENCOUNTER — APPOINTMENT (OUTPATIENT)
Dept: CT IMAGING | Age: 75
DRG: 291 | End: 2020-05-07
Payer: MEDICARE

## 2020-05-07 VITALS
HEIGHT: 76 IN | DIASTOLIC BLOOD PRESSURE: 71 MMHG | WEIGHT: 315 LBS | SYSTOLIC BLOOD PRESSURE: 141 MMHG | BODY MASS INDEX: 38.36 KG/M2 | OXYGEN SATURATION: 96 % | HEART RATE: 75 BPM | TEMPERATURE: 98.5 F | RESPIRATION RATE: 18 BRPM

## 2020-05-07 LAB
ANION GAP SERPL CALCULATED.3IONS-SCNC: 12 MMOL/L (ref 7–16)
ANION GAP SERPL CALCULATED.3IONS-SCNC: 14 MMOL/L (ref 7–16)
ANION GAP SERPL CALCULATED.3IONS-SCNC: 15 MMOL/L (ref 7–16)
BODY FLUID CULTURE, STERILE: NORMAL
BUN BLDV-MCNC: 61 MG/DL (ref 8–23)
BUN BLDV-MCNC: 61 MG/DL (ref 8–23)
BUN BLDV-MCNC: 62 MG/DL (ref 8–23)
CALCIUM SERPL-MCNC: 9.1 MG/DL (ref 8.6–10.2)
CALCIUM SERPL-MCNC: 9.3 MG/DL (ref 8.6–10.2)
CALCIUM SERPL-MCNC: 9.5 MG/DL (ref 8.6–10.2)
CHLORIDE BLD-SCNC: 89 MMOL/L (ref 98–107)
CHLORIDE BLD-SCNC: 91 MMOL/L (ref 98–107)
CHLORIDE BLD-SCNC: 91 MMOL/L (ref 98–107)
CO2: 38 MMOL/L (ref 22–29)
CO2: 40 MMOL/L (ref 22–29)
CO2: 41 MMOL/L (ref 22–29)
CREAT SERPL-MCNC: 1.4 MG/DL (ref 0.7–1.2)
GFR AFRICAN AMERICAN: >60
GFR NON-AFRICAN AMERICAN: 50 ML/MIN/1.73
GLUCOSE BLD-MCNC: 174 MG/DL (ref 74–99)
GLUCOSE BLD-MCNC: 199 MG/DL (ref 74–99)
GLUCOSE BLD-MCNC: 219 MG/DL (ref 74–99)
GRAM STAIN RESULT: NORMAL
MAGNESIUM: 2.1 MG/DL (ref 1.6–2.6)
METER GLUCOSE: 227 MG/DL (ref 74–99)
METER GLUCOSE: 235 MG/DL (ref 74–99)
METER GLUCOSE: 274 MG/DL (ref 74–99)
POTASSIUM SERPL-SCNC: 3.5 MMOL/L (ref 3.5–5)
POTASSIUM SERPL-SCNC: 3.8 MMOL/L (ref 3.5–5)
POTASSIUM SERPL-SCNC: 4 MMOL/L (ref 3.5–5)
SODIUM BLD-SCNC: 143 MMOL/L (ref 132–146)
SODIUM BLD-SCNC: 144 MMOL/L (ref 132–146)
SODIUM BLD-SCNC: 144 MMOL/L (ref 132–146)

## 2020-05-07 PROCEDURE — 82962 GLUCOSE BLOOD TEST: CPT

## 2020-05-07 PROCEDURE — 83735 ASSAY OF MAGNESIUM: CPT

## 2020-05-07 PROCEDURE — 2580000003 HC RX 258: Performed by: NURSE PRACTITIONER

## 2020-05-07 PROCEDURE — 94640 AIRWAY INHALATION TREATMENT: CPT

## 2020-05-07 PROCEDURE — 74176 CT ABD & PELVIS W/O CONTRAST: CPT

## 2020-05-07 PROCEDURE — 6370000000 HC RX 637 (ALT 250 FOR IP): Performed by: INTERNAL MEDICINE

## 2020-05-07 PROCEDURE — 2700000000 HC OXYGEN THERAPY PER DAY

## 2020-05-07 PROCEDURE — 6360000002 HC RX W HCPCS: Performed by: INTERNAL MEDICINE

## 2020-05-07 PROCEDURE — 6370000000 HC RX 637 (ALT 250 FOR IP): Performed by: NURSE PRACTITIONER

## 2020-05-07 PROCEDURE — 94668 MNPJ CHEST WALL SBSQ: CPT

## 2020-05-07 PROCEDURE — 2580000003 HC RX 258: Performed by: INTERNAL MEDICINE

## 2020-05-07 PROCEDURE — 94660 CPAP INITIATION&MGMT: CPT

## 2020-05-07 PROCEDURE — 36415 COLL VENOUS BLD VENIPUNCTURE: CPT

## 2020-05-07 PROCEDURE — 2500000003 HC RX 250 WO HCPCS: Performed by: NURSE PRACTITIONER

## 2020-05-07 PROCEDURE — 80048 BASIC METABOLIC PNL TOTAL CA: CPT

## 2020-05-07 RX ORDER — SENNA PLUS 8.6 MG/1
1 TABLET ORAL NIGHTLY
Qty: 30 TABLET | Refills: 0
Start: 2020-05-07 | End: 2020-06-06

## 2020-05-07 RX ORDER — ARFORMOTEROL TARTRATE 15 UG/2ML
15 SOLUTION RESPIRATORY (INHALATION) 2 TIMES DAILY
Qty: 120 ML | Refills: 3
Start: 2020-05-07

## 2020-05-07 RX ORDER — HYDRALAZINE HYDROCHLORIDE 50 MG/1
50 TABLET, FILM COATED ORAL 3 TIMES DAILY
Qty: 90 TABLET | Refills: 3
Start: 2020-05-07

## 2020-05-07 RX ORDER — BUDESONIDE 0.25 MG/2ML
500 INHALANT ORAL 2 TIMES DAILY
Qty: 60 AMPULE | Refills: 3
Start: 2020-05-07

## 2020-05-07 RX ORDER — METHYLPREDNISOLONE SODIUM SUCCINATE 40 MG/ML
40 INJECTION, POWDER, LYOPHILIZED, FOR SOLUTION INTRAMUSCULAR; INTRAVENOUS EVERY 12 HOURS
Qty: 1 EACH | Refills: 0
Start: 2020-05-07

## 2020-05-07 RX ORDER — PSEUDOEPHEDRINE HCL 30 MG
100 TABLET ORAL DAILY
Qty: 30 CAPSULE | Refills: 0
Start: 2020-05-08

## 2020-05-07 RX ORDER — POLYETHYLENE GLYCOL 3350 17 G/17G
17 POWDER, FOR SOLUTION ORAL DAILY PRN
Qty: 255 G | Refills: 1
Start: 2020-05-07 | End: 2020-06-06

## 2020-05-07 RX ORDER — SPIRONOLACTONE 25 MG/1
25 TABLET ORAL DAILY
Qty: 30 TABLET | Refills: 3
Start: 2020-05-08

## 2020-05-07 RX ORDER — PETROLATUM 42 G/100G
OINTMENT TOPICAL
Qty: 1 TUBE | Refills: 0
Start: 2020-05-07

## 2020-05-07 RX ORDER — ALBUTEROL SULFATE 2.5 MG/3ML
2.5 SOLUTION RESPIRATORY (INHALATION) 4 TIMES DAILY
Qty: 120 EACH | Refills: 3
Start: 2020-05-07

## 2020-05-07 RX ORDER — HEPARIN SODIUM 10000 [USP'U]/ML
5000 INJECTION, SOLUTION INTRAVENOUS; SUBCUTANEOUS EVERY 8 HOURS
Qty: 1 VIAL | Refills: 0
Start: 2020-05-07

## 2020-05-07 RX ADMIN — ALBUTEROL SULFATE 2.5 MG: 2.5 SOLUTION RESPIRATORY (INHALATION) at 11:44

## 2020-05-07 RX ADMIN — HYDRALAZINE HYDROCHLORIDE 50 MG: 50 TABLET, FILM COATED ORAL at 14:23

## 2020-05-07 RX ADMIN — SODIUM CHLORIDE 1 MG/HR: 900 INJECTION, SOLUTION INTRAVENOUS at 14:24

## 2020-05-07 RX ADMIN — HEPARIN SODIUM 5000 UNITS: 10000 INJECTION INTRAVENOUS; SUBCUTANEOUS at 08:37

## 2020-05-07 RX ADMIN — METHYLPREDNISOLONE SODIUM SUCCINATE 40 MG: 40 INJECTION, POWDER, FOR SOLUTION INTRAMUSCULAR; INTRAVENOUS at 10:19

## 2020-05-07 RX ADMIN — HYDRALAZINE HYDROCHLORIDE 50 MG: 50 TABLET, FILM COATED ORAL at 10:20

## 2020-05-07 RX ADMIN — ALBUTEROL SULFATE 2.5 MG: 2.5 SOLUTION RESPIRATORY (INHALATION) at 08:52

## 2020-05-07 RX ADMIN — SODIUM CHLORIDE 1 MG/HR: 900 INJECTION, SOLUTION INTRAVENOUS at 06:19

## 2020-05-07 RX ADMIN — INSULIN LISPRO 2 UNITS: 100 INJECTION, SOLUTION INTRAVENOUS; SUBCUTANEOUS at 18:43

## 2020-05-07 RX ADMIN — CARBIDOPA AND LEVODOPA 1 TABLET: 25; 100 TABLET ORAL at 10:20

## 2020-05-07 RX ADMIN — ATORVASTATIN CALCIUM 20 MG: 20 TABLET, FILM COATED ORAL at 10:20

## 2020-05-07 RX ADMIN — POTASSIUM BICARBONATE 20 MEQ: 782 TABLET, EFFERVESCENT ORAL at 10:20

## 2020-05-07 RX ADMIN — ARFORMOTEROL TARTRATE 15 MCG: 15 SOLUTION RESPIRATORY (INHALATION) at 08:52

## 2020-05-07 RX ADMIN — ALBUTEROL SULFATE 2.5 MG: 2.5 SOLUTION RESPIRATORY (INHALATION) at 15:24

## 2020-05-07 RX ADMIN — HEPARIN SODIUM 5000 UNITS: 10000 INJECTION INTRAVENOUS; SUBCUTANEOUS at 18:44

## 2020-05-07 RX ADMIN — BUDESONIDE 500 MCG: 0.25 SUSPENSION RESPIRATORY (INHALATION) at 08:52

## 2020-05-07 RX ADMIN — CEFTRIAXONE SODIUM 1 G: 1 INJECTION, POWDER, FOR SOLUTION INTRAMUSCULAR; INTRAVENOUS at 14:24

## 2020-05-07 RX ADMIN — INSULIN LISPRO 3 UNITS: 100 INJECTION, SOLUTION INTRAVENOUS; SUBCUTANEOUS at 08:36

## 2020-05-07 RX ADMIN — CARBIDOPA AND LEVODOPA 1 TABLET: 25; 100 TABLET ORAL at 14:23

## 2020-05-07 RX ADMIN — SPIRONOLACTONE 25 MG: 25 TABLET ORAL at 10:20

## 2020-05-07 RX ADMIN — PETROLATUM: 42 OINTMENT TOPICAL at 10:21

## 2020-05-07 RX ADMIN — CARBIDOPA AND LEVODOPA 1 TABLET: 25; 100 TABLET ORAL at 18:42

## 2020-05-07 RX ADMIN — INSULIN LISPRO 2 UNITS: 100 INJECTION, SOLUTION INTRAVENOUS; SUBCUTANEOUS at 13:15

## 2020-05-07 RX ADMIN — SODIUM CHLORIDE, PRESERVATIVE FREE 10 ML: 5 INJECTION INTRAVENOUS at 14:24

## 2020-05-07 RX ADMIN — Medication 10 ML: at 10:19

## 2020-05-07 ASSESSMENT — PAIN SCALES - GENERAL
PAINLEVEL_OUTOF10: 0

## 2020-05-07 NOTE — CARE COORDINATION
Plan at discharge [facility] vs [facility] Both following. Bumex gtt continues. 7 liters hig flow. IV Solumedrol q12. Call placed to Matt [facility] with [facility] to see if they have a bed available today.  HENS and ambulance form on soft chart

## 2020-05-07 NOTE — CONSULTS
5/7/2020 12:53 PM  Service: Urology  Group: [facility] ([facility])    Kaylin Francois  [id number]     Chief Complaint:    Evaluation for tubbs catheter     History of Present Illness: The patient is a 70 y.o. male patient who presented to the hospital on 4/27/2020 with complaints of worsening shortness of breath. He had not been taking his diuretics. He was admitted for further evaluation and concern of CHF. He is being seen by cardiology for heart failure and pulmonology for  Acute hypercapnic respiratory failure. He has a history of diabetes he has had right BKA. We were asked to see him for evaluation of long-term Tubbs catheter. He had a Tubbs catheter inserted in the emergency department upon admission per his request.  He has a difficult time using a urinal due to his buried penis and anatomy. He states that prior to admission with his shortness of breath and worsening fluid overload he was having issues using his bedside commode as well. Prior to his acute illness he was living at home and denies any difficulty urinating. He would sit to urinate at home and would also use bedside commode at nighttime. He has never seen a urologist in the past.  He denies history of BPH or PCA. His Tubbs catheter is currently draining cloudy yellow urine.     Past Medical History:   Diagnosis Date    Diabetes mellitus ([address]     Diabetic foot ulcer associated with type 2 diabetes mellitus, with fat layer exposed ([address] 9/16/2016    Diabetic peripheral neuropathy (HCC)     Hyperlipidemia     Hypertension     RLS (restless legs syndrome)          Past Surgical History:   Procedure Laterality Date    FOOT AMPUTATION      FOOT SURGERY Left 5/29/16    I&D    INSERT PICC LINE  7/23/2019         OTHER SURGICAL HISTORY  9/20/2014    left foot debridement, I&D exostectomy, bone biopsy; I&D right lower leg(BK stump site)    PACEMAKER PLACEMENT      has been shut off for 5 years       Medications Prior to Admission:    Medications Prior to Admission: dilTIAZem (CARDIZEM 12 HR) 120 MG extended release capsule, Take 120 mg by mouth 2 times daily  doxazosin (CARDURA) 8 MG tablet, Take 8 mg by mouth nightly  hydrALAZINE (APRESOLINE) 50 MG tablet, Take 50 mg by mouth daily In the evening  furosemide (LASIX) 40 MG tablet, Take 40 mg by mouth daily  Cholecalciferol (VITAMIN D3) 50 MCG (2000 UT) CAPS, Take 2,000 Units by mouth daily  atorvastatin (LIPITOR) 20 MG tablet, Take 20 mg by mouth daily   pioglitazone (ACTOS) 45 MG tablet, Take 45 mg by mouth nightly  carbidopa-levodopa (SINEMET)  MG per tablet, Take 1 tablet by mouth 4 times daily   colchicine (COLCRYS) 0.6 MG tablet, Take 0.6 mg by mouth daily    Allergies:    Zosyn [piperacillin sod-tazobactam so]; Morphine; and Vancomycin    Social History:    reports that he quit smoking about 35 years ago. His smoking use included cigarettes. He started smoking about 56 years ago. He smoked 2.00 packs per day. His smokeless tobacco use includes chew. He reports that he does not drink alcohol or use drugs. Family History:   Non-contributory to this Urological problem  family history includes Cancer in his brother; Diabetes in his mother; Heart Attack in his brother; Heart Disease in his mother; Other in his father; Parkinsonism in his sister. Review of Systems:  Constitutional: No fever or chills   Respiratory: negative for cough and hemoptysis  Cardiovascular: negative for chest pain.   Positive for shortness of breath  Gastrointestinal: negative for abdominal pain, diarrhea, nausea and vomiting   Derm: negative for rash   Neurological: negative for seizures and tremors  Musculoskeletal: Right BKA   Psychiatric: Negative   : As above in the HPI, otherwise negative  All other reviews are negative    Physical Exam:     Vitals:  BP (!) 148/68   Pulse 78   Temp 98.2 °F (36.8 °C) (Temporal)   Resp 20   Ht 6' 4\" (1.93 m)   Wt (!) 408 lb 1.6 oz (185.1 kg)   SpO2

## 2020-05-07 NOTE — DISCHARGE INSTR - COC
Hyperlipidemia E78.5    Diabetic ulcer of left foot ([facility] [address] E11.621, L97.529    [facility] E66.01    CKD stage 2 due to type 2 diabetes mellitus ([facility] E11.22, N18.2    Acute on chronic diastolic (congestive) heart failure ([facility]) I50.33    S/P placement of cardiac pacemaker Z95.0    Acute on chronic respiratory failure with hypoxia and hypercapnia ([facility]) J96.21, J96.22       Isolation/Infection:   Isolation          No Isolation        Patient Infection Status     Infection Onset Added Last Indicated Last Indicated By Review Planned Expiration Resolved Resolved By    CRE (Carbapenem-Resistant Enterobacteriaceae)  07/19/17 07/19/17 Eric Pennington RN        Acinetobacter baumanni-MDRO  wound-foot  7/17/19, 7/14/17  Proteus mirabilis CRE foot-wound 9-2-18    Resolved    COVID-19 Rule Out 04/27/20 04/27/20 04/27/20 COVID-19 (Ordered)   04/27/20 Rule-Out Test Resulted    MRSA 07/17/19 07/21/19 07/17/19 Wound Culture   12/19/19 Goldy Lorenzo RN    MRSA wound-left foot 7/17/19          Nurse Assessment:  Last Vital Signs: BP (!) 148/68   Pulse 78   Temp 98.2 °F (36.8 °C) (Temporal)   Resp 20   Ht 6' 4\" (1.93 m)   Wt (!) 402 lb 14.4 oz (182.8 kg)   SpO2 95%   BMI 49.04 kg/m²     Last documented pain score (0-10 scale): Pain Level: 0  Last Weight:   Wt Readings from Last 1 Encounters:   05/07/20 (!) 402 lb 14.4 oz (182.8 kg)     Mental Status:  oriented, alert, thought processes intact and able to concentrate and follow conversation    IV Access:  - Peripheral IV - site  right forearm, insertion date: 5/2/2020-#22, [place] in left forearm-#22-inserted on 5/3/2020    Nursing Mobility/ADLs:  Walking   Dependent  Transfer  Dependent  Bathing  Dependent  Dressing  Dependent  Toileting  Assisted  Feeding  Assisted-with set-up  Med Admin  Dependent  Med Delivery   whole    Wound Care Documentation and Therapy:  Puncture 05/05/20 Abdomen Lower;Right (Active)   Wound Assessment Painful 5/5/2020 10:00 PM Colostomy/Ileostomy/Ileal Conduit: No       Date of Last BM: 5/7/2020    Intake/Output Summary (Last 24 hours) at 5/7/2020 1545  Last data filed at 5/7/2020 1408  Gross per 24 hour   Intake 930 ml   Output 3575 ml   Net -2645 ml     I/O last 3 completed shifts: In: 80 [P.O.:920; I.V.:10]  Out: 3575 [Urine:3575]    Safety Concerns:      At Risk for Falls    Impairments/Disabilities:      Amputation - right BKA    Nutrition Therapy:  Current Nutrition Therapy:   - Oral Diet:  Carb Control 4 carbs/meal (1800kcals/day)  - Oral Nutrition Supplement:  Wound Healing  twice a day and Low Calorie High Protein  twice a day    Routes of Feeding: Oral  Liquids: No Restrictions  Daily Fluid Restriction: no  Last Modified Barium Swallow with Video (Video Swallowing Test): not done    Treatments at the Time of Hospital Discharge:   Respiratory Treatments: See discharge med-rec  Oxygen Therapy:  7L High flow nasal cannula  Ventilator:    - BiPAP    , CPAP/EPAP: 8 cmH2O HS    Rehab Therapies: Physical Therapy and Occupational Therapy  Weight Bearing Status/Restrictions: Right BKA & dressing to left foot & leg  Other Medical Equipment (for information only, NOT a DME order):  Bariatric bed  Other Treatments: chest vest every 8 hours, BMP & Mag daily, BNP every other day, Blood sugars AC & HS, rinse paracentesis site with NS & apply ABD pad & tape & change daily, left lateral lower leg-clean with NS-apply Opticell Ag & follow with coflex & change on Tuesdays & Fridays, left dorsal foot/left plantar-clean with NS, apply Mesalt & follow with coflex TLC & change on Tuesdays & Fridays    Patient's personal belongings (please select all that are sent with patient):  glasses, watch, cell phone, wallet, footwear, socks, pants undergarments    RN SIGNATURE:  Electronically signed by Aleida Smith RN on 5/7/20 at 6:07 PM EDT    CASE MANAGEMENT/SOCIAL WORK SECTION    Inpatient Status Date: ***    Readmission Risk Assessment

## 2020-05-07 NOTE — PROGRESS NOTES
Nebulization, BID, Burgess Alison MD, 500 mcg at 05/07/20 4311    Arformoterol Tartrate (BROVANA) nebulizer solution 15 mcg, 15 mcg, Nebulization, BID, Burgess Alison MD, 15 mcg at 05/07/20 0452    insulin lispro (HUMALOG) injection vial 0-6 Units, 0-6 Units, Subcutaneous, TID WC, Burgess Alison MD, 2 Units at 05/07/20 1315    insulin lispro (HUMALOG) injection vial 0-3 Units, 0-3 Units, Subcutaneous, Nightly, Burgess Alison MD, 1 Units at 05/06/20 2104    glucose (GLUTOSE) 40 % oral gel 15 g, 15 g, Oral, PRN, Donzell Copier Malmer, DO    dextrose 50 % IV solution, 12.5 g, Intravenous, PRN, Donzell Copier Malmer, DO, 12.5 g at 05/02/20 1117    glucagon (rDNA) injection 1 mg, 1 mg, Intramuscular, PRN, Donzell Copier Malmer, DO    dextrose 5 % solution, 100 mL/hr, Intravenous, PRN, Donzell Copier Malmer, DO    atorvastatin (LIPITOR) tablet 20 mg, 20 mg, Oral, Daily, Donzell Copier Malmer, DO, 20 mg at 05/07/20 1020    carbidopa-levodopa (SINEMET)  MG per tablet 1 tablet, 1 tablet, Oral, 4x Daily, Donzell Copier Malmer, DO, 1 tablet at 05/07/20 1020    doxazosin (CARDURA) tablet 8 mg, 8 mg, Oral, Nightly, Donzell Copier Malmer, DO, 8 mg at 05/06/20 2105    hydrALAZINE (APRESOLINE) tablet 50 mg, 50 mg, Oral, TID, Donzell Copier Malmer, DO, 50 mg at 05/07/20 1020    sodium chloride flush 0.9 % injection 10 mL, 10 mL, Intravenous, 2 times per day, Donzell Copier Malmer, DO, 10 mL at 05/07/20 1019    sodium chloride flush 0.9 % injection 10 mL, 10 mL, Intravenous, PRN, Donzell Copier Malmer, DO, 10 mL at 05/06/20 0458    acetaminophen (TYLENOL) tablet 650 mg, 650 mg, Oral, Q6H PRN, 650 mg at 05/05/20 1707 **OR** acetaminophen (TYLENOL) suppository 650 mg, 650 mg, Rectal, Q6H PRN, Kevan Sanabria,     polyethylene glycol (GLYCOLAX) packet 17 g, 17 g, Oral, Daily PRN, Kevan Sanabria DO, 17 g at 05/05/20 1051    promethazine (PHENERGAN) tablet 12.5 mg, 12.5 mg, Oral, Q6H PRN **OR** ondansetron (ZOFRAN) injection 4 mg, 4 mg, Intravenous, Q6H PRN, Markos Steiner, DO    mineral oil-hydrophilic petrolatum (HYDROPHOR) ointment, , Topical, BID, Artem Cushing DO Elly    Objective:    BP (!) 148/68   Pulse 78   Temp 98.2 °F (36.8 °C) (Temporal)   Resp 20   Ht 6' 4\" (1.93 m)   Wt (!) 408 lb 1.6 oz (185.1 kg)   SpO2 95%   BMI 49.68 kg/m²     Heart:  Reg  Lungs:  Decreased breath sounds bases  Abd: bowel sounds present, nontender, nondistended, no masses  Extrem:  Edema l leg    CBC with Differential:    Lab Results   Component Value Date    WBC 5.1 04/27/2020    RBC 3.36 04/27/2020    HGB 9.4 04/27/2020    HCT 32.4 04/27/2020     04/27/2020    MCV 96.4 04/27/2020    MCH 28.0 04/27/2020    MCHC 29.0 04/27/2020    RDW 16.8 04/27/2020    NRBC 0.0 12/17/2019    BANDSPCT 2 09/19/2014    LYMPHOPCT 4.3 04/27/2020    MONOPCT 9.6 04/27/2020    BASOPCT 0.2 04/27/2020    MONOSABS 0.51 04/27/2020    LYMPHSABS 0.20 04/27/2020    EOSABS 0.09 04/27/2020    BASOSABS 0.00 04/27/2020     CMP:    Lab Results   Component Value Date     05/07/2020    K 3.5 05/07/2020    K 4.3 04/27/2020    CL 89 05/07/2020    CO2 40 05/07/2020    BUN 62 05/07/2020    CREATININE 1.4 05/07/2020    GFRAA >60 05/07/2020    LABGLOM 50 05/07/2020    GLUCOSE 219 05/07/2020    PROT 7.8 12/27/2019    LABALBU 3.7 12/27/2019    CALCIUM 9.5 05/07/2020    BILITOT 0.6 12/27/2019    ALKPHOS 99 12/27/2019    AST 16 12/27/2019    ALT <5 12/27/2019     Warfarin PT/INR:    Lab Results   Component Value Date    INR 1.7 07/17/2019    INR 1.2 05/27/2016    INR 1.4 04/11/2016    PROTIME 19.7 (H) 07/17/2019    PROTIME 13.1 (H) 05/27/2016    PROTIME 15.3 (H) 04/11/2016   ua + bacteria    Assessment:    Principal Problem:    Acute on chronic diastolic (congestive) heart failure ([facility])  Active Problems:    DM (diabetes mellitus) (HCC)    HTN (hypertension)    Hyperlipidemia    Diabetic ulcer of left foot ([facility] [address] [facility]    Morbid obesity ([facility] [address]    CKD stage 2 due to type 2 diabetes mellitus ([facility] [address]    S/P placement of cardiac pacemaker

## 2020-05-08 LAB
ORGANISM: ABNORMAL
URINE CULTURE, ROUTINE: ABNORMAL

## 2020-05-13 ENCOUNTER — TELEPHONE (OUTPATIENT)
Dept: CARDIOLOGY CLINIC | Age: 75
End: 2020-05-13

## 2020-05-15 NOTE — DISCHARGE SUMMARY
[address]                  [address] [facility],  [facility]                               DISCHARGE SUMMARY    PATIENT NAME: Hua Bella                   :        1945  MED REC NO:   [id number]                            ROOM:       [facility]  ACCOUNT NO:   [de-identified]                           ADMIT DATE: 2020  PROVIDER:     Lucero Tong DO                  DISCHARGE DATE:  2020    DISCHARGE DIAGNOSES:  1. Acute hypoxic respiratory failure. 2.  Acute diastolic congestive heart failure. 3.  CKD II.  4.  Diabetic foot ulcer. 5.  Diabetes mellitus. 6.  Status post pacemaker. 7.  Hypertension. 8.  Hyperlipidemia. 9.  Morbid obesity. HOSPITAL COURSE:  The patient is a 30-year-old  male who  presented to the emergency room with complaints of shortness of breath. He was felt to have acute diastolic congestive heart failure, acute  hypoxic respiratory failure. He was admitted to the hospital.  He was  seen by Urology, Renal, Pulmonary, and Cardiology. He underwent  abdominal paracentesis with removal of large amount of fluid. He was  diuresed. He made slow progress and he was eventually discharged to  [facility] [place], operated by [facility] on 2020 in stable condition for further treatment. DISCHARGE MEDICATIONS:  As per discharge med rec which include:  1. Albuterol nebulizer. 2.  Brovana nebulizer. 3.  Pulmicort nebulizer. 4.  Colace. 5.  Heparin subcutaneous. 6.  Humalog sliding scale. 7.  Solu-Medrol IV. 8.  Mineral oil/Hydrophilic Petrolatum ointment topical.  9.  GlycoLax p.r.n.  10.  Potassium bicarbonate/citric acid. 11.  Senokot. 12.  Spironolactone. 13.  Hydralazine. 14.  Atorvastatin. 15.  Sinemet. 16.  Doxazosin.         Nataliya Contreras DO    D: 2020 14:32:44       T: 2020 14:40:41     [email or web address]  Job#: [id number]     Doc#: [id number]    CC:  Karla Parikh MD

## 2022-08-26 NOTE — PROGRESS NOTES
I sent in a 30 day supply to [facility] and a three month supply to [facility].  Please tell the patient to contact [facility] and instruct [facility] to not send me a refill request for 90 days.  They have an automatic system that will request a 90 day supply whenever I send in 30, and typically this is what I have to cancel.  This is a problem with [facility] and only she can instruct them to not send the reflex prescription automatically requesting 90 days   Medications:    cefTRIAXone (ROCEPHIN) 1 g in sterile water 10 mL IV syringe, Q24H  potassium bicarb-citric acid (EFFER-K) effervescent tablet 20 mEq, Daily  albuterol (PROVENTIL) nebulizer solution 2.5 mg, 4x daily  methylPREDNISolone sodium (SOLU-MEDROL) injection 40 mg, Q12H  docusate sodium (COLACE) capsule 100 mg, Daily  senna (SENOKOT) tablet 8.6 mg, Nightly  spironolactone (ALDACTONE) tablet 25 mg, Daily  potassium chloride (KLOR-CON M) extended release tablet 40 mEq, PRN    Or  potassium bicarb-citric acid (EFFER-K) effervescent tablet 40 mEq, PRN    Or  potassium chloride 10 mEq/100 mL IVPB (Peripheral Line), PRN  sodium chloride (Inhalant) 3 % nebulizer solution 4 mL, PRN  heparin (porcine) injection 5,000 Units, Q8H  bumetanide (BUMEX) 12.5 mg in sodium chloride 0.9 % 125 mL infusion, Continuous  dexmedetomidine (PRECEDEX) 400 mcg in sodium chloride 0.9 % 100 mL infusion, Continuous  budesonide (PULMICORT) nebulizer suspension 500 mcg, BID  Arformoterol Tartrate (BROVANA) nebulizer solution 15 mcg, BID  insulin lispro (HUMALOG) injection vial 0-6 Units, TID WC  insulin lispro (HUMALOG) injection vial 0-3 Units, Nightly  glucose (GLUTOSE) 40 % oral gel 15 g, PRN  dextrose 50 % IV solution, PRN  glucagon (rDNA) injection 1 mg, PRN  dextrose 5 % solution, PRN  atorvastatin (LIPITOR) tablet 20 mg, Daily  carbidopa-levodopa (SINEMET)  MG per tablet 1 tablet, 4x Daily  doxazosin (CARDURA) tablet 8 mg, Nightly  hydrALAZINE (APRESOLINE) tablet 50 mg, TID  sodium chloride flush 0.9 % injection 10 mL, 2 times per day  sodium chloride flush 0.9 % injection 10 mL, PRN  acetaminophen (TYLENOL) tablet 650 mg, Q6H PRN    Or  acetaminophen (TYLENOL) suppository 650 mg, Q6H PRN  polyethylene glycol (GLYCOLAX) packet 17 g, Daily PRN  promethazine (PHENERGAN) tablet 12.5 mg, Q6H PRN    Or  ondansetron (ZOFRAN) injection 4 mg, Q6H PRN  mineral oil-hydrophilic petrolatum (HYDROPHOR) ointment, BID        Review of barajas demonstrate evidence for airspace disease. The aorta is tortuous and ectatic. Tortuous ectatic aorta Cardiomegaly Airspace disease compatible with atelectasis or pneumonia, at both lung bases with likely bilateral small pleural effusions The chest appears to be worse in the interval     Assessment/Plans      1. Hypernatremia, most likely diuretic induced;   -Improved with diuresis, monitor  2. Acute exacerbation of chronic HFpEF, good response to diuretics, wt down 20Lbs; now on bumex drip  3. Mild metabolic alkalosis diuretic induced  4. Anemia due to chronic illness; dilutional component given severe generalized edema  5. Type 2 diabetes mellitus, controlled  6. Chronic kidney disease stage III; current creatinine within the range of his usual baseline  7. Mild metabolic alkalosis suspect diuretic induced, monitror  8. COPD  9.  Hypertension with CKD, BP controlled        Donte Vega MD  12:15 PM  5/7/2020

## 2022-12-23 NOTE — PROGRESS NOTES
Date: 5/2/2020    Time: 12:03 PM    Patient Placed On BIPAP/CPAP/ Non-Invasive Ventilation? Yes    If no must comment. Facial area red/color change? No           If YES are Blister/Lesion present? No   If yes must notify nursing staff  BIPAP/CPAP skin barrier?   Yes    Skin barrier type:duoderm       Comments:        Teena Arrieta [facility]

## 2023-03-28 NOTE — PROGRESS NOTES
[address] [place] [address] [facility]  [facility]  Progress Note    SUBJECTIVE:  Chief Complaint   Patient presents with    Wound Check     left leg, was told he had to come to er to be evaluated by ID     Patient is tolerating medications. No reported adverse drug reactions. No nausea, vomiting, diarrhea. Sitting up on edge of bed. No new c/o. Review of systems as above, otherwise negative. Medications:  Scheduled Meds:   ertapenem (INVANZ) IVPB  1 g Intravenous Q24H    atorvastatin  20 mg Oral Nightly    doxazosin  8 mg Oral Nightly    hydrALAZINE  25 mg Oral TID    furosemide  40 mg Oral Daily    carbidopa-levodopa  2 tablet Oral Nightly    clorpactin 2 G irrigation  2 g Irrigation Daily    sodium chloride flush  10 mL Intravenous 2 times per day    enoxaparin  40 mg Subcutaneous Daily    insulin lispro  0-6 Units Subcutaneous TID WC    insulin lispro  0-3 Units Subcutaneous Nightly    diltiazem  240 mg Oral Nightly     Continuous Infusions:   dextrose       PRN Meds:ibuprofen, sodium chloride flush, magnesium hydroxide, ondansetron, glucose, dextrose, glucagon (rDNA), dextrose    OBJECTIVE:  BP (!) 148/82   Pulse 72   Temp 98 °F (36.7 °C) (Oral)   Resp 18   Ht 6' 4\" (1.93 m)   Wt (!) 377 lb 14.4 oz (171.4 kg)   SpO2 94%   BMI 46.00 kg/m²   Temp  Av.1 °F (36.7 °C)  Min: 98 °F (36.7 °C)  Max: 98.2 °F (36.8 °C)  Constitutional: The patient is awake, alert, and oriented. Skin: Warm and dry. No rashes were noted. HEENT: Eyes show round, and reactive pupils. No jaundice. Moist mucous membranes, no ulcerations, no thrush. Neck: Supple to movements. Chest: No use of accessory muscles to breathe. Symmetrical expansion. Auscultation reveals no wheezing, crackles, or rhonchi. Cardiovascular: S1 and S2 are rhythmic and regular. No murmurs appreciated. Abdomen: Positive bowel sounds to auscultation. Benign to palpation. Extremities: Right BKA, wearing prosthesis. The left Subjective     Patient ID: Seun Wynne is a 34 y.o. male.    Chief Complaint: Follow-up (6 month follow up)  34-year-old  male patient of Dr. Arce but new patient to me presents to clinic today accompanied by his mother for annual physical exam.  He continues to have stable prediabetes.  Hyperlipidemia remains well controlled on Crestor 40 mg daily.  Nonalcoholic fatty liver disease remained stable.  Alpha thalassemia also remains stable.  He continues to be followed by Neurology secondary to migraine headaches which are stable on Topamax.  He has been evaluated by a developmental specialist secondary to developmental disorder and remains stable.  Social anxiety disorder remains stable on Lexapro.  They report no significant past surgical history.  He is a family history of hypertension.  Follow-up  Pertinent negatives include no abdominal pain, chest pain, chills, congestion, coughing, fatigue, fever, headaches, myalgias, nausea, neck pain, rash, sore throat or vomiting.   Review of Systems   Constitutional:  Negative for appetite change, chills, fatigue and fever.   HENT:  Negative for nasal congestion, ear pain, hearing loss, postnasal drip, rhinorrhea, sinus pressure/congestion, sore throat and tinnitus.    Eyes:  Negative for redness, itching and visual disturbance.   Respiratory:  Negative for cough, chest tightness and shortness of breath.    Cardiovascular:  Negative for chest pain and palpitations.   Gastrointestinal:  Negative for abdominal pain, constipation, diarrhea, nausea and vomiting.   Genitourinary:  Negative for decreased urine volume, difficulty urinating, dysuria, frequency, hematuria and urgency.   Musculoskeletal:  Negative for back pain, myalgias, neck pain and neck stiffness.   Integumentary:  Negative for rash.   Neurological:  Negative for dizziness, light-headedness and headaches.   Psychiatric/Behavioral: Negative.          Objective     Physical Exam  Vitals  and nursing note reviewed.   Constitutional:       General: He is not in acute distress.     Appearance: Normal appearance. He is well-developed. He is not diaphoretic.   HENT:      Head: Normocephalic and atraumatic.      Right Ear: External ear normal.      Left Ear: External ear normal.      Nose: Nose normal.      Mouth/Throat:      Pharynx: No oropharyngeal exudate.   Eyes:      General: No scleral icterus.        Right eye: No discharge.         Left eye: No discharge.      Conjunctiva/sclera: Conjunctivae normal.      Pupils: Pupils are equal, round, and reactive to light.   Neck:      Thyroid: No thyromegaly.      Vascular: No JVD.      Trachea: No tracheal deviation.   Cardiovascular:      Rate and Rhythm: Normal rate and regular rhythm.      Heart sounds: Normal heart sounds. No murmur heard.    No friction rub. No gallop.   Pulmonary:      Effort: Pulmonary effort is normal. No respiratory distress.      Breath sounds: Normal breath sounds. No stridor. No wheezing, rhonchi or rales.   Chest:      Chest wall: No tenderness.   Abdominal:      General: Bowel sounds are normal. There is no distension.      Palpations: Abdomen is soft. There is no mass.      Tenderness: There is no abdominal tenderness. There is no guarding or rebound.   Musculoskeletal:         General: No tenderness. Normal range of motion.      Cervical back: Normal range of motion and neck supple.   Lymphadenopathy:      Cervical: No cervical adenopathy.   Skin:     General: Skin is warm and dry.      Coloration: Skin is not pale.      Findings: No erythema or rash.   Neurological:      Mental Status: He is alert and oriented to person, place, and time.   Psychiatric:         Mood and Affect: Mood normal.         Speech: Speech is delayed.         Behavior: Behavior is slowed.         Thought Content: Thought content normal.         Judgment: Judgment normal.          Assessment and Plan     Problem List Items Addressed This Visit        Alpha thalassemia    Chronic migraine without aura without status migrainosus, not intractable    Mild intellectual disability    Mixed hyperlipidemia    NAFLD (nonalcoholic fatty liver disease)    Pervasive developmental disorder    Prediabetes    Relevant Orders    Comprehensive Metabolic Panel    Hemoglobin A1C    Social anxiety disorder     Other Visit Diagnoses       Encounter for general adult medical examination with abnormal findings    -  Primary            1. Continue to encourage low sugar diet.  Prediabetes remains stable.  Recommend repeat CMP and A1c for continued monitoring.    2. Continue Crestor 40 mg daily.  Hyperlipidemia is stable.  3. Continue diet and exercise.  Nonalcoholic fatty liver disease remained stable.    4. Alpha thalassemia remains stable.  5. Continue Topamax and propranolol as prescribed and continue follow-up with neurology as scheduled.  Migraine headaches remained stable.    6. Pervasive developmental disorder is stable.  Patient was evaluated by developmental specialist.  Continue to monitor.    7. Continue Lexapro 20 mg daily.  Social anxiety disorder remains stable.  Continue follow-up with psychiatry as scheduled.  8. Return to clinic as needed or in 6 months for follow-up.

## 2023-06-22 NOTE — PROGRESS NOTES
Pulmonary [facility] [employer]                             Pulmonary Consult/Progress Note :                Reason for Consultation:Acute hypercapnic respiratory failure   CC : worsening SOB   HPI:     Doing muc better  9 L removed from his abdomen   No fever   On NC   Breathing much bettter   Abdomen distended  Negative 30  L  More awake and alert      PHYSICAL EXAMINATION:     VITAL SIGNS:  BP (!) 143/67   Pulse 67   Temp 98.1 °F (36.7 °C) (Temporal)   Resp 22   Ht 6' 4\" (1.93 m)   Wt (!) 408 lb 1.6 oz (185.1 kg)   SpO2 97%   BMI 49.68 kg/m²   Wt Readings from Last 3 Encounters:   05/06/20 (!) 408 lb 1.6 oz (185.1 kg)   01/06/20 (!) 366 lb (166 kg)   12/26/19 (!) 454 lb 8 oz (206.2 kg)     Temp Readings from Last 3 Encounters:   05/06/20 98.1 °F (36.7 °C) (Temporal)   12/27/19 98.8 °F (37.1 °C) (Oral)   08/08/19 97.3 °F (36.3 °C)     TMAX:  BP Readings from Last 3 Encounters:   05/06/20 (!) 143/67   01/06/20 124/60   12/27/19 (!) 169/74     Pulse Readings from Last 3 Encounters:   05/06/20 67   01/06/20 60   12/27/19 67           INTAKE/OUTPUTS:  I/O last 3 completed shifts: In: 2347 [P.O.:2050; I.V.:297]  Out: 73151 [Urine:7710;  Other:9000]    Intake/Output Summary (Last 24 hours) at 5/6/2020 1339  Last data filed at 5/6/2020 0805  Gross per 24 hour   Intake 2217 ml   Output 88661 ml   Net -61067 ml       General Appearance: alert and oriented to person, place and time, well-developed and   well-nourished, in no acute distress   Eyes: pupils equal, round, and reactive to light, extraocular eye movements intact, conjunctivae normal and sclera anicteric   Neck: neck supple and non tender without mass, no thyromegaly, no thyroid nodules and no cervical adenopathy   Pulmonary/Chest: Decreased breath sound bilaterally with rales  Cardiovascular: normal rate, regular rhythm, normal S1 and S2, no murmurs, rubs, clicks or gallops, distal pulses intact, no carotid bruits, no  Hyperlipidemia    Diabetic ulcer of left foot ([facility]    Morbid obesity ([facility]    CKD stage 2 due to type 2 diabetes mellitus ([facility]    Acute on chronic diastolic (congestive) heart failure (HCC)    S/P placement of cardiac pacemaker    Acute on chronic respiratory failure with hypoxia and hypercapnia (HCC)               ASSESSMENT:  1.) Acute hypoxic/hypercapnic respiratory failure  2.) HFpEF Acute diastolic congestive heart failure/acute hypoxic/hypercapnic respiratory            failure  3. )ZACHARY   4. )COPD  5.) Anemia  6. )Morbid obesity    7- electrolyte disturbance  8- Anasarca       PLAN:  S/P removal of 9 L   Effusion ,pleural seems better ,will continue diuresis and follow u CXR down the road,hold thoracentesis   *-We will continue with a AVAPS 4 on 4 of and at night alternate with NC   *-diuresis and electrolyte as per renal   *- Bronchodilator  *- Off ctos  *_COVID negative  *-Need aggressive Pulmonary toilet   *- discuss with his wife and will update again today   *- Transfer to telemetry       Geraldskyler   Pulmonary&Critical Care Medicine   Director of [address] [place] [address] Director of [address] [place] [address]    Hallie Galdamez    NOTE: This report was transcribed using voice recognition software. Every effort was made to ensure accuracy; however, inadvertent computerized transcription errors may be present. Him/He

## 2023-07-05 LAB
ALBUMIN SERPL-MCNC: 4.1 G/DL (ref 3.5–5.2)
ALP SERPL-CCNC: 118 U/L (ref 40–129)
ALT SERPL-CCNC: 11 U/L (ref 0–40)
ANION GAP SERPL CALCULATED.3IONS-SCNC: 12 MMOL/L (ref 7–16)
AST SERPL-CCNC: 23 U/L (ref 0–39)
BASOPHILS # BLD: 0.04 E9/L (ref 0–0.2)
BASOPHILS NFR BLD: 0.7 % (ref 0–2)
BILIRUB SERPL-MCNC: 0.3 MG/DL (ref 0–1.2)
BUN SERPL-MCNC: 27 MG/DL (ref 6–23)
CALCIUM SERPL-MCNC: 8.7 MG/DL (ref 8.6–10.2)
CHLORIDE SERPL-SCNC: 109 MMOL/L (ref 98–107)
CO2 SERPL-SCNC: 19 MMOL/L (ref 22–29)
CREAT SERPL-MCNC: 1.7 MG/DL (ref 0.7–1.2)
CRP SERPL HS-MCNC: 1.3 MG/DL (ref 0–0.4)
EOSINOPHIL # BLD: 0.32 E9/L (ref 0.05–0.5)
EOSINOPHIL NFR BLD: 5.5 % (ref 0–6)
ERYTHROCYTE [DISTWIDTH] IN BLOOD BY AUTOMATED COUNT: 15.9 FL (ref 11.5–15)
ERYTHROCYTE [SEDIMENTATION RATE] IN BLOOD BY WESTERGREN METHOD: 78 MM/HR (ref 0–15)
GLUCOSE SERPL-MCNC: 148 MG/DL (ref 74–99)
HCT VFR BLD AUTO: 34.2 % (ref 37–54)
HGB BLD-MCNC: 10.6 G/DL (ref 12.5–16.5)
IMM GRANULOCYTES # BLD: 0.03 E9/L
IMM GRANULOCYTES NFR BLD: 0.5 % (ref 0–5)
LYMPHOCYTES # BLD: 0.86 E9/L (ref 1.5–4)
LYMPHOCYTES NFR BLD: 14.9 % (ref 20–42)
MCH RBC QN AUTO: 28 PG (ref 26–35)
MCHC RBC AUTO-ENTMCNC: 31 % (ref 32–34.5)
MCV RBC AUTO: 90.2 FL (ref 80–99.9)
MONOCYTES # BLD: 0.62 E9/L (ref 0.1–0.95)
MONOCYTES NFR BLD: 10.7 % (ref 2–12)
NEUTROPHILS # BLD: 3.92 E9/L (ref 1.8–7.3)
NEUTS SEG NFR BLD: 67.7 % (ref 43–80)
PLATELET # BLD AUTO: 114 E9/L (ref 130–450)
PMV BLD AUTO: 11.9 FL (ref 7–12)
POTASSIUM SERPL-SCNC: 3.8 MMOL/L (ref 3.5–5)
PROT SERPL-MCNC: 7.9 G/DL (ref 6.4–8.3)
RBC # BLD AUTO: 3.79 E12/L (ref 3.8–5.8)
SODIUM SERPL-SCNC: 140 MMOL/L (ref 132–146)
WBC # BLD: 5.8 E9/L (ref 4.5–11.5)

## 2023-10-20 ENCOUNTER — APPOINTMENT (OUTPATIENT)
Dept: CT IMAGING | Age: 78
End: 2023-10-20
Payer: MEDICARE

## 2023-10-20 ENCOUNTER — HOSPITAL ENCOUNTER (EMERGENCY)
Age: 78
Discharge: HOME | End: 2023-10-21
Attending: EMERGENCY MEDICINE
Payer: MEDICARE

## 2023-10-20 VITALS
OXYGEN SATURATION: 100 % | RESPIRATION RATE: 16 BRPM | TEMPERATURE: 98.5 F | WEIGHT: 304.24 LBS | BODY MASS INDEX: 37.05 KG/M2 | HEIGHT: 76 IN | HEART RATE: 67 BPM | SYSTOLIC BLOOD PRESSURE: 171 MMHG | DIASTOLIC BLOOD PRESSURE: 93 MMHG

## 2023-10-20 DIAGNOSIS — H53.2 DIPLOPIA: Primary | ICD-10-CM

## 2023-10-20 LAB
ALBUMIN SERPL-MCNC: 4.1 G/DL (ref 3.5–5.2)
ALP SERPL-CCNC: 109 U/L (ref 40–129)
ALT SERPL-CCNC: 9 U/L (ref 0–40)
ANION GAP SERPL CALCULATED.3IONS-SCNC: 11 MMOL/L (ref 7–16)
AST SERPL-CCNC: 18 U/L (ref 0–39)
BASOPHILS # BLD: 0.03 K/UL (ref 0–0.2)
BASOPHILS NFR BLD: 1 % (ref 0–2)
BILIRUB SERPL-MCNC: 0.6 MG/DL (ref 0–1.2)
BUN SERPL-MCNC: 27 MG/DL (ref 6–23)
CALCIUM SERPL-MCNC: 9.6 MG/DL (ref 8.6–10.2)
CHLORIDE SERPL-SCNC: 99 MMOL/L (ref 98–107)
CHP ED QC CHECK: NORMAL
CO2 SERPL-SCNC: 25 MMOL/L (ref 22–29)
CREAT SERPL-MCNC: 1.6 MG/DL (ref 0.7–1.2)
EOSINOPHIL # BLD: 0.16 K/UL (ref 0.05–0.5)
EOSINOPHILS RELATIVE PERCENT: 3 % (ref 0–6)
ERYTHROCYTE [DISTWIDTH] IN BLOOD BY AUTOMATED COUNT: 13.9 % (ref 11.5–15)
ERYTHROCYTE [SEDIMENTATION RATE] IN BLOOD BY WESTERGREN METHOD: 31 MM/HR (ref 0–15)
GFR SERPL CREATININE-BSD FRML MDRD: 46 ML/MIN/1.73M2
GLUCOSE BLD-MCNC: 85 MG/DL
GLUCOSE BLD-MCNC: 85 MG/DL (ref 74–99)
GLUCOSE SERPL-MCNC: 94 MG/DL (ref 74–99)
HCT VFR BLD AUTO: 44.7 % (ref 37–54)
HGB BLD-MCNC: 14.3 G/DL (ref 12.5–16.5)
IMM GRANULOCYTES # BLD AUTO: <0.03 K/UL (ref 0–0.58)
IMM GRANULOCYTES NFR BLD: 0 % (ref 0–5)
LYMPHOCYTES NFR BLD: 1.07 K/UL (ref 1.5–4)
LYMPHOCYTES RELATIVE PERCENT: 19 % (ref 20–42)
MCH RBC QN AUTO: 28.4 PG (ref 26–35)
MCHC RBC AUTO-ENTMCNC: 32 G/DL (ref 32–34.5)
MCV RBC AUTO: 88.9 FL (ref 80–99.9)
MONOCYTES NFR BLD: 0.64 K/UL (ref 0.1–0.95)
MONOCYTES NFR BLD: 11 % (ref 2–12)
NEUTROPHILS NFR BLD: 67 % (ref 43–80)
NEUTS SEG NFR BLD: 3.82 K/UL (ref 1.8–7.3)
PLATELET # BLD AUTO: 176 K/UL (ref 130–450)
PMV BLD AUTO: 9.6 FL (ref 7–12)
POTASSIUM SERPL-SCNC: 4 MMOL/L (ref 3.5–5)
PROT SERPL-MCNC: 8.9 G/DL (ref 6.4–8.3)
RBC # BLD AUTO: 5.03 M/UL (ref 3.8–5.8)
SODIUM SERPL-SCNC: 135 MMOL/L (ref 132–146)
WBC OTHER # BLD: 5.7 K/UL (ref 4.5–11.5)

## 2023-10-20 PROCEDURE — 82962 GLUCOSE BLOOD TEST: CPT

## 2023-10-20 PROCEDURE — 85652 RBC SED RATE AUTOMATED: CPT

## 2023-10-20 PROCEDURE — 6360000004 HC RX CONTRAST MEDICATION: Performed by: PHYSICIAN ASSISTANT

## 2023-10-20 PROCEDURE — 70496 CT ANGIOGRAPHY HEAD: CPT

## 2023-10-20 PROCEDURE — 70498 CT ANGIOGRAPHY NECK: CPT

## 2023-10-20 PROCEDURE — 99285 EMERGENCY DEPT VISIT HI MDM: CPT

## 2023-10-20 PROCEDURE — 80053 COMPREHEN METABOLIC PANEL: CPT

## 2023-10-20 PROCEDURE — 85025 COMPLETE CBC W/AUTO DIFF WBC: CPT

## 2023-10-20 PROCEDURE — 70450 CT HEAD/BRAIN W/O DYE: CPT

## 2023-10-20 RX ADMIN — IOPAMIDOL 80 ML: 755 INJECTION, SOLUTION INTRAVENOUS at 23:02

## 2023-10-20 ASSESSMENT — LIFESTYLE VARIABLES
HOW MANY STANDARD DRINKS CONTAINING ALCOHOL DO YOU HAVE ON A TYPICAL DAY: PATIENT DOES NOT DRINK
HOW OFTEN DO YOU HAVE A DRINK CONTAINING ALCOHOL: NEVER

## 2023-10-21 RX ORDER — ASPIRIN 81 MG/1
81 TABLET ORAL DAILY
Qty: 90 TABLET | Refills: 1 | Status: SHIPPED | OUTPATIENT
Start: 2023-10-21 | End: 2023-10-21 | Stop reason: CLARIF

## 2023-10-21 NOTE — ED PROVIDER NOTES
as low as reasonably achievable.; CTA of the head/brain was performed with the administration of intravenous contrast. Multiplanar reformatted images are provided for review. MIP images are provided for review. Automated exposure control, iterative reconstruction, and/or weight based adjustment of the mA/kV was utilized to reduce the radiation dose to as low as reasonably achievable. COMPARISON: None. HISTORY: ORDERING SYSTEM PROVIDED HISTORY: diplopia FINDINGS: CTA NECK: AORTIC ARCH/ARCH VESSELS: No dissection or arterial injury. No significant stenosis of the brachiocephalic or subclavian arteries. CAROTID ARTERIES: No dissection, arterial injury, or hemodynamically significant stenosis by NASCET criteria. VERTEBRAL ARTERIES: No dissection, arterial injury, or significant stenosis. SOFT TISSUES: The lung apices are clear. No cervical or superior mediastinal lymphadenopathy. The larynx and pharynx are unremarkable. No acute abnormality of the salivary and thyroid glands. BONES: No acute osseous abnormality. CTA HEAD: ANTERIOR CIRCULATION: No significant stenosis of the intracranial internal carotid, anterior cerebral, or middle cerebral arteries. No aneurysm. POSTERIOR CIRCULATION: No significant stenosis of the vertebral, basilar, or posterior cerebral arteries. No aneurysm. OTHER: No dural venous sinus thrombosis on this study. BRAIN: No mass effect or midline shift. No extra-axial fluid collection. The gray-white differentiation is maintained. No flow-limiting large vessel stenosis in the head and neck. No evidence of dural venous sinus thrombosis.      CT HEAD WO CONTRAST    Result Date: 10/20/2023  EXAMINATION: CT OF THE HEAD WITHOUT CONTRAST  10/20/2023 5:25 pm TECHNIQUE: CT of the head was performed without the administration of intravenous contrast. Automated exposure control, iterative reconstruction, and/or weight based adjustment of the mA/kV was utilized to reduce the radiation dose to as low as

## 2023-12-11 ENCOUNTER — APPOINTMENT (OUTPATIENT)
Dept: CT IMAGING | Age: 78
DRG: 065 | End: 2023-12-11
Attending: EMERGENCY MEDICINE
Payer: MEDICARE

## 2023-12-11 ENCOUNTER — HOSPITAL ENCOUNTER (INPATIENT)
Age: 78
LOS: 2 days | Discharge: HOME HEALTH | DRG: 065 | End: 2023-12-13
Attending: INTERNAL MEDICINE | Admitting: INTERNAL MEDICINE
Payer: MEDICARE

## 2023-12-11 DIAGNOSIS — G52.9 CRANIAL NERVE PALSY: Primary | ICD-10-CM

## 2023-12-11 LAB
ALBUMIN SERPL-MCNC: 4 G/DL (ref 3.5–5.2)
ALP SERPL-CCNC: 124 U/L (ref 40–129)
ALT SERPL-CCNC: 12 U/L (ref 0–40)
ANION GAP SERPL CALCULATED.3IONS-SCNC: 13 MMOL/L (ref 7–16)
AST SERPL-CCNC: 15 U/L (ref 0–39)
BASOPHILS # BLD: 0.04 K/UL (ref 0–0.2)
BASOPHILS NFR BLD: 1 % (ref 0–2)
BILIRUB SERPL-MCNC: 0.7 MG/DL (ref 0–1.2)
BUN SERPL-MCNC: 23 MG/DL (ref 6–23)
CALCIUM SERPL-MCNC: 9.6 MG/DL (ref 8.6–10.2)
CHLORIDE SERPL-SCNC: 102 MMOL/L (ref 98–107)
CO2 SERPL-SCNC: 23 MMOL/L (ref 22–29)
CREAT SERPL-MCNC: 1.6 MG/DL (ref 0.7–1.2)
EOSINOPHIL # BLD: 0.15 K/UL (ref 0.05–0.5)
EOSINOPHILS RELATIVE PERCENT: 2 % (ref 0–6)
ERYTHROCYTE [DISTWIDTH] IN BLOOD BY AUTOMATED COUNT: 15.1 % (ref 11.5–15)
GFR SERPL CREATININE-BSD FRML MDRD: 43 ML/MIN/1.73M2
GLUCOSE SERPL-MCNC: 79 MG/DL (ref 74–99)
HCT VFR BLD AUTO: 43.9 % (ref 37–54)
HGB BLD-MCNC: 14.4 G/DL (ref 12.5–16.5)
IMM GRANULOCYTES # BLD AUTO: 0.03 K/UL (ref 0–0.58)
IMM GRANULOCYTES NFR BLD: 0 % (ref 0–5)
INR PPP: 1.8
LYMPHOCYTES NFR BLD: 0.73 K/UL (ref 1.5–4)
LYMPHOCYTES RELATIVE PERCENT: 11 % (ref 20–42)
MAGNESIUM SERPL-MCNC: 2 MG/DL (ref 1.6–2.6)
MCH RBC QN AUTO: 29.2 PG (ref 26–35)
MCHC RBC AUTO-ENTMCNC: 32.8 G/DL (ref 32–34.5)
MCV RBC AUTO: 89 FL (ref 80–99.9)
MONOCYTES NFR BLD: 0.81 K/UL (ref 0.1–0.95)
MONOCYTES NFR BLD: 12 % (ref 2–12)
NEUTROPHILS NFR BLD: 75 % (ref 43–80)
NEUTS SEG NFR BLD: 5.18 K/UL (ref 1.8–7.3)
PLATELET # BLD AUTO: 144 K/UL (ref 130–450)
PMV BLD AUTO: 9.4 FL (ref 7–12)
POTASSIUM SERPL-SCNC: 4.1 MMOL/L (ref 3.5–5)
PROT SERPL-MCNC: 8.1 G/DL (ref 6.4–8.3)
PROTHROMBIN TIME: 19.4 SEC (ref 9.3–12.4)
RBC # BLD AUTO: 4.93 M/UL (ref 3.8–5.8)
SODIUM SERPL-SCNC: 138 MMOL/L (ref 132–146)
WBC OTHER # BLD: 6.9 K/UL (ref 4.5–11.5)

## 2023-12-11 PROCEDURE — 83735 ASSAY OF MAGNESIUM: CPT

## 2023-12-11 PROCEDURE — 85610 PROTHROMBIN TIME: CPT

## 2023-12-11 PROCEDURE — 99285 EMERGENCY DEPT VISIT HI MDM: CPT

## 2023-12-11 PROCEDURE — 93005 ELECTROCARDIOGRAM TRACING: CPT | Performed by: EMERGENCY MEDICINE

## 2023-12-11 PROCEDURE — 85025 COMPLETE CBC W/AUTO DIFF WBC: CPT

## 2023-12-11 PROCEDURE — 80053 COMPREHEN METABOLIC PANEL: CPT

## 2023-12-11 PROCEDURE — 70450 CT HEAD/BRAIN W/O DYE: CPT

## 2023-12-11 PROCEDURE — 99221 1ST HOSP IP/OBS SF/LOW 40: CPT | Performed by: INTERNAL MEDICINE

## 2023-12-11 PROCEDURE — 1200000000 HC SEMI PRIVATE

## 2023-12-11 ASSESSMENT — PAIN - FUNCTIONAL ASSESSMENT: PAIN_FUNCTIONAL_ASSESSMENT: NONE - DENIES PAIN

## 2023-12-11 NOTE — ED NOTES
Department of Emergency Medicine  FIRST PROVIDER TRIAGE NOTE             Independent MLP           12/11/23  1:52 PM EST    Date of Encounter: 12/11/23   MRN: [id number]      HPI: Kimberly Hensley is a 70 y.o. male who presents to the ED for No chief complaint on file. Patient has had diplopia since October. Patient was seen at his eye care specialist and they were concerned and sent him in for CTA w/ contrast .  Patient states has been needed close his right eye to avoid the diplopia or he has been patching his right eye. Patient states is worsening    ROS: Negative for cp, sob, abd pain, back pain, or fever. PE: Gen Appearance/Constitutional: alert  HEENT: NC/NT. PERRLA,  Airway patent. Neck: supple     Initial Plan of Care: All treatment areas with department are currently occupied. Plan to order/Initiate the following while awaiting opening in ED: labs and imaging studies.   Initiate Treatment-Testing, Proceed toTreatment Area When Bed Available for ED Attending/MLP to Continue Care    Electronically signed by Funmi Reed PA-C   DD: 12/11/23       Funmi Reed PA-C  12/11/23 1043

## 2023-12-11 NOTE — H&P
89.0   MCH 29.2   MCHC 32.8   RDW 15.1*      MPV 9.4       No results for input(s): \"POCGLU\" in the last 72 hours. CBC with Differential:    Lab Results   Component Value Date/Time    WBC 6.9 12/11/2023 02:18 PM    RBC 4.93 12/11/2023 02:18 PM    HGB 14.4 12/11/2023 02:18 PM    HCT 43.9 12/11/2023 02:18 PM     12/11/2023 02:18 PM    MCV 89.0 12/11/2023 02:18 PM    MCH 29.2 12/11/2023 02:18 PM    MCHC 32.8 12/11/2023 02:18 PM    RDW 15.1 12/11/2023 02:18 PM    NRBC 0.0 12/17/2019 01:39 PM    BANDSPCT 2 09/19/2014 04:10 AM    LYMPHOPCT 11 12/11/2023 02:18 PM    MONOPCT 12 12/11/2023 02:18 PM    BASOPCT 1 12/11/2023 02:18 PM    MONOSABS 0.81 12/11/2023 02:18 PM    LYMPHSABS 0.73 12/11/2023 02:18 PM    EOSABS 0.15 12/11/2023 02:18 PM    BASOSABS 0.04 12/11/2023 02:18 PM     CMP:    Lab Results   Component Value Date/Time     12/11/2023 02:18 PM    K 4.1 12/11/2023 02:18 PM    K 4.3 04/27/2020 03:02 PM     12/11/2023 02:18 PM    CO2 23 12/11/2023 02:18 PM    BUN 23 12/11/2023 02:18 PM    CREATININE 1.6 12/11/2023 02:18 PM    GFRAA >60 05/07/2020 11:51 AM    LABGLOM 43 12/11/2023 02:18 PM    GLUCOSE 79 12/11/2023 02:18 PM    PROT 8.1 12/11/2023 02:18 PM    LABALBU 4.0 12/11/2023 02:18 PM    CALCIUM 9.6 12/11/2023 02:18 PM    BILITOT 0.7 12/11/2023 02:18 PM    ALKPHOS 124 12/11/2023 02:18 PM    AST 15 12/11/2023 02:18 PM    ALT 12 12/11/2023 02:18 PM       Radiology:   CT Head W/O Contrast   Final Result   No acute intracranial abnormality. Sinus disease, as noted above. EKG: No results found for this or any previous visit (from the past 4464 hour(s)). Prior Prior [unfilled]     Principal Problem:    Cranial nerve palsy  Resolved Problems:    * No resolved hospital problems. *      ASSESSMENT and PLAN:    Cranial nerve III palsy  Likely due to diabetes mellitus versus others sarcoidosis, space-occupying lesion etc, MS.  CT head negative for acute intracranial abnormality.

## 2023-12-12 ENCOUNTER — APPOINTMENT (OUTPATIENT)
Age: 78
DRG: 065 | End: 2023-12-12
Attending: PSYCHIATRY & NEUROLOGY
Payer: MEDICARE

## 2023-12-12 LAB
ECHO AO ASC DIAM: 3.6 CM
ECHO AO ASCENDING AORTA INDEX: 1.38 CM/M2
ECHO AV AREA PEAK VELOCITY: 3.1 CM2
ECHO AV AREA VTI: 3.1 CM2
ECHO AV AREA/BSA PEAK VELOCITY: 1.2 CM2/M2
ECHO AV AREA/BSA VTI: 1.2 CM2/M2
ECHO AV CUSP MM: 1.9 CM
ECHO AV MEAN GRADIENT: 5 MMHG
ECHO AV MEAN VELOCITY: 1.1 M/S
ECHO AV PEAK GRADIENT: 10 MMHG
ECHO AV PEAK VELOCITY: 1.6 M/S
ECHO AV VELOCITY RATIO: 0.75
ECHO AV VTI: 29.3 CM
ECHO BSA: 2.68 M2
ECHO EST RA PRESSURE: 3 MMHG
ECHO LA DIAMETER INDEX: 1.92 CM/M2
ECHO LA DIAMETER: 5 CM
ECHO LA VOL A-L A2C: 107 ML (ref 18–58)
ECHO LA VOL A-L A4C: 89 ML (ref 18–58)
ECHO LA VOL MOD A2C: 107 ML (ref 18–58)
ECHO LA VOL MOD A4C: 87 ML (ref 18–58)
ECHO LA VOLUME AREA LENGTH: 102 ML
ECHO LA VOLUME INDEX A-L A2C: 41 ML/M2 (ref 16–34)
ECHO LA VOLUME INDEX A-L A4C: 34 ML/M2 (ref 16–34)
ECHO LA VOLUME INDEX AREA LENGTH: 39 ML/M2 (ref 16–34)
ECHO LA VOLUME INDEX MOD A2C: 41 ML/M2 (ref 16–34)
ECHO LA VOLUME INDEX MOD A4C: 33 ML/M2 (ref 16–34)
ECHO LV EDV A2C: 78 ML
ECHO LV EDV A4C: 90 ML
ECHO LV EDV BP: 85 ML (ref 67–155)
ECHO LV EDV INDEX A4C: 34 ML/M2
ECHO LV EDV INDEX BP: 33 ML/M2
ECHO LV EDV NDEX A2C: 30 ML/M2
ECHO LV EJECTION FRACTION A2C: 82 %
ECHO LV EJECTION FRACTION A4C: 80 %
ECHO LV EJECTION FRACTION BIPLANE: 80 % (ref 55–100)
ECHO LV ESV A2C: 14 ML
ECHO LV ESV A4C: 18 ML
ECHO LV ESV BP: 17 ML (ref 22–58)
ECHO LV ESV INDEX A2C: 5 ML/M2
ECHO LV ESV INDEX A4C: 7 ML/M2
ECHO LV ESV INDEX BP: 7 ML/M2
ECHO LV FRACTIONAL SHORTENING: 39 % (ref 28–44)
ECHO LV INTERNAL DIMENSION DIASTOLE INDEX: 1.57 CM/M2
ECHO LV INTERNAL DIMENSION DIASTOLIC: 4.1 CM (ref 4.2–5.9)
ECHO LV INTERNAL DIMENSION SYSTOLIC INDEX: 0.96 CM/M2
ECHO LV INTERNAL DIMENSION SYSTOLIC: 2.5 CM
ECHO LV ISOVOLUMETRIC RELAXATION TIME (IVRT): 78.4 MS
ECHO LV IVSD: 1.5 CM (ref 0.6–1)
ECHO LV IVSS: 1.9 CM
ECHO LV MASS 2D: 228.6 G (ref 88–224)
ECHO LV MASS INDEX 2D: 87.6 G/M2 (ref 49–115)
ECHO LV POSTERIOR WALL DIASTOLIC: 1.4 CM (ref 0.6–1)
ECHO LV POSTERIOR WALL SYSTOLIC: 1.6 CM
ECHO LV RELATIVE WALL THICKNESS RATIO: 0.68
ECHO LVOT AREA: 4.2 CM2
ECHO LVOT AV VTI INDEX: 0.77
ECHO LVOT DIAM: 2.3 CM
ECHO LVOT MEAN GRADIENT: 3 MMHG
ECHO LVOT PEAK GRADIENT: 6 MMHG
ECHO LVOT PEAK VELOCITY: 1.2 M/S
ECHO LVOT STROKE VOLUME INDEX: 36 ML/M2
ECHO LVOT SV: 93.8 ML
ECHO LVOT VTI: 22.6 CM
ECHO MV AREA PHT: 2.5 CM2
ECHO MV AREA VTI: 3.1 CM2
ECHO MV E DECELERATION TIME (DT): 316.9 MS
ECHO MV LVOT VTI INDEX: 1.34
ECHO MV MAX VELOCITY: 1.3 M/S
ECHO MV MEAN GRADIENT: 3 MMHG
ECHO MV MEAN VELOCITY: 0.8 M/S
ECHO MV PEAK GRADIENT: 7 MMHG
ECHO MV PRESSURE HALF TIME (PHT): 87.1 MS
ECHO MV VTI: 30.2 CM
ECHO PV MAX VELOCITY: 1.2 M/S
ECHO PV MEAN GRADIENT: 3 MMHG
ECHO PV MEAN VELOCITY: 0.8 M/S
ECHO PV PEAK GRADIENT: 5 MMHG
ECHO PV VTI: 21.8 CM
ECHO PVEIN A DURATION: 120 MS
ECHO PVEIN A VELOCITY: 0.2 M/S
ECHO PVEIN PEAK D VELOCITY: 0.6 M/S
ECHO PVEIN PEAK S VELOCITY: 0.2 M/S
ECHO PVEIN S/D RATIO: 0.3
ECHO RIGHT VENTRICULAR SYSTOLIC PRESSURE (RVSP): 33 MMHG
ECHO RV INTERNAL DIMENSION: 4 CM
ECHO TV REGURGITANT MAX VELOCITY: 2.73 M/S
ECHO TV REGURGITANT PEAK GRADIENT: 30 MMHG
EKG ATRIAL RATE: 312 BPM
EKG Q-T INTERVAL: 414 MS
EKG QRS DURATION: 96 MS
EKG QTC CALCULATION (BAZETT): 449 MS
EKG R AXIS: 11 DEGREES
EKG T AXIS: 27 DEGREES
EKG VENTRICULAR RATE: 71 BPM
ERYTHROCYTE [SEDIMENTATION RATE] IN BLOOD BY WESTERGREN METHOD: 40 MM/HR (ref 0–15)
GLUCOSE BLD-MCNC: 113 MG/DL (ref 74–99)
GLUCOSE BLD-MCNC: 92 MG/DL (ref 74–99)
GLUCOSE BLD-MCNC: 96 MG/DL (ref 74–99)

## 2023-12-12 PROCEDURE — 82164 ANGIOTENSIN I ENZYME TEST: CPT

## 2023-12-12 PROCEDURE — 99223 1ST HOSP IP/OBS HIGH 75: CPT | Performed by: PSYCHIATRY & NEUROLOGY

## 2023-12-12 PROCEDURE — 2580000003 HC RX 258: Performed by: INTERNAL MEDICINE

## 2023-12-12 PROCEDURE — 6370000000 HC RX 637 (ALT 250 FOR IP): Performed by: INTERNAL MEDICINE

## 2023-12-12 PROCEDURE — 93306 TTE W/DOPPLER COMPLETE: CPT | Performed by: INTERNAL MEDICINE

## 2023-12-12 PROCEDURE — 99233 SBSQ HOSP IP/OBS HIGH 50: CPT | Performed by: INTERNAL MEDICINE

## 2023-12-12 PROCEDURE — 83516 IMMUNOASSAY NONANTIBODY: CPT

## 2023-12-12 PROCEDURE — 6370000000 HC RX 637 (ALT 250 FOR IP): Performed by: PSYCHIATRY & NEUROLOGY

## 2023-12-12 PROCEDURE — 87086 URINE CULTURE/COLONY COUNT: CPT

## 2023-12-12 PROCEDURE — 36415 COLL VENOUS BLD VENIPUNCTURE: CPT

## 2023-12-12 PROCEDURE — 86038 ANTINUCLEAR ANTIBODIES: CPT

## 2023-12-12 PROCEDURE — 82962 GLUCOSE BLOOD TEST: CPT

## 2023-12-12 PROCEDURE — 81001 URINALYSIS AUTO W/SCOPE: CPT

## 2023-12-12 PROCEDURE — 93010 ELECTROCARDIOGRAM REPORT: CPT | Performed by: INTERNAL MEDICINE

## 2023-12-12 PROCEDURE — C8929 TTE W OR WO FOL WCON,DOPPLER: HCPCS

## 2023-12-12 PROCEDURE — 83519 RIA NONANTIBODY: CPT

## 2023-12-12 PROCEDURE — 85652 RBC SED RATE AUTOMATED: CPT

## 2023-12-12 PROCEDURE — 87077 CULTURE AEROBIC IDENTIFY: CPT

## 2023-12-12 PROCEDURE — 6360000004 HC RX CONTRAST MEDICATION: Performed by: PSYCHIATRY & NEUROLOGY

## 2023-12-12 PROCEDURE — 1200000000 HC SEMI PRIVATE

## 2023-12-12 PROCEDURE — 86039 ANTINUCLEAR ANTIBODIES (ANA): CPT

## 2023-12-12 PROCEDURE — 86618 LYME DISEASE ANTIBODY: CPT

## 2023-12-12 RX ORDER — GABAPENTIN 300 MG/1
300 CAPSULE ORAL DAILY
Status: ON HOLD | COMMUNITY
End: 2023-12-12

## 2023-12-12 RX ORDER — DILTIAZEM HYDROCHLORIDE 60 MG/1
120 CAPSULE, EXTENDED RELEASE ORAL 2 TIMES DAILY
Status: DISCONTINUED | OUTPATIENT
Start: 2023-12-12 | End: 2023-12-13 | Stop reason: HOSPADM

## 2023-12-12 RX ORDER — GABAPENTIN 300 MG/1
300 CAPSULE ORAL NIGHTLY
Status: DISCONTINUED | OUTPATIENT
Start: 2023-12-12 | End: 2023-12-13 | Stop reason: HOSPADM

## 2023-12-12 RX ORDER — SPIRONOLACTONE 25 MG/1
25 TABLET ORAL DAILY
Status: DISCONTINUED | OUTPATIENT
Start: 2023-12-12 | End: 2023-12-12

## 2023-12-12 RX ORDER — INSULIN LISPRO 100 [IU]/ML
0-4 INJECTION, SOLUTION INTRAVENOUS; SUBCUTANEOUS
Status: DISCONTINUED | OUTPATIENT
Start: 2023-12-12 | End: 2023-12-13 | Stop reason: HOSPADM

## 2023-12-12 RX ORDER — SODIUM CHLORIDE 0.9 % (FLUSH) 0.9 %
5-40 SYRINGE (ML) INJECTION EVERY 12 HOURS SCHEDULED
Status: DISCONTINUED | OUTPATIENT
Start: 2023-12-12 | End: 2023-12-13 | Stop reason: HOSPADM

## 2023-12-12 RX ORDER — POTASSIUM CHLORIDE 20 MEQ/1
40 TABLET, EXTENDED RELEASE ORAL PRN
Status: DISCONTINUED | OUTPATIENT
Start: 2023-12-12 | End: 2023-12-12

## 2023-12-12 RX ORDER — MAGNESIUM SULFATE IN WATER 40 MG/ML
2000 INJECTION, SOLUTION INTRAVENOUS PRN
Status: DISCONTINUED | OUTPATIENT
Start: 2023-12-12 | End: 2023-12-12

## 2023-12-12 RX ORDER — DOXAZOSIN MESYLATE 4 MG/1
8 TABLET ORAL NIGHTLY
Status: DISCONTINUED | OUTPATIENT
Start: 2023-12-12 | End: 2023-12-13 | Stop reason: HOSPADM

## 2023-12-12 RX ORDER — ACETAMINOPHEN 650 MG/1
650 SUPPOSITORY RECTAL EVERY 6 HOURS PRN
Status: DISCONTINUED | OUTPATIENT
Start: 2023-12-12 | End: 2023-12-13 | Stop reason: HOSPADM

## 2023-12-12 RX ORDER — SODIUM CHLORIDE 0.9 % (FLUSH) 0.9 %
5-40 SYRINGE (ML) INJECTION PRN
Status: DISCONTINUED | OUTPATIENT
Start: 2023-12-12 | End: 2023-12-13 | Stop reason: HOSPADM

## 2023-12-12 RX ORDER — ONDANSETRON 2 MG/ML
4 INJECTION INTRAMUSCULAR; INTRAVENOUS EVERY 6 HOURS PRN
Status: DISCONTINUED | OUTPATIENT
Start: 2023-12-12 | End: 2023-12-13 | Stop reason: HOSPADM

## 2023-12-12 RX ORDER — ATORVASTATIN CALCIUM 20 MG/1
20 TABLET, FILM COATED ORAL DAILY
Status: DISCONTINUED | OUTPATIENT
Start: 2023-12-12 | End: 2023-12-13

## 2023-12-12 RX ORDER — POLYETHYLENE GLYCOL 3350 17 G/17G
17 POWDER, FOR SOLUTION ORAL DAILY PRN
Status: DISCONTINUED | OUTPATIENT
Start: 2023-12-12 | End: 2023-12-13 | Stop reason: HOSPADM

## 2023-12-12 RX ORDER — HYDRALAZINE HYDROCHLORIDE 50 MG/1
50 TABLET, FILM COATED ORAL 3 TIMES DAILY
Status: DISCONTINUED | OUTPATIENT
Start: 2023-12-12 | End: 2023-12-13 | Stop reason: HOSPADM

## 2023-12-12 RX ORDER — DILTIAZEM HYDROCHLORIDE 120 MG/1
120 CAPSULE, EXTENDED RELEASE ORAL DAILY
Status: ON HOLD | COMMUNITY
End: 2023-12-12

## 2023-12-12 RX ORDER — ASPIRIN 81 MG/1
81 TABLET, CHEWABLE ORAL DAILY
Status: DISCONTINUED | OUTPATIENT
Start: 2023-12-12 | End: 2023-12-13 | Stop reason: HOSPADM

## 2023-12-12 RX ORDER — SODIUM CHLORIDE 9 MG/ML
INJECTION, SOLUTION INTRAVENOUS PRN
Status: DISCONTINUED | OUTPATIENT
Start: 2023-12-12 | End: 2023-12-13 | Stop reason: HOSPADM

## 2023-12-12 RX ORDER — ONDANSETRON 4 MG/1
4 TABLET, ORALLY DISINTEGRATING ORAL EVERY 8 HOURS PRN
Status: DISCONTINUED | OUTPATIENT
Start: 2023-12-12 | End: 2023-12-13 | Stop reason: HOSPADM

## 2023-12-12 RX ORDER — GABAPENTIN 300 MG/1
300 CAPSULE ORAL NIGHTLY
COMMUNITY
Start: 2023-11-01

## 2023-12-12 RX ORDER — ENOXAPARIN SODIUM 100 MG/ML
30 INJECTION SUBCUTANEOUS 2 TIMES DAILY
Status: DISCONTINUED | OUTPATIENT
Start: 2023-12-12 | End: 2023-12-12

## 2023-12-12 RX ORDER — INSULIN LISPRO 100 [IU]/ML
0-4 INJECTION, SOLUTION INTRAVENOUS; SUBCUTANEOUS NIGHTLY
Status: DISCONTINUED | OUTPATIENT
Start: 2023-12-12 | End: 2023-12-13 | Stop reason: HOSPADM

## 2023-12-12 RX ORDER — POTASSIUM CHLORIDE 7.45 MG/ML
10 INJECTION INTRAVENOUS PRN
Status: DISCONTINUED | OUTPATIENT
Start: 2023-12-12 | End: 2023-12-12

## 2023-12-12 RX ORDER — HYDRALAZINE HYDROCHLORIDE 50 MG/1
50 TABLET, FILM COATED ORAL DAILY
COMMUNITY

## 2023-12-12 RX ORDER — ACETAMINOPHEN 325 MG/1
650 TABLET ORAL EVERY 6 HOURS PRN
Status: DISCONTINUED | OUTPATIENT
Start: 2023-12-12 | End: 2023-12-13 | Stop reason: HOSPADM

## 2023-12-12 RX ORDER — DILTIAZEM HYDROCHLORIDE 120 MG/1
120 CAPSULE, EXTENDED RELEASE ORAL 2 TIMES DAILY
COMMUNITY

## 2023-12-12 RX ORDER — DEXTROSE MONOHYDRATE 100 MG/ML
INJECTION, SOLUTION INTRAVENOUS CONTINUOUS PRN
Status: DISCONTINUED | OUTPATIENT
Start: 2023-12-12 | End: 2023-12-13 | Stop reason: HOSPADM

## 2023-12-12 RX ADMIN — DILTIAZEM HYDROCHLORIDE 120 MG: 60 CAPSULE, EXTENDED RELEASE ORAL at 20:56

## 2023-12-12 RX ADMIN — HYDRALAZINE HYDROCHLORIDE 50 MG: 50 TABLET, FILM COATED ORAL at 20:56

## 2023-12-12 RX ADMIN — ASPIRIN 81 MG: 81 TABLET, CHEWABLE ORAL at 11:40

## 2023-12-12 RX ADMIN — ATORVASTATIN CALCIUM 20 MG: 20 TABLET, FILM COATED ORAL at 10:35

## 2023-12-12 RX ADMIN — APIXABAN 5 MG: 5 TABLET, FILM COATED ORAL at 20:56

## 2023-12-12 RX ADMIN — PERFLUTREN 1.5 ML: 6.52 INJECTION, SUSPENSION INTRAVENOUS at 16:22

## 2023-12-12 RX ADMIN — APIXABAN 5 MG: 5 TABLET, FILM COATED ORAL at 10:35

## 2023-12-12 RX ADMIN — SODIUM CHLORIDE, PRESERVATIVE FREE 10 ML: 5 INJECTION INTRAVENOUS at 20:57

## 2023-12-12 RX ADMIN — DILTIAZEM HYDROCHLORIDE 120 MG: 60 CAPSULE, EXTENDED RELEASE ORAL at 10:36

## 2023-12-12 RX ADMIN — HYDRALAZINE HYDROCHLORIDE 50 MG: 50 TABLET, FILM COATED ORAL at 10:35

## 2023-12-12 RX ADMIN — GABAPENTIN 300 MG: 300 CAPSULE ORAL at 20:56

## 2023-12-12 RX ADMIN — DOXAZOSIN 8 MG: 4 TABLET ORAL at 20:57

## 2023-12-12 RX ADMIN — SODIUM CHLORIDE, PRESERVATIVE FREE 10 ML: 5 INJECTION INTRAVENOUS at 10:36

## 2023-12-12 RX ADMIN — HYDRALAZINE HYDROCHLORIDE 50 MG: 50 TABLET, FILM COATED ORAL at 15:49

## 2023-12-12 ASSESSMENT — PAIN SCALES - GENERAL
PAINLEVEL_OUTOF10: 0
PAINLEVEL_OUTOF10: 0

## 2023-12-12 NOTE — CONSULTS
[facility]  Neurology Consult    Date:  12/12/2023  Patient Name:  Tien Willard  YOB: 1945  MRN: [id number]     PCP:  No primary care provider on file. Referring:  No ref. provider found      Chief Complaint: double vision    History obtained from: patient    Linda Mckenzie is a 70 y.o. male presenting with horizontal binocular diplopia with apparent deficits of both the right 3rd and 6th cranial nerves suggesting either an infarct in the posterior circulation vs a multifocal cranial polyneuropathy. I will plan to treat as stroke for present given lack of progression, and, in fact, mild subjective improvement from the patient's perspective and inability to obtain MRI at our institution. I will also check labs for possible alternative etiologies. Plan  Add ASA 81 to patient's Eliquis  Statin therapy, goal LDL<70  Labs: ACE, MAGALI, Lyme assay, AChR antibodies        History of Present Illness:  Tien Willard is a 70 y.o. right handed male presenting for evaluation of double vision. The patient states that he woke up with double vision abruptly in October 2023. He had previously been sent in for imaging and had CTA/V head which did not show any causes for his symptoms. He is unable to get MRI done due to and old pacemaker. He again saw his ophthalmologist the day prior to admission and was told that his eye appeared worse on their measurements. He feels that his vision is somewhat better than when his symptoms initially began. He initially noted double vision except when looking to his left. Now he is able to look straight ahead without double vision, which is the improvement. He continues to have double vision when looking to his right. He has not noted any significant temporal fluctuation to his symptoms. He reports no associated headache, vision loss, dysarthria, dysphagia, or weakness/numbness in the extremities.  No history of tick bite or

## 2023-12-12 NOTE — PLAN OF CARE
Problem: ABCDS Injury Assessment  Goal: Absence of physical injury  Outcome: Progressing     Problem: Safety - Adult  Goal: Free from fall injury  Outcome: Progressing     Problem: ABCDS Injury Assessment  Goal: Absence of physical injury  Outcome: Progressing     Problem: Safety - Adult  Goal: Free from fall injury  Outcome: Progressing

## 2023-12-12 NOTE — PLAN OF CARE
Problem: ABCDS Injury Assessment  Goal: Absence of physical injury  12/12/2023 1058 by Deo Garces RN  Outcome: Progressing  12/12/2023 0202 by Demetrius Andrew RN  Outcome: Progressing     Problem: Safety - Adult  Goal: Free from fall injury  12/12/2023 1058 by Deo Garces RN  Outcome: Progressing  12/12/2023 0202 by Demetrius Andrew RN  Outcome: Progressing     Problem: Pain  Goal: Verbalizes/displays adequate comfort level or baseline comfort level  Outcome: Progressing

## 2023-12-12 NOTE — CARE COORDINATION
LENIN transition of care. Pt presented to [facility] ER secondary to double vision. Pt was sent in by his ophthalmologist.  Admitted inpatient with cranial nerve palsy. Neurology consulted. MRI unable to be completed d/t patient having a pacer from 1984 that is no longer working. Echo currently being done in the room. Will meet with pt later to discuss d/c planning. CM/WILSON to follow.   Angy Chin RN  [phone]

## 2023-12-13 VITALS
HEART RATE: 75 BPM | DIASTOLIC BLOOD PRESSURE: 53 MMHG | TEMPERATURE: 97.4 F | SYSTOLIC BLOOD PRESSURE: 107 MMHG | WEIGHT: 300 LBS | HEIGHT: 75 IN | BODY MASS INDEX: 37.3 KG/M2 | OXYGEN SATURATION: 97 % | RESPIRATION RATE: 16 BRPM

## 2023-12-13 LAB
ACE SERPL-CCNC: 25 U/L (ref 8–52)
ANA SER QL IA: NEGATIVE
ANION GAP SERPL CALCULATED.3IONS-SCNC: 13 MMOL/L (ref 7–16)
BACTERIA URNS QL MICRO: ABNORMAL
BASOPHILS # BLD: 0.03 K/UL (ref 0–0.2)
BASOPHILS NFR BLD: 1 % (ref 0–2)
BILIRUB UR QL STRIP: NEGATIVE
BUN SERPL-MCNC: 30 MG/DL (ref 6–23)
CALCIUM SERPL-MCNC: 8.9 MG/DL (ref 8.6–10.2)
CHLORIDE SERPL-SCNC: 106 MMOL/L (ref 98–107)
CLARITY UR: ABNORMAL
CO2 SERPL-SCNC: 19 MMOL/L (ref 22–29)
COLOR UR: YELLOW
CREAT SERPL-MCNC: 1.7 MG/DL (ref 0.7–1.2)
EOSINOPHIL # BLD: 0.08 K/UL (ref 0.05–0.5)
EOSINOPHILS RELATIVE PERCENT: 2 % (ref 0–6)
EPI CELLS #/AREA URNS HPF: ABNORMAL /HPF
ERYTHROCYTE [DISTWIDTH] IN BLOOD BY AUTOMATED COUNT: 15.6 % (ref 11.5–15)
GFR SERPL CREATININE-BSD FRML MDRD: 41 ML/MIN/1.73M2
GLUCOSE BLD-MCNC: 104 MG/DL (ref 74–99)
GLUCOSE BLD-MCNC: 156 MG/DL (ref 74–99)
GLUCOSE SERPL-MCNC: 86 MG/DL (ref 74–99)
GLUCOSE UR STRIP-MCNC: NEGATIVE MG/DL
HCT VFR BLD AUTO: 41.9 % (ref 37–54)
HGB BLD-MCNC: 13.9 G/DL (ref 12.5–16.5)
HGB UR QL STRIP.AUTO: ABNORMAL
IMM GRANULOCYTES # BLD AUTO: <0.03 K/UL (ref 0–0.58)
IMM GRANULOCYTES NFR BLD: 0 % (ref 0–5)
KETONES UR STRIP-MCNC: NEGATIVE MG/DL
LEUKOCYTE ESTERASE UR QL STRIP: ABNORMAL
LYMPHOCYTES NFR BLD: 0.62 K/UL (ref 1.5–4)
LYMPHOCYTES RELATIVE PERCENT: 12 % (ref 20–42)
MAGNESIUM SERPL-MCNC: 2 MG/DL (ref 1.6–2.6)
MCH RBC QN AUTO: 29.3 PG (ref 26–35)
MCHC RBC AUTO-ENTMCNC: 33.2 G/DL (ref 32–34.5)
MCV RBC AUTO: 88.2 FL (ref 80–99.9)
MONOCYTES NFR BLD: 0.83 K/UL (ref 0.1–0.95)
MONOCYTES NFR BLD: 16 % (ref 2–12)
NEUTROPHILS NFR BLD: 70 % (ref 43–80)
NEUTS SEG NFR BLD: 3.63 K/UL (ref 1.8–7.3)
NITRITE UR QL STRIP: NEGATIVE
PH UR STRIP: >9 [PH] (ref 5–9)
PHOSPHATE SERPL-MCNC: 3.1 MG/DL (ref 2.5–4.5)
PLATELET # BLD AUTO: 150 K/UL (ref 130–450)
PMV BLD AUTO: 9.8 FL (ref 7–12)
POTASSIUM SERPL-SCNC: 3.9 MMOL/L (ref 3.5–5)
PROT UR STRIP-MCNC: 100 MG/DL
RBC # BLD AUTO: 4.75 M/UL (ref 3.8–5.8)
RBC #/AREA URNS HPF: ABNORMAL /HPF
SODIUM SERPL-SCNC: 138 MMOL/L (ref 132–146)
SP GR UR STRIP: 1.01 (ref 1–1.03)
UROBILINOGEN UR STRIP-ACNC: 0.2 EU/DL (ref 0–1)
WBC #/AREA URNS HPF: ABNORMAL /HPF
WBC OTHER # BLD: 5.2 K/UL (ref 4.5–11.5)

## 2023-12-13 PROCEDURE — 6370000000 HC RX 637 (ALT 250 FOR IP): Performed by: INTERNAL MEDICINE

## 2023-12-13 PROCEDURE — 99233 SBSQ HOSP IP/OBS HIGH 50: CPT | Performed by: INTERNAL MEDICINE

## 2023-12-13 PROCEDURE — 80048 BASIC METABOLIC PNL TOTAL CA: CPT

## 2023-12-13 PROCEDURE — 99239 HOSP IP/OBS DSCHRG MGMT >30: CPT | Performed by: INTERNAL MEDICINE

## 2023-12-13 PROCEDURE — 36415 COLL VENOUS BLD VENIPUNCTURE: CPT

## 2023-12-13 PROCEDURE — 83735 ASSAY OF MAGNESIUM: CPT

## 2023-12-13 PROCEDURE — 6370000000 HC RX 637 (ALT 250 FOR IP): Performed by: PSYCHIATRY & NEUROLOGY

## 2023-12-13 PROCEDURE — 82962 GLUCOSE BLOOD TEST: CPT

## 2023-12-13 PROCEDURE — 84100 ASSAY OF PHOSPHORUS: CPT

## 2023-12-13 PROCEDURE — 99232 SBSQ HOSP IP/OBS MODERATE 35: CPT | Performed by: PHYSICIAN ASSISTANT

## 2023-12-13 PROCEDURE — 85025 COMPLETE CBC W/AUTO DIFF WBC: CPT

## 2023-12-13 PROCEDURE — 2580000003 HC RX 258: Performed by: INTERNAL MEDICINE

## 2023-12-13 RX ORDER — CIPROFLOXACIN 500 MG/1
500 TABLET, FILM COATED ORAL EVERY 12 HOURS SCHEDULED
Status: DISCONTINUED | OUTPATIENT
Start: 2023-12-13 | End: 2023-12-13 | Stop reason: HOSPADM

## 2023-12-13 RX ORDER — CIPROFLOXACIN 500 MG/1
500 TABLET, FILM COATED ORAL EVERY 12 HOURS SCHEDULED
Qty: 9 TABLET | Refills: 0 | Status: SHIPPED | OUTPATIENT
Start: 2023-12-13 | End: 2023-12-18

## 2023-12-13 RX ORDER — ASPIRIN 81 MG/1
81 TABLET, CHEWABLE ORAL DAILY
Qty: 30 TABLET | Refills: 0 | Status: SHIPPED | OUTPATIENT
Start: 2023-12-14

## 2023-12-13 RX ORDER — ATORVASTATIN CALCIUM 40 MG/1
40 TABLET, FILM COATED ORAL DAILY
Status: DISCONTINUED | OUTPATIENT
Start: 2023-12-13 | End: 2023-12-13 | Stop reason: HOSPADM

## 2023-12-13 RX ORDER — ATORVASTATIN CALCIUM 40 MG/1
40 TABLET, FILM COATED ORAL DAILY
Qty: 30 TABLET | Refills: 0 | Status: SHIPPED | OUTPATIENT
Start: 2023-12-14

## 2023-12-13 RX ADMIN — DILTIAZEM HYDROCHLORIDE 120 MG: 60 CAPSULE, EXTENDED RELEASE ORAL at 08:47

## 2023-12-13 RX ADMIN — CIPROFLOXACIN 500 MG: 500 TABLET, FILM COATED ORAL at 11:58

## 2023-12-13 RX ADMIN — APIXABAN 5 MG: 5 TABLET, FILM COATED ORAL at 08:46

## 2023-12-13 RX ADMIN — ASPIRIN 81 MG: 81 TABLET, CHEWABLE ORAL at 08:46

## 2023-12-13 RX ADMIN — HYDRALAZINE HYDROCHLORIDE 50 MG: 50 TABLET, FILM COATED ORAL at 08:47

## 2023-12-13 RX ADMIN — ATORVASTATIN CALCIUM 40 MG: 40 TABLET, FILM COATED ORAL at 08:47

## 2023-12-13 RX ADMIN — SODIUM CHLORIDE, PRESERVATIVE FREE 10 ML: 5 INJECTION INTRAVENOUS at 08:47

## 2023-12-13 NOTE — DISCHARGE SUMMARY
sounds. Musculoskeletal: No clubbing, cyanosis or edema bilaterally. Full range of motion without deformity. Skin: Skin color, texture, turgor normal.  No rashes or lesions. Neurologic:  Neurovascularly intact without any focal sensory/motor deficits. Cranial nerves: II-XII intact, grossly non-focal.  Psychiatric: Alert and oriented, thought content appropriate, normal insight      Consults:     IP CONSULT TO NEUROLOGY  IP CONSULT TO NEUROLOGY    Significant Diagnostic Studies:     Echo (TTE) complete (PRN contrast/bubble/strain/3D)    Result Date: 12/12/2023    Left Ventricle: Normal left ventricular systolic function with a visually estimated EF of 60 - 65%. Left ventricle size is normal. Moderately increased wall thickness. Normal wall motion. Right Ventricle: Normal systolic function. Left Atrium: Left atrial volume index is mildly increased (35-41 mL/m2). Mitral Valve: Trace regurgitation. Tricuspid Valve: Mild regurgitation. The estimated RVSP is 33 mmHg. Pericardium: No pericardial effusion. Interatrial Septum: Agitated saline study was negative with and without provocation. Image quality is fair. Contrast used: Definity. Technically difficult study due to patient's body habitus. CT Head W/O Contrast    Result Date: 12/11/2023  EXAMINATION: CT OF THE HEAD WITHOUT CONTRAST  12/11/2023 2:50 pm TECHNIQUE: CT of the head was performed without the administration of intravenous contrast. Automated exposure control, iterative reconstruction, and/or weight based adjustment of the mA/kV was utilized to reduce the radiation dose to as low as reasonably achievable. COMPARISON: None. HISTORY: ORDERING SYSTEM PROVIDED HISTORY: cn nerve palsy TECHNOLOGIST PROVIDED HISTORY: Reason for exam:->cn nerve palsy Has a \"code stroke\" or \"stroke alert\" been called? ->No Decision Support Exception - unselect if not a suspected or confirmed emergency medical condition->Emergency Medical Condition (MA) What reading

## 2023-12-13 NOTE — PLAN OF CARE
Problem: Safety - Adult  Goal: Free from fall injury  12/13/2023 0028 by Sol Pena RN  Outcome: Progressing     Problem: ABCDS Injury Assessment  Goal: Absence of physical injury  12/13/2023 0028 by Sol Pena RN  Outcome: Progressing     Problem: Skin/Tissue Integrity  Goal: Absence of new skin breakdown  Description: 1. Monitor for areas of redness and/or skin breakdown  2. Assess vascular access sites hourly  3. Every 4-6 hours minimum:  Change oxygen saturation probe site  4. Every 4-6 hours:  If on nasal continuous positive airway pressure, respiratory therapy assess nares and determine need for appliance change or resting period.   Outcome: Progressing

## 2023-12-13 NOTE — ACP (ADVANCE CARE PLANNING)
Advance Care Planning   Healthcare Decision Maker:    Primary Decision Maker: Shell Douglas - [phone]    Click here to complete Healthcare Decision Makers including selection of the Healthcare Decision Maker Relationship (ie \"Primary\"). Today we documented Decision Maker(s) consistent with Legal Next of Kin hierarchy.        If the relationship to the patient does NOT follow our state's Next of Kin hierarchy, the patient MUST complete an ACP Document to allow him/her to act on the patient's behalf. :

## 2023-12-13 NOTE — CARE COORDINATION
CM update note. Neurology following and treating as stroke. Asprin added. MRI was not able to be obtained d/t his old pacer that is not compatible. Met with pt at bedside to discuss d/c planning. He and his wife live together in a single story home with wheelchair ramp access. 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 PTA admission pt is independent with ADLs and an active . He uses a walker and mobility scooter to get around. He also owns a cane. [facility] is actively coming to his home. Has been to [facility] in the past.  Pt is a  and sees Dr Rogelio Lantigua and the [place]. He fills are prescriptions at the [place]. Will need paper Rx written and will need to get the [place] before 4 pm.  If he needs a short term med such as an antibiotics he uses [facility] in [place]. Pt plans to return to home with [facility]. Mio orders placed. His wife will provide transport home on day of d/c. Notified [address] of admission. Left information via voicemail. CM/SW to follow. Francheska Lewis [phone].

## 2023-12-14 LAB
MICROORGANISM SPEC CULT: ABNORMAL
MICROORGANISM SPEC CULT: ABNORMAL
SPECIMEN DESCRIPTION: ABNORMAL

## 2023-12-15 LAB
B BURGDOR AB SER IA-ACNC: 0.96 IV
B BURGDOR IGG SERPL QL IA: 1.08 IV
B BURGDOR IGG+IGM SER IA-IMP: POSITIVE
B BURGDOR IGM SERPL QL IA: 0.33 IV

## 2023-12-16 LAB
ACHR BIND AB SER-SCNC: 0.2 NMOL/L (ref 0–0.4)
ACHR BLOCK AB/ACHR TOTAL SFR SER: 13 % (ref 0–26)

## 2024-01-24 ENCOUNTER — OFFICE VISIT (OUTPATIENT)
Dept: NEUROLOGY | Age: 79
End: 2024-01-24
Payer: MEDICARE

## 2024-01-24 VITALS
BODY MASS INDEX: 36.52 KG/M2 | HEIGHT: 76 IN | DIASTOLIC BLOOD PRESSURE: 67 MMHG | TEMPERATURE: 97.8 F | HEART RATE: 69 BPM | RESPIRATION RATE: 18 BRPM | SYSTOLIC BLOOD PRESSURE: 105 MMHG | OXYGEN SATURATION: 100 %

## 2024-01-24 DIAGNOSIS — A69.20 NEUROLOGICAL LYME DISEASE: Primary | ICD-10-CM

## 2024-01-24 PROCEDURE — 99213 OFFICE O/P EST LOW 20 MIN: CPT | Performed by: PHYSICIAN ASSISTANT

## 2024-01-24 PROCEDURE — 1036F TOBACCO NON-USER: CPT | Performed by: PHYSICIAN ASSISTANT

## 2024-01-24 PROCEDURE — 3074F SYST BP LT 130 MM HG: CPT | Performed by: PHYSICIAN ASSISTANT

## 2024-01-24 PROCEDURE — 3078F DIAST BP <80 MM HG: CPT | Performed by: PHYSICIAN ASSISTANT

## 2024-01-24 PROCEDURE — 1123F ACP DISCUSS/DSCN MKR DOCD: CPT | Performed by: PHYSICIAN ASSISTANT

## 2024-01-24 PROCEDURE — G8427 DOCREV CUR MEDS BY ELIG CLIN: HCPCS | Performed by: PHYSICIAN ASSISTANT

## 2024-01-24 PROCEDURE — G8417 CALC BMI ABV UP PARAM F/U: HCPCS | Performed by: PHYSICIAN ASSISTANT

## 2024-01-24 PROCEDURE — G8484 FLU IMMUNIZE NO ADMIN: HCPCS | Performed by: PHYSICIAN ASSISTANT

## 2024-01-24 NOTE — PROGRESS NOTES
CTA head / neck   IMPRESSION:  No flow-limiting large vessel stenosis in the head and neck.     No evidence of dural venous sinus thrombosis.    Echo     Left Ventricle: Normal left ventricular systolic function with a visually estimated EF of 60 - 65%. Left ventricle size is normal. Moderately increased wall thickness. Normal wall motion.    Right Ventricle: Normal systolic function.    Left Atrium: Left atrial volume index is mildly increased (35-41 mL/m2).    Mitral Valve: Trace regurgitation.    Tricuspid Valve: Mild regurgitation. The estimated RVSP is 33 mmHg.    Pericardium: No pericardial effusion.    Interatrial Septum: Agitated saline study was negative with and without provocation.    Image quality is fair. Contrast used: Definity. Technically difficult study due to patient's body habitus.    I have personally reviewed the following images:     There is a questionable right midbrain infarct noted which appears new from CT head in October 2023.      Electronically signed by SANDRA Hayes on 1/24/2024 at 2:34 PM

## 2024-06-21 ENCOUNTER — OFFICE VISIT (OUTPATIENT)
Dept: FAMILY MEDICINE CLINIC | Age: 79
End: 2024-06-21
Payer: MEDICARE

## 2024-06-21 VITALS
DIASTOLIC BLOOD PRESSURE: 62 MMHG | SYSTOLIC BLOOD PRESSURE: 104 MMHG | HEART RATE: 100 BPM | TEMPERATURE: 98.1 F | HEIGHT: 76 IN | OXYGEN SATURATION: 99 % | BODY MASS INDEX: 36.53 KG/M2

## 2024-06-21 DIAGNOSIS — R30.9 PAINFUL URINATION: ICD-10-CM

## 2024-06-21 DIAGNOSIS — R31.9 URINARY TRACT INFECTION WITH HEMATURIA, SITE UNSPECIFIED: Primary | ICD-10-CM

## 2024-06-21 DIAGNOSIS — N39.0 URINARY TRACT INFECTION WITH HEMATURIA, SITE UNSPECIFIED: Primary | ICD-10-CM

## 2024-06-21 LAB
BILIRUBIN, POC: NEGATIVE
BLOOD URINE, POC: NORMAL
CLARITY, POC: NORMAL
COLOR, POC: NORMAL
GLUCOSE URINE, POC: 500
KETONES, POC: NEGATIVE
LEUKOCYTE EST, POC: NORMAL
NITRITE, POC: NEGATIVE
PH, POC: 8.5
PROTEIN, POC: >300
SPECIFIC GRAVITY, POC: 1.02
UROBILINOGEN, POC: 1

## 2024-06-21 PROCEDURE — 99214 OFFICE O/P EST MOD 30 MIN: CPT | Performed by: PHYSICIAN ASSISTANT

## 2024-06-21 PROCEDURE — 1123F ACP DISCUSS/DSCN MKR DOCD: CPT | Performed by: PHYSICIAN ASSISTANT

## 2024-06-21 PROCEDURE — 81002 URINALYSIS NONAUTO W/O SCOPE: CPT | Performed by: PHYSICIAN ASSISTANT

## 2024-06-21 PROCEDURE — 3074F SYST BP LT 130 MM HG: CPT | Performed by: PHYSICIAN ASSISTANT

## 2024-06-21 PROCEDURE — 3078F DIAST BP <80 MM HG: CPT | Performed by: PHYSICIAN ASSISTANT

## 2024-06-21 RX ORDER — CEFDINIR 300 MG/1
300 CAPSULE ORAL 2 TIMES DAILY
Qty: 14 CAPSULE | Refills: 0 | Status: SHIPPED | OUTPATIENT
Start: 2024-06-21 | End: 2024-06-28

## 2024-06-21 NOTE — PROGRESS NOTES
Patient lives at home.    Physical Exam:     Vitals:    06/21/24 1146   BP: 104/62   Pulse: 100   Temp: 98.1 °F (36.7 °C)   SpO2: 99%   Height: 1.93 m (6' 3.98\")       Exam:  Physical Exam  Nurses note and vital signs reviewed and patient is not hypoxic.    General: The patient appears well and in no apparent distress. Patient is resting comfortably on cart.  Skin: Warm, dry, no pallor noted. There is no rash noted.  Head: Normocephalic, atraumatic.  Eye: Normal conjunctiva  Ears, Nose, Mouth, and Throat: Oral mucosa is moist  Cardiovascular: Regular Rate and Rhythm  Respiratory: Patient is in no distress, no accessory muscle use, lungs are clear to auscultation, no wheezing, crackles or rhonchi  Back: Non-tender, no CVA tenderness bilaterally to percussion.  GI: Normal bowel sounds, no tenderness to palpation, no masses appreciated. No rebound, guarding, or rigidity noted.  Neurological: A&O x4, normal speech      Testing:     Results for orders placed or performed in visit on 06/21/24   POCT Urinalysis no Micro   Result Value Ref Range    Color, UA cl     Clarity, UA cloudy     Glucose, UA      Bilirubin, UA negative     Ketones, UA negative     Spec Grav, UA 1.020     Blood, UA POC large     pH, UA 8.5     Protein, UA POC >300     Urobilinogen, UA 1.0     Leukocytes, UA Large     Nitrite, UA negative      Component    Specimen Description .CLEAN CATCH URINE   Culture PROTEUS MIRABILIS >100,000 CFU/ML Abnormal     GRAM NEGATIVE RODS <10,000 CFU/ML Abnormal    Resulting Agency [facility]        Susceptibility    Proteus mirabilis (1)    Antibiotic Interpretation Microscan Method Status    ceFAZolin Sensitive <=4 BACTERIAL SUSCEPTIBILITY PANEL FABRICE Final     Cefazolin sensitivity results can be used to predict the effectiveness of oral  cephalosporins (eg. Cephalexin) in uncomplicated Urinary Tract Infections due to E. coli, K.   pneumoniae, and P. mirabilis       cefepime

## 2024-06-23 LAB
CULTURE: NORMAL
SPECIMEN DESCRIPTION: NORMAL

## 2024-06-25 LAB
CULTURE: ABNORMAL
CULTURE: ABNORMAL
SPECIMEN DESCRIPTION: ABNORMAL

## 2024-11-21 ENCOUNTER — TELEPHONE (OUTPATIENT)
Dept: PRIMARY CARE CLINIC | Age: 79
End: 2024-11-21

## 2024-11-21 NOTE — TELEPHONE ENCOUNTER
----- Message from Diandra OLSON sent at 11/20/2024 10:17 AM EST -----  Regarding: ECC Appointment Request  ECC Appointment Request    Patient needs appointment for ECC Appointment Type: New Patient.    Patient Requested Dates(s):December 2024 or January 2025  Patient Requested Time:Any time  Provider Name:Mervin Ruiz DO    Reason for Appointment Request: New Patient - Requested Provider unavailable  --------------------------------------------------------------------------------------------------------------------------    Relationship to Patient: Self     Call Back Information: OK to leave message on voicemail  Preferred Call Back Number: Phone  [phone]

## 2024-12-09 ENCOUNTER — APPOINTMENT (OUTPATIENT)
Dept: GENERAL RADIOLOGY | Age: 79
End: 2024-12-09
Payer: MEDICARE

## 2024-12-09 ENCOUNTER — HOSPITAL ENCOUNTER (EMERGENCY)
Age: 79
Discharge: HOME | End: 2024-12-09
Attending: EMERGENCY MEDICINE
Payer: MEDICARE

## 2024-12-09 VITALS
BODY MASS INDEX: 28.01 KG/M2 | OXYGEN SATURATION: 99 % | TEMPERATURE: 98.7 F | WEIGHT: 230 LBS | HEART RATE: 85 BPM | SYSTOLIC BLOOD PRESSURE: 111 MMHG | DIASTOLIC BLOOD PRESSURE: 71 MMHG | RESPIRATION RATE: 15 BRPM
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
## 2024-12-09 DIAGNOSIS — N30.01 ACUTE CYSTITIS WITH HEMATURIA: ICD-10-CM

## 2024-12-09 LAB
ANION GAP SERPL CALCULATED.3IONS-SCNC: 13 MMOL/L (ref 7–16)
B PARAP IS1001 DNA NPH QL NAA+NON-PROBE: NOT DETECTED
B PERT DNA SPEC QL NAA+PROBE: NOT DETECTED
BACTERIA URNS QL MICRO: ABNORMAL
BILIRUB UR QL STRIP: NEGATIVE
BUN SERPL-MCNC: 50 MG/DL (ref 6–23)
C PNEUM DNA NPH QL NAA+NON-PROBE: NOT DETECTED
CALCIUM SERPL-MCNC: 9.1 MG/DL (ref 8.6–10.2)
CHLORIDE SERPL-SCNC: 100 MMOL/L (ref 98–107)
CLARITY UR: ABNORMAL
CO2 SERPL-SCNC: 19 MMOL/L (ref 22–29)
COLOR UR: ABNORMAL
CREAT SERPL-MCNC: 2 MG/DL (ref 0.7–1.2)
CRYSTALS URNS MICRO: ABNORMAL /HPF
ERYTHROCYTE [DISTWIDTH] IN BLOOD BY AUTOMATED COUNT: 15.9 % (ref 11.5–15)
FLUAV RNA NPH QL NAA+NON-PROBE: NOT DETECTED
FLUBV RNA NPH QL NAA+NON-PROBE: NOT DETECTED
GFR, ESTIMATED: 35 ML/MIN/1.73M2
GLUCOSE SERPL-MCNC: 166 MG/DL (ref 74–99)
GLUCOSE UR STRIP-MCNC: 250 MG/DL
HADV DNA NPH QL NAA+NON-PROBE: NOT DETECTED
HCOV 229E RNA NPH QL NAA+NON-PROBE: NOT DETECTED
HCOV HKU1 RNA NPH QL NAA+NON-PROBE: NOT DETECTED
HCOV NL63 RNA NPH QL NAA+NON-PROBE: NOT DETECTED
HCOV OC43 RNA NPH QL NAA+NON-PROBE: NOT DETECTED
HCT VFR BLD AUTO: 31.2 % (ref 37–54)
HGB BLD-MCNC: 9.7 G/DL (ref 12.5–16.5)
HGB UR QL STRIP.AUTO: ABNORMAL
HMPV RNA NPH QL NAA+NON-PROBE: NOT DETECTED
HPIV1 RNA NPH QL NAA+NON-PROBE: NOT DETECTED
HPIV2 RNA NPH QL NAA+NON-PROBE: NOT DETECTED
HPIV3 RNA NPH QL NAA+NON-PROBE: NOT DETECTED
HPIV4 RNA NPH QL NAA+NON-PROBE: NOT DETECTED
KETONES UR STRIP-MCNC: NEGATIVE MG/DL
LEUKOCYTE ESTERASE UR QL STRIP: ABNORMAL
M PNEUMO DNA NPH QL NAA+NON-PROBE: NOT DETECTED
MCH RBC QN AUTO: 25.4 PG (ref 26–35)
MCHC RBC AUTO-ENTMCNC: 31.1 G/DL (ref 32–34.5)
MCV RBC AUTO: 81.7 FL (ref 80–99.9)
NITRITE UR QL STRIP: NEGATIVE
PH UR STRIP: 8.5 [PH] (ref 5–9)
PLATELET # BLD AUTO: 296 K/UL (ref 130–450)
PMV BLD AUTO: 9.9 FL (ref 7–12)
POTASSIUM SERPL-SCNC: 4 MMOL/L (ref 3.5–5)
PROT UR STRIP-MCNC: 100 MG/DL
RBC # BLD AUTO: 3.82 M/UL (ref 3.8–5.8)
RBC #/AREA URNS HPF: ABNORMAL /HPF
RSV RNA NPH QL NAA+NON-PROBE: NOT DETECTED
RV+EV RNA NPH QL NAA+NON-PROBE: NOT DETECTED
SARS-COV-2 RNA NPH QL NAA+NON-PROBE: NOT DETECTED
SODIUM SERPL-SCNC: 132 MMOL/L (ref 132–146)
SP GR UR STRIP: 1.01 (ref 1–1.03)
SPECIMEN DESCRIPTION: NORMAL
UROBILINOGEN UR STRIP-ACNC: 0.2 EU/DL (ref 0–1)
WBC #/AREA URNS HPF: ABNORMAL /HPF
WBC OTHER # BLD: 9 K/UL (ref 4.5–11.5)

## 2024-12-09 PROCEDURE — 0202U NFCT DS 22 TRGT SARS-COV-2: CPT

## 2024-12-09 PROCEDURE — 81001 URINALYSIS AUTO W/SCOPE: CPT

## 2024-12-09 PROCEDURE — 6360000002 HC RX W HCPCS: Performed by: EMERGENCY MEDICINE

## 2024-12-09 PROCEDURE — 87077 CULTURE AEROBIC IDENTIFY: CPT

## 2024-12-09 PROCEDURE — 96374 THER/PROPH/DIAG INJ IV PUSH: CPT

## 2024-12-09 PROCEDURE — 71045 X-RAY EXAM CHEST 1 VIEW: CPT

## 2024-12-09 PROCEDURE — 72170 X-RAY EXAM OF PELVIS: CPT

## 2024-12-09 PROCEDURE — 87086 URINE CULTURE/COLONY COUNT: CPT

## 2024-12-09 PROCEDURE — 80048 BASIC METABOLIC PNL TOTAL CA: CPT

## 2024-12-09 PROCEDURE — 85027 COMPLETE CBC AUTOMATED: CPT

## 2024-12-09 PROCEDURE — 71101 X-RAY EXAM UNILAT RIBS/CHEST: CPT

## 2024-12-09 PROCEDURE — 73562 X-RAY EXAM OF KNEE 3: CPT

## 2024-12-09 PROCEDURE — 2580000003 HC RX 258: Performed by: EMERGENCY MEDICINE

## 2024-12-09 PROCEDURE — 93005 ELECTROCARDIOGRAM TRACING: CPT | Performed by: EMERGENCY MEDICINE

## 2024-12-09 PROCEDURE — 99285 EMERGENCY DEPT VISIT HI MDM: CPT

## 2024-12-09 PROCEDURE — 87040 BLOOD CULTURE FOR BACTERIA: CPT

## 2024-12-09 PROCEDURE — 6370000000 HC RX 637 (ALT 250 FOR IP): Performed by: EMERGENCY MEDICINE

## 2024-12-09 RX ORDER — ACETAMINOPHEN 325 MG/1
650 TABLET ORAL ONCE
Status: COMPLETED | OUTPATIENT
Start: 2024-12-09 | End: 2024-12-09

## 2024-12-09 RX ORDER — CEFDINIR 300 MG/1
300 CAPSULE ORAL 2 TIMES DAILY
Qty: 20 CAPSULE | Refills: 0 | Status: SHIPPED | OUTPATIENT
Start: 2024-12-09 | End: 2024-12-19

## 2024-12-09 RX ADMIN — WATER 2000 MG: 1 INJECTION INTRAMUSCULAR; INTRAVENOUS; SUBCUTANEOUS at 17:34

## 2024-12-09 RX ADMIN — ACETAMINOPHEN 650 MG: 325 TABLET ORAL at 12:49

## 2024-12-09 ASSESSMENT — PAIN SCALES - GENERAL
PAINLEVEL_OUTOF10: 2
PAINLEVEL_OUTOF10: 1

## 2024-12-09 ASSESSMENT — PAIN DESCRIPTION - ORIENTATION: ORIENTATION: RIGHT

## 2024-12-09 ASSESSMENT — PAIN DESCRIPTION - DESCRIPTORS: DESCRIPTORS: ACHING

## 2024-12-09 ASSESSMENT — PAIN - FUNCTIONAL ASSESSMENT
PAIN_FUNCTIONAL_ASSESSMENT: ACTIVITIES ARE NOT PREVENTED
PAIN_FUNCTIONAL_ASSESSMENT: 0-10

## 2024-12-09 ASSESSMENT — PAIN DESCRIPTION - FREQUENCY: FREQUENCY: CONTINUOUS

## 2024-12-09 NOTE — ED PROVIDER NOTES
HPI:  12/9/24, Time: 6:43 PM EST         Geovanny Kraft is a 79 y.o. male presenting to the ED for patient had a mechanical fall from standing to bedside commode states his legs felt weak and gave out he has right sided BKA.  Patient landed on a trash basket on his right lower ribs he is on Eliquis for for pacemaker.  He did not strike his head.  No loss of consciousness.  He is no hemoptysis.  He also complains of left knee pain when he fell., beginning several hours ago.  The complaint has been persistent, mild in severity, and worsened by nothing.  \    Review of Systems:   A complete review of systems was performed and pertinent positives and negatives are stated within HPI, all other systems reviewed and are negative.    --------------------------------------------- PAST HISTORY ---------------------------------------------  Past Medical History:  has a past medical history of Diabetes mellitus (HCC), Diabetic foot ulcer associated with type 2 diabetes mellitus, with fat layer exposed (HCC), Diabetic peripheral neuropathy (HCC), Hyperlipidemia, Hypertension, and RLS (restless legs syndrome).    Past Surgical History:  has a past surgical history that includes Foot Amputation; other surgical history (9/20/2014); Foot surgery (Left, 5/29/16); pacemaker placement; and Insert Picc Line (7/23/2019).    Social History:  reports that he quit smoking about 40 years ago. His smoking use included cigarettes. He started smoking about 61 years ago. He has a 42 pack-year smoking history. He has quit using smokeless tobacco.  His smokeless tobacco use included chew. He reports that he does not drink alcohol and does not use drugs.    Family History: family history includes Cancer in his brother; Diabetes in his mother; Heart Attack in his brother; Heart Disease in his mother; Other in his father; Parkinsonism in his sister.     The patient’s home medications have been reviewed.    Allergies: Zosyn [piperacillin

## 2024-12-10 LAB
EKG ATRIAL RATE: 107 BPM
EKG Q-T INTERVAL: 306 MS
EKG QRS DURATION: 94 MS
EKG QTC CALCULATION (BAZETT): 416 MS
EKG R AXIS: 101 DEGREES
EKG T AXIS: 38 DEGREES
EKG VENTRICULAR RATE: 111 BPM

## 2024-12-10 PROCEDURE — 93010 ELECTROCARDIOGRAM REPORT: CPT | Performed by: INTERNAL MEDICINE

## 2024-12-11 ENCOUNTER — OFFICE VISIT (OUTPATIENT)
Dept: PRIMARY CARE CLINIC | Age: 79
End: 2024-12-11
Payer: MEDICARE

## 2024-12-11 VITALS
RESPIRATION RATE: 18 BRPM | TEMPERATURE: 97.2 F | HEART RATE: 85 BPM | DIASTOLIC BLOOD PRESSURE: 75 MMHG | BODY MASS INDEX: 28.01 KG/M2 | HEIGHT: 76 IN | WEIGHT: 230 LBS | SYSTOLIC BLOOD PRESSURE: 130 MMHG | OXYGEN SATURATION: 91 %

## 2024-12-11 DIAGNOSIS — N39.0 COMPLICATED UTI (URINARY TRACT INFECTION): ICD-10-CM

## 2024-12-11 DIAGNOSIS — R49.0 HOARSENESS OF VOICE: ICD-10-CM

## 2024-12-11 DIAGNOSIS — R07.89 CHEST WALL TENDERNESS: Primary | ICD-10-CM

## 2024-12-11 PROBLEM — N18.2 CKD STAGE 2 DUE TO TYPE 2 DIABETES MELLITUS (HCC): Chronic | Status: RESOLVED | Noted: 2019-07-17 | Resolved: 2024-12-11

## 2024-12-11 PROBLEM — E11.22 CKD STAGE 2 DUE TO TYPE 2 DIABETES MELLITUS (HCC): Chronic | Status: RESOLVED | Noted: 2019-07-17 | Resolved: 2024-12-11

## 2024-12-11 LAB
MICROORGANISM SPEC CULT: ABNORMAL
SPECIMEN DESCRIPTION: ABNORMAL

## 2024-12-11 PROCEDURE — G8484 FLU IMMUNIZE NO ADMIN: HCPCS | Performed by: FAMILY MEDICINE

## 2024-12-11 PROCEDURE — 3078F DIAST BP <80 MM HG: CPT | Performed by: FAMILY MEDICINE

## 2024-12-11 PROCEDURE — 3075F SYST BP GE 130 - 139MM HG: CPT | Performed by: FAMILY MEDICINE

## 2024-12-11 PROCEDURE — 1123F ACP DISCUSS/DSCN MKR DOCD: CPT | Performed by: FAMILY MEDICINE

## 2024-12-11 PROCEDURE — G8427 DOCREV CUR MEDS BY ELIG CLIN: HCPCS | Performed by: FAMILY MEDICINE

## 2024-12-11 PROCEDURE — G8417 CALC BMI ABV UP PARAM F/U: HCPCS | Performed by: FAMILY MEDICINE

## 2024-12-11 PROCEDURE — 1036F TOBACCO NON-USER: CPT | Performed by: FAMILY MEDICINE

## 2024-12-11 PROCEDURE — 1159F MED LIST DOCD IN RCRD: CPT | Performed by: FAMILY MEDICINE

## 2024-12-11 PROCEDURE — 99203 OFFICE O/P NEW LOW 30 MIN: CPT | Performed by: FAMILY MEDICINE

## 2024-12-11 RX ORDER — ROPINIROLE 1 MG/1
1 TABLET, FILM COATED ORAL
COMMUNITY
Start: 2024-10-10

## 2024-12-11 RX ORDER — PANTOPRAZOLE SODIUM 40 MG/1
40 TABLET, DELAYED RELEASE ORAL
Qty: 30 TABLET | Refills: 5 | Status: SHIPPED | OUTPATIENT
Start: 2024-12-11

## 2024-12-11 ASSESSMENT — ENCOUNTER SYMPTOMS
SHORTNESS OF BREATH: 0
ALLERGIC/IMMUNOLOGIC NEGATIVE: 1
COLOR CHANGE: 0
BLOOD IN STOOL: 0
NAUSEA: 0
APNEA: 0
SORE THROAT: 0
DIARRHEA: 0
FACIAL SWELLING: 0
CHEST TIGHTNESS: 0
VOMITING: 0
ABDOMINAL PAIN: 0
BACK PAIN: 0
WHEEZING: 0
PHOTOPHOBIA: 0
SINUS PRESSURE: 0
COUGH: 0

## 2024-12-11 NOTE — PROGRESS NOTES
Chief Complaint:   Chief Complaint   Patient presents with    New Patient    Fall     Day before yesterday.     Urinary Tract Infection       Fall  The accident occurred 2 days ago. He fell from a height of 3 to 5 ft. He landed on Carpet. There was no blood loss. Point of impact: chest. The pain is at a severity of 2/10. The pain is mild. Pertinent negatives include no abdominal pain, headaches, nausea or vomiting.       Patient Active Problem List   Diagnosis    DM (diabetes mellitus) (HCC)    HTN (hypertension)    Hyperlipidemia    Morbid obesity    S/P placement of cardiac pacemaker    Cranial nerve palsy       Past Medical History:   Diagnosis Date    Diabetes mellitus (HCC)     Diabetic foot ulcer associated with type 2 diabetes mellitus, with fat layer exposed (HCC) 9/16/2016    Diabetic peripheral neuropathy (HCC)     Hyperlipidemia     Hypertension     RLS (restless legs syndrome)        Past Surgical History:   Procedure Laterality Date    FOOT AMPUTATION      FOOT SURGERY Left 5/29/16    I&D    INSERT PICC LINE  7/23/2019         OTHER SURGICAL HISTORY  9/20/2014    left foot debridement, I&D exostectomy, bone biopsy; I&D right lower leg(BK stump site)    PACEMAKER PLACEMENT      has been shut off for 5 years       Current Outpatient Medications   Medication Sig Dispense Refill    Semaglutide (OZEMPIC, 1 MG/DOSE, SC)       rOPINIRole (REQUIP) 1 MG tablet Take 1 tablet by mouth      pantoprazole (PROTONIX) 40 MG tablet Take 1 tablet by mouth every morning (before breakfast) 30 tablet 5    cefdinir (OMNICEF) 300 MG capsule Take 1 capsule by mouth 2 times daily for 10 days 20 capsule 0    aspirin 81 MG chewable tablet Take 1 tablet by mouth daily 30 tablet 0    atorvastatin (LIPITOR) 40 MG tablet Take 1 tablet by mouth daily 30 tablet 0    Semaglutide (OZEMPIC, 1 MG/DOSE, SC) Inject 1 Application into the skin once a week      hydrALAZINE (APRESOLINE) 50 MG tablet Take 1 tablet by mouth daily With food

## 2024-12-13 ENCOUNTER — TELEPHONE (OUTPATIENT)
Dept: ENT CLINIC | Age: 79
End: 2024-12-13

## 2024-12-13 ENCOUNTER — TELEPHONE (OUTPATIENT)
Dept: PRIMARY CARE CLINIC | Age: 79
End: 2024-12-13

## 2024-12-13 DIAGNOSIS — L97.509 TYPE 2 DIABETES MELLITUS WITH FOOT ULCER, UNSPECIFIED WHETHER LONG TERM INSULIN USE (HCC): Primary | Chronic | ICD-10-CM

## 2024-12-13 DIAGNOSIS — E11.621 TYPE 2 DIABETES MELLITUS WITH FOOT ULCER, UNSPECIFIED WHETHER LONG TERM INSULIN USE (HCC): Primary | Chronic | ICD-10-CM

## 2024-12-13 DIAGNOSIS — I73.9 PVD (PERIPHERAL VASCULAR DISEASE) (HCC): ICD-10-CM

## 2024-12-13 DIAGNOSIS — R49.0 HOARSENESS OF VOICE: ICD-10-CM

## 2024-12-13 NOTE — TELEPHONE ENCOUNTER
Ade with [facility] calling. Patient was seen as a new patient this week. Ade spoke with family and they are requesting referral to Hospice. Patient has a lot of issues, not swallowing, constantly choking. If agreeable, you can place order in Epic and they will put out of Epic and contact patient.

## 2024-12-13 NOTE — TELEPHONE ENCOUNTER
Pt's wife called in she would like to know if the pt can be seen sooner than 1/27/25? She said he is in severe distress with his cough, he can't eat, voice is hoarse and he has lost alot of weight, he also can't take a sip of water without choking, she is very concerned about him. Please, advise. Thank you. Whitley can be reached at [phone]

## 2024-12-14 LAB
MICROORGANISM SPEC CULT: NORMAL
MICROORGANISM SPEC CULT: NORMAL
SERVICE CMNT-IMP: NORMAL
SERVICE CMNT-IMP: NORMAL
SPECIMEN DESCRIPTION: NORMAL
SPECIMEN DESCRIPTION: NORMAL

## 2024-12-20 ENCOUNTER — PROCEDURE VISIT (OUTPATIENT)
Dept: AUDIOLOGY | Age: 79
End: 2024-12-20
Payer: MEDICARE

## 2024-12-20 ENCOUNTER — OFFICE VISIT (OUTPATIENT)
Dept: ENT CLINIC | Age: 79
End: 2024-12-20

## 2024-12-20 VITALS
HEART RATE: 97 BPM | TEMPERATURE: 98.5 F | BODY MASS INDEX: 28.01 KG/M2 | SYSTOLIC BLOOD PRESSURE: 107 MMHG | DIASTOLIC BLOOD PRESSURE: 58 MMHG | HEIGHT: 76 IN | WEIGHT: 230 LBS

## 2024-12-20 DIAGNOSIS — H69.93 DYSFUNCTION OF BOTH EUSTACHIAN TUBES: Primary | ICD-10-CM

## 2024-12-20 DIAGNOSIS — J39.2 NASOPHARYNGEAL MASS: Primary | ICD-10-CM

## 2024-12-20 DIAGNOSIS — Z01.812 PRE-PROCEDURE LAB EXAM: ICD-10-CM

## 2024-12-20 DIAGNOSIS — R49.0 HOARSENESS: ICD-10-CM

## 2024-12-20 DIAGNOSIS — R13.10 DYSPHAGIA, UNSPECIFIED TYPE: ICD-10-CM

## 2024-12-20 DIAGNOSIS — R63.4 WEIGHT LOSS: ICD-10-CM

## 2024-12-20 DIAGNOSIS — Z87.891 HISTORY OF TOBACCO ABUSE: ICD-10-CM

## 2024-12-20 PROCEDURE — 92567 TYMPANOMETRY: CPT

## 2024-12-20 ASSESSMENT — ENCOUNTER SYMPTOMS
SORE THROAT: 1
STRIDOR: 0
RESPIRATORY NEGATIVE: 1
SHORTNESS OF BREATH: 0
TROUBLE SWALLOWING: 1
VOICE CHANGE: 1
EYES NEGATIVE: 1

## 2024-12-20 NOTE — PROGRESS NOTES
This patient was referred for tympanometric testing by SAGE Briones due to eustachian tube dysfunction.     Tympanometry revealed normal middle ear peak pressure and compliance, bilaterally.    The results were reviewed with the patient and ordering provider.     Recommendations for follow up will be made pending ordering provider consult.    Jason Casas/CCC-WHITNEY  [place] Lic [id number]  Electronically signed by Jason Casas on 12/20/2024 at 8:57 AM

## 2024-12-20 NOTE — PROGRESS NOTES
[facility]  WEN NguyenO. Ms.Ed.  New Consult       Patient Name:  Geovanny Kraft  :  1945     CHIEF C/O:    Chief Complaint   Patient presents with    New Patient     NP hoarseness, sore throat and mouth, difficulty swallowing, lack of appetite. Started a few months ago but has gotten worse in the last 3 weeks. Weight loss 30 lbs in the last 6 months.       HISTORY OBTAINED FROM:  patient, spouse    HISTORY OF PRESENT ILLNESS:       Geovanny is a 79 y.o. year old male, here today for:       Sore throat, dysphagia, hoarseness.  Patient states his symptoms been present for approximately 6 months.  He states he has had a hoarse voice with a sore throat especially on the right side and difficulty swallowing.  He also has pain into his right ear during this time with a sensation of fullness like his ear needs to pop.  He does have a history of hearing loss with bilateral hearing aids.  Patient does have a history of smoking but stopped in  but did use chewing tobacco until .  He states he is lost approximately 30 pounds over the course of the past 6 months and has very little appetite at this time.  He also complains of weakness.  He denies any previous diagnosis of any head or neck cancers or family history of head or neck cancers.  He denies any recent fevers or recent antibiotics.  He denies any current congestion, rhinorrhea, postnasal drainage, sinus pain or pressure.  He denies any history of recurrent ear infections or previous ear surgeries.           Past Medical History:   Diagnosis Date    Diabetes mellitus (HCC)     Diabetic foot ulcer associated with type 2 diabetes mellitus, with fat layer exposed (HCC) 2016    Diabetic peripheral neuropathy (HCC)     Hyperlipidemia     Hypertension     RLS (restless legs syndrome)      Past Surgical History:   Procedure Laterality Date    FOOT AMPUTATION      FOOT SURGERY Left 16    I&D    INSERT PICC LINE  2019

## 2025-01-01 ENCOUNTER — APPOINTMENT (OUTPATIENT)
Dept: CT IMAGING | Age: 80
DRG: 641 | End: 2025-01-01
Payer: MEDICARE

## 2025-01-01 ENCOUNTER — APPOINTMENT (OUTPATIENT)
Dept: GENERAL RADIOLOGY | Age: 80
DRG: 689 | End: 2025-01-01
Payer: MEDICARE

## 2025-01-01 ENCOUNTER — APPOINTMENT (OUTPATIENT)
Dept: CT IMAGING | Age: 80
DRG: 689 | End: 2025-01-01
Payer: MEDICARE

## 2025-01-01 ENCOUNTER — PREP FOR PROCEDURE (OUTPATIENT)
Dept: UROLOGY | Age: 80
End: 2025-01-01

## 2025-01-01 ENCOUNTER — ANESTHESIA (OUTPATIENT)
Dept: OPERATING ROOM | Age: 80
DRG: 689 | End: 2025-01-01
Payer: MEDICARE

## 2025-01-01 ENCOUNTER — ANESTHESIA EVENT (OUTPATIENT)
Dept: OPERATING ROOM | Age: 80
DRG: 689 | End: 2025-01-01
Payer: MEDICARE

## 2025-01-01 ENCOUNTER — HOSPITAL ENCOUNTER (INPATIENT)
Age: 80
LOS: 1 days | Discharge: HOSPICE | DRG: 641 | End: 2025-03-25
Attending: EMERGENCY MEDICINE | Admitting: INTERNAL MEDICINE
Payer: MEDICARE

## 2025-01-01 ENCOUNTER — HOSPITAL ENCOUNTER (INPATIENT)
Age: 80
LOS: 7 days | Discharge: NURSING, REHAB OR LONG-TERM CARE | DRG: 689 | End: 2025-03-19
Attending: EMERGENCY MEDICINE | Admitting: HOSPITALIST
Payer: MEDICARE

## 2025-01-01 VITALS
DIASTOLIC BLOOD PRESSURE: 67 MMHG | SYSTOLIC BLOOD PRESSURE: 123 MMHG | RESPIRATION RATE: 17 BRPM | OXYGEN SATURATION: 97 % | HEART RATE: 80 BPM | BODY MASS INDEX: 31.7 KG/M2 | TEMPERATURE: 98 F | HEIGHT: 73 IN | WEIGHT: 239.2 LBS

## 2025-01-01 VITALS
HEIGHT: 73 IN | BODY MASS INDEX: 29.03 KG/M2 | OXYGEN SATURATION: 91 % | DIASTOLIC BLOOD PRESSURE: 63 MMHG | WEIGHT: 219 LBS | HEART RATE: 93 BPM | TEMPERATURE: 98.2 F | RESPIRATION RATE: 18 BRPM | SYSTOLIC BLOOD PRESSURE: 112 MMHG

## 2025-01-01 DIAGNOSIS — Z51.5 ENCOUNTER FOR HOSPICE CARE: Primary | ICD-10-CM

## 2025-01-01 DIAGNOSIS — Z97.8 CHRONIC INDWELLING FOLEY CATHETER: ICD-10-CM

## 2025-01-01 DIAGNOSIS — Z51.5 PALLIATIVE CARE ENCOUNTER: ICD-10-CM

## 2025-01-01 DIAGNOSIS — R41.82 ALTERED MENTAL STATUS, UNSPECIFIED ALTERED MENTAL STATUS TYPE: ICD-10-CM

## 2025-01-01 DIAGNOSIS — R26.2 AMBULATORY DYSFUNCTION: ICD-10-CM

## 2025-01-01 DIAGNOSIS — E87.6 HYPOKALEMIA: ICD-10-CM

## 2025-01-01 DIAGNOSIS — D64.9 ACUTE ANEMIA: ICD-10-CM

## 2025-01-01 DIAGNOSIS — R19.7 DIARRHEA, UNSPECIFIED TYPE: Primary | ICD-10-CM

## 2025-01-01 DIAGNOSIS — R53.1 GENERALIZED WEAKNESS: ICD-10-CM

## 2025-01-01 DIAGNOSIS — F41.9 ANXIETY: ICD-10-CM

## 2025-01-01 LAB
ABO + RH BLD: NORMAL
ALBUMIN SERPL-MCNC: 2.2 G/DL (ref 3.5–5.2)
ALBUMIN SERPL-MCNC: 2.5 G/DL (ref 3.5–5.2)
ALBUMIN SERPL-MCNC: 2.5 G/DL (ref 3.5–5.2)
ALP SERPL-CCNC: 105 U/L (ref 40–129)
ALP SERPL-CCNC: 105 U/L (ref 40–129)
ALP SERPL-CCNC: 126 U/L (ref 40–129)
ALT SERPL-CCNC: 18 U/L (ref 0–40)
ALT SERPL-CCNC: 21 U/L (ref 0–40)
ALT SERPL-CCNC: 28 U/L (ref 0–40)
AMMONIA PLAS-SCNC: <10 UMOL/L (ref 16–60)
ANION GAP SERPL CALCULATED.3IONS-SCNC: 10 MMOL/L (ref 7–16)
ANION GAP SERPL CALCULATED.3IONS-SCNC: 11 MMOL/L (ref 7–16)
ANION GAP SERPL CALCULATED.3IONS-SCNC: 12 MMOL/L (ref 7–16)
ANION GAP SERPL CALCULATED.3IONS-SCNC: 13 MMOL/L (ref 7–16)
ANION GAP SERPL CALCULATED.3IONS-SCNC: 9 MMOL/L (ref 7–16)
ARM BAND NUMBER: NORMAL
AST SERPL-CCNC: 35 U/L (ref 0–39)
AST SERPL-CCNC: 37 U/L (ref 0–39)
AST SERPL-CCNC: 50 U/L (ref 0–39)
B PARAP IS1001 DNA NPH QL NAA+NON-PROBE: NOT DETECTED
B PERT DNA SPEC QL NAA+PROBE: NOT DETECTED
BACTERIA URNS QL MICRO: ABNORMAL
BASOPHILS # BLD: 0 K/UL (ref 0–0.2)
BASOPHILS # BLD: 0.02 K/UL (ref 0–0.2)
BASOPHILS # BLD: 0.03 K/UL (ref 0–0.2)
BASOPHILS # BLD: 0.05 K/UL (ref 0–0.2)
BASOPHILS # BLD: 0.05 K/UL (ref 0–0.2)
BASOPHILS NFR BLD: 0 % (ref 0–2)
BASOPHILS NFR BLD: 1 % (ref 0–2)
BILIRUB SERPL-MCNC: 0.5 MG/DL (ref 0–1.2)
BILIRUB SERPL-MCNC: 0.5 MG/DL (ref 0–1.2)
BILIRUB SERPL-MCNC: 0.6 MG/DL (ref 0–1.2)
BILIRUB UR QL STRIP: NEGATIVE
BILIRUB UR QL STRIP: NEGATIVE
BLOOD BANK SAMPLE EXPIRATION: NORMAL
BLOOD GROUP ANTIBODIES SERPL: NEGATIVE
BUN SERPL-MCNC: 21 MG/DL (ref 6–23)
BUN SERPL-MCNC: 24 MG/DL (ref 6–23)
BUN SERPL-MCNC: 25 MG/DL (ref 6–23)
BUN SERPL-MCNC: 27 MG/DL (ref 6–23)
BUN SERPL-MCNC: 33 MG/DL (ref 6–23)
C DIFF GDH + TOXINS A+B STL QL IA.RAPID: ABNORMAL
C DIFFICILE TOXINS, PCR: NEGATIVE
C PNEUM DNA NPH QL NAA+NON-PROBE: NOT DETECTED
CALCIUM SERPL-MCNC: 8.3 MG/DL (ref 8.6–10.2)
CALCIUM SERPL-MCNC: 8.5 MG/DL (ref 8.6–10.2)
CALCIUM SERPL-MCNC: 8.6 MG/DL (ref 8.6–10.2)
CALCIUM SERPL-MCNC: 8.6 MG/DL (ref 8.6–10.2)
CALCIUM SERPL-MCNC: 8.7 MG/DL (ref 8.6–10.2)
CALCIUM SERPL-MCNC: 9 MG/DL (ref 8.6–10.2)
CALCIUM SERPL-MCNC: 9.3 MG/DL (ref 8.6–10.2)
CHLORIDE SERPL-SCNC: 102 MMOL/L (ref 98–107)
CHLORIDE SERPL-SCNC: 102 MMOL/L (ref 98–107)
CHLORIDE SERPL-SCNC: 103 MMOL/L (ref 98–107)
CHLORIDE SERPL-SCNC: 103 MMOL/L (ref 98–107)
CHLORIDE SERPL-SCNC: 104 MMOL/L (ref 98–107)
CHLORIDE SERPL-SCNC: 105 MMOL/L (ref 98–107)
CHLORIDE SERPL-SCNC: 106 MMOL/L (ref 98–107)
CHLORIDE SERPL-SCNC: 107 MMOL/L (ref 98–107)
CHLORIDE SERPL-SCNC: 99 MMOL/L (ref 98–107)
CK SERPL-CCNC: 65 U/L (ref 20–200)
CLARITY UR: ABNORMAL
CLARITY UR: CLEAR
CO2 SERPL-SCNC: 21 MMOL/L (ref 22–29)
CO2 SERPL-SCNC: 22 MMOL/L (ref 22–29)
CO2 SERPL-SCNC: 22 MMOL/L (ref 22–29)
CO2 SERPL-SCNC: 23 MMOL/L (ref 22–29)
CO2 SERPL-SCNC: 24 MMOL/L (ref 22–29)
CO2 SERPL-SCNC: 25 MMOL/L (ref 22–29)
CO2 SERPL-SCNC: 29 MMOL/L (ref 22–29)
COLOR UR: YELLOW
COLOR UR: YELLOW
CREAT SERPL-MCNC: 1.1 MG/DL (ref 0.7–1.2)
CREAT SERPL-MCNC: 1.3 MG/DL (ref 0.7–1.2)
CREAT SERPL-MCNC: 1.4 MG/DL (ref 0.7–1.2)
CREAT SERPL-MCNC: 1.5 MG/DL (ref 0.7–1.2)
CREAT SERPL-MCNC: 1.5 MG/DL (ref 0.7–1.2)
DATE, STOOL #1: NORMAL
EKG ATRIAL RATE: 375 BPM
EKG ATRIAL RATE: 79 BPM
EKG Q-T INTERVAL: 394 MS
EKG Q-T INTERVAL: 462 MS
EKG QRS DURATION: 108 MS
EKG QRS DURATION: 98 MS
EKG QTC CALCULATION (BAZETT): 413 MS
EKG QTC CALCULATION (BAZETT): 491 MS
EKG R AXIS: 107 DEGREES
EKG R AXIS: 137 DEGREES
EKG T AXIS: 129 DEGREES
EKG T AXIS: 25 DEGREES
EKG VENTRICULAR RATE: 66 BPM
EKG VENTRICULAR RATE: 68 BPM
EOSINOPHIL # BLD: 0.08 K/UL (ref 0.05–0.5)
EOSINOPHIL # BLD: 0.08 K/UL (ref 0.05–0.5)
EOSINOPHIL # BLD: 0.09 K/UL (ref 0.05–0.5)
EOSINOPHIL # BLD: 0.1 K/UL (ref 0.05–0.5)
EOSINOPHIL # BLD: 0.12 K/UL (ref 0.05–0.5)
EOSINOPHIL # BLD: 0.13 K/UL (ref 0.05–0.5)
EOSINOPHIL # BLD: 0.17 K/UL (ref 0.05–0.5)
EOSINOPHIL # BLD: 0.19 K/UL (ref 0.05–0.5)
EOSINOPHIL # BLD: 0.32 K/UL (ref 0.05–0.5)
EOSINOPHILS RELATIVE PERCENT: 1 % (ref 0–6)
EOSINOPHILS RELATIVE PERCENT: 2 % (ref 0–6)
EOSINOPHILS RELATIVE PERCENT: 2 % (ref 0–6)
EOSINOPHILS RELATIVE PERCENT: 3 % (ref 0–6)
ERYTHROCYTE [DISTWIDTH] IN BLOOD BY AUTOMATED COUNT: 16.4 % (ref 11.5–15)
ERYTHROCYTE [DISTWIDTH] IN BLOOD BY AUTOMATED COUNT: 16.4 % (ref 11.5–15)
ERYTHROCYTE [DISTWIDTH] IN BLOOD BY AUTOMATED COUNT: 16.5 % (ref 11.5–15)
ERYTHROCYTE [DISTWIDTH] IN BLOOD BY AUTOMATED COUNT: 16.5 % (ref 11.5–15)
ERYTHROCYTE [DISTWIDTH] IN BLOOD BY AUTOMATED COUNT: 16.6 % (ref 11.5–15)
ERYTHROCYTE [DISTWIDTH] IN BLOOD BY AUTOMATED COUNT: 16.7 % (ref 11.5–15)
ERYTHROCYTE [DISTWIDTH] IN BLOOD BY AUTOMATED COUNT: 16.9 % (ref 11.5–15)
ERYTHROCYTE [DISTWIDTH] IN BLOOD BY AUTOMATED COUNT: 16.9 % (ref 11.5–15)
FLUAV RNA NPH QL NAA+NON-PROBE: NOT DETECTED
FLUBV RNA NPH QL NAA+NON-PROBE: NOT DETECTED
GFR, ESTIMATED: 48 ML/MIN/1.73M2
GFR, ESTIMATED: 48 ML/MIN/1.73M2
GFR, ESTIMATED: 52 ML/MIN/1.73M2
GFR, ESTIMATED: 55 ML/MIN/1.73M2
GFR, ESTIMATED: 56 ML/MIN/1.73M2
GFR, ESTIMATED: 58 ML/MIN/1.73M2
GFR, ESTIMATED: 71 ML/MIN/1.73M2
GLUCOSE SERPL-MCNC: 108 MG/DL (ref 74–99)
GLUCOSE SERPL-MCNC: 116 MG/DL (ref 74–99)
GLUCOSE SERPL-MCNC: 121 MG/DL (ref 74–99)
GLUCOSE SERPL-MCNC: 126 MG/DL (ref 74–99)
GLUCOSE SERPL-MCNC: 132 MG/DL (ref 74–99)
GLUCOSE SERPL-MCNC: 139 MG/DL (ref 74–99)
GLUCOSE SERPL-MCNC: 141 MG/DL (ref 74–99)
GLUCOSE SERPL-MCNC: 150 MG/DL (ref 74–99)
GLUCOSE SERPL-MCNC: 92 MG/DL (ref 74–99)
GLUCOSE UR STRIP-MCNC: 500 MG/DL
GLUCOSE UR STRIP-MCNC: >=1000 MG/DL
HADV DNA NPH QL NAA+NON-PROBE: NOT DETECTED
HCOV 229E RNA NPH QL NAA+NON-PROBE: NOT DETECTED
HCOV HKU1 RNA NPH QL NAA+NON-PROBE: NOT DETECTED
HCOV NL63 RNA NPH QL NAA+NON-PROBE: NOT DETECTED
HCOV OC43 RNA NPH QL NAA+NON-PROBE: NOT DETECTED
HCT VFR BLD AUTO: 26.2 % (ref 37–54)
HCT VFR BLD AUTO: 27.3 % (ref 37–54)
HCT VFR BLD AUTO: 27.4 % (ref 37–54)
HCT VFR BLD AUTO: 27.9 % (ref 37–54)
HCT VFR BLD AUTO: 28.2 % (ref 37–54)
HCT VFR BLD AUTO: 28.6 % (ref 37–54)
HCT VFR BLD AUTO: 29.2 % (ref 37–54)
HCT VFR BLD AUTO: 29.4 % (ref 37–54)
HCT VFR BLD AUTO: 29.8 % (ref 37–54)
HCT VFR BLD AUTO: 33.7 % (ref 37–54)
HEMOCCULT SP1 STL QL: NEGATIVE
HGB BLD-MCNC: 10 G/DL (ref 12.5–16.5)
HGB BLD-MCNC: 8.2 G/DL (ref 12.5–16.5)
HGB BLD-MCNC: 8.3 G/DL (ref 12.5–16.5)
HGB BLD-MCNC: 8.4 G/DL (ref 12.5–16.5)
HGB BLD-MCNC: 8.5 G/DL (ref 12.5–16.5)
HGB BLD-MCNC: 8.5 G/DL (ref 12.5–16.5)
HGB BLD-MCNC: 8.6 G/DL (ref 12.5–16.5)
HGB BLD-MCNC: 8.7 G/DL (ref 12.5–16.5)
HGB BLD-MCNC: 8.9 G/DL (ref 12.5–16.5)
HGB BLD-MCNC: 9 G/DL (ref 12.5–16.5)
HGB UR QL STRIP.AUTO: ABNORMAL
HGB UR QL STRIP.AUTO: ABNORMAL
HMPV RNA NPH QL NAA+NON-PROBE: NOT DETECTED
HPIV1 RNA NPH QL NAA+NON-PROBE: NOT DETECTED
HPIV2 RNA NPH QL NAA+NON-PROBE: NOT DETECTED
HPIV3 RNA NPH QL NAA+NON-PROBE: NOT DETECTED
HPIV4 RNA NPH QL NAA+NON-PROBE: NOT DETECTED
IMM GRANULOCYTES # BLD AUTO: 0.04 K/UL (ref 0–0.58)
IMM GRANULOCYTES # BLD AUTO: 0.06 K/UL (ref 0–0.58)
IMM GRANULOCYTES # BLD AUTO: 0.06 K/UL (ref 0–0.58)
IMM GRANULOCYTES # BLD AUTO: 0.08 K/UL (ref 0–0.58)
IMM GRANULOCYTES # BLD AUTO: 0.08 K/UL (ref 0–0.58)
IMM GRANULOCYTES # BLD AUTO: 0.09 K/UL (ref 0–0.58)
IMM GRANULOCYTES NFR BLD: 0 % (ref 0–5)
IMM GRANULOCYTES NFR BLD: 1 % (ref 0–5)
INFLUENZA A BY PCR: NOT DETECTED
INFLUENZA A BY PCR: NOT DETECTED
INFLUENZA B BY PCR: NOT DETECTED
INFLUENZA B BY PCR: NOT DETECTED
KETONES UR STRIP-MCNC: NEGATIVE MG/DL
KETONES UR STRIP-MCNC: NEGATIVE MG/DL
LEUKOCYTE ESTERASE UR QL STRIP: ABNORMAL
LEUKOCYTE ESTERASE UR QL STRIP: ABNORMAL
LIPASE SERPL-CCNC: 11 U/L (ref 13–60)
LYMPHOCYTES NFR BLD: 0.16 K/UL (ref 1.5–4)
LYMPHOCYTES NFR BLD: 0.26 K/UL (ref 1.5–4)
LYMPHOCYTES NFR BLD: 0.32 K/UL (ref 1.5–4)
LYMPHOCYTES NFR BLD: 0.55 K/UL (ref 1.5–4)
LYMPHOCYTES NFR BLD: 0.56 K/UL (ref 1.5–4)
LYMPHOCYTES NFR BLD: 0.58 K/UL (ref 1.5–4)
LYMPHOCYTES NFR BLD: 0.59 K/UL (ref 1.5–4)
LYMPHOCYTES NFR BLD: 0.66 K/UL (ref 1.5–4)
LYMPHOCYTES NFR BLD: 0.72 K/UL (ref 1.5–4)
LYMPHOCYTES RELATIVE PERCENT: 2 % (ref 20–42)
LYMPHOCYTES RELATIVE PERCENT: 3 % (ref 20–42)
LYMPHOCYTES RELATIVE PERCENT: 3 % (ref 20–42)
LYMPHOCYTES RELATIVE PERCENT: 4 % (ref 20–42)
LYMPHOCYTES RELATIVE PERCENT: 6 % (ref 20–42)
LYMPHOCYTES RELATIVE PERCENT: 7 % (ref 20–42)
LYMPHOCYTES RELATIVE PERCENT: 8 % (ref 20–42)
M PNEUMO DNA NPH QL NAA+NON-PROBE: NOT DETECTED
MAGNESIUM SERPL-MCNC: 2.2 MG/DL (ref 1.6–2.6)
MAGNESIUM SERPL-MCNC: 2.2 MG/DL (ref 1.6–2.6)
MCH RBC QN AUTO: 25.6 PG (ref 26–35)
MCH RBC QN AUTO: 25.9 PG (ref 26–35)
MCH RBC QN AUTO: 26.1 PG (ref 26–35)
MCH RBC QN AUTO: 26.2 PG (ref 26–35)
MCH RBC QN AUTO: 26.3 PG (ref 26–35)
MCH RBC QN AUTO: 26.5 PG (ref 26–35)
MCH RBC QN AUTO: 26.6 PG (ref 26–35)
MCH RBC QN AUTO: 26.7 PG (ref 26–35)
MCHC RBC AUTO-ENTMCNC: 29.2 G/DL (ref 32–34.5)
MCHC RBC AUTO-ENTMCNC: 29.7 G/DL (ref 32–34.5)
MCHC RBC AUTO-ENTMCNC: 29.8 G/DL (ref 32–34.5)
MCHC RBC AUTO-ENTMCNC: 29.9 G/DL (ref 32–34.5)
MCHC RBC AUTO-ENTMCNC: 30.1 G/DL (ref 32–34.5)
MCHC RBC AUTO-ENTMCNC: 30.5 G/DL (ref 32–34.5)
MCHC RBC AUTO-ENTMCNC: 30.5 G/DL (ref 32–34.5)
MCHC RBC AUTO-ENTMCNC: 30.6 G/DL (ref 32–34.5)
MCHC RBC AUTO-ENTMCNC: 31.1 G/DL (ref 32–34.5)
MCHC RBC AUTO-ENTMCNC: 31.7 G/DL (ref 32–34.5)
MCV RBC AUTO: 84.2 FL (ref 80–99.9)
MCV RBC AUTO: 85.3 FL (ref 80–99.9)
MCV RBC AUTO: 85.7 FL (ref 80–99.9)
MCV RBC AUTO: 86.4 FL (ref 80–99.9)
MCV RBC AUTO: 86.7 FL (ref 80–99.9)
MCV RBC AUTO: 86.7 FL (ref 80–99.9)
MCV RBC AUTO: 86.9 FL (ref 80–99.9)
MCV RBC AUTO: 87 FL (ref 80–99.9)
MCV RBC AUTO: 87.3 FL (ref 80–99.9)
MCV RBC AUTO: 87.6 FL (ref 80–99.9)
MICROORGANISM SPEC CULT: ABNORMAL
MICROORGANISM SPEC CULT: NORMAL
MONOCYTES NFR BLD: 0.32 K/UL (ref 0.1–0.95)
MONOCYTES NFR BLD: 0.32 K/UL (ref 0.1–0.95)
MONOCYTES NFR BLD: 0.43 K/UL (ref 0.1–0.95)
MONOCYTES NFR BLD: 0.55 K/UL (ref 0.1–0.95)
MONOCYTES NFR BLD: 0.85 K/UL (ref 0.1–0.95)
MONOCYTES NFR BLD: 0.88 K/UL (ref 0.1–0.95)
MONOCYTES NFR BLD: 0.92 K/UL (ref 0.1–0.95)
MONOCYTES NFR BLD: 0.92 K/UL (ref 0.1–0.95)
MONOCYTES NFR BLD: 0.94 K/UL (ref 0.1–0.95)
MONOCYTES NFR BLD: 10 % (ref 2–12)
MONOCYTES NFR BLD: 10 % (ref 2–12)
MONOCYTES NFR BLD: 3 % (ref 2–12)
MONOCYTES NFR BLD: 4 % (ref 2–12)
MONOCYTES NFR BLD: 4 % (ref 2–12)
MONOCYTES NFR BLD: 7 % (ref 2–12)
MONOCYTES NFR BLD: 8 % (ref 2–12)
MONOCYTES NFR BLD: 8 % (ref 2–12)
MONOCYTES NFR BLD: 9 % (ref 2–12)
NEUTROPHILS NFR BLD: 80 % (ref 43–80)
NEUTROPHILS NFR BLD: 81 % (ref 43–80)
NEUTROPHILS NFR BLD: 82 % (ref 43–80)
NEUTROPHILS NFR BLD: 82 % (ref 43–80)
NEUTROPHILS NFR BLD: 83 % (ref 43–80)
NEUTROPHILS NFR BLD: 87 % (ref 43–80)
NEUTROPHILS NFR BLD: 91 % (ref 43–80)
NEUTROPHILS NFR BLD: 92 % (ref 43–80)
NEUTROPHILS NFR BLD: 94 % (ref 43–80)
NEUTS SEG NFR BLD: 10.99 K/UL (ref 1.8–7.3)
NEUTS SEG NFR BLD: 11.25 K/UL (ref 1.8–7.3)
NEUTS SEG NFR BLD: 5.38 K/UL (ref 1.8–7.3)
NEUTS SEG NFR BLD: 7.24 K/UL (ref 1.8–7.3)
NEUTS SEG NFR BLD: 7.58 K/UL (ref 1.8–7.3)
NEUTS SEG NFR BLD: 7.92 K/UL (ref 1.8–7.3)
NEUTS SEG NFR BLD: 8.73 K/UL (ref 1.8–7.3)
NEUTS SEG NFR BLD: 9.05 K/UL (ref 1.8–7.3)
NEUTS SEG NFR BLD: 9.32 K/UL (ref 1.8–7.3)
NITRITE UR QL STRIP: NEGATIVE
NITRITE UR QL STRIP: POSITIVE
PH UR STRIP: 5.5 [PH] (ref 5–8)
PH UR STRIP: 7.5 [PH] (ref 5–8)
PLATELET # BLD AUTO: 298 K/UL (ref 130–450)
PLATELET # BLD AUTO: 326 K/UL (ref 130–450)
PLATELET # BLD AUTO: 352 K/UL (ref 130–450)
PLATELET # BLD AUTO: 359 K/UL (ref 130–450)
PLATELET # BLD AUTO: 393 K/UL (ref 130–450)
PLATELET # BLD AUTO: 415 K/UL (ref 130–450)
PLATELET # BLD AUTO: 418 K/UL (ref 130–450)
PLATELET # BLD AUTO: 428 K/UL (ref 130–450)
PLATELET # BLD AUTO: 431 K/UL (ref 130–450)
PLATELET # BLD AUTO: 460 K/UL (ref 130–450)
PMV BLD AUTO: 10 FL (ref 7–12)
PMV BLD AUTO: 10.3 FL (ref 7–12)
PMV BLD AUTO: 9.5 FL (ref 7–12)
PMV BLD AUTO: 9.6 FL (ref 7–12)
PMV BLD AUTO: 9.6 FL (ref 7–12)
PMV BLD AUTO: 9.7 FL (ref 7–12)
PMV BLD AUTO: 9.8 FL (ref 7–12)
PMV BLD AUTO: 9.8 FL (ref 7–12)
PMV BLD AUTO: 9.9 FL (ref 7–12)
PMV BLD AUTO: 9.9 FL (ref 7–12)
POTASSIUM SERPL-SCNC: 3 MMOL/L (ref 3.5–5)
POTASSIUM SERPL-SCNC: 3.2 MMOL/L (ref 3.5–5)
POTASSIUM SERPL-SCNC: 3.3 MMOL/L (ref 3.5–5)
POTASSIUM SERPL-SCNC: 3.5 MMOL/L (ref 3.5–5)
POTASSIUM SERPL-SCNC: 3.8 MMOL/L (ref 3.5–5)
POTASSIUM SERPL-SCNC: 3.8 MMOL/L (ref 3.5–5)
POTASSIUM SERPL-SCNC: 3.9 MMOL/L (ref 3.5–5)
POTASSIUM SERPL-SCNC: 4 MMOL/L (ref 3.5–5)
POTASSIUM SERPL-SCNC: 4.2 MMOL/L (ref 3.5–5)
PROT SERPL-MCNC: 7.3 G/DL (ref 6.4–8.3)
PROT SERPL-MCNC: 7.9 G/DL (ref 6.4–8.3)
PROT SERPL-MCNC: 8.3 G/DL (ref 6.4–8.3)
PROT UR STRIP-MCNC: 30 MG/DL
PROT UR STRIP-MCNC: ABNORMAL MG/DL
RBC # BLD AUTO: 3.11 M/UL (ref 3.8–5.8)
RBC # BLD AUTO: 3.16 M/UL (ref 3.8–5.8)
RBC # BLD AUTO: 3.2 M/UL (ref 3.8–5.8)
RBC # BLD AUTO: 3.21 M/UL (ref 3.8–5.8)
RBC # BLD AUTO: 3.24 M/UL (ref 3.8–5.8)
RBC # BLD AUTO: 3.3 M/UL (ref 3.8–5.8)
RBC # BLD AUTO: 3.38 M/UL (ref 3.8–5.8)
RBC # BLD AUTO: 3.4 M/UL (ref 3.8–5.8)
RBC # BLD AUTO: 3.43 M/UL (ref 3.8–5.8)
RBC # BLD AUTO: 3.86 M/UL (ref 3.8–5.8)
RBC # BLD: ABNORMAL 10*6/UL
RBC # BLD: NORMAL 10*6/UL
RBC #/AREA URNS HPF: ABNORMAL /HPF
RBC #/AREA URNS HPF: ABNORMAL /HPF
RSV RNA NPH QL NAA+NON-PROBE: NOT DETECTED
RV+EV RNA NPH QL NAA+NON-PROBE: NOT DETECTED
SARS-COV-2 RDRP RESP QL NAA+PROBE: DETECTED
SARS-COV-2 RDRP RESP QL NAA+PROBE: NOT DETECTED
SARS-COV-2 RNA NPH QL NAA+NON-PROBE: DETECTED
SERVICE CMNT-IMP: ABNORMAL
SERVICE CMNT-IMP: ABNORMAL
SERVICE CMNT-IMP: NORMAL
SERVICE CMNT-IMP: NORMAL
SODIUM SERPL-SCNC: 133 MMOL/L (ref 132–146)
SODIUM SERPL-SCNC: 134 MMOL/L (ref 132–146)
SODIUM SERPL-SCNC: 134 MMOL/L (ref 132–146)
SODIUM SERPL-SCNC: 137 MMOL/L (ref 132–146)
SODIUM SERPL-SCNC: 138 MMOL/L (ref 132–146)
SODIUM SERPL-SCNC: 140 MMOL/L (ref 132–146)
SODIUM SERPL-SCNC: 140 MMOL/L (ref 132–146)
SODIUM SERPL-SCNC: 141 MMOL/L (ref 132–146)
SODIUM SERPL-SCNC: 141 MMOL/L (ref 132–146)
SP GR UR STRIP: 1.01 (ref 1–1.03)
SP GR UR STRIP: 1.01 (ref 1–1.03)
SPECIMEN DESCRIPTION: ABNORMAL
SPECIMEN DESCRIPTION: NORMAL
TIME, STOOL #1: 1651
TROPONIN I SERPL HS-MCNC: 24 NG/L (ref 0–11)
TROPONIN I SERPL HS-MCNC: 28 NG/L (ref 0–11)
TSH SERPL DL<=0.05 MIU/L-ACNC: 2.42 UIU/ML (ref 0.27–4.2)
UROBILINOGEN UR STRIP-ACNC: 0.2 EU/DL (ref 0–1)
UROBILINOGEN UR STRIP-ACNC: 1 EU/DL (ref 0–1)
WBC #/AREA URNS HPF: ABNORMAL /HPF
WBC #/AREA URNS HPF: ABNORMAL /HPF
WBC OTHER # BLD: 11.2 K/UL (ref 4.5–11.5)
WBC OTHER # BLD: 12.2 K/UL (ref 4.5–11.5)
WBC OTHER # BLD: 12.7 K/UL (ref 4.5–11.5)
WBC OTHER # BLD: 13.1 K/UL (ref 4.5–11.5)
WBC OTHER # BLD: 6.7 K/UL (ref 4.5–11.5)
WBC OTHER # BLD: 9 K/UL (ref 4.5–11.5)
WBC OTHER # BLD: 9.2 K/UL (ref 4.5–11.5)
WBC OTHER # BLD: 9.3 K/UL (ref 4.5–11.5)
WBC OTHER # BLD: 9.6 K/UL (ref 4.5–11.5)
WBC OTHER # BLD: 9.9 K/UL (ref 4.5–11.5)

## 2025-01-01 PROCEDURE — 85025 COMPLETE CBC W/AUTO DIFF WBC: CPT

## 2025-01-01 PROCEDURE — 3700000000 HC ANESTHESIA ATTENDED CARE: Performed by: SURGERY

## 2025-01-01 PROCEDURE — 92610 EVALUATE SWALLOWING FUNCTION: CPT

## 2025-01-01 PROCEDURE — 82272 OCCULT BLD FECES 1-3 TESTS: CPT

## 2025-01-01 PROCEDURE — 7100000011 HC PHASE II RECOVERY - ADDTL 15 MIN: Performed by: SURGERY

## 2025-01-01 PROCEDURE — 93010 ELECTROCARDIOGRAM REPORT: CPT | Performed by: INTERNAL MEDICINE

## 2025-01-01 PROCEDURE — 70450 CT HEAD/BRAIN W/O DYE: CPT

## 2025-01-01 PROCEDURE — 1200000000 HC SEMI PRIVATE

## 2025-01-01 PROCEDURE — 81001 URINALYSIS AUTO W/SCOPE: CPT

## 2025-01-01 PROCEDURE — 2500000003 HC RX 250 WO HCPCS: Performed by: INTERNAL MEDICINE

## 2025-01-01 PROCEDURE — 87045 FECES CULTURE AEROBIC BACT: CPT

## 2025-01-01 PROCEDURE — 36415 COLL VENOUS BLD VENIPUNCTURE: CPT

## 2025-01-01 PROCEDURE — 6370000000 HC RX 637 (ALT 250 FOR IP): Performed by: SURGERY

## 2025-01-01 PROCEDURE — 87324 CLOSTRIDIUM AG IA: CPT

## 2025-01-01 PROCEDURE — 73521 X-RAY EXAM HIPS BI 2 VIEWS: CPT

## 2025-01-01 PROCEDURE — 6360000002 HC RX W HCPCS: Performed by: EMERGENCY MEDICINE

## 2025-01-01 PROCEDURE — 2500000003 HC RX 250 WO HCPCS: Performed by: HOSPITALIST

## 2025-01-01 PROCEDURE — 6360000002 HC RX W HCPCS: Performed by: STUDENT IN AN ORGANIZED HEALTH CARE EDUCATION/TRAINING PROGRAM

## 2025-01-01 PROCEDURE — 83735 ASSAY OF MAGNESIUM: CPT

## 2025-01-01 PROCEDURE — 99232 SBSQ HOSP IP/OBS MODERATE 35: CPT | Performed by: STUDENT IN AN ORGANIZED HEALTH CARE EDUCATION/TRAINING PROGRAM

## 2025-01-01 PROCEDURE — 73552 X-RAY EXAM OF FEMUR 2/>: CPT

## 2025-01-01 PROCEDURE — G0378 HOSPITAL OBSERVATION PER HR: HCPCS

## 2025-01-01 PROCEDURE — 80053 COMPREHEN METABOLIC PANEL: CPT

## 2025-01-01 PROCEDURE — 2500000003 HC RX 250 WO HCPCS: Performed by: STUDENT IN AN ORGANIZED HEALTH CARE EDUCATION/TRAINING PROGRAM

## 2025-01-01 PROCEDURE — 3E0G76Z INTRODUCTION OF NUTRITIONAL SUBSTANCE INTO UPPER GI, VIA NATURAL OR ARTIFICIAL OPENING: ICD-10-PCS | Performed by: SURGERY

## 2025-01-01 PROCEDURE — 84484 ASSAY OF TROPONIN QUANT: CPT

## 2025-01-01 PROCEDURE — 96374 THER/PROPH/DIAG INJ IV PUSH: CPT

## 2025-01-01 PROCEDURE — 3609013300 HC EGD TUBE PLACEMENT: Performed by: SURGERY

## 2025-01-01 PROCEDURE — 51702 INSERT TEMP BLADDER CATH: CPT

## 2025-01-01 PROCEDURE — 0DH63UZ INSERTION OF FEEDING DEVICE INTO STOMACH, PERCUTANEOUS APPROACH: ICD-10-PCS | Performed by: SURGERY

## 2025-01-01 PROCEDURE — 80048 BASIC METABOLIC PNL TOTAL CA: CPT

## 2025-01-01 PROCEDURE — 6360000002 HC RX W HCPCS: Performed by: SURGERY

## 2025-01-01 PROCEDURE — 2500000003 HC RX 250 WO HCPCS: Performed by: SURGERY

## 2025-01-01 PROCEDURE — 6370000000 HC RX 637 (ALT 250 FOR IP): Performed by: STUDENT IN AN ORGANIZED HEALTH CARE EDUCATION/TRAINING PROGRAM

## 2025-01-01 PROCEDURE — 87040 BLOOD CULTURE FOR BACTERIA: CPT

## 2025-01-01 PROCEDURE — 93005 ELECTROCARDIOGRAM TRACING: CPT | Performed by: EMERGENCY MEDICINE

## 2025-01-01 PROCEDURE — 99285 EMERGENCY DEPT VISIT HI MDM: CPT

## 2025-01-01 PROCEDURE — 87077 CULTURE AEROBIC IDENTIFY: CPT

## 2025-01-01 PROCEDURE — 96375 TX/PRO/DX INJ NEW DRUG ADDON: CPT

## 2025-01-01 PROCEDURE — 84443 ASSAY THYROID STIM HORMONE: CPT

## 2025-01-01 PROCEDURE — 2580000003 HC RX 258: Performed by: EMERGENCY MEDICINE

## 2025-01-01 PROCEDURE — 87502 INFLUENZA DNA AMP PROBE: CPT

## 2025-01-01 PROCEDURE — 87086 URINE CULTURE/COLONY COUNT: CPT

## 2025-01-01 PROCEDURE — 85027 COMPLETE CBC AUTOMATED: CPT

## 2025-01-01 PROCEDURE — 3700000001 HC ADD 15 MINUTES (ANESTHESIA): Performed by: SURGERY

## 2025-01-01 PROCEDURE — 43762 RPLC GTUBE NO REVJ TRC: CPT

## 2025-01-01 PROCEDURE — 6370000000 HC RX 637 (ALT 250 FOR IP): Performed by: HOSPITALIST

## 2025-01-01 PROCEDURE — 7100000010 HC PHASE II RECOVERY - FIRST 15 MIN: Performed by: SURGERY

## 2025-01-01 PROCEDURE — 83690 ASSAY OF LIPASE: CPT

## 2025-01-01 PROCEDURE — 2580000003 HC RX 258: Performed by: HOSPITALIST

## 2025-01-01 PROCEDURE — 87493 C DIFF AMPLIFIED PROBE: CPT

## 2025-01-01 PROCEDURE — 87635 SARS-COV-2 COVID-19 AMP PRB: CPT

## 2025-01-01 PROCEDURE — 87449 NOS EACH ORGANISM AG IA: CPT

## 2025-01-01 PROCEDURE — 0202U NFCT DS 22 TRGT SARS-COV-2: CPT

## 2025-01-01 PROCEDURE — 99223 1ST HOSP IP/OBS HIGH 75: CPT | Performed by: INTERNAL MEDICINE

## 2025-01-01 PROCEDURE — 73560 X-RAY EXAM OF KNEE 1 OR 2: CPT

## 2025-01-01 PROCEDURE — 74176 CT ABD & PELVIS W/O CONTRAST: CPT

## 2025-01-01 PROCEDURE — 73030 X-RAY EXAM OF SHOULDER: CPT

## 2025-01-01 PROCEDURE — 86901 BLOOD TYPING SEROLOGIC RH(D): CPT

## 2025-01-01 PROCEDURE — 97161 PT EVAL LOW COMPLEX 20 MIN: CPT

## 2025-01-01 PROCEDURE — 86850 RBC ANTIBODY SCREEN: CPT

## 2025-01-01 PROCEDURE — 99231 SBSQ HOSP IP/OBS SF/LOW 25: CPT

## 2025-01-01 PROCEDURE — 96376 TX/PRO/DX INJ SAME DRUG ADON: CPT

## 2025-01-01 PROCEDURE — 87427 SHIGA-LIKE TOXIN AG IA: CPT

## 2025-01-01 PROCEDURE — 86900 BLOOD TYPING SEROLOGIC ABO: CPT

## 2025-01-01 PROCEDURE — 43246 EGD PLACE GASTROSTOMY TUBE: CPT | Performed by: SURGERY

## 2025-01-01 PROCEDURE — 99222 1ST HOSP IP/OBS MODERATE 55: CPT

## 2025-01-01 PROCEDURE — 97165 OT EVAL LOW COMPLEX 30 MIN: CPT

## 2025-01-01 PROCEDURE — 99233 SBSQ HOSP IP/OBS HIGH 50: CPT | Performed by: STUDENT IN AN ORGANIZED HEALTH CARE EDUCATION/TRAINING PROGRAM

## 2025-01-01 PROCEDURE — 71045 X-RAY EXAM CHEST 1 VIEW: CPT

## 2025-01-01 PROCEDURE — 6360000002 HC RX W HCPCS: Performed by: INTERNAL MEDICINE

## 2025-01-01 PROCEDURE — 97530 THERAPEUTIC ACTIVITIES: CPT

## 2025-01-01 PROCEDURE — 82550 ASSAY OF CK (CPK): CPT

## 2025-01-01 PROCEDURE — 2580000003 HC RX 258: Performed by: SURGERY

## 2025-01-01 PROCEDURE — 73562 X-RAY EXAM OF KNEE 3: CPT

## 2025-01-01 PROCEDURE — 2580000003 HC RX 258: Performed by: NURSE PRACTITIONER

## 2025-01-01 PROCEDURE — 99239 HOSP IP/OBS DSCHRG MGMT >30: CPT | Performed by: STUDENT IN AN ORGANIZED HEALTH CARE EDUCATION/TRAINING PROGRAM

## 2025-01-01 PROCEDURE — 6370000000 HC RX 637 (ALT 250 FOR IP): Performed by: EMERGENCY MEDICINE

## 2025-01-01 PROCEDURE — 82140 ASSAY OF AMMONIA: CPT

## 2025-01-01 PROCEDURE — 6360000002 HC RX W HCPCS: Performed by: NURSE PRACTITIONER

## 2025-01-01 PROCEDURE — 99222 1ST HOSP IP/OBS MODERATE 55: CPT | Performed by: HOSPITALIST

## 2025-01-01 PROCEDURE — 87046 STOOL CULTR AEROBIC BACT EA: CPT

## 2025-01-01 PROCEDURE — 6360000002 HC RX W HCPCS: Performed by: HOSPITALIST

## 2025-01-01 PROCEDURE — 6360000004 HC RX CONTRAST MEDICATION: Performed by: RADIOLOGY

## 2025-01-01 PROCEDURE — 99223 1ST HOSP IP/OBS HIGH 75: CPT | Performed by: SURGERY

## 2025-01-01 PROCEDURE — 73060 X-RAY EXAM OF HUMERUS: CPT

## 2025-01-01 PROCEDURE — 2709999900 HC NON-CHARGEABLE SUPPLY: Performed by: SURGERY

## 2025-01-01 RX ORDER — ONDANSETRON 2 MG/ML
4 INJECTION INTRAMUSCULAR; INTRAVENOUS EVERY 6 HOURS PRN
Status: DISCONTINUED | OUTPATIENT
Start: 2025-01-01 | End: 2025-01-01 | Stop reason: HOSPADM

## 2025-01-01 RX ORDER — SODIUM CHLORIDE 9 MG/ML
INJECTION, SOLUTION INTRAVENOUS PRN
Status: DISCONTINUED | OUTPATIENT
Start: 2025-01-01 | End: 2025-01-01 | Stop reason: HOSPADM

## 2025-01-01 RX ORDER — ALPRAZOLAM 0.25 MG
0.25 TABLET ORAL NIGHTLY
Status: DISCONTINUED | OUTPATIENT
Start: 2025-01-01 | End: 2025-01-01 | Stop reason: HOSPADM

## 2025-01-01 RX ORDER — POLYETHYLENE GLYCOL 3350 17 G/17G
17 POWDER, FOR SOLUTION ORAL DAILY PRN
Status: DISCONTINUED | OUTPATIENT
Start: 2025-01-01 | End: 2025-01-01 | Stop reason: HOSPADM

## 2025-01-01 RX ORDER — ASPIRIN 81 MG/1
81 TABLET, CHEWABLE ORAL DAILY
Status: DISCONTINUED | OUTPATIENT
Start: 2025-01-01 | End: 2025-01-01 | Stop reason: HOSPADM

## 2025-01-01 RX ORDER — ACETAMINOPHEN 650 MG/1
650 SUPPOSITORY RECTAL EVERY 6 HOURS PRN
Status: DISCONTINUED | OUTPATIENT
Start: 2025-01-01 | End: 2025-01-01 | Stop reason: HOSPADM

## 2025-01-01 RX ORDER — FENTANYL CITRATE 50 UG/ML
50 INJECTION, SOLUTION INTRAMUSCULAR; INTRAVENOUS EVERY 5 MIN PRN
Refills: 0 | Status: CANCELLED | OUTPATIENT
Start: 2025-01-01

## 2025-01-01 RX ORDER — METRONIDAZOLE 500 MG/100ML
500 INJECTION, SOLUTION INTRAVENOUS ONCE
Status: COMPLETED | OUTPATIENT
Start: 2025-01-01 | End: 2025-01-01

## 2025-01-01 RX ORDER — SODIUM CHLORIDE 9 MG/ML
INJECTION, SOLUTION INTRAVENOUS CONTINUOUS
Status: ACTIVE | OUTPATIENT
Start: 2025-01-01 | End: 2025-01-01

## 2025-01-01 RX ORDER — PANTOPRAZOLE SODIUM 40 MG/1
40 FOR SUSPENSION ORAL
COMMUNITY

## 2025-01-01 RX ORDER — SODIUM CHLORIDE 9 MG/ML
INJECTION, SOLUTION INTRAVENOUS CONTINUOUS
Status: DISCONTINUED | OUTPATIENT
Start: 2025-01-01 | End: 2025-01-01 | Stop reason: HOSPADM

## 2025-01-01 RX ORDER — NALOXONE HYDROCHLORIDE 0.4 MG/ML
INJECTION, SOLUTION INTRAMUSCULAR; INTRAVENOUS; SUBCUTANEOUS PRN
Status: CANCELLED | OUTPATIENT
Start: 2025-01-01

## 2025-01-01 RX ORDER — GABAPENTIN 300 MG/1
300 CAPSULE ORAL NIGHTLY
Status: DISCONTINUED | OUTPATIENT
Start: 2025-01-01 | End: 2025-01-01 | Stop reason: HOSPADM

## 2025-01-01 RX ORDER — PANTOPRAZOLE SODIUM 40 MG/1
40 TABLET, DELAYED RELEASE ORAL DAILY
Status: ON HOLD | DISCHARGE
Start: 2025-01-01 | End: 2025-01-01 | Stop reason: HOSPADM

## 2025-01-01 RX ORDER — MAGNESIUM SULFATE IN WATER 40 MG/ML
2000 INJECTION, SOLUTION INTRAVENOUS PRN
Status: DISCONTINUED | OUTPATIENT
Start: 2025-01-01 | End: 2025-01-01 | Stop reason: HOSPADM

## 2025-01-01 RX ORDER — SODIUM CHLORIDE 0.9 % (FLUSH) 0.9 %
5-40 SYRINGE (ML) INJECTION EVERY 12 HOURS SCHEDULED
Status: CANCELLED | OUTPATIENT
Start: 2025-01-01

## 2025-01-01 RX ORDER — ACETAMINOPHEN 325 MG/1
650 TABLET ORAL EVERY 6 HOURS PRN
Status: DISCONTINUED | OUTPATIENT
Start: 2025-01-01 | End: 2025-01-01 | Stop reason: HOSPADM

## 2025-01-01 RX ORDER — POTASSIUM CHLORIDE 1500 MG/1
40 TABLET, EXTENDED RELEASE ORAL PRN
Status: DISCONTINUED | OUTPATIENT
Start: 2025-01-01 | End: 2025-01-01 | Stop reason: HOSPADM

## 2025-01-01 RX ORDER — OXYCODONE HYDROCHLORIDE 5 MG/1
5 TABLET ORAL EVERY 6 HOURS PRN
Qty: 12 TABLET | Refills: 0 | Status: SHIPPED | OUTPATIENT
Start: 2025-01-01 | End: 2025-01-01

## 2025-01-01 RX ORDER — DOXYCYCLINE 100 MG/1
100 CAPSULE ORAL EVERY 12 HOURS SCHEDULED
Status: DISCONTINUED | OUTPATIENT
Start: 2025-01-01 | End: 2025-01-01 | Stop reason: HOSPADM

## 2025-01-01 RX ORDER — SODIUM CHLORIDE 0.9 % (FLUSH) 0.9 %
5-40 SYRINGE (ML) INJECTION PRN
Status: DISCONTINUED | OUTPATIENT
Start: 2025-01-01 | End: 2025-01-01 | Stop reason: HOSPADM

## 2025-01-01 RX ORDER — SODIUM CHLORIDE 0.9 % (FLUSH) 0.9 %
5-40 SYRINGE (ML) INJECTION PRN
Status: CANCELLED | OUTPATIENT
Start: 2025-01-01

## 2025-01-01 RX ORDER — DOXAZOSIN 4 MG/1
8 TABLET ORAL NIGHTLY
Status: DISCONTINUED | OUTPATIENT
Start: 2025-01-01 | End: 2025-01-01 | Stop reason: HOSPADM

## 2025-01-01 RX ORDER — GUAIFENESIN 400 MG/1
400 TABLET ORAL 4 TIMES DAILY
Status: DISCONTINUED | OUTPATIENT
Start: 2025-01-01 | End: 2025-01-01 | Stop reason: HOSPADM

## 2025-01-01 RX ORDER — BENZONATATE 100 MG/1
100 CAPSULE ORAL 3 TIMES DAILY PRN
Status: DISCONTINUED | OUTPATIENT
Start: 2025-01-01 | End: 2025-01-01 | Stop reason: HOSPADM

## 2025-01-01 RX ORDER — DOXYCYCLINE 100 MG/1
100 CAPSULE ORAL EVERY 12 HOURS SCHEDULED
DISCHARGE
Start: 2025-01-01 | End: 2025-01-01

## 2025-01-01 RX ORDER — DILTIAZEM HYDROCHLORIDE 120 MG/1
120 CAPSULE, EXTENDED RELEASE ORAL DAILY
Status: DISCONTINUED | OUTPATIENT
Start: 2025-01-01 | End: 2025-01-01 | Stop reason: HOSPADM

## 2025-01-01 RX ORDER — ONDANSETRON 4 MG/1
4 TABLET, ORALLY DISINTEGRATING ORAL EVERY 8 HOURS PRN
Status: DISCONTINUED | OUTPATIENT
Start: 2025-01-01 | End: 2025-01-01 | Stop reason: HOSPADM

## 2025-01-01 RX ORDER — LOPERAMIDE HYDROCHLORIDE 2 MG/1
2 CAPSULE ORAL 4 TIMES DAILY PRN
Status: DISCONTINUED | OUTPATIENT
Start: 2025-01-01 | End: 2025-01-01 | Stop reason: HOSPADM

## 2025-01-01 RX ORDER — SODIUM CHLORIDE 0.9 % (FLUSH) 0.9 %
5-40 SYRINGE (ML) INJECTION EVERY 12 HOURS SCHEDULED
Status: DISCONTINUED | OUTPATIENT
Start: 2025-01-01 | End: 2025-01-01 | Stop reason: HOSPADM

## 2025-01-01 RX ORDER — ALPRAZOLAM 0.25 MG
0.25 TABLET ORAL NIGHTLY
Status: SHIPPED | DISCHARGE
Start: 2025-01-01 | End: 2025-04-02

## 2025-01-01 RX ORDER — POTASSIUM CHLORIDE 1500 MG/1
40 TABLET, EXTENDED RELEASE ORAL 2 TIMES DAILY WITH MEALS
Status: DISCONTINUED | OUTPATIENT
Start: 2025-01-01 | End: 2025-01-01

## 2025-01-01 RX ORDER — SODIUM CHLORIDE 9 MG/ML
INJECTION, SOLUTION INTRAVENOUS PRN
Status: CANCELLED | OUTPATIENT
Start: 2025-01-01

## 2025-01-01 RX ORDER — LEVOFLOXACIN 5 MG/ML
500 INJECTION, SOLUTION INTRAVENOUS
Status: COMPLETED | OUTPATIENT
Start: 2025-01-01 | End: 2025-01-01

## 2025-01-01 RX ORDER — ONDANSETRON 2 MG/ML
4 INJECTION INTRAMUSCULAR; INTRAVENOUS
Status: CANCELLED | OUTPATIENT
Start: 2025-01-01 | End: 2025-01-01

## 2025-01-01 RX ORDER — FENTANYL CITRATE 50 UG/ML
50 INJECTION, SOLUTION INTRAMUSCULAR; INTRAVENOUS ONCE
Status: COMPLETED | OUTPATIENT
Start: 2025-01-01 | End: 2025-01-01

## 2025-01-01 RX ORDER — 0.9 % SODIUM CHLORIDE 0.9 %
500 INTRAVENOUS SOLUTION INTRAVENOUS ONCE
Status: COMPLETED | OUTPATIENT
Start: 2025-01-01 | End: 2025-01-01

## 2025-01-01 RX ORDER — POTASSIUM CHLORIDE 1500 MG/1
40 TABLET, EXTENDED RELEASE ORAL ONCE
Status: COMPLETED | OUTPATIENT
Start: 2025-01-01 | End: 2025-01-01

## 2025-01-01 RX ORDER — PANTOPRAZOLE SODIUM 40 MG/1
40 TABLET, DELAYED RELEASE ORAL
DISCHARGE
Start: 2025-01-01 | End: 2025-01-01

## 2025-01-01 RX ORDER — MENTHOL AND ZINC OXIDE .44; 20.625 G/100G; G/100G
1 OINTMENT TOPICAL 2 TIMES DAILY PRN
COMMUNITY

## 2025-01-01 RX ORDER — POTASSIUM CHLORIDE 7.45 MG/ML
10 INJECTION INTRAVENOUS PRN
Status: DISCONTINUED | OUTPATIENT
Start: 2025-01-01 | End: 2025-01-01 | Stop reason: HOSPADM

## 2025-01-01 RX ORDER — LORAZEPAM 2 MG/ML
1 INJECTION INTRAMUSCULAR EVERY 6 HOURS PRN
Status: DISCONTINUED | OUTPATIENT
Start: 2025-01-01 | End: 2025-01-01 | Stop reason: HOSPADM

## 2025-01-01 RX ORDER — FENTANYL CITRATE 50 UG/ML
25 INJECTION, SOLUTION INTRAMUSCULAR; INTRAVENOUS EVERY 5 MIN PRN
Refills: 0 | Status: CANCELLED | OUTPATIENT
Start: 2025-01-01

## 2025-01-01 RX ORDER — POTASSIUM CHLORIDE 1500 MG/1
40 TABLET, EXTENDED RELEASE ORAL ONCE
Status: DISCONTINUED | OUTPATIENT
Start: 2025-01-01 | End: 2025-01-01

## 2025-01-01 RX ORDER — ROPINIROLE 1 MG/1
1 TABLET, FILM COATED ORAL NIGHTLY
Status: DISCONTINUED | OUTPATIENT
Start: 2025-01-01 | End: 2025-01-01 | Stop reason: HOSPADM

## 2025-01-01 RX ORDER — BENZONATATE 100 MG/1
100 CAPSULE ORAL 3 TIMES DAILY PRN
DISCHARGE
Start: 2025-01-01 | End: 2025-01-01

## 2025-01-01 RX ORDER — GUAIFENESIN 400 MG/1
400 TABLET ORAL 4 TIMES DAILY
Status: ON HOLD | DISCHARGE
Start: 2025-01-01 | End: 2025-01-01 | Stop reason: HOSPADM

## 2025-01-01 RX ORDER — BISACODYL 10 MG
10 SUPPOSITORY, RECTAL RECTAL DAILY PRN
COMMUNITY

## 2025-01-01 RX ORDER — GUAIFENESIN 200 MG/10ML
400 LIQUID ORAL 3 TIMES DAILY PRN
COMMUNITY

## 2025-01-01 RX ORDER — ATORVASTATIN CALCIUM 40 MG/1
40 TABLET, FILM COATED ORAL DAILY
Status: DISCONTINUED | OUTPATIENT
Start: 2025-01-01 | End: 2025-01-01 | Stop reason: HOSPADM

## 2025-01-01 RX ORDER — IOPAMIDOL 755 MG/ML
18 INJECTION, SOLUTION INTRAVASCULAR
Status: COMPLETED | OUTPATIENT
Start: 2025-01-01 | End: 2025-01-01

## 2025-01-01 RX ORDER — SODIUM CHLORIDE 9 MG/ML
INJECTION, SOLUTION INTRAVENOUS CONTINUOUS
Status: CANCELLED | OUTPATIENT
Start: 2025-01-01

## 2025-01-01 RX ADMIN — ASPIRIN 81 MG CHEWABLE TABLET 81 MG: 81 TABLET CHEWABLE at 07:37

## 2025-01-01 RX ADMIN — BENZONATATE 100 MG: 100 CAPSULE ORAL at 18:18

## 2025-01-01 RX ADMIN — GUAIFENESIN 400 MG: 400 TABLET ORAL at 09:33

## 2025-01-01 RX ADMIN — WATER 2000 MG: 1 INJECTION INTRAMUSCULAR; INTRAVENOUS; SUBCUTANEOUS at 18:30

## 2025-01-01 RX ADMIN — HYDROMORPHONE HYDROCHLORIDE 1 MG: 1 INJECTION, SOLUTION INTRAMUSCULAR; INTRAVENOUS; SUBCUTANEOUS at 00:44

## 2025-01-01 RX ADMIN — DILTIAZEM HYDROCHLORIDE 120 MG: 120 CAPSULE, EXTENDED RELEASE ORAL at 09:03

## 2025-01-01 RX ADMIN — SODIUM CHLORIDE 500 ML: 9 INJECTION, SOLUTION INTRAVENOUS at 16:13

## 2025-01-01 RX ADMIN — DOXYCYCLINE HYCLATE 100 MG: 100 CAPSULE ORAL at 07:37

## 2025-01-01 RX ADMIN — SODIUM CHLORIDE, PRESERVATIVE FREE 10 ML: 5 INJECTION INTRAVENOUS at 22:33

## 2025-01-01 RX ADMIN — POTASSIUM CHLORIDE 40 MEQ: 1500 TABLET, EXTENDED RELEASE ORAL at 11:06

## 2025-01-01 RX ADMIN — WATER 2000 MG: 1 INJECTION INTRAMUSCULAR; INTRAVENOUS; SUBCUTANEOUS at 18:02

## 2025-01-01 RX ADMIN — DILTIAZEM HYDROCHLORIDE 120 MG: 120 CAPSULE, EXTENDED RELEASE ORAL at 08:36

## 2025-01-01 RX ADMIN — DILTIAZEM HYDROCHLORIDE 120 MG: 120 CAPSULE, EXTENDED RELEASE ORAL at 08:58

## 2025-01-01 RX ADMIN — DOXAZOSIN 8 MG: 4 TABLET ORAL at 21:04

## 2025-01-01 RX ADMIN — POTASSIUM BICARBONATE 40 MEQ: 782 TABLET, EFFERVESCENT ORAL at 17:21

## 2025-01-01 RX ADMIN — ALPRAZOLAM 0.25 MG: 0.25 TABLET ORAL at 21:04

## 2025-01-01 RX ADMIN — SODIUM CHLORIDE, PRESERVATIVE FREE 10 ML: 5 INJECTION INTRAVENOUS at 09:03

## 2025-01-01 RX ADMIN — POTASSIUM BICARBONATE 40 MEQ: 782 TABLET, EFFERVESCENT ORAL at 08:51

## 2025-01-01 RX ADMIN — SODIUM CHLORIDE, PRESERVATIVE FREE 40 MG: 5 INJECTION INTRAVENOUS at 07:37

## 2025-01-01 RX ADMIN — DOXYCYCLINE HYCLATE 100 MG: 100 CAPSULE ORAL at 20:48

## 2025-01-01 RX ADMIN — SODIUM CHLORIDE, PRESERVATIVE FREE 10 ML: 5 INJECTION INTRAVENOUS at 08:51

## 2025-01-01 RX ADMIN — GUAIFENESIN 400 MG: 400 TABLET ORAL at 22:17

## 2025-01-01 RX ADMIN — WATER 2000 MG: 1 INJECTION INTRAMUSCULAR; INTRAVENOUS; SUBCUTANEOUS at 18:18

## 2025-01-01 RX ADMIN — HYDROMORPHONE HYDROCHLORIDE 1 MG: 1 INJECTION, SOLUTION INTRAMUSCULAR; INTRAVENOUS; SUBCUTANEOUS at 04:58

## 2025-01-01 RX ADMIN — DOXYCYCLINE HYCLATE 100 MG: 100 CAPSULE ORAL at 21:30

## 2025-01-01 RX ADMIN — SODIUM CHLORIDE, PRESERVATIVE FREE 10 ML: 5 INJECTION INTRAVENOUS at 20:53

## 2025-01-01 RX ADMIN — CEFEPIME HYDROCHLORIDE 2000 MG: 2 INJECTION, POWDER, FOR SOLUTION INTRAVENOUS at 18:33

## 2025-01-01 RX ADMIN — SODIUM CHLORIDE, PRESERVATIVE FREE 10 ML: 5 INJECTION INTRAVENOUS at 08:36

## 2025-01-01 RX ADMIN — APIXABAN 5 MG: 5 TABLET, FILM COATED ORAL at 21:04

## 2025-01-01 RX ADMIN — ALPRAZOLAM 0.25 MG: 0.25 TABLET ORAL at 22:28

## 2025-01-01 RX ADMIN — DOXYCYCLINE HYCLATE 100 MG: 100 CAPSULE ORAL at 20:29

## 2025-01-01 RX ADMIN — SODIUM CHLORIDE, PRESERVATIVE FREE 10 ML: 5 INJECTION INTRAVENOUS at 09:20

## 2025-01-01 RX ADMIN — ALPRAZOLAM 0.25 MG: 0.25 TABLET ORAL at 20:54

## 2025-01-01 RX ADMIN — LOPERAMIDE HYDROCHLORIDE 2 MG: 2 CAPSULE ORAL at 22:08

## 2025-01-01 RX ADMIN — LOPERAMIDE HYDROCHLORIDE 2 MG: 2 CAPSULE ORAL at 09:33

## 2025-01-01 RX ADMIN — APIXABAN 5 MG: 5 TABLET, FILM COATED ORAL at 22:28

## 2025-01-01 RX ADMIN — SODIUM CHLORIDE, PRESERVATIVE FREE 10 ML: 5 INJECTION INTRAVENOUS at 20:30

## 2025-01-01 RX ADMIN — APIXABAN 5 MG: 5 TABLET, FILM COATED ORAL at 08:36

## 2025-01-01 RX ADMIN — DOXAZOSIN 8 MG: 4 TABLET ORAL at 22:28

## 2025-01-01 RX ADMIN — ASPIRIN 81 MG CHEWABLE TABLET 81 MG: 81 TABLET CHEWABLE at 09:33

## 2025-01-01 RX ADMIN — ONDANSETRON 4 MG: 2 INJECTION, SOLUTION INTRAMUSCULAR; INTRAVENOUS at 16:34

## 2025-01-01 RX ADMIN — ATORVASTATIN CALCIUM 40 MG: 40 TABLET, FILM COATED ORAL at 08:51

## 2025-01-01 RX ADMIN — ATORVASTATIN CALCIUM 40 MG: 40 TABLET, FILM COATED ORAL at 09:33

## 2025-01-01 RX ADMIN — SODIUM CHLORIDE: 0.9 INJECTION, SOLUTION INTRAVENOUS at 22:31

## 2025-01-01 RX ADMIN — Medication 3 MG: at 22:28

## 2025-01-01 RX ADMIN — DOXAZOSIN 8 MG: 4 TABLET ORAL at 20:17

## 2025-01-01 RX ADMIN — PSYLLIUM HUSK 1 PACKET: 3.4 POWDER ORAL at 08:51

## 2025-01-01 RX ADMIN — Medication 3 MG: at 22:19

## 2025-01-01 RX ADMIN — LOPERAMIDE HYDROCHLORIDE 2 MG: 2 CAPSULE ORAL at 20:48

## 2025-01-01 RX ADMIN — ASPIRIN 81 MG CHEWABLE TABLET 81 MG: 81 TABLET CHEWABLE at 08:58

## 2025-01-01 RX ADMIN — Medication 3 MG: at 20:48

## 2025-01-01 RX ADMIN — METRONIDAZOLE 500 MG: 500 INJECTION, SOLUTION INTRAVENOUS at 20:31

## 2025-01-01 RX ADMIN — DILTIAZEM HYDROCHLORIDE 120 MG: 120 CAPSULE, EXTENDED RELEASE ORAL at 09:51

## 2025-01-01 RX ADMIN — SODIUM CHLORIDE, PRESERVATIVE FREE 40 MG: 5 INJECTION INTRAVENOUS at 16:33

## 2025-01-01 RX ADMIN — GUAIFENESIN 400 MG: 400 TABLET ORAL at 20:29

## 2025-01-01 RX ADMIN — ATORVASTATIN CALCIUM 40 MG: 40 TABLET, FILM COATED ORAL at 08:36

## 2025-01-01 RX ADMIN — ALPRAZOLAM 0.25 MG: 0.25 TABLET ORAL at 20:29

## 2025-01-01 RX ADMIN — ASPIRIN 81 MG CHEWABLE TABLET 81 MG: 81 TABLET CHEWABLE at 08:36

## 2025-01-01 RX ADMIN — DOXYCYCLINE HYCLATE 100 MG: 100 CAPSULE ORAL at 20:54

## 2025-01-01 RX ADMIN — PETROLATUM: 420 OINTMENT TOPICAL at 22:19

## 2025-01-01 RX ADMIN — GUAIFENESIN 400 MG: 400 TABLET ORAL at 20:54

## 2025-01-01 RX ADMIN — WATER 2000 MG: 1 INJECTION INTRAMUSCULAR; INTRAVENOUS; SUBCUTANEOUS at 18:23

## 2025-01-01 RX ADMIN — PSYLLIUM HUSK 1 PACKET: 3.4 POWDER ORAL at 12:27

## 2025-01-01 RX ADMIN — DOXAZOSIN 8 MG: 4 TABLET ORAL at 22:26

## 2025-01-01 RX ADMIN — IOPAMIDOL 18 ML: 755 INJECTION, SOLUTION INTRAVENOUS at 12:36

## 2025-01-01 RX ADMIN — DOXAZOSIN 8 MG: 4 TABLET ORAL at 21:30

## 2025-01-01 RX ADMIN — DILTIAZEM HYDROCHLORIDE 120 MG: 120 CAPSULE, EXTENDED RELEASE ORAL at 08:51

## 2025-01-01 RX ADMIN — ATORVASTATIN CALCIUM 40 MG: 40 TABLET, FILM COATED ORAL at 07:37

## 2025-01-01 RX ADMIN — ALPRAZOLAM 0.25 MG: 0.25 TABLET ORAL at 22:49

## 2025-01-01 RX ADMIN — Medication 3 MG: at 21:30

## 2025-01-01 RX ADMIN — ATORVASTATIN CALCIUM 40 MG: 40 TABLET, FILM COATED ORAL at 08:58

## 2025-01-01 RX ADMIN — APIXABAN 5 MG: 5 TABLET, FILM COATED ORAL at 20:37

## 2025-01-01 RX ADMIN — SODIUM CHLORIDE, PRESERVATIVE FREE 10 ML: 5 INJECTION INTRAVENOUS at 07:38

## 2025-01-01 RX ADMIN — WATER 2000 MG: 1 INJECTION INTRAMUSCULAR; INTRAVENOUS; SUBCUTANEOUS at 17:21

## 2025-01-01 RX ADMIN — HYDROMORPHONE HYDROCHLORIDE 1 MG: 1 INJECTION, SOLUTION INTRAMUSCULAR; INTRAVENOUS; SUBCUTANEOUS at 09:19

## 2025-01-01 RX ADMIN — PETROLATUM: 420 OINTMENT TOPICAL at 20:50

## 2025-01-01 RX ADMIN — GUAIFENESIN 400 MG: 400 TABLET ORAL at 18:30

## 2025-01-01 RX ADMIN — ALPRAZOLAM 0.25 MG: 0.25 TABLET ORAL at 22:18

## 2025-01-01 RX ADMIN — DOXYCYCLINE HYCLATE 100 MG: 100 CAPSULE ORAL at 09:03

## 2025-01-01 RX ADMIN — PSYLLIUM HUSK 1 PACKET: 3.4 POWDER ORAL at 09:03

## 2025-01-01 RX ADMIN — LEVOFLOXACIN 500 MG: 500 INJECTION, SOLUTION INTRAVENOUS at 13:20

## 2025-01-01 RX ADMIN — DILTIAZEM HYDROCHLORIDE 120 MG: 120 CAPSULE, EXTENDED RELEASE ORAL at 07:37

## 2025-01-01 RX ADMIN — SODIUM CHLORIDE, PRESERVATIVE FREE 10 ML: 5 INJECTION INTRAVENOUS at 09:34

## 2025-01-01 RX ADMIN — LOPERAMIDE HYDROCHLORIDE 2 MG: 2 CAPSULE ORAL at 14:34

## 2025-01-01 RX ADMIN — ASPIRIN 81 MG CHEWABLE TABLET 81 MG: 81 TABLET CHEWABLE at 09:03

## 2025-01-01 RX ADMIN — BENZONATATE 100 MG: 100 CAPSULE ORAL at 20:54

## 2025-01-01 RX ADMIN — POTASSIUM BICARBONATE 40 MEQ: 782 TABLET, EFFERVESCENT ORAL at 19:11

## 2025-01-01 RX ADMIN — DOXYCYCLINE HYCLATE 100 MG: 100 CAPSULE ORAL at 22:17

## 2025-01-01 RX ADMIN — ALPRAZOLAM 0.25 MG: 0.25 TABLET ORAL at 21:30

## 2025-01-01 RX ADMIN — Medication 3 MG: at 21:04

## 2025-01-01 RX ADMIN — DOXAZOSIN 8 MG: 4 TABLET ORAL at 20:29

## 2025-01-01 RX ADMIN — ATORVASTATIN CALCIUM 40 MG: 40 TABLET, FILM COATED ORAL at 09:03

## 2025-01-01 RX ADMIN — ACETAMINOPHEN 650 MG: 325 TABLET ORAL at 20:48

## 2025-01-01 RX ADMIN — GUAIFENESIN 400 MG: 400 TABLET ORAL at 14:34

## 2025-01-01 RX ADMIN — DOXYCYCLINE HYCLATE 100 MG: 100 CAPSULE ORAL at 09:34

## 2025-01-01 RX ADMIN — GUAIFENESIN 400 MG: 400 TABLET ORAL at 18:02

## 2025-01-01 RX ADMIN — SODIUM CHLORIDE, PRESERVATIVE FREE 10 ML: 5 INJECTION INTRAVENOUS at 20:17

## 2025-01-01 RX ADMIN — GUAIFENESIN 400 MG: 400 TABLET ORAL at 16:37

## 2025-01-01 RX ADMIN — SODIUM CHLORIDE, PRESERVATIVE FREE 10 ML: 5 INJECTION INTRAVENOUS at 21:04

## 2025-01-01 RX ADMIN — DOXAZOSIN 8 MG: 4 TABLET ORAL at 21:14

## 2025-01-01 RX ADMIN — SODIUM CHLORIDE, PRESERVATIVE FREE 10 ML: 5 INJECTION INTRAVENOUS at 20:49

## 2025-01-01 RX ADMIN — ALPRAZOLAM 0.25 MG: 0.25 TABLET ORAL at 20:48

## 2025-01-01 RX ADMIN — GUAIFENESIN 400 MG: 400 TABLET ORAL at 21:30

## 2025-01-01 RX ADMIN — GUAIFENESIN 400 MG: 400 TABLET ORAL at 07:37

## 2025-01-01 RX ADMIN — APIXABAN 5 MG: 5 TABLET, FILM COATED ORAL at 08:58

## 2025-01-01 RX ADMIN — SODIUM CHLORIDE, PRESERVATIVE FREE 10 ML: 5 INJECTION INTRAVENOUS at 21:30

## 2025-01-01 RX ADMIN — GUAIFENESIN 400 MG: 400 TABLET ORAL at 15:46

## 2025-01-01 RX ADMIN — WATER 2000 MG: 1 INJECTION INTRAMUSCULAR; INTRAVENOUS; SUBCUTANEOUS at 18:01

## 2025-01-01 RX ADMIN — DOXAZOSIN 8 MG: 4 TABLET ORAL at 20:52

## 2025-01-01 RX ADMIN — ASPIRIN 81 MG CHEWABLE TABLET 81 MG: 81 TABLET CHEWABLE at 08:51

## 2025-01-01 RX ADMIN — PSYLLIUM HUSK 1 PACKET: 3.4 POWDER ORAL at 07:37

## 2025-01-01 RX ADMIN — FENTANYL CITRATE 50 MCG: 50 INJECTION INTRAMUSCULAR; INTRAVENOUS at 21:44

## 2025-01-01 RX ADMIN — POTASSIUM BICARBONATE 40 MEQ: 782 TABLET, EFFERVESCENT ORAL at 12:26

## 2025-01-01 RX ADMIN — SODIUM CHLORIDE: 0.9 INJECTION, SOLUTION INTRAVENOUS at 12:29

## 2025-01-01 RX ADMIN — SODIUM CHLORIDE, PRESERVATIVE FREE 10 ML: 5 INJECTION INTRAVENOUS at 22:19

## 2025-01-01 ASSESSMENT — PAIN - FUNCTIONAL ASSESSMENT
PAIN_FUNCTIONAL_ASSESSMENT: 0-10
PAIN_FUNCTIONAL_ASSESSMENT: PREVENTS OR INTERFERES SOME ACTIVE ACTIVITIES AND ADLS
PAIN_FUNCTIONAL_ASSESSMENT: NONE - DENIES PAIN

## 2025-01-01 ASSESSMENT — PAIN SCALES - GENERAL
PAINLEVEL_OUTOF10: 7
PAINLEVEL_OUTOF10: 0
PAINLEVEL_OUTOF10: 10
PAINLEVEL_OUTOF10: 0

## 2025-01-01 ASSESSMENT — PAIN DESCRIPTION - ORIENTATION
ORIENTATION: RIGHT;LEFT
ORIENTATION: RIGHT;LEFT;MID

## 2025-01-01 ASSESSMENT — PAIN DESCRIPTION - DESCRIPTORS
DESCRIPTORS: ACHING;DISCOMFORT
DESCRIPTORS: ACHING
DESCRIPTORS: ACHING;DISCOMFORT
DESCRIPTORS: ACHING;DISCOMFORT

## 2025-01-01 ASSESSMENT — PAIN DESCRIPTION - LOCATION
LOCATION: GENERALIZED

## 2025-01-10 ENCOUNTER — TELEPHONE (OUTPATIENT)
Dept: PRIMARY CARE CLINIC | Age: 80
End: 2025-01-10

## 2025-01-10 DIAGNOSIS — L89.529 PRESSURE INJURY OF SKIN OF LEFT ANKLE, UNSPECIFIED INJURY STAGE: Primary | ICD-10-CM

## 2025-01-17 ENCOUNTER — TELEPHONE (OUTPATIENT)
Dept: PRIMARY CARE CLINIC | Age: 80
End: 2025-01-17

## 2025-01-17 DIAGNOSIS — L89.529 PRESSURE INJURY OF SKIN OF LEFT ANKLE, UNSPECIFIED INJURY STAGE: Primary | ICD-10-CM

## 2025-01-17 NOTE — TELEPHONE ENCOUNTER
----- Message from Shannon JONES sent at 1/17/2025  1:38 PM EST -----  Regarding: [facility] Referral Request  [facility] Referral Request    Reason for referral request: Specialty Provider    Specialist/Lab/Test patient is requesting (if known): Wound Care    Specialist Phone Number (if applicable): N/A    Additional Information: The patient wanted to have a referral to go the [facility] to see Dr. Veto Vazquez.  --------------------------------------------------------------------------------------------------------------------------    Relationship to Patient: Self     Call Back Information: OK to leave message on voicemail  Preferred Call Back Number: [phone]

## 2025-01-23 ENCOUNTER — HOSPITAL ENCOUNTER (OUTPATIENT)
Age: 80
Discharge: HOME | End: 2025-01-23
Payer: MEDICARE

## 2025-01-23 ENCOUNTER — HOSPITAL ENCOUNTER (OUTPATIENT)
Dept: CT IMAGING | Age: 80
Discharge: HOME | End: 2025-01-25
Payer: MEDICARE

## 2025-01-23 DIAGNOSIS — J39.2 NASOPHARYNGEAL MASS: ICD-10-CM

## 2025-01-23 DIAGNOSIS — R63.4 WEIGHT LOSS: ICD-10-CM

## 2025-01-23 DIAGNOSIS — R49.0 HOARSENESS: ICD-10-CM

## 2025-01-23 DIAGNOSIS — R13.10 DYSPHAGIA, UNSPECIFIED TYPE: ICD-10-CM

## 2025-01-23 DIAGNOSIS — Z87.891 HISTORY OF TOBACCO ABUSE: ICD-10-CM

## 2025-01-23 DIAGNOSIS — Z01.812 PRE-PROCEDURE LAB EXAM: ICD-10-CM

## 2025-01-23 LAB
BUN SERPL-MCNC: 26 MG/DL (ref 6–23)
CREAT SERPL-MCNC: 1.3 MG/DL (ref 0.7–1.2)
GFR, ESTIMATED: 59 ML/MIN/1.73M2

## 2025-01-23 PROCEDURE — 36415 COLL VENOUS BLD VENIPUNCTURE: CPT

## 2025-01-23 PROCEDURE — 70491 CT SOFT TISSUE NECK W/DYE: CPT

## 2025-01-23 PROCEDURE — 82565 ASSAY OF CREATININE: CPT

## 2025-01-23 PROCEDURE — 6360000004 HC RX CONTRAST MEDICATION: Performed by: RADIOLOGY

## 2025-01-23 PROCEDURE — 84520 ASSAY OF UREA NITROGEN: CPT

## 2025-01-23 PROCEDURE — 71260 CT THORAX DX C+: CPT

## 2025-01-23 RX ORDER — IOPAMIDOL 755 MG/ML
75 INJECTION, SOLUTION INTRAVASCULAR
Status: COMPLETED | OUTPATIENT
Start: 2025-01-23 | End: 2025-01-23

## 2025-01-23 RX ADMIN — IOPAMIDOL 75 ML: 755 INJECTION, SOLUTION INTRAVENOUS at 14:37

## 2025-01-24 DIAGNOSIS — F41.9 ANXIETY: Primary | ICD-10-CM

## 2025-01-24 RX ORDER — ALPRAZOLAM 0.25 MG/1
0.25 TABLET ORAL NIGHTLY
Qty: 30 TABLET | Refills: 0 | Status: SHIPPED | OUTPATIENT
Start: 2025-01-24 | End: 2025-02-23

## 2025-01-24 NOTE — TELEPHONE ENCOUNTER
Wife calling. Patient was prescribed Xanax when he was on Hospice. Now off Hospice because they are treating his cancer. Patient is now in need of refill for Xanax.     Direction on bottle were  Xanax 0.25mg  Take 1 tablet by mouth every night at bedtime for anxiety.     Medication pended

## 2025-01-27 ENCOUNTER — OFFICE VISIT (OUTPATIENT)
Dept: ENT CLINIC | Age: 80
End: 2025-01-27
Payer: MEDICARE

## 2025-01-27 VITALS
SYSTOLIC BLOOD PRESSURE: 118 MMHG | DIASTOLIC BLOOD PRESSURE: 81 MMHG | HEART RATE: 75 BPM | BODY MASS INDEX: 28 KG/M2 | HEIGHT: 76 IN

## 2025-01-27 DIAGNOSIS — R63.4 WEIGHT LOSS: ICD-10-CM

## 2025-01-27 DIAGNOSIS — R13.10 DYSPHAGIA, UNSPECIFIED TYPE: ICD-10-CM

## 2025-01-27 DIAGNOSIS — R49.0 HOARSENESS: Primary | ICD-10-CM

## 2025-01-27 DIAGNOSIS — N28.89 RENAL MASS OF UNKNOWN NATURE: ICD-10-CM

## 2025-01-27 PROCEDURE — G8427 DOCREV CUR MEDS BY ELIG CLIN: HCPCS | Performed by: OTOLARYNGOLOGY

## 2025-01-27 PROCEDURE — G8417 CALC BMI ABV UP PARAM F/U: HCPCS | Performed by: OTOLARYNGOLOGY

## 2025-01-27 PROCEDURE — 99214 OFFICE O/P EST MOD 30 MIN: CPT | Performed by: OTOLARYNGOLOGY

## 2025-01-27 PROCEDURE — 1036F TOBACCO NON-USER: CPT | Performed by: OTOLARYNGOLOGY

## 2025-01-27 PROCEDURE — 1159F MED LIST DOCD IN RCRD: CPT | Performed by: OTOLARYNGOLOGY

## 2025-01-27 PROCEDURE — 3074F SYST BP LT 130 MM HG: CPT | Performed by: OTOLARYNGOLOGY

## 2025-01-27 PROCEDURE — 1123F ACP DISCUSS/DSCN MKR DOCD: CPT | Performed by: OTOLARYNGOLOGY

## 2025-01-27 PROCEDURE — 3079F DIAST BP 80-89 MM HG: CPT | Performed by: OTOLARYNGOLOGY

## 2025-01-27 NOTE — DISCHARGE INSTRUCTIONS
Visit Discharge/Physician Orders    Discharge condition: Stable    Assessment of pain at discharge: mild    Anesthetic used: 4% Lidocaine    Discharge to: Home    Left via:Private automobile    Accompanied by: accompanied by self    [facility]     Dressing Orders: Clean wounds left leg and penis with normal saline. Apply silver alginate. Secure wound on left leg with kerlix. Secure wound on penis with abdominal pad. Pad around catheter. Change both daily. Home care to change at least 2-3 times per week, according to availability. Keep pressure off wounds. Keep dressings clean and dry.    Treatment Orders:Follow a nutritious diet. Choose foods high in protein: chicken, fish, and eggs. Choose foods high in Vitamin C. Multivitamin daily unless contraindicated.     Vascular studies to be scheduled  Culture taken 2/3    [facility] followup visit ____2 weeks_________________________  (Please note your next appointment above and if you are unable to keep, kindly give a 24 hour notice. Thank you.)    Physician signature:__________________________      If you experience any of the following, please call the Wound Care Center during business hours:    * Increase in Pain  * Temperature over 101  * Increase in drainage from your wound  * Drainage with a foul odor  * Bleeding  * Increase in swelling  * Need for compression bandage changes due to slippage, breakthrough drainage.    If you need medical attention outside of the business hours of the Wound Care Centers please contact your PCP or go to the nearest emergency room.

## 2025-01-30 ASSESSMENT — ENCOUNTER SYMPTOMS
TROUBLE SWALLOWING: 1
SINUS PRESSURE: 0
SHORTNESS OF BREATH: 0
VOMITING: 0
COUGH: 0
SORE THROAT: 0

## 2025-01-30 NOTE — PROGRESS NOTES
[facility]  WEN NguyenO. Ms.Ed        Patient Name:  Geovanny Kraft  :  1945     CHIEF C/O:    Chief Complaint   Patient presents with    Follow-up     FOLLOW UP - 6 wk CT neck/chest, (in Epic)         HISTORY OBTAINED FROM:  patient    HISTORY OF PRESENT ILLNESS:       Geovanny is a 79 y.o. year old male, here today for follow up of:       Patient seen and examined today, follow-up from previous difficulty swallowing dysphagia possible nasopharyngeal or hypopharyngeal mass status post CT neck and chest.  CT scans ordered outside practitioner, personally reviewed interpreted with the patient and the family members today present showing anterior cervical spine osteophytes, at C1-2 and C5-6, consistent with levels at which the patient is having difficulty with dysphagia.  He continues to have dysphagia, showing no aspiration at this time, no recent pneumonia.  Also, underwent CT scan of the chest this was found to have proximal possible up to 11 cm kidney mass or lesion that is partially imaged on the current examination recommending significant follow-up.  Of note, and discussed with the patient today he is having significant urinary problems he has been seen by urology x 1 and currently his indwelling catheter for inability urinate appropriately.           Past Medical History:   Diagnosis Date    Diabetes mellitus (HCC)     Diabetic foot ulcer associated with type 2 diabetes mellitus, with fat layer exposed (HCC) 2016    Diabetic peripheral neuropathy (HCC)     Hyperlipidemia     Hypertension     RLS (restless legs syndrome)      Past Surgical History:   Procedure Laterality Date    FOOT AMPUTATION      FOOT SURGERY Left 16    I&D    INSERT PICC LINE  2019         OTHER SURGICAL HISTORY  2014    left foot debridement, I&D exostectomy, bone biopsy; I&D right lower leg(BK stump site)    PACEMAKER PLACEMENT      has been shut off for 5 years       Current Outpatient

## 2025-02-03 ENCOUNTER — HOSPITAL ENCOUNTER (OUTPATIENT)
Dept: WOUND CARE | Age: 80
Discharge: HOME | End: 2025-02-03
Attending: NURSE PRACTITIONER
Payer: MEDICARE

## 2025-02-03 VITALS
RESPIRATION RATE: 18 BRPM | HEART RATE: 73 BPM | DIASTOLIC BLOOD PRESSURE: 67 MMHG | TEMPERATURE: 97.9 F | SYSTOLIC BLOOD PRESSURE: 117 MMHG

## 2025-02-03 DIAGNOSIS — L97.922 NON-PRESSURE CHRONIC ULCER OF LEFT LOWER LEG WITH FAT LAYER EXPOSED (HCC): ICD-10-CM

## 2025-02-03 DIAGNOSIS — Z89.511 HISTORY OF RIGHT BELOW KNEE AMPUTATION (HCC): Primary | ICD-10-CM

## 2025-02-03 DIAGNOSIS — E11.622 TYPE 2 DIABETES MELLITUS WITH OTHER SKIN ULCER, WITHOUT LONG-TERM CURRENT USE OF INSULIN (HCC): Chronic | ICD-10-CM

## 2025-02-03 DIAGNOSIS — Z97.8 FOLEY CATHETER IN PLACE: ICD-10-CM

## 2025-02-03 DIAGNOSIS — Z95.0 S/P PLACEMENT OF CARDIAC PACEMAKER: Chronic | ICD-10-CM

## 2025-02-03 DIAGNOSIS — Z79.01 LONG TERM (CURRENT) USE OF ANTICOAGULANTS: ICD-10-CM

## 2025-02-03 DIAGNOSIS — L89.893: ICD-10-CM

## 2025-02-03 PROCEDURE — 87205 SMEAR GRAM STAIN: CPT

## 2025-02-03 PROCEDURE — 99213 OFFICE O/P EST LOW 20 MIN: CPT

## 2025-02-03 PROCEDURE — 86403 PARTICLE AGGLUT ANTBDY SCRN: CPT

## 2025-02-03 PROCEDURE — 11045 DBRDMT SUBQ TISS EACH ADDL: CPT

## 2025-02-03 PROCEDURE — 11042 DBRDMT SUBQ TIS 1ST 20SQCM/<: CPT

## 2025-02-03 PROCEDURE — 87070 CULTURE OTHR SPECIMN AEROBIC: CPT

## 2025-02-03 ASSESSMENT — PAIN SCALES - GENERAL: PAINLEVEL_OUTOF10: 3

## 2025-02-03 ASSESSMENT — PAIN DESCRIPTION - LOCATION: LOCATION: LEG

## 2025-02-03 NOTE — PROGRESS NOTES
Wound Healing Center /Hyperbarics   History and Physical/Consultation  General Surgery/Medicine    Referring Physician : Mervin Ruiz DO  Geovanny Kraft  MEDICAL RECORD NUMBER:  [id number]  AGE: 79 y.o.   GENDER: male  : 1945  EPISODE DATE:  2/3/2025  Subjective:     Chief Complaint   Patient presents with    Wound Check     Left leg         HISTORY of PRESENT ILLNESS HPI     Geovanny Kraft is a 79 y.o. male who presents today for wound/ulcer evaluation.   History of Wound Context:  The patient has had a wound of left lower extremity and penis which was first noted approximately 2024.  This has been treated aquacel ag dressing changes. On their initial visit to the wound healing center, 2/3/2025 ,  the patient has noted that the wound has not been improving.  The patient has had similar previous wounds in the past.      Pt is not on abx at time of initial visit.      Wound/Ulcer Pain Timing/Severity: intermittent  Quality of pain: aching  Severity:  3 / 10   Modifying Factors: None  Associated Signs/Symptoms: edema, erythema, drainage, and pain    Ulcer Identification:  Ulcer Type: venous, diabetic, and pressure  Contributing Factors: edema, diabetes, chronic pressure, decreased mobility, shear force, and anticoagulation therapy    Diabetic/Pressure/Non Pressure Ulcers only:  Ulcer: Pressure ulcer, Stage 3    If patient has diabetic lower extremity wounds  Tilley Classification of diabetic lower extremity wounds:    Grade Description   []  0 No open wound   []  1 Superficial ulcer involving the full skin thickness   []  2 Deep ulcer involves ligament, tendon, joint capsule, or fascia  No bone involvement or abscess presence   []  3 Deep Ulcer with abcess formation and/or osteomyelitis   []  4 Localized gangrene   []  5 Extensive gangrene of the foot     Wound: N/A        PAST MEDICAL HISTORY      Diagnosis Date    Diabetes mellitus (HCC)     Diabetic foot ulcer associated with type 2 diabetes

## 2025-02-08 LAB
MICROORGANISM SPEC CULT: ABNORMAL
MICROORGANISM/AGENT SPEC: ABNORMAL
SERVICE CMNT-IMP: ABNORMAL
SPECIMEN DESCRIPTION: ABNORMAL

## 2025-02-10 PROBLEM — R89.5 POSITIVE CULTURE FINDINGS IN WOUND: Status: ACTIVE | Noted: 2025-02-10

## 2025-02-11 ENCOUNTER — OFFICE VISIT (OUTPATIENT)
Dept: PRIMARY CARE CLINIC | Age: 80
End: 2025-02-11

## 2025-02-11 VITALS
DIASTOLIC BLOOD PRESSURE: 80 MMHG | BODY MASS INDEX: 28.01 KG/M2 | OXYGEN SATURATION: 92 % | SYSTOLIC BLOOD PRESSURE: 128 MMHG | TEMPERATURE: 97.2 F | HEART RATE: 78 BPM | RESPIRATION RATE: 18 BRPM | HEIGHT: 76 IN

## 2025-02-11 DIAGNOSIS — I10 PRIMARY HYPERTENSION: Chronic | ICD-10-CM

## 2025-02-11 DIAGNOSIS — Z00.00 INITIAL MEDICARE ANNUAL WELLNESS VISIT: Primary | ICD-10-CM

## 2025-02-11 DIAGNOSIS — F41.8 DEPRESSION WITH ANXIETY: ICD-10-CM

## 2025-02-11 DIAGNOSIS — R49.0 HOARSENESS OF VOICE: ICD-10-CM

## 2025-02-11 DIAGNOSIS — E11.621 TYPE 2 DIABETES MELLITUS WITH FOOT ULCER, UNSPECIFIED WHETHER LONG TERM INSULIN USE (HCC): ICD-10-CM

## 2025-02-11 DIAGNOSIS — E78.2 MIXED HYPERLIPIDEMIA: Chronic | ICD-10-CM

## 2025-02-11 DIAGNOSIS — L97.509 TYPE 2 DIABETES MELLITUS WITH FOOT ULCER, UNSPECIFIED WHETHER LONG TERM INSULIN USE (HCC): ICD-10-CM

## 2025-02-11 DIAGNOSIS — Z97.8 FOLEY CATHETER IN PLACE: ICD-10-CM

## 2025-02-11 DIAGNOSIS — F41.9 ANXIETY: ICD-10-CM

## 2025-02-11 LAB
BASOPHILS ABSOLUTE: 0 K/UL (ref 0–0.2)
BASOPHILS RELATIVE PERCENT: 0 % (ref 0–2)
CREATININE URINE: 59.4 MG/DL (ref 40–278)
EOSINOPHILS ABSOLUTE: 0 K/UL (ref 0.05–0.5)
EOSINOPHILS RELATIVE PERCENT: 0 % (ref 0–6)
HBA1C MFR BLD: 6.8 % (ref 4–5.6)
HCT VFR BLD CALC: 34.4 % (ref 37–54)
HEMOGLOBIN: 10.7 G/DL (ref 12.5–16.5)
LYMPHOCYTES ABSOLUTE: 0.23 K/UL (ref 1.5–4)
LYMPHOCYTES RELATIVE PERCENT: 2 % (ref 20–42)
MCH RBC QN AUTO: 26.7 PG (ref 26–35)
MCHC RBC AUTO-ENTMCNC: 31.1 G/DL (ref 32–34.5)
MCV RBC AUTO: 85.8 FL (ref 80–99.9)
METAMYELOCYTES ABSOLUTE COUNT: 0.11 K/UL (ref 0–0.12)
METAMYELOCYTES: 1 % (ref 0–1)
MICROALBUMIN/CREAT 24H UR: 106 MG/L (ref 0–19)
MICROALBUMIN/CREAT UR-RTO: 178 MCG/MG CREAT (ref 0–30)
MONOCYTES ABSOLUTE: 0.79 K/UL (ref 0.1–0.95)
MONOCYTES RELATIVE PERCENT: 6 % (ref 2–12)
NEUTROPHILS ABSOLUTE: 11.87 K/UL (ref 1.8–7.3)
NEUTROPHILS RELATIVE PERCENT: 91 % (ref 43–80)
PDW BLD-RTO: 18.5 % (ref 11.5–15)
PLATELET # BLD: 416 K/UL (ref 130–450)
PMV BLD AUTO: 10 FL (ref 7–12)
RBC # BLD: 4.01 M/UL (ref 3.8–5.8)
RBC # BLD: ABNORMAL 10*6/UL
WBC # BLD: 13 K/UL (ref 4.5–11.5)

## 2025-02-11 RX ORDER — MIRTAZAPINE 7.5 MG/1
7.5 TABLET, FILM COATED ORAL NIGHTLY
Qty: 30 TABLET | Refills: 5 | Status: SHIPPED | OUTPATIENT
Start: 2025-02-11

## 2025-02-11 RX ORDER — ALPRAZOLAM 0.25 MG/1
0.25 TABLET ORAL NIGHTLY
Qty: 30 TABLET | Refills: 0 | Status: SHIPPED | OUTPATIENT
Start: 2025-02-11 | End: 2025-03-13

## 2025-02-11 ASSESSMENT — PATIENT HEALTH QUESTIONNAIRE - PHQ9
1. LITTLE INTEREST OR PLEASURE IN DOING THINGS: NOT AT ALL
SUM OF ALL RESPONSES TO PHQ QUESTIONS 1-9: 0
SUM OF ALL RESPONSES TO PHQ9 QUESTIONS 1 & 2: 0
2. FEELING DOWN, DEPRESSED OR HOPELESS: NOT AT ALL
SUM OF ALL RESPONSES TO PHQ QUESTIONS 1-9: 0

## 2025-02-11 ASSESSMENT — LIFESTYLE VARIABLES
HOW OFTEN DO YOU HAVE A DRINK CONTAINING ALCOHOL: NEVER
HOW MANY STANDARD DRINKS CONTAINING ALCOHOL DO YOU HAVE ON A TYPICAL DAY: PATIENT DOES NOT DRINK

## 2025-02-11 NOTE — PROGRESS NOTES
So] Itching    Piperacillin-Tazobactam In Dex Rash    Vancomycin Rash     Prior to Visit Medications    Medication Sig Taking? Authorizing Provider   ALPRAZolam (XANAX) 0.25 MG tablet Take 1 tablet by mouth nightly for 30 days. Max Daily Amount: 0.25 mg Yes Mervin Ruiz DO   mirtazapine (REMERON) 7.5 MG tablet Take 1 tablet by mouth nightly Yes Mervni Ruiz DO   doxycycline hyclate (VIBRA-TABS) 100 MG tablet Take 1 tablet by mouth 2 times daily for 10 days Take with food  Daja Banda APRN - CNP   rOPINIRole (REQUIP) 1 MG tablet Take 1 tablet by mouth  Froylan Monsalve MD   aspirin 81 MG chewable tablet Take 1 tablet by mouth daily  Murray Hooper MD   atorvastatin (LIPITOR) 40 MG tablet Take 1 tablet by mouth daily  Murray Hooper MD   dilTIAZem (CARDIZEM 12 HR) 120 MG extended release capsule Take 1 capsule by mouth daily  Froylan Monsalve MD   apixaban (ELIQUIS) 5 MG TABS tablet Take 1 tablet by mouth 2 times daily  Froylan Monsalve MD   gabapentin (NEURONTIN) 300 MG capsule Take 1 capsule by mouth at bedtime.  ProviderFroylan MD   empagliflozin (JARDIANCE) 25 MG tablet Take 0.5 tablets by mouth daily  Froylan Monsalve MD   mineral oil-hydrophilic petrolatum (HYDROPHOR) ointment Apply topically as needed.  Yosi Sanabria DO   doxazosin (CARDURA) 8 MG tablet Take 1 tablet by mouth nightly  ProviderFroylan MD CareTeam (Including outside providers/suppliers regularly involved in providing care):   Patient Care Team:  Mervin Ruiz DO as PCP - General (Family Medicine)  Mervin Ruiz DO as PCP - Empaneled Provider     Recommendations for Preventive Services Due: see orders and patient instructions/AVS.  Recommended screening schedule for the next 5-10 years is provided to the patient in written form: see Patient Instructions/AVS.     Reviewed and updated this visit:  Allergies  Meds  Sexual Hx

## 2025-02-12 LAB
ALBUMIN: 2.8 G/DL (ref 3.5–5.2)
ALP BLD-CCNC: 120 U/L (ref 40–129)
ALT SERPL-CCNC: 19 U/L (ref 0–40)
ANION GAP SERPL CALCULATED.3IONS-SCNC: 20 MMOL/L (ref 7–16)
AST SERPL-CCNC: 44 U/L (ref 0–39)
BILIRUB SERPL-MCNC: 0.7 MG/DL (ref 0–1.2)
BUN BLDV-MCNC: 24 MG/DL (ref 6–23)
CALCIUM SERPL-MCNC: 9.1 MG/DL (ref 8.6–10.2)
CHLORIDE BLD-SCNC: 95 MMOL/L (ref 98–107)
CHOLESTEROL, TOTAL: 99 MG/DL
CO2: 20 MMOL/L (ref 22–29)
CREAT SERPL-MCNC: 1.4 MG/DL (ref 0.7–1.2)
GFR, ESTIMATED: 52 ML/MIN/1.73M2
GLUCOSE BLD-MCNC: 142 MG/DL (ref 74–99)
HDLC SERPL-MCNC: 24 MG/DL
LDL CHOLESTEROL: 58 MG/DL
POTASSIUM SERPL-SCNC: 3.8 MMOL/L (ref 3.5–5)
SODIUM BLD-SCNC: 135 MMOL/L (ref 132–146)
TOTAL PROTEIN: 8.2 G/DL (ref 6.4–8.3)
TRIGL SERPL-MCNC: 85 MG/DL
TSH SERPL DL<=0.05 MIU/L-ACNC: 2.41 UIU/ML (ref 0.27–4.2)
VLDLC SERPL CALC-MCNC: 17 MG/DL

## 2025-02-19 ENCOUNTER — HOSPITAL ENCOUNTER (OUTPATIENT)
Dept: GENERAL RADIOLOGY | Age: 80
Discharge: HOME | End: 2025-02-21
Attending: OTOLARYNGOLOGY
Payer: MEDICARE

## 2025-02-19 DIAGNOSIS — R49.0 HOARSENESS: ICD-10-CM

## 2025-02-19 DIAGNOSIS — R13.10 DYSPHAGIA, UNSPECIFIED TYPE: ICD-10-CM

## 2025-02-19 DIAGNOSIS — R63.4 WEIGHT LOSS: ICD-10-CM

## 2025-02-19 PROCEDURE — 92611 MOTION FLUOROSCOPY/SWALLOW: CPT

## 2025-02-19 PROCEDURE — 2500000003 HC RX 250 WO HCPCS: Performed by: RADIOLOGY

## 2025-02-19 PROCEDURE — 74230 X-RAY XM SWLNG FUNCJ C+: CPT

## 2025-02-19 PROCEDURE — 92526 ORAL FUNCTION THERAPY: CPT

## 2025-02-19 RX ADMIN — BARIUM SULFATE 10 ML: 400 PASTE ORAL at 13:54

## 2025-02-19 RX ADMIN — BARIUM SULFATE 120 ML: 400 SUSPENSION ORAL at 13:54

## 2025-02-19 RX ADMIN — BARIUM SULFATE 70 ML: 0.81 POWDER, FOR SUSPENSION ORAL at 13:54

## 2025-02-19 NOTE — DISCHARGE INSTRUCTIONS
Visit Discharge/Physician Orders     Discharge condition: Stable     Assessment of pain at discharge: mild     Anesthetic used: 4% Lidocaine     Discharge to: Home     Left via:Private automobile     Accompanied by: accompanied by self     [facility]      Dressing Orders: Clean wounds left leg and penis with normal saline. Apply silver alginate. Secure wound on left leg with kerlix. Secure wound on penis with abdominal pad. Pad around catheter. Change both daily. Home care to change at least 2-3 times per week, according to availability. Keep pressure off wounds. Keep dressings clean and dry.     Treatment Orders:Follow a nutritious diet. Choose foods high in protein: chicken, fish, and eggs. Choose foods high in Vitamin C. Multivitamin daily unless contraindicated.      Vascular studies to be scheduled       [facility] followup visit _______2 weeks______________  (Please note your next appointment above and if you are unable to keep, kindly give a 24 hour notice. Thank you.)     Physician signature:__________________________        If you experience any of the following, please call the Wound Care Center during business hours:     * Increase in Pain  * Temperature over 101  * Increase in drainage from your wound  * Drainage with a foul odor  * Bleeding  * Increase in swelling  * Need for compression bandage changes due to slippage, breakthrough drainage.     If you need medical attention outside of the business hours of the Wound Care Centers please contact your PCP or go to the nearest emergency room.

## 2025-02-19 NOTE — PROGRESS NOTES
SPEECH/LANGUAGE PATHOLOGY  VIDEOFLUOROSCOPIC STUDY OF SWALLOWING (MBS)   and PLAN OF CARE    PATIENT NAME:  Geovanny Kraft  (male)     MRN:  [id number]    :  1945  (80 y.o.)  STATUS:  Outpatient    TODAY'S DATE:  2025  ORDER DATE, DESCRIPTION AND REFERRING PROVIDER:  Dr. Parson : [place] MODIFIED BARIUM SWALLOW W VIDEO   REASON FOR REFERRAL: Assess oropharyngeal swallow function    EVALUATING THERAPIST: Arline Flynn, SLP      RESULTS:      DYSPHAGIA DIAGNOSIS:  Clinical indicators of marked pharyngeal phase dysphagia     DIET OPTIONS: Due to large cervical osteophyte obstructing swallow function, patient is at risk of aspiration of all consistencies administered. Therefore, unable to completely eliminate risk of aspiration with diet modifications. For this reason, patient was presented with various diet options and is encouraged to discuss further with family and physician's to determine what option fits his goals of care the best. Diet options discussed were as followed:     NPO with all means of nutrition via alternate method  NPO with main source of nutrition via alternate method and muro free water protocol. Muro Free Water Protocol --- regular water only after ORAL CARE (brush oral cavity and rinse thoroughly).   Runny puree diet with thin liquids utilizing small sips, double swallow and throat clear and reswallow with all liquids.     Educated patient that if he choices for option that includes continuing PO intake, close monitoring of respiratory status should be followed. In addition, discussed the pillars of aspiration and the importance of good oral hygiene.     FEEDING RECOMMENDATIONS:    Assistance level:  Set-up is required for all oral intake     Compensatory strategies recommended: Thorough oral care to prevent colonization of oral bacteria   Upright in bed/ chair as tolerated  Fully alert for all PO  Multiple swallow  Incorporate liquids into solids during mastication before

## 2025-02-21 ENCOUNTER — HOSPITAL ENCOUNTER (OUTPATIENT)
Dept: WOUND CARE | Age: 80
Discharge: HOME | End: 2025-02-21
Payer: MEDICARE

## 2025-02-21 VITALS
BODY MASS INDEX: 28.01 KG/M2 | HEART RATE: 74 BPM | WEIGHT: 230 LBS | SYSTOLIC BLOOD PRESSURE: 108 MMHG | DIASTOLIC BLOOD PRESSURE: 59 MMHG | TEMPERATURE: 96.9 F | HEIGHT: 76 IN | RESPIRATION RATE: 18 BRPM

## 2025-02-21 DIAGNOSIS — L97.922 NON-PRESSURE CHRONIC ULCER OF LEFT LOWER LEG WITH FAT LAYER EXPOSED (HCC): Primary | ICD-10-CM

## 2025-02-21 DIAGNOSIS — L89.893: ICD-10-CM

## 2025-02-21 PROCEDURE — 11045 DBRDMT SUBQ TISS EACH ADDL: CPT

## 2025-02-21 PROCEDURE — 11042 DBRDMT SUBQ TIS 1ST 20SQCM/<: CPT

## 2025-02-21 NOTE — PROGRESS NOTES
Wound Healing Center /Hyperbarics   History and Physical/Consultation  General Surgery/Medicine    Referring Physician : Mervin Ruiz DO  Geovanny Kraft  MEDICAL RECORD NUMBER:  [id number]  AGE: 80 y.o.   GENDER: male  : 1945  EPISODE DATE:  2025  Subjective:     Chief Complaint   Patient presents with    Wound Check     Left lateral leg, left penis         HISTORY of PRESENT ILLNESS HPI     Geovanny Kraft is a 80 y.o. male who presents today for wound/ulcer evaluation.   History of Wound Context:  The patient has had a wound of left lower extremity and penis which was first noted approximately 2024.  This has been treated aquacel ag dressing changes. On their initial visit to the wound healing center, 2/3/2025 ,  the patient has noted that the wound has not been improving.  The patient has had similar previous wounds in the past.      Patient is diabetic without insulin therapy   Patient has history of cardiac pacemaker placement with long term anticoagulation therapy   Patient denies history of heart attack, stroke or blood clots   Patient denies history of lung disease or difficulty breathing   Patient has history of right foot amputation     Pt is not on abx at time of initial visit.    2/3/25  Patient presents to wound care for left lower extremity and penile ulcers  Patient has tubbs catheter in place which may have resulted in penile shaft ulcer due to prolonged pressure due to catheter placement   Wounds debrided   Culture obtained   Aquacel ag, dry dressing to wounds, pad around catheter to prevent additional pressure injuries     2025  Lower extremity wound debrided, measuring smaller  Penile wound unable to be assessed as patient could not get out of wheelchair today  AG to both wounds, pad around tubbs catheter      Wound/Ulcer Pain Timing/Severity: intermittent  Quality of pain: aching  Severity:  3 / 10   Modifying Factors: None  Associated Signs/Symptoms: edema, erythema,

## 2025-03-05 NOTE — DISCHARGE INSTRUCTIONS
Visit Discharge/Physician Orders     Discharge condition: Stable     Assessment of pain at discharge: mild     Anesthetic used: 4% Lidocaine     Discharge to: Home     Left via:Private automobile     Accompanied by: accompanied by self     [facility]      Dressing Orders: Clean wounds left leg/knee and penis with normal saline. Apply silver alginate. Secure wound on left leg with kerlix.   Secure wound on penis with alginate ag, foam, abdominal pad. Pad around catheter. Change both daily. Home care to change at least 2-3 times per week, according to availability. Keep pressure off wounds. Keep dressings clean and dry.     Treatment Orders:Follow a nutritious diet. Choose foods high in protein: chicken, fish, and eggs. Choose foods high in Vitamin C. Multivitamin daily unless contraindicated.           [facility] followup visit _______2 weeks______________  (Please note your next appointment above and if you are unable to keep, kindly give a 24 hour notice. Thank you.)     Physician signature:__________________________        If you experience any of the following, please call the Wound Care Center during business hours:     * Increase in Pain  * Temperature over 101  * Increase in drainage from your wound  * Drainage with a foul odor  * Bleeding  * Increase in swelling  * Need for compression bandage changes due to slippage, breakthrough drainage.     If you need medical attention outside of the business hours of the Wound Care Centers please contact your PCP or go to the nearest emergency room.

## 2025-03-07 ENCOUNTER — HOSPITAL ENCOUNTER (OUTPATIENT)
Dept: WOUND CARE | Age: 80
Discharge: HOME | End: 2025-03-07
Attending: SURGERY
Payer: MEDICARE

## 2025-03-07 VITALS
RESPIRATION RATE: 16 BRPM | DIASTOLIC BLOOD PRESSURE: 65 MMHG | TEMPERATURE: 97.5 F | HEART RATE: 83 BPM | SYSTOLIC BLOOD PRESSURE: 108 MMHG

## 2025-03-07 DIAGNOSIS — L97.922 NON-PRESSURE CHRONIC ULCER OF LEFT LOWER LEG WITH FAT LAYER EXPOSED (HCC): Primary | ICD-10-CM

## 2025-03-07 DIAGNOSIS — L89.893: ICD-10-CM

## 2025-03-07 PROCEDURE — 11042 DBRDMT SUBQ TIS 1ST 20SQCM/<: CPT | Performed by: SURGERY

## 2025-03-07 PROCEDURE — 11042 DBRDMT SUBQ TIS 1ST 20SQCM/<: CPT

## 2025-03-07 RX ORDER — LIDOCAINE HYDROCHLORIDE 40 MG/ML
SOLUTION TOPICAL ONCE
Status: COMPLETED | OUTPATIENT
Start: 2025-03-07 | End: 2025-03-07

## 2025-03-07 RX ORDER — OXYCODONE HYDROCHLORIDE 5 MG/1
5 CAPSULE ORAL EVERY 4 HOURS PRN
COMMUNITY

## 2025-03-07 RX ADMIN — LIDOCAINE HYDROCHLORIDE 6 ML: 40 SOLUTION TOPICAL at 14:31

## 2025-03-07 ASSESSMENT — PAIN - FUNCTIONAL ASSESSMENT: PAIN_FUNCTIONAL_ASSESSMENT: ACTIVITIES ARE NOT PREVENTED

## 2025-03-07 ASSESSMENT — PAIN SCALES - GENERAL: PAINLEVEL_OUTOF10: 2

## 2025-03-07 ASSESSMENT — PAIN DESCRIPTION - LOCATION: LOCATION: LEG

## 2025-03-07 ASSESSMENT — PAIN DESCRIPTION - DESCRIPTORS: DESCRIPTORS: DULL

## 2025-03-07 ASSESSMENT — PAIN DESCRIPTION - ORIENTATION: ORIENTATION: LEFT

## 2025-03-07 NOTE — PROGRESS NOTES
Wound Care Center   Progress Note and Procedure Note      Geovanny Kraft  AGE: 80 y.o.   GENDER: male  : 1945  TODAY'S DATE:  3/7/2025    Subjective:   HISTORY of PRESENT ILLNESS HPI   Geovanny Kraft is a 80 y.o. male who presents today for wound evaluation.    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    History of right below knee amputation ([facility]) Z89.511    Decubitus ulcer of penis, stage 3 ([facility]) L89.893    Allred catheter in place Z97.8    Long term (current) use of anticoagulants Z79.01    Positive culture findings in wound R89.5     ALLERGIES  Allergies   Allergen Reactions    Morphine Rash     Other reaction(s): rash, rash    Zosyn [Piperacillin Sod-Tazobactam So] Itching    Piperacillin-Tazobactam In Dex Rash    Vancomycin Rash     Objective:    /65   Pulse 83   Temp 97.5 °F (36.4 °C) (Temporal)   Resp 16     Post Debridement Measurements and Assessment:  Wound 25 Leg Left;Lateral;Lower #2 (Active)   Wound Image   25 0911   Wound Cleansed Cleansed with saline 25 1459   Dressing/Treatment Alginate with Ag;ABD;Dry dressing 25 1459   Offloading for Diabetic Foot Ulcers Offloading ordered 25 1459   Wound Length (cm) 1.3 cm 25 1416   Wound Width (cm) 0.8 cm 25 1416   Wound Depth (cm) 0.1 cm 25 1416   Wound Surface Area (cm^2) 1.04 cm^2 25 1416   Change in Wound Size % (l*w) 98.07 25 1416   Wound Volume (cm^3) 0.104 cm^3 25 1416   Wound Healing % 99 25 1416   Post-Procedure

## 2025-03-10 ENCOUNTER — OFFICE VISIT (OUTPATIENT)
Dept: ENT CLINIC | Age: 80
End: 2025-03-10
Payer: MEDICARE

## 2025-03-10 VITALS
TEMPERATURE: 98.6 F | HEIGHT: 75 IN | WEIGHT: 230 LBS | SYSTOLIC BLOOD PRESSURE: 105 MMHG | BODY MASS INDEX: 28.6 KG/M2 | DIASTOLIC BLOOD PRESSURE: 69 MMHG | HEART RATE: 103 BPM

## 2025-03-10 DIAGNOSIS — R63.4 WEIGHT LOSS: ICD-10-CM

## 2025-03-10 DIAGNOSIS — H69.93 DYSFUNCTION OF BOTH EUSTACHIAN TUBES: Primary | ICD-10-CM

## 2025-03-10 DIAGNOSIS — R13.10 DYSPHAGIA, UNSPECIFIED TYPE: ICD-10-CM

## 2025-03-10 DIAGNOSIS — N28.89 RENAL MASS OF UNKNOWN NATURE: ICD-10-CM

## 2025-03-10 PROCEDURE — 3078F DIAST BP <80 MM HG: CPT | Performed by: OTOLARYNGOLOGY

## 2025-03-10 PROCEDURE — G8427 DOCREV CUR MEDS BY ELIG CLIN: HCPCS | Performed by: OTOLARYNGOLOGY

## 2025-03-10 PROCEDURE — 99213 OFFICE O/P EST LOW 20 MIN: CPT | Performed by: OTOLARYNGOLOGY

## 2025-03-10 PROCEDURE — 1036F TOBACCO NON-USER: CPT | Performed by: OTOLARYNGOLOGY

## 2025-03-10 PROCEDURE — 1123F ACP DISCUSS/DSCN MKR DOCD: CPT | Performed by: OTOLARYNGOLOGY

## 2025-03-10 PROCEDURE — G8417 CALC BMI ABV UP PARAM F/U: HCPCS | Performed by: OTOLARYNGOLOGY

## 2025-03-10 PROCEDURE — 3074F SYST BP LT 130 MM HG: CPT | Performed by: OTOLARYNGOLOGY

## 2025-03-10 ASSESSMENT — ENCOUNTER SYMPTOMS
TROUBLE SWALLOWING: 1
SHORTNESS OF BREATH: 0
SINUS PRESSURE: 0
COUGH: 0
SORE THROAT: 0
VOMITING: 0

## 2025-03-10 NOTE — PROGRESS NOTES
[facility]  WEN NguyenO. Ms.Ed        Patient Name:  Geovanny Kraft  :  1945     CHIEF C/O:    Chief Complaint   Patient presents with    Follow-up      FOLLOW UP - 1 mo f/u swallow study (in EP       HISTORY OBTAINED FROM:  patient    HISTORY OF PRESENT ILLNESS:       Geovanny is a 80 y.o. year old male, here today for follow up of:       Patient seen and examined today, follow-up from previous difficulty swallowing dysphagia possible nasopharyngeal or hypopharyngeal mass status post CT neck and chest.  CT scans ordered outside practitioner, personally reviewed interpreted with the patient and the family members today present showing anterior cervical spine osteophytes, at C1-2 and C5-6, consistent with levels at which the patient is having difficulty with dysphagia.  He continues to have dysphagia, showing no aspiration at this time, no recent pneumonia.  Also, underwent CT scan of the chest this was found to have proximal possible up to 11 cm kidney mass or lesion that is partially imaged on the current examination recommending significant follow-up.  Of note, and discussed with the patient today he is having significant urinary problems he has been seen by urology x 1 and currently his indwelling catheter for inability urinate appropriately.    3/10/25: Here for follow up of dysphagia after swallow study showing aspiration of both thin liquids and thickened honey nectar. No new symptoms.           Past Medical History:   Diagnosis Date    Diabetes mellitus (HCC)     Diabetic foot ulcer associated with type 2 diabetes mellitus, with fat layer exposed (HCC) 2016    Diabetic peripheral neuropathy (HCC)     Hyperlipidemia     Hypertension     RLS (restless legs syndrome)      Past Surgical History:   Procedure Laterality Date    FOOT AMPUTATION      FOOT SURGERY Left 16    I&D    INSERT PICC LINE  2019         OTHER SURGICAL HISTORY  2014    left foot debridement,

## 2025-03-11 LAB
BACTERIA URNS QL MICRO: ABNORMAL
BILIRUB UR QL STRIP: NEGATIVE
CLARITY UR: ABNORMAL
COLOR UR: YELLOW
CRYSTALS URNS MICRO: ABNORMAL /HPF
GLUCOSE UR STRIP-MCNC: >=1000 MG/DL
HGB UR QL STRIP.AUTO: ABNORMAL
KETONES UR STRIP-MCNC: NEGATIVE MG/DL
LEUKOCYTE ESTERASE UR QL STRIP: ABNORMAL
NITRITE UR QL STRIP: POSITIVE
PH UR STRIP: 6 [PH] (ref 5–8)
PROT UR STRIP-MCNC: 30 MG/DL
RBC #/AREA URNS HPF: ABNORMAL /HPF
SP GR UR STRIP: 1.01 (ref 1–1.03)
UROBILINOGEN UR STRIP-ACNC: 0.2 EU/DL (ref 0–1)
WBC #/AREA URNS HPF: ABNORMAL /HPF

## 2025-03-12 PROBLEM — N18.9 ANEMIA IN CHRONIC KIDNEY DISEASE: Status: ACTIVE | Noted: 2022-01-11

## 2025-03-12 PROBLEM — G47.00 INSOMNIA: Status: ACTIVE | Noted: 2025-03-12

## 2025-03-12 PROBLEM — G52.9 CRANIAL NERVE PALSY: Status: RESOLVED | Noted: 2023-12-11 | Resolved: 2025-01-01

## 2025-03-12 PROBLEM — R19.7 DIARRHEA: Status: ACTIVE | Noted: 2025-03-12

## 2025-03-12 PROBLEM — R53.1 ASTHENIA: Status: ACTIVE | Noted: 2025-03-12

## 2025-03-12 PROBLEM — N40.1 BENIGN PROSTATIC HYPERPLASIA WITH URINARY OBSTRUCTION: Status: ACTIVE | Noted: 2025-03-12

## 2025-03-12 PROBLEM — Z95.0 S/P PLACEMENT OF CARDIAC PACEMAKER: Status: ACTIVE | Noted: 2020-04-27

## 2025-03-12 PROBLEM — I50.30 DIASTOLIC HEART FAILURE (HCC): Status: ACTIVE | Noted: 2025-01-01

## 2025-03-12 PROBLEM — D63.1 ANEMIA IN CHRONIC KIDNEY DISEASE: Chronic | Status: ACTIVE | Noted: 2022-01-11

## 2025-03-12 PROBLEM — R19.7 DIARRHEA: Chronic | Status: ACTIVE | Noted: 2025-01-01

## 2025-03-12 PROBLEM — D63.1 ANEMIA IN CHRONIC KIDNEY DISEASE: Status: ACTIVE | Noted: 2022-01-11

## 2025-03-12 PROBLEM — R19.5 OCCULT BLOOD IN STOOLS: Status: ACTIVE | Noted: 2025-03-12

## 2025-03-12 PROBLEM — I50.32 CHRONIC HEART FAILURE WITH PRESERVED EJECTION FRACTION (HFPEF) (HCC): Status: ACTIVE | Noted: 2025-03-12

## 2025-03-12 PROBLEM — F10.10 ALCOHOL ABUSE: Status: ACTIVE | Noted: 2025-01-01

## 2025-03-12 PROBLEM — R53.1 ASTHENIA: Chronic | Status: ACTIVE | Noted: 2025-03-12

## 2025-03-12 PROBLEM — N13.8 BENIGN PROSTATIC HYPERPLASIA WITH URINARY OBSTRUCTION: Status: ACTIVE | Noted: 2025-03-12

## 2025-03-12 PROBLEM — D64.9 ACUTE ANEMIA: Status: ACTIVE | Noted: 2025-03-12

## 2025-03-12 PROBLEM — N18.32 STAGE 3B CHRONIC KIDNEY DISEASE (HCC): Status: ACTIVE | Noted: 2022-01-11

## 2025-03-12 PROBLEM — Z77.098 EXPOSURE TO AGENT ORANGE: Status: ACTIVE | Noted: 2025-01-01

## 2025-03-12 PROBLEM — N18.9 ANEMIA IN CHRONIC KIDNEY DISEASE: Chronic | Status: ACTIVE | Noted: 2022-01-11

## 2025-03-12 PROBLEM — I48.91 ATRIAL FIBRILLATION (HCC): Status: ACTIVE | Noted: 2023-02-13

## 2025-03-12 NOTE — ED PROVIDER NOTES
Cardiovascular:  Negative for chest pain.   Gastrointestinal:  Negative for abdominal pain.       Positives and Pertinent negatives as per HPI.     SURGICAL HISTORY     Past Surgical History:   Procedure Laterality Date    FOOT AMPUTATION      FOOT SURGERY Left 5/29/16    I&D    INSERT PICC LINE  7/23/2019         OTHER SURGICAL HISTORY  9/20/2014    left foot debridement, I&D exostectomy, bone biopsy; I&D right lower leg(BK stump site)    PACEMAKER PLACEMENT      has been shut off for 5 years       CURRENTMEDICATIONS       Previous Medications    ALPRAZOLAM (XANAX) 0.25 MG TABLET    Take 1 tablet by mouth nightly for 30 days. Max Daily Amount: 0.25 mg    APIXABAN (ELIQUIS) 5 MG TABS TABLET    Take 1 tablet by mouth 2 times daily    ASPIRIN 81 MG CHEWABLE TABLET    Take 1 tablet by mouth daily    ATORVASTATIN (LIPITOR) 40 MG TABLET    Take 1 tablet by mouth daily    DILTIAZEM (CARDIZEM 12 HR) 120 MG EXTENDED RELEASE CAPSULE    Take 1 capsule by mouth daily    DOXAZOSIN (CARDURA) 8 MG TABLET    Take 1 tablet by mouth nightly    EMPAGLIFLOZIN (JARDIANCE) 25 MG TABLET    Take 0.5 tablets by mouth daily    GABAPENTIN (NEURONTIN) 300 MG CAPSULE    Take 1 capsule by mouth at bedtime.    MINERAL OIL-HYDROPHILIC PETROLATUM (HYDROPHOR) OINTMENT    Apply topically as needed.    MIRTAZAPINE (REMERON) 7.5 MG TABLET    Take 1 tablet by mouth nightly    OXYCODONE 5 MG CAPSULE    Take 1 capsule by mouth every 4 hours as needed for Pain.    ROPINIROLE (REQUIP) 1 MG TABLET    Take 1 tablet by mouth       ALLERGIES     Morphine, Zosyn [piperacillin sod-tazobactam so], Piperacillin-tazobactam in dex, and Vancomycin    FAMILYHISTORY       Family History   Problem Relation Age of Onset    Diabetes Mother     Heart Disease Mother     Other Father         cerebral aneurysm    Parkinsonism Sister     Heart Attack Brother     Cancer Brother         SOCIAL HISTORY       Social History     Tobacco Use    Smoking status: Former     Current        DISCONTINUED MEDICATIONS:  Discontinued Medications    No medications on file              (Please note that portions of this note were completed with a voice recognition program.  Efforts were made to edit the dictations but occasionally words are mis-transcribed.)    Veto Dorado DO (electronically signed)           Veto Dorado DO  03/12/25 1944

## 2025-03-13 PROBLEM — E44.0 MODERATE PROTEIN-CALORIE MALNUTRITION: Status: ACTIVE | Noted: 2025-03-13

## 2025-03-13 LAB
MICROORGANISM SPEC CULT: ABNORMAL
SPECIMEN DESCRIPTION: ABNORMAL

## 2025-03-13 NOTE — ACP (ADVANCE CARE PLANNING)
3/13/2025  Advance Care Planning   Healthcare Decision Maker:    Primary Decision Maker: Whitley Kraft - [facility] - [phone]    Click here to complete Healthcare Decision Makers including selection of the Healthcare Decision Maker Relationship (ie \"Primary\").

## 2025-03-13 NOTE — PROGRESS NOTES
Occupational Therapy  OCCUPATIONAL THERAPY INITIAL EVALUATION    [facility]   [address], [place]         Date:3/13/2025                                                  Patient Name: Geovanny Kraft    MRN: [id number]    : 1945    Room: [address] [place] [address]      Evaluating OT: Kiki Ulloa OTR/L [id number]       Referring Provider:Scot De Souza DO     Specific Provider Orders/Date: OT eval and treat 3/12/25      Diagnosis: Diarrhea     Surgery: none      Pertinent Medical History: DM, RBKA, HTN,Diabetic Neuropathy,pacemaker, Left Foot Surgery          Precautions:  Fall Risk, contact isolation    Assessment of current deficits    [x] Functional mobility  [x]ADLs  [x] Strength               []Cognition    [x] Functional transfers   [x] IADLs         [x] Safety Awareness   [x]Endurance    [] Fine Coordination              [x] Balance      [] Vision/perception   []Sensation     []Gross Motor Coordination  [] ROM  [] Delirium                   [] Motor Control     OT PLAN OF CARE   OT POC based on physician orders, patient diagnosis and results of clinical assessment    Frequency/Duration 2-5 days/wk for 2-4 weeks PRN   Specific OT Treatment Interventions to include:   * Instruction/training on adapted ADL techniques and AE recommendations to increase functional independence within precautions       * Training on energy conservation strategies, correct breathing pattern and techniques to improve independence/tolerance for self-care routine  * Functional transfer/mobility training/DME recommendations for increased independence, safety, and fall prevention  * Patient/Family education to increase follow through with safety techniques and functional independence  * Recommendation of environmental modifications for increased safety with functional transfers/mobility and ADLs  * Therapeutic exercise to improve motor endurance, ROM, and functional strength for

## 2025-03-13 NOTE — PROGRESS NOTES
Physical Therapy  Facility/Department: [address] MED SURG/TELE  Physical Therapy Initial Assessment    Name: Geovanny Kraft  : 1945  MRN: [id number]  Date of Service: 3/13/2025    Patient Diagnosis(es): The primary encounter diagnosis was Diarrhea, unspecified type. Diagnoses of Generalized weakness, Ambulatory dysfunction, Acute anemia, Hypokalemia, and Chronic indwelling Allred catheter were also pertinent to this visit.  Past Medical History:  has a past medical history of Diabetes mellitus (HCC), Diabetic foot ulcer associated with type 2 diabetes mellitus, with fat layer exposed (HCC), Diabetic peripheral neuropathy (HCC), Hyperlipidemia, Hypertension, and RLS (restless legs syndrome).  Past Surgical History:  has a past surgical history that includes Foot Amputation; other surgical history (2014); Foot surgery (Left, 16); pacemaker placement; and Insert Picc Line (2019).      Referring provider:  Ginny Irving DO    PT Order:  PT eval and treat     Evaluating PT:  Alina Mckeon PT, DPT PT [id number]    Room #:  0540/0540-A  Diagnosis:  Hypokalemia [E87.6]  Generalized weakness [R53.1]  Chronic indwelling Allred catheter [Z97.8]  Ambulatory dysfunction [R26.2]  Diarrhea, unspecified type [R19.7]  Acute anemia [D64.9]  Precautions:  fall risk, custom shoe left LE, right BKA with prothesis    SUBJECTIVE:    Pt lives with his wife in a 1 story home with ramp to enter.   Bed and bath is on first floor.  Pt reported he is mainly pivoting from surface to surface and using his w/c for mobility. Pt reported at times, he ambulates a short distance in the restroom or the bedroom without device but reaching for objects to support.  Pt with two falls recently.  Pt has an aide.      OBJECTIVE:   Initial Evaluation  Date: 3/13 Treatment Short Term/ Long Term   Goals   Was pt agreeable to Eval/treatment? yes     Does pt have pain? General discomforts     Bed Mobility  Rolling: Max A  Supine to sit: Max A  Sit to  supine: Mod A  Scooting: Max A to sitting EOB  SBA   Transfers Sit to stand: Max A x 2 to partial standing.  Bed also elevated to assist with transfer  Stand to sit: Max A x 2  Stand pivot: NT  Mod A   Ambulation    NT  5+ feet with AAD Mod A   Stair negotiation: ascended and descended  NT      ROM BLE:  WFL     Strength BLE:  grossly 2-3/5  Increase LE Strength by 1/2 mm grade   Balance Sitting EOB:  SBA  Static standing:  Max A x 2.  Partial standing only.  Sitting EOB:  supervision  Dynamic Standing:  Mod A   AM-PAC 6 Clicks 9/24       Pt is A & O  Sensation:  Pt denies numbness and tingling to extremities    Patient education  Pt educated on PT objectives during eval and while in the hospital    Patient response to education:   Pt verbalized understanding Pt demonstrated skill Pt requires further education in this area       yes     ASSESSMENT:    Conditions Requiring Skilled Therapeutic Intervention:    [x]Decreased strength     []Decreased ROM  [x]Decreased functional mobility  [x]Decreased balance   [x]Decreased endurance   [x]Decreased posture  []Decreased sensation  []Decreased coordination   []Decreased vision  []Decreased safety awareness   [x]Increased pain       Comments:  Pt found in bed.  Right prothesis donned while pt supine in bed.  No report of dizziness during mobility performed.  Standing attempted with Max A x 2 and bed elevated but unable to get pt to complete standing position.  Pt reported her left knee has been weak lately and feels as if it might buckle.  Pt assisted back into bed and assisted on the bed. Nursing was made aware that pt on the bed pan.   At end of eval, pt left in bed with call light in reach.      Pt's/ family goals   1. To go home    Prognosis is good for reaching above PT goals.    Patient and or family understand(s) diagnosis, prognosis, and plan of care.  yes    PHYSICAL THERAPY PLAN OF CARE:    PT POC is established based on physician order and patient diagnosis

## 2025-03-13 NOTE — CARE COORDINATION
3/13/2025  Social Work Discharge Planning:SW went to discuss discharge planning with Pt;however, Pt was sleeping deeply. SW left a voicemail with Pts spouse to please return my call to discuss possible [facility] placement. Waiting return call.  Electronically signed by SABRINA Jerome on 3/13/2025 at 3:16 PM

## 2025-03-13 NOTE — PROGRESS NOTES
4 Eyes Skin Assessment     NAME:  Geovanny Kraft  YOB: 1945  MEDICAL RECORD NUMBER:  [id number]    The patient is being assessed for  Admission    I agree that at least one RN has performed a thorough Head to Toe Skin Assessment on the patient. ALL assessment sites listed below have been assessed.      Areas assessed by both nurses:    Head, Face, Ears, Shoulders, Back, Chest, Arms, Elbows, Hands, Sacrum. Buttock, Coccyx, Ischium, Legs. Feet and Heels, Under Medical Devices , and Other          Does the Patient have a Wound? Yes wound(s) were present on assessment. LDA wound assessment was Initiated and completed by RN       Bruce Prevention initiated by RN: Yes  Wound Care Orders initiated by RN: Yes    Pressure Injury (Stage 3,4, Unstageable, DTI, NWPT, and Complex wounds) if present, place Wound referral order by RN under : No    New Ostomies, if present place, Ostomy referral order under : No     Nurse 1 eSignature: Electronically signed by Karley Rivas RN on 3/12/25 at 11:41 PM EDT    **SHARE this note so that the co-signing nurse can place an eSignature**    Nurse 2 eSignature: {Esignature:[id number]}

## 2025-03-13 NOTE — PROGRESS NOTES
SPEECH/LANGUAGE PATHOLOGY  CLINICAL ASSESSMENT OF SWALLOWING FUNCTION   and PLAN OF CARE      PATIENT NAME:  Geovanny Kraft  (male)     MRN:  [id number]    :  1945  (80 y.o.)  STATUS:  Inpatient: Room 0540/0540-A    TODAY'S DATE:  3/13/2025  ORDER DATE, DESCRIPTION AND REFERRING PROVIDER:    SLP swallowing-dysphagia evaluation and treatment  Start:  25,   End:  25,   ONE TIME,   Standing Count:  1 Occurrences,   R       Scot De Souza DO  REASON FOR REFERRAL:  Known history of oropharyngeal dysphagia with laryngeal penetration and aspiration on MBSS  EVALUATING THERAPIST: NISSA Sanders                 RESULTS:    DYSPHAGIA DIAGNOSIS:   Clinical indicators of mild oral phase dysphagia and Clinical indicators of marked pharyngeal phase dysphagia     Patient has a known medical history of dysphagia. Recently, he completed a modified barium swallow study on 25 that revealed \"marked pharyngeal phase dysphagia.\" Patient was provided \"diet options\" versus a specific diet recommendation due to a large cervical osteophyte obstructing the swallow function. He was noted to have \"risk\" for aspiration on all consistencies on the study. The diet options provided from the modified barium swallow study were as followed: NPO with all means of nutrition via alternate method, NPO with main source of nutrition via alternate method and muro free water protocol. Muro Free Water Protocol --- regular water only after ORAL CARE (brush oral cavity and rinse thoroughly), or a runny puree diet with thin liquids utilizing small sips, double swallow and throat clear/re-swallow with all liquids.       DIET RECOMMENDATIONS:  Pureed consistency solids (IDDSI level 4) with thin liquids (IDDSI level 0) -- medication crushed in puree     Prior to hospitalization, patient was receiving HomeHealth speech therapy -- patient was oriented and aware of his dysphagia deficits -- intermittent throat clears were

## 2025-03-13 NOTE — H&P
Gabapentin nightly.    All services rendered & documented were deemed medically necessary by me and were appropriate for this patient's condition.      Consultations Ordered:  Consult Wound care, PT, OT, SLP, Dietician.     I performed a comprehensive review of the patient's prior external notes, labs, & imaging, initial diagnostic tests, and other clinically relevant data.    Code Status: Full  VTE prophylaxis: Eliquis  Dispo: admit to inpt med tele    Patient is high risk for complications resulting in significant morbidity and/or Mortality    CHIEF COMPLAINT:  had concerns including Fall (Slid out of bed twice. First time he states he hurt his upper muscles in his legs. The second time he hurt his right hip, right arm, and lower back. Bruise noted to lower back. Pt lives with his wife at home. Pt states he has been weaker then normal.).    History of Present Illness:      Geovanny Kraft is a 80 y.o.-year-old male with a PMH of Multiple Chronic Wounds, BKA,  BPH with MONTE & chronic tubbs, HFpEF, Stage IIIa CKD & ACKD who present for weakness. There was significant historic drift. Pt maintained his was having more bloating and flatuence. His wife insisted he come in because he was weak and \"didn't look the same.\" He has had two falls in the past two weeks.         I discussed the patient's case with the Emergency Medicine Resident/physician and the plan of care with the appropriate ancillary medical staff.       REVIEW OF SYSTEMS:  A comprehensive review of systems was negative except for: what is in the HPI      PMH:  Past Medical History:   Diagnosis Date    Diabetes mellitus (HCC)     Diabetic foot ulcer associated with type 2 diabetes mellitus, with fat layer exposed (HCC) 9/16/2016    Diabetic peripheral neuropathy (HCC)     Hyperlipidemia     Hypertension     RLS (restless legs syndrome)        Surgical History:  Past Surgical History:   Procedure Laterality Date    FOOT AMPUTATION      FOOT SURGERY Left  residue within the vallecula and piriform sinuses.  Laryngeal elevation is reduced.     Aspiration of thin, nectar and honey thick liquid barium.\ Please see separate speech pathology report for full discussion of findings and recommendations.             NOTE: This report was transcribed using voice recognition software. Every effort was made to ensure accuracy; however, inadvertent computerized transcription errors may be present.    Electronically signed by Scot De Souza DO on 3/12/2025 at 8:05 PM

## 2025-03-13 NOTE — PROGRESS NOTES
Patients wife Whitley would like a call from urologist to discuss plans for surgery on Monday. Notified Hiral with urology, they will contact her in the morning.

## 2025-03-13 NOTE — PLAN OF CARE
Problem: Chronic Conditions and Co-morbidities  Goal: Patient's chronic conditions and co-morbidity symptoms are monitored and maintained or improved  3/13/2025 1009 by Alyssa Carr RN  Outcome: Progressing  3/13/2025 0022 by Karley Rivas RN  Outcome: Progressing     Problem: Discharge Planning  Goal: Discharge to home or other facility with appropriate resources  3/13/2025 1009 by Alyssa Carr RN  Outcome: Progressing  3/13/2025 0022 by Karley Rivas RN  Outcome: Progressing     Problem: Skin/Tissue Integrity  Goal: Skin integrity remains intact  Description: 1.  Monitor for areas of redness and/or skin breakdown  2.  Assess vascular access sites hourly  3.  Every 4-6 hours minimum:  Change oxygen saturation probe site  4.  Every 4-6 hours:  If on nasal continuous positive airway pressure, respiratory therapy assess nares and determine need for appliance change or resting period  3/13/2025 0022 by Karley Rivas, RN  Outcome: Progressing     Problem: ABCDS Injury Assessment  Goal: Absence of physical injury  3/13/2025 0022 by Karley Rivas, RN  Outcome: Progressing     Problem: Safety - Adult  Goal: Free from fall injury  3/13/2025 0022 by Karley Rivas, RN  Outcome: Progressing

## 2025-03-13 NOTE — PROGRESS NOTES
[facility] Hospitalist Progress Note    Admitting Date and Time: 3/12/2025  1:31 PM  Admit Dx: Hypokalemia [E87.6]  Generalized weakness [R53.1]  Chronic indwelling Allred catheter [Z97.8]  Ambulatory dysfunction [R26.2]  Diarrhea, unspecified type [R19.7]  Acute anemia [D64.9]    Subjective:  Patient is being followed for Hypokalemia [E87.6]  Generalized weakness [R53.1]  Chronic indwelling Allred catheter [Z97.8]  Ambulatory dysfunction [R26.2]  Diarrhea, unspecified type [R19.7]  Acute anemia [D64.9]   Patient was seen  ROS: denies fever, chills, cp, sob, n/v, HA unless stated above.      cefTRIAXone (ROCEPHIN) IV  2,000 mg IntraVENous Q24H    ALPRAZolam  0.25 mg Oral Nightly    apixaban  5 mg Oral BID    aspirin  81 mg Oral Daily    atorvastatin  40 mg Oral Daily    dilTIAZem  120 mg Oral Daily    doxazosin  8 mg Oral Nightly    sodium chloride flush  5-40 mL IntraVENous 2 times per day     sodium chloride flush, 5-40 mL, PRN  sodium chloride, , PRN  ondansetron, 4 mg, Q8H PRN   Or  ondansetron, 4 mg, Q6H PRN  melatonin, 3 mg, Nightly PRN  acetaminophen, 650 mg, Q6H PRN   Or  acetaminophen, 650 mg, Q6H PRN         Objective:    /63   Pulse 81   Temp 98.2 °F (36.8 °C) (Oral)   Resp 16   Ht 1.854 m (6' 1\")   Wt 106.2 kg (234 lb 3.2 oz)   SpO2 95%   BMI 30.90 kg/m²     General Appearance: alert and oriented to person, place and time and in no acute distress  Skin: warm and dry  Head: normocephalic and atraumatic  Eyes: pupils equal, round, and reactive to light, extraocular eye movements intact, conjunctivae normal  Neck: neck supple and non tender without mass   Pulmonary/Chest: clear to auscultation bilaterally- no wheezes, rales or rhonchi, normal air movement, no respiratory distress  Cardiovascular: normal rate, normal S1 and S2 and no carotid bruits  Abdomen: soft, non-tender, non-distended, normal bowel sounds, no masses or organomegaly  Extremities: no cyanosis, no clubbing and no

## 2025-03-13 NOTE — CONSULTS
3/13/2025 2:08 PM  Geovanny Kraft  [id number]     Chief Complaint:    5mm right proximal ureteral stone, 2.7cm left renal stone, hydronephrosis      History of Present Illness:      The patient is a 80 y.o. male patient who presented to the hospital with complaints of a fall. He was admitted for diarrhea, ZACHARY, and failure to thrive. Urology is asked to evaluate for kidney stones.     He has chronic urinary retention with a chronic tubbs catheter. CT imaging showed a 5mm right proximal ureteral stone with right hydronephrosis and a large 2.7cm left renal stone with left hydronephrosis.     Currently patient seems to have minimal complaints.  He states he was unaware that he was diagnosed with the left-sided stone 5 years ago.  He is currently making urine with clear yellow urine in his Tubbs tubing.      Past Medical History:   Diagnosis Date    Diabetes mellitus (HCC)     Diabetic foot ulcer associated with type 2 diabetes mellitus, with fat layer exposed (HCC) 9/16/2016    Diabetic peripheral neuropathy (HCC)     Hyperlipidemia     Hypertension     RLS (restless legs syndrome)          Past Surgical History:   Procedure Laterality Date    FOOT AMPUTATION      FOOT SURGERY Left 5/29/16    I&D    INSERT PICC LINE  7/23/2019         OTHER SURGICAL HISTORY  9/20/2014    left foot debridement, I&D exostectomy, bone biopsy; I&D right lower leg(BK stump site)    PACEMAKER PLACEMENT      has been shut off for 5 years       Medications Prior to Admission:    Medications Prior to Admission: ALPRAZolam (XANAX) 0.25 MG tablet, Take 1 tablet by mouth nightly for 30 days. Max Daily Amount: 0.25 mg  mirtazapine (REMERON) 7.5 MG tablet, Take 1 tablet by mouth nightly  rOPINIRole (REQUIP) 1 MG tablet, Take 1 tablet by mouth  aspirin 81 MG chewable tablet, Take 1 tablet by mouth daily  atorvastatin (LIPITOR) 40 MG tablet, Take 1 tablet by mouth daily  dilTIAZem (CARDIZEM 12 HR) 120 MG extended release capsule, Take 1 capsule  hydroureter.     Cholelithiasis, without evidence of acute cholecystitis.     Persistent bilateral pleural effusions, left larger than right, with  unchanged left lower lobe airspace disease.     Descending and sigmoid colonic diverticulosis, without evidence of acute  diverticulitis.     Stable fat containing umbilical hernia.                    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  Also appears to have stones on the right side as well.  He is actually already on the surgical schedule with Dr. Campbell on Monday.  We would recommend keeping patient inpatient so he can be treated with antibiotics prior to the surgery given that his urine culture appears dirty.  This should hopefully result with sensitivities by tomorrow.  He can then undergo surgery as planned on Monday as an inpatient.    Scot Castro MD  [facility]

## 2025-03-13 NOTE — PROGRESS NOTES
Comprehensive Nutrition Assessment    Type and Reason for Visit:  Initial, Consult    Nutrition Recommendations/Plan:   Continue diet per MD orders/SLP recs  Added HP,LC ONS once daily, Diab ONS once daily to optimize nutrient intake.      Malnutrition Assessment:  Malnutrition Status:  Moderate malnutrition (03/13/25 1353)    Context:  Chronic Illness     Findings of the 6 clinical characteristics of malnutrition:  Energy Intake:  Mild decrease in energy intake  Weight Loss:  Greater than 10% over 6 months (x5 mos)     Body Fat Loss:  Mild body fat loss Buccal region   Muscle Mass Loss:  Mild muscle mass loss Clavicles (pectoralis & deltoids)  Fluid Accumulation:  No fluid accumulation     Strength:  Not Performed    Nutrition Assessment:    ADM: Unwitnessed fall, diarrhea. PMH: DM, Obesity, R BKA d/t charcot. S/p Swallow eval- foot mild oral phase dysphagia. Per pt -has cyst in throat making it difficult to swallow at times. Drinks Ensure at home. Reports unintentional wt loss over last 1.5 yrs d/t decreased appetite. Has improved some w/ addition Remeron to stmuate appetite he says. Added HP,LC ONS once daily, Diab ONS once daily to optimize nutrient intake. Meets criteria for moderate PCM.    Nutrition Related Findings:    A& O x 4, dysphagia, soft abd, + BS, diarrhea, R BKA, K+ 3.2 repleted,  GFR/Cr 48/1.5, IVF Wound Type: Pressure Injury (per LDA), wound consult pending    Current Nutrition Intake & Therapies:    Average Meal Intake: 1-25% (per flowsheet)     ADULT DIET; Dysphagia - Pureed  ADULT ORAL NUTRITION SUPPLEMENT; Dinner; Diabetic Oral Supplement  ADULT ORAL NUTRITION SUPPLEMENT; Dinner; Low Calorie/High Protein Oral Supplement    Anthropometric Measures:  Height: 185.4 cm (6' 1\")  Ideal Body Weight (IBW): 184 lbs (84 kg)    Admission Body Weight: 105.9 kg (233 lb 6 oz) (3/13 bed)  Current Body Weight: 106.2 kg (234 lb 3.2 oz), 127.3 % IBW. Weight Source: Bed scale (3/13)  Current BMI (kg/m2):

## 2025-03-13 NOTE — PROGRESS NOTES
Inpatient Wound Care (initial consult)    Admit Date: 3/12/2025  1:31 PM    Reason for consult:  lateral side of penis    Significant history:  adm diarrhea, falls at home  penile & lower ext   Past Medical History:   Diagnosis Date    Diabetes mellitus (HCC)     Diabetic foot ulcer associated with type 2 diabetes mellitus, with fat layer exposed (HCC) 9/16/2016    Diabetic peripheral neuropathy (HCC)     Hyperlipidemia     Hypertension     RLS (restless legs syndrome)      BKA rt    Wound history:  POA    Findings:  alert and verbal. Allred in place    Left lateral heel below      Penial wound. 2 x1.5x0.2, red, clear drainage      Left lower lateral leg      Left knee      Left lateral foot                Left posterior thigh      **Informed Consent**    The patient has given verbal consent to have photos taken of wounds and inserted into their chart as part of their permanent medical record for purposes of documentation, treatment management and/or medical review.   All Images taken on 3/13/25 of patient name: Geovanny Kraft were transmitted and stored on secured Epic  Site located within Media Folder Tab by a registered Epic-[employer] Mobile Application Device.       Plan:gauze dressing to penis wound for drainage    Comfort glide  Wedges  Heel protectors  Low air loss bed  Chair waffle cushion  Dietary consult  Patient will need continued preventative care    Riana Castro RN 3/13/2025 2:43 PM

## 2025-03-14 PROBLEM — N30.00 ACUTE CYSTITIS WITHOUT HEMATURIA: Status: ACTIVE | Noted: 2025-01-01

## 2025-03-14 PROBLEM — N30.01 ACUTE CYSTITIS WITH HEMATURIA: Status: ACTIVE | Noted: 2025-03-14

## 2025-03-14 PROBLEM — N20.0 KIDNEY STONES: Status: ACTIVE | Noted: 2025-03-14

## 2025-03-14 NOTE — CARE COORDINATION
3/14/2025  Social Work Discharge Planning: Pt will have  a cysto on Monday. Urology is following.This worker met with Pt to discuss  role and transition of care/discharge planning.Pt and his spouse reside together in a one story home with a ramp at entrance. Pt has  wc, hospital bed and right prosthetic leg. AM PAC is 9/24. Pt prefers to go to either [facility] or [facility].Referral was made to [facility]. Waiting reply. Electronically signed by SABRINA Jerome on 3/14/2025 at 10:44 AM    3/14/2025  Social Work Discharge Planning:[facility] is unable to accept Pt. SW made a referral to [facility]. Waiting reply. Electronically signed by SABRINA Jerome on 3/14/2025 at 12:48 PM

## 2025-03-14 NOTE — PROGRESS NOTES
[facility] Hospitalist Progress Note    Admitting Date and Time: 3/12/2025  1:31 PM  Admit Dx: Hypokalemia [E87.6]  Generalized weakness [R53.1]  Chronic indwelling Allred catheter [Z97.8]  Ambulatory dysfunction [R26.2]  Diarrhea, unspecified type [R19.7]  Acute anemia [D64.9]    Subjective:  Patient is being followed for Hypokalemia [E87.6]  Generalized weakness [R53.1]  Chronic indwelling Allred catheter [Z97.8]  Ambulatory dysfunction [R26.2]  Diarrhea, unspecified type [R19.7]  Acute anemia [D64.9]   Pt was seen and examined today. Denies any new issues. Reports diarrhea continues but has slowed down a bit compared to on admission.    ROS: denies fever, chills, cp, sob, n/v, HA unless stated above.     potassium chloride  40 mEq Oral BID WC    cefTRIAXone (ROCEPHIN) IV  2,000 mg IntraVENous Q24H    psyllium  1 packet Oral Daily    ALPRAZolam  0.25 mg Oral Nightly    apixaban  5 mg Oral BID    aspirin  81 mg Oral Daily    atorvastatin  40 mg Oral Daily    dilTIAZem  120 mg Oral Daily    doxazosin  8 mg Oral Nightly    sodium chloride flush  5-40 mL IntraVENous 2 times per day     loperamide, 2 mg, 4x Daily PRN  sodium chloride flush, 5-40 mL, PRN  sodium chloride, , PRN  ondansetron, 4 mg, Q8H PRN   Or  ondansetron, 4 mg, Q6H PRN  melatonin, 3 mg, Nightly PRN  acetaminophen, 650 mg, Q6H PRN   Or  acetaminophen, 650 mg, Q6H PRN         Objective:    /65   Pulse 96   Temp 98.4 °F (36.9 °C) (Oral)   Resp 20   Ht 1.854 m (6' 1\")   Wt 106.4 kg (234 lb 8 oz)   SpO2 96%   BMI 30.94 kg/m²     General Appearance: alert and in no acute distress  Skin: warm and dry  Head: normocephalic and atraumatic  Pulmonary/Chest: clear to auscultation bilaterally- no wheezes, rales or rhonchi, normal air movement, no respiratory distress  Cardiovascular: normal rate, normal S1 and S2  Abdomen: soft, non-tender, non-distended, normal bowel sounds  Extremities: no cyanosis, no clubbing and no edema    Recent Labs      Edu Spring MD on 3/14/2025 at 10:05 AM

## 2025-03-14 NOTE — CARE COORDINATION
3/14/2025  Social Work Discharge Planning: Pt will have  a cysto on Monday. Urology is following.This worker met with Pt to discuss  role and transition of care/discharge planning.Pt and his spouse reside together in a one story home with a ramp at entrance. Pt has  wc, hospital bed and right prosthetic leg. AM PAC is 9/24. Pt prefers to go to either [facility] or [facility].Referral was made to [facility]. Waiting reply. Electronically signed by SABRINA Jerome on 3/14/2025 at 10:44 AM    3/14/2025  Social Work Discharge Planning:[facility] is unable to accept Pt. SW made a referral to [facility]. Waiting reply. Electronically signed by SABRINA Jerome on 3/14/2025 at 12:48 PM

## 2025-03-14 NOTE — PROGRESS NOTES
[facility]  ([facility])      PROGRESS NOTE         PATIENT NAME: Geovanny Kraft   YOB: 1945  ADMISSION DATE: 3/12/2025  1:31 PM   TODAY'S DATE: 3/14/2025        Subjective       Stable no new issues      Objective     VS:   /65   Pulse 96   Temp 98.4 °F (36.9 °C) (Oral)   Resp 20   Ht 1.854 m (6' 1\")   Wt 106.4 kg (234 lb 8 oz)   SpO2 96%   BMI 30.94 kg/m²     I & O - 24hr:    Intake/Output Summary (Last 24 hours) at 3/14/2025 0839  Last data filed at 3/14/2025 0453  Gross per 24 hour   Intake 0 ml   Output 1850 ml   Net -1850 ml         Physical Exam:  General:  Neck:  Resp:  Abdomen:   No acute distress  Supple  Normal effort  Soft, non-tender, nondistended   :  Deferred   Skin: Skin color, texture, turgor normal, no rashes or lesions       Labs and Imaging Studies   Labs:    CBC:   Recent Labs     03/12/25  2205 03/13/25  0735 03/14/25  0321   WBC 13.1* 12.2* 9.9   HGB 8.9* 8.5* 8.5*   HCT 29.2* 27.9* 27.3*   MCV 86.4 86.9 85.3    428 418     BMP:  Recent Labs     03/12/25  1431 03/13/25  0735 03/14/25  0321    137 134   K 3.0* 3.2* 3.3*   CL 99 104 103   CO2 23 23 21*   BUN 27* 25* 21   CREATININE 1.5* 1.5* 1.3*       Magnesium:   Lab Results   Component Value Date/Time    MG 2.2 03/13/2025 07:35 AM      Phosphate:   Lab Results   Component Value Date/Time    PHOS 3.1 12/13/2023 04:37 AM     PT/INR: No results for input(s): \"PROTIME\", \"INR\" in the last 72 hours.    U/A:   Lab Results   Component Value Date/Time    NITRITE negative 06/21/2024 12:04 PM    LEUKOCYTESUR MODERATE 03/12/2025 02:31 PM    PHUR 5.5 03/12/2025 02:31 PM    PHUR 8.5 06/21/2024 12:04 PM    PHUR >9.0 12/12/2023 09:15 AM    WBCUA 10 TO 20 03/12/2025 02:31 PM    RBCUA 3 to 5 03/12/2025 02:31 PM    BACTERIA 1+ 03/12/2025 02:31 PM    SPECGRAV 1.020 06/21/2024 12:04 PM    BLOODU large 06/21/2024 12:04 PM    BLOODU Negative 05/06/2020 05:59 PM    GLUCOSEU 500 03/12/2025 02:31 PM       Urine

## 2025-03-15 NOTE — PROGRESS NOTES
[facility] Hospitalist Progress Note    Admitting Date and Time: 3/12/2025  1:31 PM  Admit Dx: Hypokalemia [E87.6]  Generalized weakness [R53.1]  Chronic indwelling Allred catheter [Z97.8]  Ambulatory dysfunction [R26.2]  Diarrhea, unspecified type [R19.7]  Acute anemia [D64.9]    Subjective:  Patient is being followed for Hypokalemia [E87.6]  Generalized weakness [R53.1]  Chronic indwelling Allred catheter [Z97.8]  Ambulatory dysfunction [R26.2]  Diarrhea, unspecified type [R19.7]  Acute anemia [D64.9]   Pt was seen and examined today. Family reported increased confusion earlier today, by the time of my evaluation he was back to baseline, he has been getting worsening cough.    ROS: denies fever, chills, cp, sob, n/v, HA unless stated above.     cefTRIAXone (ROCEPHIN) IV  2,000 mg IntraVENous Q24H    psyllium  1 packet Oral Daily    ALPRAZolam  0.25 mg Oral Nightly    [Held by provider] apixaban  5 mg Oral BID    aspirin  81 mg Oral Daily    atorvastatin  40 mg Oral Daily    dilTIAZem  120 mg Oral Daily    doxazosin  8 mg Oral Nightly    sodium chloride flush  5-40 mL IntraVENous 2 times per day     loperamide, 2 mg, 4x Daily PRN  white petrolatum, , BID PRN  sodium chloride flush, 5-40 mL, PRN  sodium chloride, , PRN  ondansetron, 4 mg, Q8H PRN   Or  ondansetron, 4 mg, Q6H PRN  melatonin, 3 mg, Nightly PRN  acetaminophen, 650 mg, Q6H PRN   Or  acetaminophen, 650 mg, Q6H PRN         Objective:    /66   Pulse 99   Temp 98.6 °F (37 °C) (Oral)   Resp 18   Ht 1.854 m (6' 1\")   Wt 109.8 kg (242 lb)   SpO2 92%   BMI 31.93 kg/m²     General Appearance: alert and in no acute distress  Skin: warm and dry  Head: normocephalic and atraumatic  Pulmonary/Chest: Diminished breath sounds on LLL, normal air movement, no respiratory distress  Cardiovascular: normal rate, normal S1 and S2  Abdomen: soft, non-tender, non-distended, normal bowel sounds  Extremities: no cyanosis, no clubbing and no edema    Recent

## 2025-03-15 NOTE — PROGRESS NOTES
Dr. Spring updated on wife's concerns. Orders obtained. Wife gave informed consent via phone and witness of another RN for cystoscopy.

## 2025-03-15 NOTE — PROGRESS NOTES
PROGRESS NOTE    Chief Complaint:   Right proximal ureteral stone/Bilateral hydronephrosis/Left renal stone/Possible left renal pelvic mass     HPI:   Tired and comfortable.  No pain at this time    Vitals:    03/15/25 0653   BP: 121/63   Pulse: (!) 116   Resp: 18   Temp: 100 °F (37.8 °C)   SpO2: 94%       Allergies: Morphine, Zosyn [piperacillin sod-tazobactam so], Piperacillin-tazobactam in dex, and Vancomycin    PAST MEDICAL HISTORY:   Past Medical History:   Diagnosis Date    Diabetes mellitus (HCC)     Diabetic foot ulcer associated with type 2 diabetes mellitus, with fat layer exposed (HCC) 2016    Diabetic peripheral neuropathy (HCC)     Hyperlipidemia     Hypertension     RLS (restless legs syndrome)        PAST SURGICAL HISTORY:   Past Surgical History:   Procedure Laterality Date    FOOT AMPUTATION      FOOT SURGERY Left 16    I&D    INSERT PICC LINE  2019         OTHER SURGICAL HISTORY  2014    left foot debridement, I&D exostectomy, bone biopsy; I&D right lower leg(BK stump site)    PACEMAKER PLACEMENT      has been shut off for 5 years        PAST FAMILY HISTORY:    Family History   Problem Relation Age of Onset    Diabetes Mother     Heart Disease Mother     Other Father         cerebral aneurysm    Parkinsonism Sister     Heart Attack Brother     Cancer Brother        PAST SOCIAL HISTORY:    Social History     Socioeconomic History    Marital status:    Occupational History    Occupation: RETIRED   Tobacco Use    Smoking status: Former     Current packs/day: 0.00     Average packs/day: 2.0 packs/day for 21.0 years (42.0 ttl pk-yrs)     Types: Cigarettes     Start date: 1963     Quit date: 1984     Years since quittin.6    Smokeless tobacco: Former     Types: Chew   Vaping Use    Vaping status: Never Used   Substance and Sexual Activity    Alcohol use: No    Drug use: No    Sexual activity: Never     Social Drivers of Health     Financial Resource Strain: Low  negative  Cardiovascular: Hypertension  Gastrointestinal: Diverticulosis  Genitourinary: Stones  Musculoskeletal: Restless leg  Neurological: negative  Behavioral/Psych: negative  Endocrine: Diabetes    Physical Exam:  Skin is dry with multiple abrasions and superficial ulcerations  Respirations are non-labored, intact  Abdomen is soft, non-tender, non-distended.  Active bowel sounds are present.  No rebound or guarding  Right below-knee amputation stump  Alert and appropriate.  No focal motor/sensory deficits  Allred catheter is draining clear, cl-colored urine    Assessment and Plan:  Right proximal ureteral stone/Bilateral hydronephrosis/Left renal stone/Possible left renal pelvic mass   -CT scan on 3/13/2025 shows a 2.7 cm left intrarenal collecting system stone, bilateral hydronephrosis, a 5 mm right proximal ureteral stone, bilateral pleural effusions, diverticulosis, a fat-containing umbilical hernia, and a left renal lobular soft tissue enhancement with left renal hilar lymph node enlargement concerning for a possible infiltrating renal neoplasm.  -Serum creatinine is stable at 1.3.  Continue to monitor.  -Continue the Allred and doxazosin  -Urine culture on 3/12/2025 grew probably dentia and streptococci.  Continue antibiotics  -Plan for cystoscopy, retrograde pyelography, possible bilateral ureteroscopy with laser lithotripsy, possible left renal pelvic biopsy, possible bilateral ureteral stent insertion this Monday.  NPO after midnight Sunday for the procedure on Monday.  He will require preoperative medical clearance and Eliquis will need to be placed on hold for the procedure  -We will continue to follow him during his hospital stay          Sanket Leung MD  3/15/2025  8:28 AM

## 2025-03-16 PROBLEM — J18.9 COMMUNITY ACQUIRED PNEUMONIA OF LEFT LOWER LOBE OF LUNG: Status: ACTIVE | Noted: 2025-03-16

## 2025-03-16 PROBLEM — U07.1 COVID-19: Status: ACTIVE | Noted: 2025-03-16

## 2025-03-16 NOTE — PLAN OF CARE
Problem: Chronic Conditions and Co-morbidities  Goal: Patient's chronic conditions and co-morbidity symptoms are monitored and maintained or improved  3/16/2025 1129 by Marla Monae, RN  Outcome: Progressing  3/16/2025 1111 by Marla Monae RN  Outcome: Progressing  3/16/2025 0639 by Aleyda Morse RN  Outcome: Progressing     Problem: Discharge Planning  Goal: Discharge to home or other facility with appropriate resources  3/16/2025 1129 by Marla Monae, RN  Outcome: Progressing  3/16/2025 1111 by Marla Monae RN  Outcome: Progressing  3/16/2025 0639 by Aleyda Morse RN  Outcome: Progressing     Problem: Skin/Tissue Integrity  Goal: Skin integrity remains intact  Description: 1.  Monitor for areas of redness and/or skin breakdown  2.  Assess vascular access sites hourly  3.  Every 4-6 hours minimum:  Change oxygen saturation probe site  4.  Every 4-6 hours:  If on nasal continuous positive airway pressure, respiratory therapy assess nares and determine need for appliance change or resting period  3/16/2025 1129 by Marla Monae, RN  Outcome: Progressing  Flowsheets (Taken 3/16/2025 1128)  Skin Integrity Remains Intact: Monitor for areas of redness and/or skin breakdown  3/16/2025 1111 by Marla Monae RN  Outcome: Progressing  Flowsheets (Taken 3/16/2025 0800)  Skin Integrity Remains Intact: Monitor for areas of redness and/or skin breakdown  3/16/2025 0639 by Aleyda Morse RN  Outcome: Progressing     Problem: ABCDS Injury Assessment  Goal: Absence of physical injury  3/16/2025 0639 by Aleyda Morse RN  Outcome: Progressing

## 2025-03-16 NOTE — PLAN OF CARE
Problem: Chronic Conditions and Co-morbidities  Goal: Patient's chronic conditions and co-morbidity symptoms are monitored and maintained or improved  3/16/2025 1111 by Marla Monae RN  Outcome: Progressing  3/16/2025 0639 by Aleyda Morse RN  Outcome: Progressing     Problem: Discharge Planning  Goal: Discharge to home or other facility with appropriate resources  3/16/2025 1111 by Marla Monae, RN  Outcome: Progressing  3/16/2025 0639 by Aleyda Morse RN  Outcome: Progressing     Problem: Skin/Tissue Integrity  Goal: Skin integrity remains intact  Description: 1.  Monitor for areas of redness and/or skin breakdown  2.  Assess vascular access sites hourly  3.  Every 4-6 hours minimum:  Change oxygen saturation probe site  4.  Every 4-6 hours:  If on nasal continuous positive airway pressure, respiratory therapy assess nares and determine need for appliance change or resting period  3/16/2025 1111 by Marla Monae, RN  Outcome: Progressing  3/16/2025 0639 by Aleyda Morse RN  Outcome: Progressing     Problem: ABCDS Injury Assessment  Goal: Absence of physical injury  3/16/2025 0639 by Aleyda Morse RN  Outcome: Progressing

## 2025-03-16 NOTE — PROGRESS NOTES
[facility] Hospitalist Progress Note    Admitting Date and Time: 3/12/2025  1:31 PM  Admit Dx: Hypokalemia [E87.6]  Generalized weakness [R53.1]  Chronic indwelling Allred catheter [Z97.8]  Ambulatory dysfunction [R26.2]  Diarrhea, unspecified type [R19.7]  Acute anemia [D64.9]    Subjective:  Patient is being followed for Hypokalemia [E87.6]  Generalized weakness [R53.1]  Chronic indwelling Allred catheter [Z97.8]  Ambulatory dysfunction [R26.2]  Diarrhea, unspecified type [R19.7]  Acute anemia [D64.9]   Pt was seen and examined today. Found to have COVID-19 overnight    ROS: denies fever, chills, cp, sob, n/v, HA unless stated above.     doxycycline hyclate  100 mg Oral 2 times per day    cefTRIAXone (ROCEPHIN) IV  2,000 mg IntraVENous Q24H    psyllium  1 packet Oral Daily    ALPRAZolam  0.25 mg Oral Nightly    [Held by provider] apixaban  5 mg Oral BID    aspirin  81 mg Oral Daily    atorvastatin  40 mg Oral Daily    dilTIAZem  120 mg Oral Daily    doxazosin  8 mg Oral Nightly    sodium chloride flush  5-40 mL IntraVENous 2 times per day     loperamide, 2 mg, 4x Daily PRN  white petrolatum, , BID PRN  sodium chloride flush, 5-40 mL, PRN  sodium chloride, , PRN  ondansetron, 4 mg, Q8H PRN   Or  ondansetron, 4 mg, Q6H PRN  melatonin, 3 mg, Nightly PRN  acetaminophen, 650 mg, Q6H PRN   Or  acetaminophen, 650 mg, Q6H PRN         Objective:    /63   Pulse 86   Temp 98.2 °F (36.8 °C) (Oral)   Resp 16   Ht 1.854 m (6' 1\")   Wt 109.8 kg (242 lb)   SpO2 97%   BMI 31.93 kg/m²     General Appearance: alert and in no acute distress  Skin: warm and dry  Head: normocephalic and atraumatic  Pulmonary/Chest: Diminished breath sounds on LLL, normal air movement, no respiratory distress  Cardiovascular: normal rate, normal S1 and S2  Abdomen: soft, non-tender, non-distended, normal bowel sounds  Extremities: no cyanosis, no clubbing and no edema    Recent Labs     03/14/25  0321 03/15/25  0340 03/16/25  0582

## 2025-03-17 PROBLEM — Z51.5 PALLIATIVE CARE ENCOUNTER: Status: ACTIVE | Noted: 2025-03-17

## 2025-03-17 PROBLEM — Z71.89 GOALS OF CARE, COUNSELING/DISCUSSION: Status: ACTIVE | Noted: 2025-01-01

## 2025-03-17 NOTE — CONSULTS
15      ASSESSMENT/PLAN:  80 y.o. male with admitted for COVID, ongoing dysphagia with low caloric intake     PEG 3/18  NPO at midnight   Continue holding eliquis   Appreciate IM assistance with respiratory issues- currently on room air     Plan discussed with Dr. Myrick .    Roxie Loya MD  Surgery Resident PGY-4  3/17/2025  5:49 PM

## 2025-03-17 NOTE — ANESTHESIA PRE PROCEDURE
Department of Anesthesiology  Preprocedure Note       Name:  Geovanny Kraft   Age:  80 y.o.  :  1945                                          MRN:  [id number]         Date:  3/16/2025      Surgeon: Surgeon(s):  Kike Milton MD    Procedure: Procedure(s):  CYSTOSCOPY RETROGRADE PYELOGRAM URETEROSCOPY J STENT LASER LITHOTRIPSY COMPLEX LEFT (DR COTE 90 MIN)    Medications prior to admission:   Prior to Admission medications    Medication Sig Start Date End Date Taking? Authorizing Provider   oxyCODONE 5 MG capsule Take 1 capsule by mouth every 4 hours as needed for Pain.  Patient not taking: Reported on 3/12/2025    Froylan Monsalve MD   mirtazapine (REMERON) 7.5 MG tablet Take 1 tablet by mouth nightly 25   Mervin Ruiz DO   rOPINIRole (REQUIP) 1 MG tablet Take 1 tablet by mouth 10/10/24   Froylan Monsalve MD   aspirin 81 MG chewable tablet Take 1 tablet by mouth daily 23   Murray Hooper MD   atorvastatin (LIPITOR) 40 MG tablet Take 1 tablet by mouth daily 23   Murray Hooper MD   dilTIAZem (CARDIZEM 12 HR) 120 MG extended release capsule Take 1 capsule by mouth daily    Froylan Monsalve MD   apixaban (ELIQUIS) 5 MG TABS tablet Take 1 tablet by mouth 2 times daily 23   Froylan Monsalve MD   gabapentin (NEURONTIN) 300 MG capsule Take 1 capsule by mouth at bedtime. 23   Froylan Monsalve MD   empagliflozin (JARDIANCE) 25 MG tablet Take 0.5 tablets by mouth daily 23   Froylan Monsalve MD   mineral oil-hydrophilic petrolatum (HYDROPHOR) ointment Apply topically as needed.  Patient not taking: Reported on 3/12/2025 5/7/20   Yosi Sanabria DO   doxazosin (CARDURA) 8 MG tablet Take 1 tablet by mouth nightly    Froylan Monsalve MD       Current medications:    No current facility-administered medications for this encounter.     No current outpatient medications on file.     Facility-Administered Medications Ordered in Other Encounters

## 2025-03-17 NOTE — PROGRESS NOTES
[facility]  ([facility])      PROGRESS NOTE         PATIENT NAME: Geovanny Kraft   YOB: 1945  ADMISSION DATE: 3/12/2025  1:31 PM   TODAY'S DATE: 3/17/2025        Subjective       Pt does not look well.   More respiratory issues.       Objective     VS:   /81   Pulse 91   Temp 98.3 °F (36.8 °C) (Temporal)   Resp 18   Ht 1.854 m (6' 1\")   Wt 110.3 kg (243 lb 1.6 oz)   SpO2 94%   BMI 32.07 kg/m²     I & O - 24hr:    Intake/Output Summary (Last 24 hours) at 3/17/2025 1334  Last data filed at 3/17/2025 1146  Gross per 24 hour   Intake 480 ml   Output 1975 ml   Net -1495 ml         Physical Exam:  General:  Neck:  Resp:  Abdomen:   No acute distress  Supple  Normal effort  Soft, non-tender, nondistended   :  defer   Skin: Skin color, texture, turgor normal, no rashes or lesions       Labs and Imaging Studies   Labs:    CBC:   Recent Labs     03/15/25  0340 03/16/25  0525 03/17/25  0720   WBC 9.3 9.6 9.0   HGB 8.3* 9.0* 8.2*   HCT 26.2* 29.4* 27.4*   MCV 84.2 85.7 86.7    359 326     BMP:  Recent Labs     03/15/25  0340 03/16/25  0525 03/17/25  0720    141 138   K 3.5 3.8 3.9    103 105   CO2 22 25 23   BUN 21 21 24*   CREATININE 1.3* 1.4* 1.3*       Magnesium:   Lab Results   Component Value Date/Time    MG 2.2 03/13/2025 07:35 AM      Phosphate:   Lab Results   Component Value Date/Time    PHOS 3.1 12/13/2023 04:37 AM     PT/INR: No results for input(s): \"PROTIME\", \"INR\" in the last 72 hours.    U/A:   Lab Results   Component Value Date/Time    NITRITE negative 06/21/2024 12:04 PM    LEUKOCYTESUR MODERATE 03/12/2025 02:31 PM    PHUR 5.5 03/12/2025 02:31 PM    PHUR 8.5 06/21/2024 12:04 PM    PHUR >9.0 12/12/2023 09:15 AM    WBCUA 10 TO 20 03/12/2025 02:31 PM    RBCUA 3 to 5 03/12/2025 02:31 PM    BACTERIA 1+ 03/12/2025 02:31 PM    SPECGRAV 1.020 06/21/2024 12:04 PM    BLOODU large 06/21/2024 12:04 PM    BLOODU Negative 05/06/2020 05:59 PM    GLUCOSEU 500

## 2025-03-17 NOTE — PROGRESS NOTES
[facility] Hospitalist Progress Note    Admitting Date and Time: 3/12/2025  1:31 PM  Admit Dx: Hypokalemia [E87.6]  Generalized weakness [R53.1]  Chronic indwelling Allred catheter [Z97.8]  Ambulatory dysfunction [R26.2]  Diarrhea, unspecified type [R19.7]  Acute anemia [D64.9]    Subjective:  Patient is being followed for Hypokalemia [E87.6]  Generalized weakness [R53.1]  Chronic indwelling Allred catheter [Z97.8]  Ambulatory dysfunction [R26.2]  Diarrhea, unspecified type [R19.7]  Acute anemia [D64.9]   Pt was seen and examined today. Denies any new issues.    ROS: denies fever, chills, cp, sob, n/v, HA unless stated above.     guaiFENesin  400 mg Oral 4x daily    doxycycline hyclate  100 mg Oral 2 times per day    cefTRIAXone (ROCEPHIN) IV  2,000 mg IntraVENous Q24H    psyllium  1 packet Oral Daily    ALPRAZolam  0.25 mg Oral Nightly    [Held by provider] apixaban  5 mg Oral BID    aspirin  81 mg Oral Daily    atorvastatin  40 mg Oral Daily    dilTIAZem  120 mg Oral Daily    doxazosin  8 mg Oral Nightly    sodium chloride flush  5-40 mL IntraVENous 2 times per day     benzonatate, 100 mg, TID PRN  loperamide, 2 mg, 4x Daily PRN  white petrolatum, , BID PRN  sodium chloride flush, 5-40 mL, PRN  sodium chloride, , PRN  ondansetron, 4 mg, Q8H PRN   Or  ondansetron, 4 mg, Q6H PRN  melatonin, 3 mg, Nightly PRN  acetaminophen, 650 mg, Q6H PRN   Or  acetaminophen, 650 mg, Q6H PRN         Objective:    BP (!) 110/59   Pulse (!) 108   Temp 97.9 °F (36.6 °C) (Axillary)   Resp 20   Ht 1.854 m (6' 1\")   Wt 110.3 kg (243 lb 1.6 oz)   SpO2 95%   BMI 32.07 kg/m²     General Appearance: alert and in no acute distress  Skin: warm and dry  Head: normocephalic and atraumatic  Pulmonary/Chest: Diminished breath sounds on LLL, normal air movement, no respiratory distress  Cardiovascular: normal rate, normal S1 and S2  Abdomen: soft, non-tender, non-distended, normal bowel sounds  Extremities: no cyanosis, no clubbing  compensated, on room air despite active COVID-19 infection, ok to proceed with cystoscopy today    Code Status: Full code  DVT/GI ppx: Eliquis(held)/Diet  Dispo: Continue care    Time spent reviewing chart, clinical exam, discussing case and answering questions with staff/consultants/patient/family = 35 min    NOTE: This report was transcribed using voice recognition software. Every effort was made to ensure accuracy; however, inadvertent computerized transcription errors may be present.  Electronically signed by Edu Spring MD on 3/17/2025 at 8:49 AM

## 2025-03-17 NOTE — CONSULTS
Palliative Care Department  [phone]  Palliative Care Initial Consult  Provider Diandra Marsh, APRN - CNP     Geovanny Kraft  [id number]  Hospital Day: 6  Date of Initial Consult: 3/17/25  Referring Provider: Edu Spring MD   Palliative Medicine was consulted for assistance with: speech recommends PEG for aspiration concern    HPI:   Geovanny Kraft is a 80 y.o. with a medical history of chronic wounds, BKA, BPH with p.o. and chronic Allred, CHF, CKD who was admitted on 3/12/2025 from home with a CHIEF COMPLAINT of weakness.  Patient was falling recently and more weak at home.  He was admitted and treated for acute cystitis with hematuria, diarrhea, COVID-19 positive, R hydronephrosis with stones.  He is for laser lithotripsy with urology when more stable.  Per notes, patient had increased confusion and worsening cough.  He has known dysphagia and previous MBSS on 2/19/2025 and was instructed to remain on puréed diet and follow with speech therapy.  This admission, MBSS was repeated and shows aspiration with all consistencies.  Palliative medicine consulted for further assistance and goals of care.    ASSESSMENT/PLAN:     Pertinent Hospital Diagnoses     Hydronephrosis  Acute cystitis with hematuria  Dysphagia  COVID-19      Palliative Care Encounter / Counseling Regarding Goals of Care  Please see detailed goals of care discussion as below  At this time, Geovanny Kraft, Does Not have capacity for medical decision-making.  Capacity is time limited and situation/question specific  During encounter Whitley was surrogate medical decision-maker  Outcome of goals of care meeting:   Change CODE STATUS to DNR CCA  Agreeable to PEG tube  Continue current medical management  Code status DNR-CCA  Advanced Directives: no POA or living will in [facility]-family will bring in  Surrogate/Legal NOK:  Whitley Kraft (spouse) [phone]  Kavon Kraft (child) [phone]      Spiritual assessment: no spiritual distress  Family    Time/Communication  Greater than 50% of time spent, total 55 minutes in counseling and coordination of care at the bedside regarding goals of care, diagnosis and prognosis, and see above.    Thank you for allowing Palliative Medicine to participate in the care of Geovanny Kraft.

## 2025-03-17 NOTE — CARE COORDINATION
Social Work:    Advised by Jeny at [facility] that she does not have an open Covid bed. Patient & wife will need updated and choices needed ASAP.    Electronically signed by SABRINA Campos on 3/17/2025 at 3:47 PM

## 2025-03-17 NOTE — CARE COORDINATION
Social Work:    Gypsy at [facility] advised Friday that she has no open beds. Social work checked bed availability and still no open bed. Social work called a referral to Jeny at [facility] who is evaluating.  Social work prompted for PT/OT as auth can be started for transfer plans.  Social work met with wife who only wants [facility] or [facility]. Follow-up needed if denied by [facility].    Electronically signed by SABRINA Campos on 3/17/2025 at 2:45 PM

## 2025-03-17 NOTE — PLAN OF CARE
Problem: Chronic Conditions and Co-morbidities  Goal: Patient's chronic conditions and co-morbidity symptoms are monitored and maintained or improved  Outcome: Progressing     Problem: Discharge Planning  Goal: Discharge to home or other facility with appropriate resources  Outcome: Progressing     Problem: Skin/Tissue Integrity  Goal: Skin integrity remains intact  Description: 1.  Monitor for areas of redness and/or skin breakdown  2.  Assess vascular access sites hourly  3.  Every 4-6 hours minimum:  Change oxygen saturation probe site  4.  Every 4-6 hours:  If on nasal continuous positive airway pressure, respiratory therapy assess nares and determine need for appliance change or resting period  Outcome: Progressing     Problem: ABCDS Injury Assessment  Goal: Absence of physical injury  Outcome: Progressing     Problem: Safety - Adult  Goal: Free from fall injury  Outcome: Progressing     Problem: Nutrition Deficit:  Goal: Optimize nutritional status  Outcome: Progressing     Problem: Pain  Goal: Verbalizes/displays adequate comfort level or baseline comfort level  Outcome: Progressing

## 2025-03-17 NOTE — PROGRESS NOTES
Speech Language Pathology      NAME:  Geovanny Kraft  :  1945  DATE: 3/17/2025  ROOM:  0540/0540-A    Order received. Chart reviewed.    Patient known to this SLP from outpatient MBSS completed on 25. At that time patient was found to have marked pharyngeal phase dysphagia with aspiration of thin liquid, nectar thick liquid and honey thick liquid. Patient's aspiration appeared to be due to a large cervical osteophyte which obstructed pharyngeal constriction. Following MBSS that date, patient was provided with various diet options due to the inability to completely eliminate risk of aspiration with any consistency, as aspiration was with all liquid consistencies except for puree. However, puree resulted in significant retention within the vallecula and pyriform sinus. The options the patient was presented with at that time were as followed:     NPO with all means of nutrition via alternate method    2.  NPO with main source of nutrition via alternate method and muro free water protocol. Muro Free Water Protocol --- regular water only after ORAL CARE (brush oral cavity and rinse thoroughly).     3.  Runny puree diet with thin liquids utilizing small sips, double swallow and throat clear and reswallow with all liquids.     Patient presented this admission and was evaluated by alternate SLP. Per CSE note on 3/13/25 \"Prior to hospitalization, patient was receiving HomeHealth speech therapy -- patient was oriented and aware of his dysphagia deficits -- intermittent throat clears were noted as patient's meal progressed; however, he independently recalled compensatory swallowing strategies (e.g., alternate sips of liquid between bites of food, double swallow, eat at a slow rate) to reduce his risks for aspiration and globus sensation.\"    SLP reconsulted this date due to continued concerns with aspiration. Due to the known aspiration and likelihood of inability to completely eliminate aspiration risk,

## 2025-03-18 ENCOUNTER — ANESTHESIA (OUTPATIENT)
Dept: ENDOSCOPY | Age: 80
DRG: 689 | End: 2025-03-18
Payer: MEDICARE

## 2025-03-18 ENCOUNTER — ANESTHESIA EVENT (OUTPATIENT)
Dept: ENDOSCOPY | Age: 80
DRG: 689 | End: 2025-03-18
Payer: MEDICARE

## 2025-03-18 PROBLEM — R13.13 PHARYNGEAL DYSPHAGIA: Status: ACTIVE | Noted: 2025-03-18

## 2025-03-18 PROCEDURE — 6360000002 HC RX W HCPCS

## 2025-03-18 PROCEDURE — 2580000003 HC RX 258: Performed by: HOSPITALIST

## 2025-03-18 RX ORDER — LIDOCAINE HYDROCHLORIDE 20 MG/ML
INJECTION, SOLUTION EPIDURAL; INFILTRATION; INTRACAUDAL; PERINEURAL
Status: DISCONTINUED | OUTPATIENT
Start: 2025-03-18 | End: 2025-03-18 | Stop reason: SDUPTHER

## 2025-03-18 RX ORDER — PROPOFOL 10 MG/ML
INJECTION, EMULSION INTRAVENOUS
Status: DISCONTINUED | OUTPATIENT
Start: 2025-03-18 | End: 2025-03-18 | Stop reason: SDUPTHER

## 2025-03-18 RX ADMIN — SODIUM CHLORIDE: 9 INJECTION, SOLUTION INTRAVENOUS at 13:26

## 2025-03-18 RX ADMIN — LIDOCAINE HYDROCHLORIDE 100 MG: 20 INJECTION, SOLUTION EPIDURAL; INFILTRATION; INTRACAUDAL; PERINEURAL at 13:43

## 2025-03-18 RX ADMIN — PROPOFOL 80 MG: 10 INJECTION, EMULSION INTRAVENOUS at 13:43

## 2025-03-18 ASSESSMENT — LIFESTYLE VARIABLES: SMOKING_STATUS: 0

## 2025-03-18 NOTE — CARE COORDINATION
Social work:    Follow-up call was made to Whitley, patient's wife. Social service advised Whitley that [facility] did  not have a bed and preference now is [facility]. Social service called a referral in to Mera at [facility].    Electronically signed by SABRINA Campos on 3/18/2025 at 11:01 AM

## 2025-03-18 NOTE — PLAN OF CARE
Problem: Chronic Conditions and Co-morbidities  Goal: Patient's chronic conditions and co-morbidity symptoms are monitored and maintained or improved  3/17/2025 2209 by Treasure Delgado  Outcome: Progressing  3/17/2025 1126 by Emanuel Tovar RN  Outcome: Progressing     Problem: Discharge Planning  Goal: Discharge to home or other facility with appropriate resources  3/17/2025 2209 by Treasure Delgado  Outcome: Progressing  3/17/2025 1126 by Emanuel Tovar RN  Outcome: Progressing     Problem: Skin/Tissue Integrity  Goal: Skin integrity remains intact  Description: 1.  Monitor for areas of redness and/or skin breakdown  2.  Assess vascular access sites hourly  3.  Every 4-6 hours minimum:  Change oxygen saturation probe site  4.  Every 4-6 hours:  If on nasal continuous positive airway pressure, respiratory therapy assess nares and determine need for appliance change or resting period  3/17/2025 2209 by Treasure Delgado  Outcome: Progressing  3/17/2025 1126 by Emanuel Tovar RN  Outcome: Progressing     Problem: ABCDS Injury Assessment  Goal: Absence of physical injury  3/17/2025 2209 by Treasure Delgado  Outcome: Progressing  3/17/2025 1126 by Emanuel Tovar RN  Outcome: Progressing     Problem: Safety - Adult  Goal: Free from fall injury  3/17/2025 2209 by Treasure Delgado  Outcome: Progressing  3/17/2025 1126 by Emanuel Tovar RN  Outcome: Progressing     Problem: Nutrition Deficit:  Goal: Optimize nutritional status  3/17/2025 2209 by Treasure Delgado  Outcome: Progressing  3/17/2025 1126 by Emanuel Tovar RN  Outcome: Progressing     Problem: Pain  Goal: Verbalizes/displays adequate comfort level or baseline comfort level  3/17/2025 2209 by Treasure Delgado  Outcome: Progressing  3/17/2025 1126 by Emanuel Tovar RN  Outcome: Progressing

## 2025-03-18 NOTE — OP NOTE
PEG Operative Note      Patient:  Geovanny Kraft    Pre-op Diagnosis:   General weakness [R53.1]    Post-op Diagnosis List:  General weakness [R53.1]    Procedure:  Procedure(s):  ESOPHAGOGASTRODUODENOSCOPY PERCUTANEOUS ENDOSCOPIC GASTROSTOMY TUBE PLACEMENT    ++DROPLET PLUS / CONTACT ISOLATION++    Surgeon:  Surgeon(s):  Santo Myrick DO    Staff:   Perioperative Nurse: Nicola Serrano, RN; Demetria Alfonso, RN; Beronica Head RN    Anesthesia:  Monitor Anesthesia Care    Findings: Adequate placement of PEG at 2.5cm    EBL: minimal    Specimens:  * No specimens in log *      Complications:  * No complications entered in OR log *    Disposition of Patient:  Tolerated procedure well, returned to floor    Surgical Course:  Patient is a 80 y.o. male who has dysphagia from a cervical osteophyte resulting in aspiration. Long term enteral nutritional access is needed. Risks, Benefits, Alternatives were discussed with the patient. They agreed to undergo the procedure.    The patient was brought to the operating room and placed supine. Adequate anesthesia was then induced. A timeout was performed, and the patient had received appropriate perioperative antibiotics.  Patient was prepped and draped in usual sterile fashion. An EGD was performed and no major mucosal abnormalities were noted in the esophagus, stomach, or proximal duodenum including on retroflexed view. There was moderate gastritis in the antrum and body of the stomach.  With the stomach held in full insufflation, a bright light reflex was identified in the left upper abdomen without using the transillumination function of the endoscope. In addition, 1 to 1 ballotment was seen.  The skin was infiltrated with local anesthetic and the needle and sheath were inserted through the abdominal wall into the stomach under direct visualization with it held in full insufflation, on the first attempt. A wire was threaded, grasped endoscopically, and withdrawn.  A

## 2025-03-18 NOTE — PROGRESS NOTES
Palliative Care Department  [phone]  Palliative Care Progress Note  Provider Diandra Marsh, APRN - CNP     Geovanny Kraft  [id number]  Hospital Day: 7  Date of Initial Consult: 3/17/25  Referring Provider: Edu Spring MD   Palliative Medicine was consulted for assistance with: speech recommends PEG for aspiration concern    HPI:   Geovanny Kraft is a 80 y.o. with a medical history of chronic wounds, BKA, BPH with p.o. and chronic Allred, CHF, CKD who was admitted on 3/12/2025 from home with a CHIEF COMPLAINT of weakness.  Patient was falling recently and more weak at home.  He was admitted and treated for acute cystitis with hematuria, diarrhea, COVID-19 positive, R hydronephrosis with stones.  He is for laser lithotripsy with urology when more stable.  Per notes, patient had increased confusion and worsening cough.  He has known dysphagia and previous MBSS on 2/19/2025 and was instructed to remain on puréed diet and follow with speech therapy.  This admission, MBSS was repeated and shows aspiration with all consistencies.  Palliative medicine consulted for further assistance and goals of care.    ASSESSMENT/PLAN:     Pertinent Hospital Diagnoses     Hydronephrosis  Acute cystitis with hematuria  Dysphagia  COVID-19      Palliative Care Encounter / Counseling Regarding Goals of Care  Please see detailed goals of care discussion as below  At this time, Geovanny Kraft, Does Not have capacity for medical decision-making.  Capacity is time limited and situation/question specific  During encounter Whitley was surrogate medical decision-maker  Outcome of goals of care meeting:   Continue CODE STATUS to DNR CCA  Scheduled for PEG today  Continue current medical management  Code status DNR-CCA  Advanced Directives: no POA or living will in [facility]-family will bring in  Surrogate/Legal NOK:  Whitley Kraft (spouse) [phone]  Kavon Kraft (child) [phone]      Spiritual assessment: no spiritual

## 2025-03-18 NOTE — CARE COORDINATION
Social Work:    Chart update. Patient is scheduled for a peg tube placement today. Mera at [facility] accepted Geovanny for admission and started the authorization today. Social service updated wife Whitley. (N-17, [facility], ambulance form completed)    Electronically signed by SABRINA Campos on 3/18/2025 at 1:47 PM

## 2025-03-18 NOTE — PROGRESS NOTES
Speech Language Pathology  NAME:  Geovanny Kraft  :  1945  DATE: 3/18/2025  ROOM:  [facility]    Pt unavailable at 1045 for Clinical Swallow Evaluation Discussed with patient nurse in person     REASON:  Patient NPO for procedure not able to address dysphagia goals due to this.    Will continue previously established POC to target pharyngeal strengthening exercises following PEG placement.     Thank You    Arline Flynn M.S. CCC-SLP/L  Speech Language Pathologist  [phone]

## 2025-03-18 NOTE — DISCHARGE INSTR - COC
Continuity of Care Form    Patient Name: Geovanny Kraft   :  1945  MRN:  [id number]    Admit date:  3/12/2025  Discharge date:  3/19/25    Code Status Order: DNR-CCA   Advance Directives:    Date/Time Healthcare Directive Type of Healthcare Directive Copy in Chart Healthcare Agent Appointed Healthcare Agent's Name Healthcare Agent's Phone Number    25 0112 Yes, patient has an advance directive for healthcare treatment  DNRCC-A  Health care treatment directive  --  --  --  --     25 1307 No, patient does not have an advance directive for healthcare treatment  --  --  --  --  --             Admitting Physician:  Scot De Souza DO  PCP: Mervin Ruiz DO    Discharging Nurse: WHITNEY Carr RN  Discharging Hospital Unit/Room#: ENDO POOL ROOM/NONE  Discharging Unit Phone Number: [phone]    Emergency Contact:   Extended Emergency Contact Information  Primary Emergency Contact: Whitley Kraft  Address: [address] [place] [address] [place] [address] [facility]  Home Phone: [phone]  Work Phone: [phone]  Mobile Phone: [phone]  Relation: Spouse   needed? No  Secondary Emergency Contact: Kavon Kraft  Address: [address] [place] [address] [place] [address] [facility]  Home Phone: [phone]  Work Phone: [phone]  Mobile Phone: [phone]  Relation: Child    Past Surgical History:  Past Surgical History:   Procedure Laterality Date    FOOT AMPUTATION      FOOT SURGERY Left 18    I&D    INSERT PICC LINE  2019         OTHER SURGICAL HISTORY  2014    left foot debridement, I&D exostectomy, bone biopsy; I&D right lower leg(BK stump site)    PACEMAKER PLACEMENT      has been shut off for 5 years       Immunization History:   Immunization History   Administered Date(s) Administered    COVID-19, MODERNA BLUE border, Primary or Immunocompromised, (age 12y+), IM, 100 mcg/0.5mL 2021, 2021, 10/27/2021, 2022    COVID-19,

## 2025-03-18 NOTE — ANESTHESIA PRE PROCEDURE
400 mg Oral 4x daily Edu Spring MD   400 mg at 03/17/25 2130   • benzonatate (TESSALON) capsule 100 mg  100 mg Oral TID PRN Edu Spring MD   100 mg at 03/16/25 1818   • doxycycline hyclate (VIBRAMYCIN) capsule 100 mg  100 mg Oral 2 times per day Edu Spring MD   100 mg at 03/17/25 2130   • cefTRIAXone (ROCEPHIN) 2,000 mg in sterile water 20 mL IV syringe  2,000 mg IntraVENous Q24H Edu Spring MD   2,000 mg at 03/17/25 1830   • psyllium (KONSYL) 28.3 % packet 1 packet  1 packet Oral Daily Edu Spring MD   1 packet at 03/16/25 0903   • loperamide (IMODIUM) capsule 2 mg  2 mg Oral 4x Daily PRN Edu Spring MD   2 mg at 03/17/25 0933   • white petrolatum ointment   Topical BID PRN Edu Spring MD   Given at 03/16/25 2219   • ALPRAZolam (XANAX) tablet 0.25 mg  0.25 mg Oral Nightly Scot De Souza DO   0.25 mg at 03/17/25 2130   • [Held by provider] apixaban (ELIQUIS) tablet 5 mg  5 mg Oral BID Scot De Souza DO   5 mg at 03/14/25 2104   • aspirin chewable tablet 81 mg  81 mg Oral Daily Scot De Souza DO   81 mg at 03/17/25 0933   • atorvastatin (LIPITOR) tablet 40 mg  40 mg Oral Daily Scot De Souza DO   40 mg at 03/17/25 0933   • dilTIAZem (CARDIZEM 12 HR) extended release capsule 120 mg  120 mg Oral Daily Scot De Souza DO   120 mg at 03/17/25 0951   • doxazosin (CARDURA) tablet 8 mg  8 mg Oral Nightly Scot De Souza DO   8 mg at 03/17/25 2130   • sodium chloride flush 0.9 % injection 5-40 mL  5-40 mL IntraVENous 2 times per day Scot De Souza DO   10 mL at 03/17/25 2130   • sodium chloride flush 0.9 % injection 5-40 mL  5-40 mL IntraVENous PRN Scot De Souza DO       • 0.9 % sodium chloride infusion   IntraVENous PRN Scot De Souza DO       • ondansetron (ZOFRAN-ODT) disintegrating tablet 4 mg  4 mg Oral Q8H PRN Scot De Souza, DO        Or   • ondansetron (ZOFRAN) injection 4 mg  4 mg IntraVENous Q6H PRN Scot De Souza DO       • melatonin tablet 3 mg  3 mg Oral

## 2025-03-18 NOTE — PROGRESS NOTES
[facility] Hospitalist Progress Note    Admitting Date and Time: 3/12/2025  1:31 PM  Admit Dx: Hypokalemia [E87.6]  Generalized weakness [R53.1]  Chronic indwelling Allred catheter [Z97.8]  Ambulatory dysfunction [R26.2]  Diarrhea, unspecified type [R19.7]  Acute anemia [D64.9]    Subjective:  Patient is being followed for Hypokalemia [E87.6]  Generalized weakness [R53.1]  Chronic indwelling Allred catheter [Z97.8]  Ambulatory dysfunction [R26.2]  Diarrhea, unspecified type [R19.7]  Acute anemia [D64.9]   Pt was seen and examined today. Denies any new issues.    ROS: denies fever, chills, cp, sob, n/v, HA unless stated above.     guaiFENesin  400 mg Oral 4x daily    doxycycline hyclate  100 mg Oral 2 times per day    cefTRIAXone (ROCEPHIN) IV  2,000 mg IntraVENous Q24H    psyllium  1 packet Oral Daily    ALPRAZolam  0.25 mg Oral Nightly    [Held by provider] apixaban  5 mg Oral BID    aspirin  81 mg Oral Daily    atorvastatin  40 mg Oral Daily    dilTIAZem  120 mg Oral Daily    doxazosin  8 mg Oral Nightly    sodium chloride flush  5-40 mL IntraVENous 2 times per day     benzonatate, 100 mg, TID PRN  loperamide, 2 mg, 4x Daily PRN  white petrolatum, , BID PRN  sodium chloride flush, 5-40 mL, PRN  sodium chloride, , PRN  ondansetron, 4 mg, Q8H PRN   Or  ondansetron, 4 mg, Q6H PRN  melatonin, 3 mg, Nightly PRN  acetaminophen, 650 mg, Q6H PRN   Or  acetaminophen, 650 mg, Q6H PRN         Objective:    /70   Pulse 92   Temp 97.3 °F (36.3 °C) (Oral)   Resp 18   Ht 1.854 m (6' 1\")   Wt 110.3 kg (243 lb 1.6 oz)   SpO2 93%   BMI 32.07 kg/m²     General Appearance: alert and in no acute distress  Skin: warm and dry  Head: normocephalic and atraumatic  Pulmonary/Chest: Diminished breath sounds on LLL, normal air movement, no respiratory distress  Cardiovascular: normal rate, normal S1 and S2  Abdomen: soft, non-tender, non-distended, normal bowel sounds  Extremities: no cyanosis, no clubbing and no

## 2025-03-18 NOTE — PROGRESS NOTES
Sent message to surgical team at pt request to discuss PEG procedure further prior to signing consent.

## 2025-03-18 NOTE — PROGRESS NOTES
General Surgery Progress Note    S/P successful PEG tube placement  OK to use for meds immediately  Can start tube feeds tomorrow    Gastritis found on EGD - will start IV protonix BID and transition to PO when able    Electronically signed by Santo Myrick DO on 3/18/2025 at 1:56 PM

## 2025-03-18 NOTE — PROGRESS NOTES
Occupational Therapy  OT BEDSIDE TREATMENT NOTE      Date:3/18/2025  Patient Name: Geovanny Kraft  MRN: [id number]  : 1945  Room: [address] [place] [address]     Evaluating OT: Kiki Ulloa OTR/KIM [id number]        Referring Provider:Scot De Souza DO     Specific Provider Orders/Date: OT eval and treat 3/12/25       Diagnosis: Diarrhea     Surgery: none      Pertinent Medical History: DM, RBKA, HTN,Diabetic Neuropathy,pacemaker, Left Foot Surgery           Precautions:  Fall Risk, contact isolation    Assessment of current deficits    [x] Functional mobility            [x]ADLs           [x] Strength                  []Cognition    [x] Functional transfers          [x] IADLs         [x] Safety Awareness   [x]Endurance    [] Fine Coordination                         [x] Balance      [] Vision/perception   []Sensation      []Gross Motor Coordination             [] ROM           [] Delirium                   [] Motor Control      OT PLAN OF CARE   OT POC based on physician orders, patient diagnosis and results of clinical assessment     Frequency/Duration 2-5 days/wk for 2-4 weeks PRN   Specific OT Treatment Interventions to include:   * Instruction/training on adapted ADL techniques and AE recommendations to increase functional independence within precautions       * Training on energy conservation strategies, correct breathing pattern and techniques to improve independence/tolerance for self-care routine  * Functional transfer/mobility training/DME recommendations for increased independence, safety, and fall prevention  * Patient/Family education to increase follow through with safety techniques and functional independence  * Recommendation of environmental modifications for increased safety with functional transfers/mobility and ADLs  * Therapeutic exercise to improve motor endurance, ROM, and functional strength for ADLs/functional transfers  * Therapeutic activities to facilitate/challenge dynamic balance, stand tolerance

## 2025-03-18 NOTE — PROGRESS NOTES
Physical Therapy  Facility/Department: [address] MED SURG/TELE  Physical Therapy Treatment Note    Name: Geovanny Kraft  : 1945  MRN: [id number]  Date of Service: 3/18/2025    Patient Diagnosis(es): The primary encounter diagnosis was Diarrhea, unspecified type. Diagnoses of Generalized weakness, Ambulatory dysfunction, Acute anemia, Hypokalemia, and Chronic indwelling Allred catheter were also pertinent to this visit.  Past Medical History:  has a past medical history of Diabetes mellitus (HCC), Diabetic foot ulcer associated with type 2 diabetes mellitus, with fat layer exposed (HCC), Diabetic peripheral neuropathy (HCC), Hyperlipidemia, Hypertension, and RLS (restless legs syndrome).  Past Surgical History:  has a past surgical history that includes Foot Amputation; other surgical history (2014); Foot surgery (Left, 18); pacemaker placement; and Insert Picc Line (2019).      Referring provider:  Edu Spring MD    PT Order:  PT eval and treat     Evaluating PT:  Alina Mckeon, PT, DPT PT [id number]    Room #:  0540/0540-A  Diagnosis:  Hypokalemia [E87.6]  Generalized weakness [R53.1]  Chronic indwelling Allred catheter [Z97.8]  Ambulatory dysfunction [R26.2]  Diarrhea, unspecified type [R19.7]  Acute anemia [D64.9]  Precautions:  fall risk, custom shoe left LE, right BKA with prothesis, droplet + precautions    SUBJECTIVE:    Pt lives with his wife in a 1 story home with ramp to enter.   Bed and bath is on first floor.  Pt reported he is mainly pivoting from surface to surface and using his w/c for mobility. Pt reported at times, he ambulates a short distance in the restroom or the bedroom without device but reaching for objects to support.  Pt with two falls recently.  Pt has an aide.      OBJECTIVE:   Initial Evaluation  Date: 3/13 Treatment  3/18/2025 Short Term/ Long Term   Goals   Was pt agreeable to Eval/treatment? yes yes    Does pt have pain? General discomforts Scrotal pain    Bed  [id number]

## 2025-03-19 PROBLEM — K29.70 GASTRITIS DETERMINED BY ENDOSCOPY: Status: ACTIVE | Noted: 2025-03-19

## 2025-03-19 NOTE — PROGRESS NOTES
PEG Examined, In place @ 2.5 cm, No drainage, No bleed. Ok for tube feeds. Please call us back with any questions or concerns     Electronically signed by Swati Potter DO on 3/19/2025 at 5:00 AM

## 2025-03-19 NOTE — PLAN OF CARE
Problem: Chronic Conditions and Co-morbidities  Goal: Patient's chronic conditions and co-morbidity symptoms are monitored and maintained or improved  3/19/2025 1140 by Alyssa Carr RN  Outcome: Progressing  3/19/2025 0253 by Alina Alejandro RN  Outcome: Progressing     Problem: Discharge Planning  Goal: Discharge to home or other facility with appropriate resources  3/19/2025 1140 by Alyssa Carr RN  Outcome: Progressing  3/19/2025 0253 by Alina Alejandro RN  Outcome: Progressing     Problem: Skin/Tissue Integrity  Goal: Skin integrity remains intact  Description: 1.  Monitor for areas of redness and/or skin breakdown  2.  Assess vascular access sites hourly  3.  Every 4-6 hours minimum:  Change oxygen saturation probe site  4.  Every 4-6 hours:  If on nasal continuous positive airway pressure, respiratory therapy assess nares and determine need for appliance change or resting period  3/19/2025 0253 by Alina Alejandro RN  Outcome: Progressing     Problem: ABCDS Injury Assessment  Goal: Absence of physical injury  3/19/2025 0253 by Alina Alejandro RN  Outcome: Progressing     Problem: Safety - Adult  Goal: Free from fall injury  3/19/2025 0253 by Alina Alejandro RN  Outcome: Progressing     Problem: Nutrition Deficit:  Goal: Optimize nutritional status  Recent Flowsheet Documentation  Taken 3/19/2025 1011 by Sandra Ivy, RD, CNSC, LD  Nutrient intake appropriate for improving, restoring, or maintaining nutritional needs:   Assess nutritional status and recommend course of action   Recommend, monitor, and adjust tube feedings and TPN/PPN based on assessed needs  3/19/2025 0253 by Alina Alejandro RN  Outcome: Progressing     Problem: Pain  Goal: Verbalizes/displays adequate comfort level or baseline comfort level  3/19/2025 0253 by Alina Alejandro RN  Outcome: Progressing

## 2025-03-19 NOTE — PROGRESS NOTES
[facility] Hospitalist Progress Note    Admitting Date and Time: 3/12/2025  1:31 PM  Admit Dx: Hypokalemia [E87.6]  Generalized weakness [R53.1]  Chronic indwelling Allred catheter [Z97.8]  Ambulatory dysfunction [R26.2]  Diarrhea, unspecified type [R19.7]  Acute anemia [D64.9]    Subjective:  Patient is being followed for Hypokalemia [E87.6]  Generalized weakness [R53.1]  Chronic indwelling Allred catheter [Z97.8]  Ambulatory dysfunction [R26.2]  Diarrhea, unspecified type [R19.7]  Acute anemia [D64.9]   Pt was seen and examined today. Denies any new issues.    ROS: denies fever, chills, cp, sob, n/v, HA unless stated above.     pantoprazole (PROTONIX) 40 mg in sodium chloride (PF) 0.9 % 10 mL injection  40 mg IntraVENous Daily    guaiFENesin  400 mg Oral 4x daily    doxycycline hyclate  100 mg Oral 2 times per day    psyllium  1 packet Oral Daily    ALPRAZolam  0.25 mg Oral Nightly    [Held by provider] apixaban  5 mg Oral BID    aspirin  81 mg Oral Daily    atorvastatin  40 mg Oral Daily    dilTIAZem  120 mg Oral Daily    doxazosin  8 mg Oral Nightly    sodium chloride flush  5-40 mL IntraVENous 2 times per day     benzonatate, 100 mg, TID PRN  loperamide, 2 mg, 4x Daily PRN  white petrolatum, , BID PRN  sodium chloride flush, 5-40 mL, PRN  sodium chloride, , PRN  ondansetron, 4 mg, Q8H PRN   Or  ondansetron, 4 mg, Q6H PRN  melatonin, 3 mg, Nightly PRN  acetaminophen, 650 mg, Q6H PRN   Or  acetaminophen, 650 mg, Q6H PRN         Objective:    /71   Pulse 100   Temp 98.1 °F (36.7 °C) (Oral)   Resp 20   Ht 1.854 m (6' 1\")   Wt 108.5 kg (239 lb 3.2 oz)   SpO2 96%   BMI 31.56 kg/m²     General Appearance: alert and in no acute distress  Skin: warm and dry  Head: normocephalic and atraumatic  Pulmonary/Chest: Diminished breath sounds on LLL, normal air movement, no respiratory distress  Cardiovascular: normal rate, normal S1 and S2  Abdomen: soft, non-tender, non-distended, normal bowel

## 2025-03-19 NOTE — CONSULTS
Comprehensive Nutrition Assessment    Type and Reason for Visit:  Consult, Reassess (TF Ordering and Management)    Nutrition Recommendations/Plan:   Continue current TF order, as tolerated    3/19 TF ORDER: Diabetic TF (Glucerna 1.5) bolus 237 ml (1 can/carton equivalent) x 5/d and Wound Healing Modular (Ivan in 180 ml water) BID with water flushes 60 ml before and after Bolus  This will provide: 1185 ml/d, 1958 total afua, 103 g total protein, 1861 ml total free water  This regimen will meet 100% energy and protein needs      Continue inpatient monitoring POC/GOC     Malnutrition Assessment:  Malnutrition Status:  Moderate malnutrition (03/13/25 1353)    Context:  Chronic Illness     Findings of the 6 clinical characteristics of malnutrition:  Energy Intake:  Mild decrease in energy intake  Weight Loss:  Greater than 10% over 6 months (x5 mos)     Body Fat Loss:  Mild body fat loss Buccal region   Muscle Mass Loss:  Mild muscle mass loss Clavicles (pectoralis & deltoids)  Fluid Accumulation:  No fluid accumulation     Strength:  Not Performed    Nutrition Assessment:    Pt s/p PEG 3/18 and OK for TF. Will order TF to meet  pt needs, as NPO has been recommended d/t aspiration risk/dysphagia.    Nutrition Related Findings:    oriented/disoriented at times, dysphagia, PEG clamped, BUN/creat 24/1.3, blood glucose controlled Wound Type: Multiple, Pressure Injury, Open Wounds (Multiple wounds per wound consult)       Current Nutrition Intake & Therapies:    Average Meal Intake: NPO  Average Supplements Intake: NPO  Diet NPO  ADULT TUBE FEEDING; PEG; Diabetic; Bolus; 5 Times Daily; 237; Infusion; 1; 60; Before and after each bolus; Wound Healing; 1 Dose; BID    Anthropometric Measures:  Height: 185.4 cm (6' 1\")  Ideal Body Weight (IBW): 184 lbs (84 kg)    Admission Body Weight: 105.9 kg (233 lb 6 oz) (3/13 bed)  Current Body Weight: 106.2 kg (234 lb 3.2 oz), 127.3 % IBW. Weight Source: Bed scale (3/13)  Current BMI  (kg/m2): 30.9  Usual Body Weight: 119.3 kg (263 lb) (10/10/24-OV)     % Weight Change (Calculated): -11  Weight Adjustment For: Amputation  Total Adjusted Percentage (Calculated): 5.9  Adjusted Ideal Body Weight (lbs) (Calculated): 173.1 lbs  Adjusted Ideal Body Weight (kg) (Calculated): 78.68 kg  Adjusted % Ideal Body Weight (Calculated): 135.3  Adjusted BMI (kg/m2) (Calculated): 32.7  BMI Categories: Obese Class 1 (BMI 30.0-34.9)    Estimated Daily Nutrient Needs:  Energy Requirements Based On: Kcal/kg  Weight Used for Energy Requirements: Current  Energy (kcal/day): 6415-0604  Weight Used for Protein Requirements: Adjusted (Adj IBW)  Protein (g/day):   Method Used for Fluid Requirements: 1 ml/kcal  Fluid (ml/day): 5696-3519    Nutrition Diagnosis:   Moderate malnutrition, in context of chronic illness related to inadequate protein-energy intake as evidenced by criteria as identified in malnutrition assessment    Nutrition Interventions:   Food and/or Nutrient Delivery: Continue NPO, Start Tube Feeding  Nutrition Education/Counseling: No recommendation at this time  Coordination of Nutrition Care: Continue to monitor while inpatient       Goals:  Goals: Tolerate nutrition support at goal rate, by next RD assessment  Type of Goal: New goal  Previous Goal Met: Progressing toward Goal(s)    Nutrition Monitoring and Evaluation:   Behavioral-Environmental Outcomes: None Identified  Food/Nutrient Intake Outcomes: Enteral Nutrition Intake/Tolerance  Physical Signs/Symptoms Outcomes: Biochemical Data, GI Status, Fluid Status or Edema, Nutrition Focused Physical Findings, Skin, Weight    Discharge Planning:    Enteral Nutrition     Sandra Ivy RD, CNSC, LD  Contact: [phone]

## 2025-03-19 NOTE — DISCHARGE SUMMARY
[facility] Hospitalist Physician Discharge Summary       [facility]  [address]  [place] [address]  [phone]          Activity level: As tolerated     Dispo: SNF      Condition on discharge: Stable     Patient ID:  Geovanny Kraft  [id number]  80 y.o.  1945    Admit date: 3/12/2025    Discharge date and time:  3/19/2025  4:18 PM    Admission Diagnoses: Principal Problem:    Acute cystitis with hematuria  Active Problems:    DM (diabetes mellitus) (HCC)    HTN (hypertension)    Hyperlipidemia    Hypokalemia    S/P placement of cardiac pacemaker    History of right below knee amputation (HCC)    Allred catheter in place    Long term (current) use of anticoagulants    Chronic heart failure with preserved ejection fraction (HFpEF) (HCC)    Anemia in chronic kidney disease    Stage 3b chronic kidney disease (HCC)    Insomnia    Primary gout    Benign prostatic hyperplasia with urinary obstruction    Atrial fibrillation (HCC)    Anxiety disorder    Asthenia    Diarrhea    Moderate protein-calorie malnutrition    Kidney stones    COVID-19    Community acquired pneumonia of left lower lobe of lung    Palliative care encounter    Goals of care, counseling/discussion    Pharyngeal dysphagia    Gastritis determined by endoscopy  Resolved Problems:    * No resolved hospital problems. *      Discharge Diagnoses: Principal Problem:    Acute cystitis with hematuria  Active Problems:    DM (diabetes mellitus) (HCC)    HTN (hypertension)    Hyperlipidemia    Hypokalemia    S/P placement of cardiac pacemaker    History of right below knee amputation (HCC)    Allred catheter in place    Long term (current) use of anticoagulants    Chronic heart failure with preserved ejection fraction (HFpEF) (HCC)    Anemia in chronic kidney disease    Stage 3b chronic kidney disease (HCC)    Insomnia    Primary gout    Benign prostatic hyperplasia with urinary obstruction    Atrial fibrillation (HCC)    Anxiety  at bedtime for 7 days     pantoprazole 40 MG tablet  Commonly known as: PROTONIX  Take 1 tablet by mouth daily     psyllium 28.3 % Pack  Commonly known as: KONSYL  Take 1 packet by mouth daily  Start taking on: March 20, 2025            CONTINUE taking these medications      ALPRAZolam 0.25 MG tablet  Commonly known as: Xanax  Take 1 tablet by mouth nightly for 30 days. Max Daily Amount: 0.25 mg     apixaban 5 MG Tabs tablet  Commonly known as: ELIQUIS     aspirin 81 MG chewable tablet  Take 1 tablet by mouth daily     atorvastatin 40 MG tablet  Commonly known as: LIPITOR  Take 1 tablet by mouth daily     dilTIAZem 120 MG extended release capsule  Commonly known as: CARDIZEM 12 HR     doxazosin 8 MG tablet  Commonly known as: CARDURA     empagliflozin 25 MG tablet  Commonly known as: JARDIANCE     gabapentin 300 MG capsule  Commonly known as: NEURONTIN     rOPINIRole 1 MG tablet  Commonly known as: REQUIP            ASK your doctor about these medications      mineral oil-hydrophilic petrolatum ointment  Apply topically as needed.               Where to Get Your Medications        You can get these medications from any pharmacy    Bring a paper prescription for each of these medications  ALPRAZolam 0.25 MG tablet       Information about where to get these medications is not yet available    Ask your nurse or doctor about these medications  benzonatate 100 MG capsule  doxycycline hyclate 100 MG capsule  guaiFENesin 400 MG tablet  pantoprazole 40 MG tablet  psyllium 28.3 % Pack           Note that 35 minutes were spent in preparing discharge papers, discussing discharge with patient, medication review, etc.    Signed:  Electronically signed by Edu Spring MD on 3/19/2025 at 4:18 PM

## 2025-03-19 NOTE — CARE COORDINATION
Social Work:    Mera at [facility] received insurance auth. [facility] to transport Mr. Kraft between 6:30- 8:30 p.m. Social work updated patient, wife, and facility.  (N-17, hens, ambulance completed)    Electronically signed by SABRINA Campos on 3/19/2025 at 1:24 PM

## 2025-03-20 NOTE — PROGRESS NOTES
TRANSPORTATION PICKED PATIENT UP, NO DISTRESS, PAPERWORK SENT WITH PATIENT, IV AND TELEMETRY REMOVED.

## 2025-03-20 NOTE — ANESTHESIA POSTPROCEDURE EVALUATION
Department of Anesthesiology  Postprocedure Note    Patient: Geovanny Kraft  MRN: [id number]  YOB: 1945  Date of evaluation: 3/20/2025    Procedure Summary       Date: 03/18/25 Room / Location: [place] [address] / [facility]    Anesthesia Start: 1338 Anesthesia Stop: 1356    Procedure: ESOPHAGOGASTRODUODENOSCOPY PERCUTANEOUS ENDOSCOPIC GASTROSTOMY TUBE PLACEMENT Diagnosis:       General weakness      (General weakness [R53.1])    Surgeons: Santo Myrick DO Responsible Provider: Andrei Mcmahon MD    Anesthesia Type: MAC ASA Status: 4            Anesthesia Type: No value filed.    Dillan Phase I: Dillan Score: 10    Dillan Phase II: Dillan Score: 9    Anesthesia Post Evaluation    Patient location during evaluation: PACU  Patient participation: complete - patient participated  Level of consciousness: awake  Airway patency: patent  Nausea & Vomiting: no nausea and no vomiting  Cardiovascular status: hemodynamically stable  Respiratory status: acceptable  Hydration status: stable  Pain management: adequate    No notable events documented.

## 2025-03-24 PROBLEM — Z51.5 ENCOUNTER FOR HOSPICE CARE: Status: ACTIVE | Noted: 2025-03-24

## 2025-03-24 PROBLEM — R62.7 FTT (FAILURE TO THRIVE) IN ADULT: Status: ACTIVE | Noted: 2025-03-24

## 2025-03-24 NOTE — ED PROVIDER NOTES
HPI:  3/24/25, Time: 1:05 PM EDT         Geovanny Kraft is a 80 y.o. male presenting to the ED for altered mental status.  Patient presents from a nursing home.  Patient's brother and sister-in-law is at bedside helping to provide history.  They stated the patient has been having waxing and waning episodes of delirium over the last few days.  He has had similar episodes in the past.  He is currently DNR-CCA.  There has been no recent falls since his most recent admission.  Patient is alert and oriented on arrival to the ED.  He has no specific complaints.  He denies any pain.  Denies chest pain, headache, shortness of breath abdominal pain, nausea, and vomiting.  Patient has a PEG tube in placed.  He is exclusively PEG tube fed.  He is nonambulatory and has chronic contracture to his lower extremities.    Review of Systems:  Pertinent positives and negatives as per HPI.    --------------------------------------------- PAST HISTORY ---------------------------------------------  Past Medical History:  has a past medical history of Diabetes mellitus (HCC), Diabetic foot ulcer associated with type 2 diabetes mellitus, with fat layer exposed (HCC), Diabetic peripheral neuropathy (HCC), Hyperlipidemia, Hypertension, and RLS (restless legs syndrome).    Past Surgical History:  has a past surgical history that includes Foot Amputation; other surgical history (9/20/2014); Foot surgery (Left, 5/29/16); pacemaker placement; Insert Picc Line (7/23/2019); and Upper gastrointestinal endoscopy (N/A, 3/18/2025).    Social History:  reports that he quit smoking about 40 years ago. His smoking use included cigarettes. He started smoking about 61 years ago. He has a 42 pack-year smoking history. He has quit using smokeless tobacco.  His smokeless tobacco use included chew. He reports that he does not drink alcohol and does not use drugs.    Family History: family history includes Cancer in his brother; Diabetes in his mother; Heart

## 2025-03-25 NOTE — ACP (ADVANCE CARE PLANNING)
Social Work discharge planning  Spoke to pt/son Kavon. He said plan is home today with [facility].   Pt already has hospital bed and needed dme.  There is a ramp into his home on [address] in [place].  Spoke to Ade and Bisi with [facility] [phone].   They need a hospice order and formula order for home tf.   Discussed with charge Rn.   Spoke to Bessie at [facility]. Pt on WILL CALL for transport home.  Electronically signed by CHILO Cortes on 3/25/2025 at 11:12 AM     Addendum  Edna at [facility] said  between 12-2p today. Charge Rn, son Kavon notified. Hedy with [facility] notified.  Electronically signed by CHILO Cortes on 3/25/2025 at 11:31 AM

## 2025-03-25 NOTE — PATIENT CARE CONFERENCE
[facility] Quality Flow/Interdisciplinary Rounds Progress Note        Quality Flow Rounds held on March 25, 2025    Disciplines Attending:  Bedside Nurse, , , and Nursing Unit Leadership    Geovanny Kraft was admitted on 3/24/2025 12:14 PM    Anticipated Discharge Date:       Disposition:    Bruce Score:  Bruce Scale Score: 9    [facility] RISK OF UNPLANNED READMISSION 2.0             19.6 Total Score        Discussed patient goal for the day, patient clinical progression, and barriers to discharge.  The following Goal(s) of the Day/Commitment(s) have been identified:  Diagnostics - Report Results and Home Care - Obtain Order      Rhonda Sinha RN  March 25, 2025

## 2025-03-25 NOTE — PROGRESS NOTES
4 Eyes Skin Assessment     NAME:  Geovanny Kraft  YOB: 1945  MEDICAL RECORD NUMBER:  [id number]    The patient is being assessed for  Admission    I agree that at least one RN has performed a thorough Head to Toe Skin Assessment on the patient. ALL assessment sites listed below have been assessed.      Areas assessed by both nurses:    Head, Face, Ears, Shoulders, Back, Chest, Arms, Elbows, Hands, Sacrum. Buttock, Coccyx, Ischium, Legs. Feet and Heels, and Under Medical Devices         Does the Patient have a Wound? Yes wound(s) were present on assessment. LDA wound assessment was Initiated and completed by RN       Bruce Prevention initiated by RN: Yes  Wound Care Orders initiated by RN: Yes    Pressure Injury (Stage 3,4, Unstageable, DTI, NWPT, and Complex wounds) if present, place Wound referral order by RN under : No    New Ostomies, if present place, Ostomy referral order under : No     Nurse 1 eSignature: Electronically signed by Sharon Mendoza RN on 3/25/25 at 1:14 AM EDT    **SHARE this note so that the co-signing nurse can place an eSignature**    Nurse 2 eSignature: Electronically signed by Pilar Prince RN on 3/25/25 at 1:15 AM EDT

## 2025-03-25 NOTE — CONSULTS
Nutrition Consult Note    Plans for D/C today to home with Hospice. Pt has been on TF via PEG and NPO. Recommend TF, as per most recent admit s/p PEG 3/18. If pt has any tolerance issues (pain, diarrhea) recommend hold TF for pt's comfort.    Estimated Daily Nutrient Needs:  Energy Requirements Based On: Kcal/kg  Weight Used for Energy Requirements: Current  Energy (kcal/day): 7929-9464  Weight Used for Protein Requirements: Adjusted (Adj IBW)  Protein (g/day):   Method Used for Fluid Requirements: 1 ml/kcal  Fluid (ml/day): 3496-3201    TF RECOMMENDATION: Diabetic TF (Glucerna 1.5) bolus 237 ml (1 can/carton equivalent) x 5/d and water flushes 60 ml before and after Bolus  This will provide: 1185 ml/d, 1778 afua, 98 g total protein, 1599 ml total free water  This regimen will meet 100% energy and protein needs    Electronically signed by Sandra Ivy RD, CNSC, LD on 3/25/25 at 12:43 PM EDT    Contact: [phone]

## 2025-03-25 NOTE — PROGRESS NOTES
Pharmacy Note    Geovanny Kraft was ordered Jardiance (for Type 2 Diabetes).  Per the [facility] Formulary Committee Policy, this medication is non-formulary and not stocked by the Pharmacy.  The medication can be reordered at discharge.

## 2025-03-25 NOTE — H&P
[facility] Hospitalist Group History and Physical    Perpetual assessment: 80-year-old female with past medical history of atrial fibrillation, chronic wounds, anxiety, restless leg syndrome and diabetes presents with generalized weakness and family wishing to placement hospice    Assessment and plan:    Failure to thrive  -Patient has significant comorbidities currently is a DNR at a rehab facility  -Family has decided that they would prefer to take him home with hospice  -His daughter works at a hospice company will attempt to arrange things in the next 24 to 36 hours  -Until then we will bring him in for symptom control as he is quite agitated  -Continue IV morphine and Ativan    Type II NIDDM  -Continue patient's Farxiga    Chronic wounds  -Multiple nature  -Wound care to see    Atrial fibrillation  -Being that he is going hospice will discontinue anticoagulation    Restless leg syndrome  -Continue Requip    Essential hypertension  -Continue Cardizem    Code Status: DNR  DVT prophylaxis: SCDs      CHIEF COMPLAINT: Hospice request    History of Present Illness: This is a pleasant 80-year-old male who presents to [facility] with the above-stated complaint.  All history has been taken from the significant other and review of medical records.  Patient resides in a rehab facility and has been chronically deconditioned and weak for quite some time due to multiple comorbidities.  Family has decided they would prefer to take him home with hospice care.  Patient's daughter-in-law works for hospice company and will providing assistance setting this up.  Until then patient has admitted for supportive care.    Informant(s) for H&P:    REVIEW OF SYSTEMS:  A comprehensive review of systems was negative except for: what is in the HPI      PMH:  Past Medical History:   Diagnosis Date    Diabetes mellitus (HCC)     Diabetic foot ulcer associated with type 2 diabetes mellitus, with fat layer exposed (HCC)

## 2025-03-25 NOTE — ED NOTES
ED to Inpatient Handoff Report    Notified [address] west that electronic handoff available and patient ready for transport to room 513.    Safety Risks: Risk of falls    Patient in Restraints: no    Constant Observer or Patient : no    Telemetry Monitoring Ordered: No          Order to transfer to unit without monitor: NA    Last MEWS:  Time completed: 2045      Deterioration Index: 20.97    Vitals:    03/24/25 1353 03/24/25 1453 03/24/25 1813 03/24/25 2045   BP: 102/76 112/71 131/80 (!) 117/56   Pulse: 78  77 76   Resp: 18  16 17   Temp:    97.8 °F (36.6 °C)   TempSrc:       SpO2: 98%  97% 95%   Weight:       Height:           Opportunity for questions and clarification was provided.

## 2025-03-25 NOTE — DISCHARGE SUMMARY
[facility] Hospitalist Physician Discharge Summary       [facility]  [address], [address]  [place] [address]  [phone]          Activity level: As tolerated     Dispo: Home      Condition on discharge: Stable     Patient ID:  Geovanny Kraft  [id number]  80 y.o.  1945    Admit date: 3/24/2025    Discharge date and time:  3/25/2025  11:39 AM    Admission Diagnoses: Principal Problem:    Encounter for hospice care  Active Problems:    FTT (failure to thrive) in adult  Resolved Problems:    * No resolved hospital problems. *      Discharge Diagnoses: Principal Problem:    Encounter for hospice care  Active Problems:    FTT (failure to thrive) in adult  Resolved Problems:    * No resolved hospital problems. *      Consults:  IP CONSULT TO DIETITIAN  IP CONSULT TO HOSPICE    Hospital Course:   Patient Geovanny Kraft is a 80 y.o. presented with Encounter for hospice care [Z51.5]  FTT (failure to thrive) in adult [R62.7]  Patient was admitted and managed for Failure to thrive. Patient has significant comorbidities currently is a DNR at a rehab facility. Family has decided  to take him home with hospice. OP symptom control S/P IV morphine and Ativan.        Discharge Exam:  General Appearance: alert and oriented to person, place and time and in no acute distress  Skin: warm and dry  Head: normocephalic and atraumatic  Eyes: pupils equal, round, and reactive to light, extraocular eye movements intact, conjunctivae normal  Neck: neck supple and non tender without mass   Pulmonary/Chest: clear to auscultation bilaterally- no wheezes, rales or rhonchi, normal air movement, no respiratory distress  Cardiovascular: normal rate, normal S1 and S2 and no carotid bruits  Abdomen: soft, non-tender, non-distended, normal bowel sounds, no masses or organomegaly  Extremities: no cyanosis, no clubbing and no edema  Neurologic: no cranial nerve deficit and speech normal    I/O last 3 completed

## 2025-03-25 NOTE — ED NOTES
ED Course as of 03/24/25 2028   Mon Mar 24, 2025   1258 I reviewed the patient's chart.  Patient admitted on 3/12/2025 for acute cystitis with hematuria.  Patient also found to have COVID-19 infection with concern for concurrent bacterial infection.  Patient received antibiotics and supportive care.  Discharged to SNF. [JA]   1327 EKG:  This EKG is signed and interpreted by me, Daja Gutierrez MD.    Rate: 68  Rhythm: Atrial fibrillation  Interpretation: A. Fib, right axis deviation, normal QTc, no acute ST or T wave changes  Comparison: stable as compared to patient's most recent EKG   [JA]   2023 Patient signed out me by outgoing provider.  80-year-old male is having issues with intermittent delirium.  Alert and oriented x 3 on my evaluation and speaking to me, but per the wife this is very intermittent.  Was signed out pending a CT imaging and a urinalysis.  Urinalysis with no evidence of urinary tract infection.  CT did show evidence of severe left-sided hydronephrosis and a kidney stone.  Spoke with patient and family Tiana.  The patient adamantly wants to go back home, does not wish to be discharged back to his rehab facility, and states that he is tired of going to nursing facilities and does not wish to do this anymore.  I discussed with patient that his CODE STATUS, he was to be made DNR, as well as transition to hospice.  His daughter works for a hospice company, will admit as he does not wish to go back to his rehab facility well arrangements are made for him to be discharged to home hospice [JG]      ED Course User Index  [JA] Daja Gutierrez MD  [JG] Gomez Hughes MD Graham, Jack E, MD  03/24/25 2029

## 2025-03-25 NOTE — PLAN OF CARE
Problem: Chronic Conditions and Co-morbidities  Goal: Patient's chronic conditions and co-morbidity symptoms are monitored and maintained or improved  Outcome: Progressing     Problem: Skin/Tissue Integrity  Goal: Skin integrity remains intact  Description: 1.  Monitor for areas of redness and/or skin breakdown  2.  Assess vascular access sites hourly  3.  Every 4-6 hours minimum:  Change oxygen saturation probe site  4.  Every 4-6 hours:  If on nasal continuous positive airway pressure, respiratory therapy assess nares and determine need for appliance change or resting period  Outcome: Progressing     Problem: ABCDS Injury Assessment  Goal: Absence of physical injury  Outcome: Progressing     Problem: Safety - Adult  Goal: Free from fall injury  Outcome: Progressing

## 2025-03-25 NOTE — PROGRESS NOTES
Pt states he does not eat by mouth and he eats by PEG. Per [facility] paperwork NPO.     Electronically signed by Sharon Mendoza RN on 3/24/2025 at 11:41 PM

## 2025-03-25 NOTE — FLOWSHEET NOTE
Inpatient Wound Care (initial consult) 513    Admit Date: 3/24/2025 12:14 PM    Reason for consult:  left knee, penis    Patient laying down in bed, awake, alert. Patient has right BKA. Assist of two people to roll, dependent. Son at bedside.    Significant history:  per H&P    CHIEF COMPLAINT: Hospice request     History of Present Illness: This is a pleasant 80-year-old male who presents to [facility] with the above-stated complaint.  All history has been taken from the significant other and review of medical records.  Patient resides in a rehab facility and has been chronically deconditioned and weak for quite some time due to multiple comorbidities.  Family has decided they would prefer to take him home with hospice care.  Patient's daughter-in-law works for hospice company and will providing assistance setting this up.  Until then patient has admitted for supportive care.    Past Medical History:   Diagnosis Date    Diabetes mellitus (HCC)     Diabetic foot ulcer associated with type 2 diabetes mellitus, with fat layer exposed (HCC) 9/16/2016    Diabetic peripheral neuropathy (HCC)     Hyperlipidemia     Hypertension     RLS (restless legs syndrome)      Findings:     03/25/25 1153   Wound 03/12/25 Coccyx pilonidal cyst   Date First Assessed/Time First Assessed: 03/12/25 2100   Present on Original Admission: Yes  Primary Wound Type: (c) Other (Comments)  Location: Coccyx  Wound Description (Comments): pilonidal cyst   Wound Image    Wound Etiology Other  (pilonidal cyst)   Wound Cleansed Cleansed with saline   Dressing/Treatment Open to air   Wound Length (cm) 1 cm   Wound Width (cm) 0.3 cm   Wound Depth (cm) 0.3 cm   Wound Surface Area (cm^2) 0.3 cm^2   Wound Volume (cm^3) 0.09 cm^3   Wound Assessment Pink/red   Drainage Amount Scant (moist but unmeasurable)   Drainage Description Serosanguinous   Odor None   Belén-wound Assessment Intact   Wound 02/03/25 Penis Lateral #1   Date First Assessed/Time  buttocks  Comfort glide  Wedges  Heel protectors  Low air loss bed  Chair waffle cushion  Dietary consult  Patient will need continued preventative care    Imelda Parra RN 3/25/2025 6:07 PM

## 2025-03-28 NOTE — PROGRESS NOTES
Physician Progress Note      PATIENT:               ROM PEDROZA  CSN #:                  [id number]  :                       1945  ADMIT DATE:       3/12/2025 1:31 PM  DISCH DATE:        3/19/2025 9:51 PM  RESPONDING  PROVIDER #:        Edu Spring MD          QUERY TEXT:    Pt admitted with UTI.  Pt noted to have chronic indwelling urinary catheter.   If possible, please document in the progress notes and discharge summary if   you are evaluating and/or treating any of the following:    The medical record reflects the following:  Risk Factors: chronic indwelling urinary catheter, MONTE,  Clinical Indicators: Per PN 3/13- UTI. BPH with MONTE/chronic tubbs catheter:   aware. Cont Doxazosin 8 mg daily with holding parameters. PN 3/14-5mm right   proximal ureteral stone, 2.7cm left renal stone, hydronephrosis  Will plan for surgical intervention on Monday.  Treatment: Rocephin  Options provided:  -- UTI due to chronic indwelling urinary catheter  -- UTI not due to indwelling urinary catheter  -- Other - I will add my own diagnosis  -- Disagree - Not applicable / Not valid  -- Disagree - Clinically unable to determine / Unknown  -- Refer to Clinical Documentation Reviewer    PROVIDER RESPONSE TEXT:    UTI is due to the chronic indwelling urinary catheter.    Query created by: Jose Wise on 3/14/2025 9:30 AM      Electronically signed by:  Edu Spring MD 3/28/2025 7:31 AM

## 2025-03-29 NOTE — PROGRESS NOTES
Physician Progress Note      PATIENT:               ROM PEDROZA  CSN #:                  [id number]  :                       1945  ADMIT DATE:       3/24/2025 12:14 PM  DISCH DATE:        3/25/2025 1:00 PM  RESPONDING  PROVIDER #:        Manpreet Glynn MD          QUERY TEXT:    patient admitted with failure to thrive, noted to have atrial fibrillation   maintained on Eliquis. If possible, please document in progress notes and   discharge summary if you are evaluating and/or treating any of the following:?    The medical record reflects the following:  Risk Factors: HTN, DM, M80 yrs  Clinical Indicators: H&P 3/24 by IM- Atrial fibrillation  DS 3/25/2025 by IM- Cardiovascular: normal rate, normal S1 and S2 and no   carotid bruits  Treatment: On Eliquis.    Thanks,  Mariajose Ortez [facility].  Options provided:  -- Secondary hypercoagulable state in a patient with atrial fibrillation  -- Other - I will add my own diagnosis  -- Disagree - Not applicable / Not valid  -- Disagree - Clinically unable to determine / Unknown  -- Refer to Clinical Documentation Reviewer    PROVIDER RESPONSE TEXT:    This patient has secondary hypercoagulable state in a patient with atrial   fibrillation.    Query created by: Mariajose Ortez on 3/27/2025 12:22 AM      Electronically signed by:  Manpreet Glynn MD 3/29/2025 8:06 AM

## 2025-04-22 NOTE — PROGRESS NOTES
"[facility] Gastroenterology  Pre Procedure History & Physical    Chief Complaint:   Screening    Subjective     HPI:   Screening    Past Medical History:   Past Medical History:   Diagnosis Date    Bleeding tendency     Hyperlipidemia     Hypertension     Stroke        Past Surgical History:  Past Surgical History:   Procedure Laterality Date    CERVICAL DISC ARTHROPLASTY      2/01/2024    COLONOSCOPY      2014?  polyps    THROMBECTOMY  02/01/2024    \"clot removed from brain\"    TOTAL HIP ARTHROPLASTY      2022       Family History:  Family History   Problem Relation Age of Onset    Arthritis Mother     Colon cancer Neg Hx     Colon polyps Neg Hx        Social History:   reports that he quit smoking about 35 years ago. His smoking use included cigarettes. He started smoking about 45 years ago. He has a 2.5 pack-year smoking history. He has been exposed to tobacco smoke. He has never used smokeless tobacco. He reports current alcohol use of about 6.0 standard drinks of alcohol per week. He reports that he does not currently use drugs.    Medications:   Prior to Admission medications    Medication Sig Start Date End Date Taking? Authorizing Provider   aspirin 81 MG EC tablet Take 1 tablet by mouth Daily.   Yes Provider, MD Eliud   atorvastatin (LIPITOR) 80 MG tablet Take 1 tablet by mouth Every Night. 8/6/24  Yes Shahid Dinero DO   diclofenac (VOLTAREN) 75 MG EC tablet Take 1 tablet by mouth 2 (Two) Times a Day. 3/31/25  Yes Pedro Oquendo MD   losartan (Cozaar) 50 MG tablet Take 1 tablet by mouth Daily. 12/11/24  Yes Shahid Dinero DO   methocarbamol (ROBAXIN) 750 MG tablet Take 1 tablet by mouth At Night As Needed for Muscle Spasms. 3/31/25  Yes Pedro Oquendo MD       Allergies:  Patient has no known allergies.    ROS:    General: Weight stable  Resp: No SOA  Cardiovascular: No CP    Objective     Blood pressure 147/69, pulse 55, temperature 97.9 °F (36.6 °C), temperature source " "Tympanic, resp. rate 18, height 175.3 cm (69\"), weight 101 kg (222 lb), SpO2 100%.    Physical Exam   Constitutional: Pt is oriented to person, place, and in no distress.   Cardiovascular: Normal rate, regular rhythm.    Pulmonary/Chest: Effort normal. No respiratory distress.   Abdominal: Non-distended.  Psychiatric: Mood, memory, affect and judgment appear normal.     Assessment & Plan     Diagnosis:  Screening    Anticipated Surgical Procedure:  Colonoscopy    The risks, benefits, and alternatives of this procedure have been discussed with the patient or the responsible party- the patient understands and agrees to proceed.    EMR Dragon/transcription disclaimer:  Much of this encounter note is electronic transcription/translation of spoken language to printed text.  The electronic translation of spoken language may be erroneous, or at times, nonsensical words or phrases may be inadvertently transcribed.  Although I have reviewed the note for such errors, some may still exist.  " no masses or organomegaly  Extremities: no cyanosis, no clubbing and 1+ LLE edema  Neurologic: no cranial nerve deficit and speech normal        No results for input(s): NA, K, CL, CO2, BUN, CREATININE, GLUCOSE, CALCIUM in the last 72 hours. No results for input(s): WBC, RBC, HGB, HCT, MCV, MCH, MCHC, RDW, PLT, MPV in the last 72 hours. Assessment:    Active Problems:    DM (diabetes mellitus) ([facility] [address]    HTN (hypertension)    Hyperlipidemia    Diabetic ulcer of left foot (HCC)    Cellulitis and abscess of left leg    Chronic osteomyelitis ([facility] [address]  Resolved Problems:    * No resolved hospital problems. *      Plan:    1. Cellulitis of left lower extremity and extensive venous stasis and lymphedema - IV antibiotics being managed by infectious disease - blood cultures negtive - ASO titer elevated - infestation of maggots - podiatry on - wound culture Oxidase positive Gram negative rods, Gram positive organisms and Proteus species   2. Chronic left neuropathic ulcer - wound care per podiatry - wound nurse on - H/o CRE  3. Diabetes type II, last A1c on record 2016 - recheck  A1c6.4 - continue SS  4. Essential hypertension - remains well controlled   5. Hyperlipidemia - diet control    NOTE: This report was transcribed using voice recognition software. Every effort was made to ensure accuracy; however, inadvertent computerized transcription errors may be present.     Electronically signed by Umberto Hatchet, APRN - CNP on 7/31/2018 at 9:56 AM

## (undated) DEVICE — GRADUATE TRIANG MEASURE 1000ML BLK PRNT

## (undated) DEVICE — SOLUTION SCRB 4OZ 4% CHG H2O AIDED FOR PREOPERATIVE SKIN

## (undated) DEVICE — TOWEL,OR,DSP,ST,BLUE,STD,6/PK,12PK/CS: Brand: MEDLINE

## (undated) DEVICE — TUBING, SUCTION, 3/16" X 12', STRAIGHT: Brand: MEDLINE

## (undated) DEVICE — 4-PORT MANIFOLD: Brand: NEPTUNE 2

## (undated) DEVICE — BLOCK BITE 60FR RUBBER ADLT DENTAL

## (undated) DEVICE — BINDER ABD UNISX 4 PNL PREM 6INX6INX12IN L XL 4

## (undated) DEVICE — SPONGE,DRAIN,NONWVN,4"X4",6PLY,STRL,LF: Brand: MEDLINE

## (undated) DEVICE — SOLUTION IRRIG 3000ML STRL H2O USP UROMATIC PLAS CONT

## (undated) DEVICE — GLOVE ORANGE PI 8   MSG9080

## (undated) DEVICE — SPONGE GZ W4XL4IN RAYON POLY CVR W/NONWOVEN FAB STRL 2/PK

## (undated) DEVICE — GOWN,SIRUS,FABRNF,2XL,18/CS: Brand: MEDLINE

## (undated) DEVICE — HOSE CONN FOR WST MGMT SYS NEPTUNE 2

## (undated) DEVICE — BAG DRNGE COMB PK

## (undated) DEVICE — SOLUTION IRRIG 3000ML 0.9% SOD CHL USP UROMATIC PLAS CONT

## (undated) DEVICE — CYSTO PACK: Brand: MEDLINE INDUSTRIES, INC.

## (undated) DEVICE — ENDOVIVE SFT PEG KIT PULL WENFIT 20F BX2

## (undated) DEVICE — GAUZE,SPONGE,4"X4",8PLY,STRL,LF,10/TRAY: Brand: MEDLINE

## (undated) DEVICE — 3M™ MICROPORE™ TAPE, 1530-2: Brand: 3M™ MICROPORE™